# Patient Record
Sex: MALE | Race: WHITE | Employment: UNEMPLOYED | URBAN - METROPOLITAN AREA
[De-identification: names, ages, dates, MRNs, and addresses within clinical notes are randomized per-mention and may not be internally consistent; named-entity substitution may affect disease eponyms.]

---

## 2017-01-02 DIAGNOSIS — Z48.298 AFTERCARE FOLLOWING ORGAN TRANSPLANT: ICD-10-CM

## 2017-01-02 DIAGNOSIS — Z94.0 KIDNEY REPLACED BY TRANSPLANT: ICD-10-CM

## 2017-01-02 LAB
ANION GAP SERPL CALCULATED.3IONS-SCNC: 8 MMOL/L (ref 3–14)
BUN SERPL-MCNC: 23 MG/DL (ref 7–30)
CALCIUM SERPL-MCNC: 10.8 MG/DL (ref 8.5–10.1)
CHLORIDE SERPL-SCNC: 104 MMOL/L (ref 94–109)
CO2 SERPL-SCNC: 24 MMOL/L (ref 20–32)
CREAT SERPL-MCNC: 2.12 MG/DL (ref 0.66–1.25)
DIFFERENTIAL METHOD BLD: NORMAL
EOSINOPHIL NFR BLD AUTO: 4 %
ERYTHROCYTE [DISTWIDTH] IN BLOOD BY AUTOMATED COUNT: 14.6 % (ref 10–15)
GFR SERPL CREATININE-BSD FRML MDRD: 32 ML/MIN/1.7M2
GLUCOSE SERPL-MCNC: 156 MG/DL (ref 70–99)
HCT VFR BLD AUTO: 32 % (ref 40–53)
HGB BLD-MCNC: 10.6 G/DL (ref 13.3–17.7)
LYMPHOCYTES NFR BLD AUTO: 4 %
MAGNESIUM SERPL-MCNC: 1.3 MG/DL (ref 1.6–2.3)
MCH RBC QN AUTO: 32.9 PG (ref 26.5–33)
MCHC RBC AUTO-ENTMCNC: 33.1 G/DL (ref 31.5–36.5)
MCV RBC AUTO: 99 FL (ref 78–100)
MONOCYTES NFR BLD AUTO: 19 %
NEUTROPHILS NFR BLD AUTO: 73 %
PHOSPHATE SERPL-MCNC: 2.1 MG/DL (ref 2.5–4.5)
PLATELET # BLD AUTO: 255 10E9/L (ref 150–450)
PLATELET # BLD EST: NORMAL 10*3/UL
POTASSIUM SERPL-SCNC: 4.8 MMOL/L (ref 3.4–5.3)
RBC # BLD AUTO: 3.22 10E12/L (ref 4.4–5.9)
SODIUM SERPL-SCNC: 136 MMOL/L (ref 133–144)
TACROLIMUS BLD-MCNC: 9.2 UG/L (ref 5–15)
TME LAST DOSE: NORMAL H
WBC # BLD AUTO: 5.2 10E9/L (ref 4–11)

## 2017-01-02 PROCEDURE — 80048 BASIC METABOLIC PNL TOTAL CA: CPT | Performed by: TRANSPLANT SURGERY

## 2017-01-02 PROCEDURE — 83735 ASSAY OF MAGNESIUM: CPT | Performed by: FAMILY MEDICINE

## 2017-01-02 PROCEDURE — 99000 SPECIMEN HANDLING OFFICE-LAB: CPT | Performed by: INTERNAL MEDICINE

## 2017-01-02 PROCEDURE — 85027 COMPLETE CBC AUTOMATED: CPT | Performed by: FAMILY MEDICINE

## 2017-01-02 PROCEDURE — 84100 ASSAY OF PHOSPHORUS: CPT | Performed by: FAMILY MEDICINE

## 2017-01-02 PROCEDURE — 36415 COLL VENOUS BLD VENIPUNCTURE: CPT | Performed by: INTERNAL MEDICINE

## 2017-01-02 PROCEDURE — 80197 ASSAY OF TACROLIMUS: CPT | Performed by: TRANSPLANT SURGERY

## 2017-01-02 PROCEDURE — 80180 DRUG SCRN QUAN MYCOPHENOLATE: CPT | Performed by: TRANSPLANT SURGERY

## 2017-01-03 LAB
MYCOPHENOLATE SERPL LC/MS/MS-MCNC: 3.96 MG/L (ref 1–3.5)
MYCOPHENOLATE-G SERPL LC/MS/MS-MCNC: 144.5 MG/L (ref 30–95)
TME LAST DOSE: ABNORMAL H

## 2017-01-05 ENCOUNTER — OFFICE VISIT (OUTPATIENT)
Dept: NEPHROLOGY | Facility: CLINIC | Age: 62
End: 2017-01-05
Attending: INTERNAL MEDICINE
Payer: MEDICARE

## 2017-01-05 VITALS
HEART RATE: 106 BPM | WEIGHT: 240.6 LBS | OXYGEN SATURATION: 98 % | SYSTOLIC BLOOD PRESSURE: 166 MMHG | DIASTOLIC BLOOD PRESSURE: 82 MMHG | HEIGHT: 72 IN | BODY MASS INDEX: 32.59 KG/M2 | TEMPERATURE: 98.1 F

## 2017-01-05 DIAGNOSIS — D63.1 ANEMIA IN CHRONIC RENAL DISEASE: ICD-10-CM

## 2017-01-05 DIAGNOSIS — K59.09 OTHER CONSTIPATION: Primary | ICD-10-CM

## 2017-01-05 DIAGNOSIS — Z48.298 AFTERCARE FOLLOWING ORGAN TRANSPLANT: ICD-10-CM

## 2017-01-05 DIAGNOSIS — E55.9 VITAMIN D DEFICIENCY: ICD-10-CM

## 2017-01-05 DIAGNOSIS — I15.1 HYPERTENSION SECONDARY TO OTHER RENAL DISORDERS: ICD-10-CM

## 2017-01-05 DIAGNOSIS — Z94.0 KIDNEY REPLACED BY TRANSPLANT: ICD-10-CM

## 2017-01-05 DIAGNOSIS — D84.9 IMMUNOSUPPRESSED STATUS (H): Chronic | ICD-10-CM

## 2017-01-05 DIAGNOSIS — N18.9 ANEMIA IN CHRONIC RENAL DISEASE: ICD-10-CM

## 2017-01-05 DIAGNOSIS — N25.81 SECONDARY RENAL HYPERPARATHYROIDISM (H): ICD-10-CM

## 2017-01-05 DIAGNOSIS — E83.42 HYPOMAGNESEMIA: ICD-10-CM

## 2017-01-05 PROCEDURE — 99212 OFFICE O/P EST SF 10 MIN: CPT | Mod: ZF

## 2017-01-05 RX ORDER — POLYETHYLENE GLYCOL 3350 17 G/17G
17 POWDER, FOR SOLUTION ORAL 2 TIMES DAILY
Qty: 30 PACKET | Refills: 3 | Status: SHIPPED | OUTPATIENT
Start: 2017-01-05 | End: 2017-03-02

## 2017-01-05 ASSESSMENT — ENCOUNTER SYMPTOMS
RECTAL PAIN: 0
EXERCISE INTOLERANCE: 1
PARALYSIS: 0
SPEECH CHANGE: 0
TREMORS: 0
WEIGHT LOSS: 1
ORTHOPNEA: 0
DISTURBANCES IN COORDINATION: 0
BLOATING: 0
LEG SWELLING: 0
NUMBNESS: 1
CLAUDICATION: 0
JAUNDICE: 0
POLYPHAGIA: 0
CONSTIPATION: 1
DIZZINESS: 0
SYNCOPE: 0
BOWEL INCONTINENCE: 0
FEVER: 0
PALPITATIONS: 0
BLOOD IN STOOL: 0
ABDOMINAL PAIN: 0
LEG PAIN: 0
ALTERED TEMPERATURE REGULATION: 0
DIARRHEA: 0
HALLUCINATIONS: 0
WEIGHT GAIN: 0
MEMORY LOSS: 0
LIGHT-HEADEDNESS: 0
HEARTBURN: 1
HEADACHES: 0
LOSS OF CONSCIOUSNESS: 0
SEIZURES: 0
CHILLS: 0
HYPOTENSION: 0
POLYDIPSIA: 0
WEAKNESS: 1
TACHYCARDIA: 1
INCREASED ENERGY: 1
HYPERTENSION: 1
FATIGUE: 1
NIGHT SWEATS: 0
RECTAL BLEEDING: 0
NAUSEA: 1
DECREASED APPETITE: 1
VOMITING: 1
SLEEP DISTURBANCES DUE TO BREATHING: 0
TINGLING: 1

## 2017-01-05 ASSESSMENT — PAIN SCALES - GENERAL: PAINLEVEL: MILD PAIN (2)

## 2017-01-05 NOTE — Clinical Note
1/5/2017       RE: Corey Roland  [address]  [facility] [id number]     Dear Colleague,    Thank you for referring your patient, Corey Roland, to the [facility] at [facility]. Please see a copy of my visit note below.    Assessment and Plan:  1. DDKT - baseline Cr ~ 2.0-2.2, which has been fairly stable after early slow graft function.  He is a little below his dry weight.  Mild proteinuria.  No DSA.  Creatinine is a bit higher than expected and would recommend a kidney transplant biopsy to rule out a simmering rejection.  Will make no changes in immunosuppression.  2. HTN - well controlled at target of less than 140/90.  Eventually, with patient's DM, would consider addition of ARB, but with higher serum creatinine, would hold off on changes at this time.  3. DM - good control.  4. Anemia in chronic renal disease - stable Hgb.  Iron replete.  Will follow.  5. Secondary renal hyperparathyroidism - moderately elevated PTH, which should improve post transplant.  Will continue on calcitriol and treat vitamin D deficiency.  However, if serum calcium remains elevated, would consider changing calcitriol to cinacalcet.  Will recheck PTH at 3 months post transplant.  6. Vitamin D deficiency - low vitamin D level and will continue on cholecalciferol.  7. Hyperphosphatemia - slightly low serum phosphorus and recommend increased dietary phosphorus.  Will continue on calcitriol and cholecalciferol.  8. Hypercalcemia - high serum calcium level and will stop oral calcium supplement.  If high serum calcium persists, would change calcitriol to cinacalcet.  9. Hypomagnesemia - low serum magnesium level and will start oral magnesium oxide 400 mg daily.  10. Metabolic acidosis - normal serum bicarbonate and will continue on oral sodium bicarbonate supplement.  11. Constipation - continue on senna-docusate and will start Miralax daily.  Okay to do enema at home if  "no results with bowel regimen.  12. Recommend return visit at 3 months post transplant.    Assessment and plan was discussed with patient and he voiced his understanding and agreement.    Reason for Visit:  Mr. Roland is here for routine follow up.    HPI:   Corey Roland is a 61 year old male with ESKD from DM and is status post DDKT on 11/24/16.         Transplant Hx:       Tx: DDKT  Date: 11/24/16       Present Maintenance IS: Tacrolimus and Mycophenolate mofetil       Baseline Creatinine: 2.0-2.2       Recent DSA: No  Date last checked: 12/2016       Biopsy: No    Mr. Roland reports feeling okay overall with some medical problems.  His energy level has been a bit better, but not quite normal.  He is active, although gets minimal exercise.  Denies any chest pain, but some shortness of breath with exertion.  Appetite is still \"lousy,\" as he just gets full easy  He has lost about 15 lbs, mostly water weight, since transplant.  Patient has been off furosemide now for about 2 weeks.  Occasional nausea and vomiting.  No diarrhea, but some constipation with no bowel movement for the last 4 days.  No fever, sweats or chills.  Leg swelling is much improved.    Home BP: 120-130/70s, although says it has been running higher the last few days.      ROS:   A comprehensive review of systems was obtained and negative, except as noted in the HPI or PMH.    Active Medical Problems:  Patient Active Problem List   Diagnosis     Diabetes mellitus, type 2 (H)     Hypertension secondary to other renal disorders     Anemia in chronic renal disease     Secondary renal hyperparathyroidism (H)     Obesity     Diabetic peripheral neuropathy (H)     Kidney replaced by transplant     Immunosuppressed status (H)     Type 1 diabetes mellitus with nephropathy (H)     Aftercare following organ transplant     Vitamin D deficiency     Postoperative infection     Complications, kidney transplant       Personal Hx:  Social History     Social " History     Marital Status: Single     Spouse Name: N/A     Number of Children: 0     Years of Education: N/A     Occupational History     Not on file.     Social History Main Topics     Smoking status: Former Smoker -- 2.00 packs/day for 38 years     Types: Cigarettes     Quit date: 04/01/2005     Smokeless tobacco: Not on file     Alcohol Use: 0.0 - 0.5 oz/week     0-1 Standard drinks or equivalent per week      Comment: Not since transplant     Drug Use: No      Comment: past (1980's, marijuana, heroin IV, cocaine, hallucinogens, methamphetamine)     Sexual Activity: No     Other Topics Concern     Not on file     Social History Narrative       Allergies:  No Known Allergies    Medications:  Prior to Admission medications    Medication Sig Start Date End Date Taking? Authorizing Provider   tacrolimus (PROGRAF - GENERIC EQUIVALENT) 1 MG capsule Take 5 capsules (5 mg) by mouth 2 times daily 12/4/16   Erasmo Wilkins MD   tacrolimus (PROGRAF - GENERIC EQUIVALENT) 0.5 MG capsule Take 1 capsule (0.5 mg) by mouth 2 times daily 12/3/16   Abdiaziz Qureshi MD   oxyCODONE (ROXICODONE) 5 MG IR tablet Take 1-2 tablets (5-10 mg) by mouth every 6 hours as needed for moderate to severe pain 12/2/16   Talisha Dunham PA-C   aspirin EC 81 MG EC tablet Take 1 tablet (81 mg) by mouth daily 12/2/16   Talisha Dunham PA-C   calcium carbonate (TUMS) 500 MG chewable tablet Take 1 tablet (500 mg) by mouth 2 times daily as needed for heartburn 12/2/16   Talisha Dunham PA-C   insulin aspart (NOVOLOG PEN) 100 UNIT/ML injection Inject 1 Units Subcutaneous Take with snacks or supplements for high blood sugar 1 unit per 4 grams carbohydrate 12/2/16   Talisha Dunham PA-C   insulin aspart (NOVOLOG PEN) 100 UNIT/ML injection Inject 1 Units Subcutaneous 3 times daily (with meals) DOSE:  1 units per 4 grams of carbohydrate.  Only chart total amount of units given.  Do not give if pre-prandial glucose is less than 60  mg/dL. 12/2/16   Talisha Dunham PA-C   insulin aspart (NOVOLOG PEN) 100 UNIT/ML injection Inject 1-22 Units Subcutaneous 3 times daily (before meals) Correction Scale - VERY HIGH INSULIN RESISTANCE DOSING     Do Not give Correction Insulin if Pre-Meal BG less than 250.   For Pre-Meal  - 260 give 1 unit.   For Pre-Meal -269  give 2 units.   For Pre-Meal  - 279 give 3 units.   For Pre-Meal  - 289 give 4 units.   For Pre-Meal  - 299 give 5 units.   For Pre-Meal  - 309 give 6 units.   ... 12/2/16   Talisha Dunham PA-C   insulin aspart (NOVOLOG PEN) 100 UNIT/ML injection Inject 1-16 Units Subcutaneous At Bedtime Correction Scale - VERY HIGH INSULIN RESISTANCE DOSING     Do Not give Bedtime Correction Insulin if BG less than 200.   For  - 209 give 1 units.   For  - 219 give 2 units.   For  - 229 give 3 units.   For  - 239 give 4 units.   For  - 249 give 5 units.   For  - 259 give 6 units.   For  - 269 give 7 units.   For  - 279 give 8 units  ... 12/2/16   Talisha Dunham PA-C   insulin glargine (LANTUS) 100 UNIT/ML injection Inject 16 Units Subcutaneous At Bedtime 12/2/16   Talisha Dunham PA-C   insulin glargine (LANTUS) 100 UNIT/ML injection Inject 34 Units Subcutaneous every morning 12/3/16   Talisha Dunham PA-C   atorvastatin (LIPITOR) 20 MG tablet Take 0.5 tablets (10 mg) by mouth daily 12/2/16   Talisha Dunham PA-C   cloNIDine (CATAPRES) 0.1 MG tablet Take 1 tablet (0.1 mg) by mouth every 8 hours as needed (Please take for SBP >180.)  Patient taking differently: Take 0.2 mg by mouth every 8 hours as needed (Please take for SBP >180.)  12/2/16   Talisha Dunham PA-C   valACYclovir (VALTREX) 500 MG tablet Take 1 tablet (500 mg) by mouth daily 12/2/16   Talisha Dunham PA-C   carvedilol (COREG) 25 MG tablet Take 1 tablet (25 mg) by mouth 2 times daily (with meals) 12/2/16   Talisha Dunham  GLADIS Jessica   furosemide (LASIX) 80 MG tablet Take 1 tablet (80 mg) by mouth 2 times daily 12/2/16   Talisha Dunham PA-C   senna-docusate (SENOKOT-S;PERICOLACE) 8.6-50 MG per tablet Take 2 tablets by mouth 2 times daily 12/2/16   Talisha Dunham PA-C   sulfamethoxazole-trimethoprim (BACTRIM/SEPTRA) 400-80 MG per tablet Take 1 tablet by mouth three times a week 12/2/16   Talisha Dunham PA-C   mycophenolate (CELLCEPT - GENERIC EQUIVALENT) 250 MG capsule Take 4 capsules (1,000 mg) by mouth 2 times daily 12/2/16   Talisha Dunham PA-C   calcitRIOL (ROCALTROL) 0.25 MCG capsule Take 1 capsule (0.25 mcg) by mouth daily 12/2/16   Talisha Dunham PA-C   amoxicillin (AMOXIL) 500 MG capsule Take 1 capsule (500 mg) by mouth daily for 10 days 12/2/16 12/12/16  Talisha Dunham PA-C   pantoprazole (PROTONIX) 40 MG EC tablet Take 1 tablet (40 mg) by mouth daily 12/2/16   Talisha Dunham PA-C   NIFEdipine ER (ADALAT CC) 30 MG TB24 Take 2 tablets (60 mg) by mouth 2 times daily 12/2/16   Talisha Dunham PA-C   betamethasone dipropionate (DIPROSONE) 0.05 % ointment Apply topically 2 times daily    Reported, Patient       Vitals:  /82 mmHg  Pulse 106  Temp(Src) 98.1  F (36.7  C) (Oral)  Ht 1.829 m (6')  Wt 109.135 kg (240 lb 9.6 oz)  BMI 32.62 kg/m2  SpO2 98%    Exam:   GENERAL APPEARANCE: alert and no distress  HENT: mouth without ulcers or lesions  LYMPHATICS: no cervical or supraclavicular nodes  RESP: lungs clear to auscultation - no rales, rhonchi or wheezes  CV: regular rhythm, normal rate, no rub, no murmur  EDEMA: trace LE edema bilaterally  ABDOMEN: soft, nondistended, nontender, bowel sounds normal, overweight  MS: extremities normal - no gross deformities noted, no evidence of inflammation in joints, no muscle tenderness  SKIN: no rash  TX KIDNEY: minimal TTP, decreased erythema, no discharge    Results:   Reviewed.    Sincerely,    Abdiaziz Qureshi MD

## 2017-01-05 NOTE — MR AVS SNAPSHOT
After Visit Summary   1/5/2017    Corey Roland    MRN: [id number]           Patient Information     Date Of Birth          1955        Visit Information        Provider Department      1/5/2017 1:25 PM Abdiaziz Qureshi MD [facility] Nephrology        Today's Diagnoses     Other constipation    -  1     Hypomagnesemia            Follow-ups after your visit        Your next 10 appointments already scheduled     Jan 06, 2017  7:30 AM   LAB with CP LAB   [facility] ([facility])    [address] [address]   [phone]           Patient must bring picture ID.  Patient should be prepared to give a urine specimen  Please do not eat 10-12 hours before your appointment if you are coming in fasting for labs on lipids, cholesterol, or glucose (sugar).  Pregnant women should follow their Care Team instructions. Water with medications is okay. Do not drink coffee or other fluids.   If you have concerns about taking  your medications, please ask at office or if scheduling via [employer], send a message by clicking on Secure Messaging, Message Your Care Team.            Jan 09, 2017  7:30 AM   LAB with CP LAB   [facility] ([facility])    [address] [address]   [phone]           Patient must bring picture ID.  Patient should be prepared to give a urine specimen  Please do not eat 10-12 hours before your appointment if you are coming in fasting for labs on lipids, cholesterol, or glucose (sugar).  Pregnant women should follow their Care Team instructions. Water with medications is okay. Do not drink coffee or other fluids.   If you have concerns about taking  your medications, please ask at office or if scheduling via [employer], send a message by clicking on Secure Messaging, Message Your Care Team.            Jan 13, 2017  7:45 AM    LAB with CP LAB   [facility]    [address] [address]   [phone]           Patient must bring picture ID.  Patient should be prepared to give a urine specimen  Please do not eat 10-12 hours before your appointment if you are coming in fasting for labs on lipids, cholesterol, or glucose (sugar).  Pregnant women should follow their Care Team instructions. Water with medications is okay. Do not drink coffee or other fluids.   If you have concerns about taking  your medications, please ask at office or if scheduling via [employer], send a message by clicking on Secure Messaging, Message Your Care Team.            Jan 16, 2017  7:30 AM   LAB with CP LAB   [facility]    [address] [address]   [phone]           Patient must bring picture ID.  Patient should be prepared to give a urine specimen  Please do not eat 10-12 hours before your appointment if you are coming in fasting for labs on lipids, cholesterol, or glucose (sugar).  Pregnant women should follow their Care Team instructions. Water with medications is okay. Do not drink coffee or other fluids.   If you have concerns about taking  your medications, please ask at office or if scheduling via [employer], send a message by clicking on Secure Messaging, Message Your Care Team.            Jan 20, 2017  7:30 AM   LAB with CP LAB   [facility]    [address] [address]   [phone]           Patient must bring picture ID.  Patient should be prepared to give a urine specimen  Please do not eat 10-12 hours before your appointment if you are coming in fasting for labs on lipids, cholesterol, or glucose (sugar).  Pregnant women should follow their Care Team instructions. Water  with medications is okay. Do not drink coffee or other fluids.   If you have concerns about taking  your medications, please ask at office or if scheduling via [employer], send a message by clicking on Secure Messaging, Message Your Care Team.            Jan 23, 2017  7:30 AM   LAB with CP LAB   [facility] ([facility])    [address] [address]   [phone]           Patient must bring picture ID.  Patient should be prepared to give a urine specimen  Please do not eat 10-12 hours before your appointment if you are coming in fasting for labs on lipids, cholesterol, or glucose (sugar).  Pregnant women should follow their Care Team instructions. Water with medications is okay. Do not drink coffee or other fluids.   If you have concerns about taking  your medications, please ask at office or if scheduling via [employer], send a message by clicking on Secure Messaging, Message Your Care Team.            Jan 27, 2017  7:30 AM   LAB with CP LAB   [facility] ([facility])    [address] [address]   [phone]           Patient must bring picture ID.  Patient should be prepared to give a urine specimen  Please do not eat 10-12 hours before your appointment if you are coming in fasting for labs on lipids, cholesterol, or glucose (sugar).  Pregnant women should follow their Care Team instructions. Water with medications is okay. Do not drink coffee or other fluids.   If you have concerns about taking  your medications, please ask at office or if scheduling via [employer], send a message by clicking on Secure Messaging, Message Your Care Team.            Jan 30, 2017  7:30 AM   LAB with CP LAB   [facility] ([facility])    [address] [address]   [phone]           Patient must  bring picture ID.  Patient should be prepared to give a urine specimen  Please do not eat 10-12 hours before your appointment if you are coming in fasting for labs on lipids, cholesterol, or glucose (sugar).  Pregnant women should follow their Care Team instructions. Water with medications is okay. Do not drink coffee or other fluids.   If you have concerns about taking  your medications, please ask at office or if scheduling via [employer], send a message by clicking on Secure Messaging, Message Your Care Team.            Feb 03, 2017  7:30 AM   LAB with CP LAB   [facility]    [address] [id number] [phone]           Patient must bring picture ID.  Patient should be prepared to give a urine specimen  Please do not eat 10-12 hours before your appointment if you are coming in fasting for labs on lipids, cholesterol, or glucose (sugar).  Pregnant women should follow their Care Team instructions. Water with medications is okay. Do not drink coffee or other fluids.   If you have concerns about taking  your medications, please ask at office or if scheduling via [employer], send a message by clicking on Secure Messaging, Message Your Care Team.            Mar 02, 2017  1:25 PM   (Arrive by 12:55 PM)   Return Kidney Transplant with Abdiaziz Qureshi MD   [facility]ph[facility] ([facility])    [address] [place] [address] [phone]              Who to contact     If you have questions or need follow up information about today's clinic visit or your schedule please contact [facility] NEPHROLOGY directly at [phone].  Normal or non-critical lab and imaging results will be communicated to you by MyChart, letter or phone within 4 business days after the clinic has received the results. If you do not hear from us within 7 days, please contact the clinic through [employer]  or phone. If you have a critical or abnormal lab result, we will notify you by phone as soon as possible.  Submit refill requests through [employer] or call your pharmacy and they will forward the refill request to us. Please allow 3 business days for your refill to be completed.          Additional Information About Your Visit        [employer] Information     [employer] gives you secure access to your electronic health record. If you see a primary care provider, you can also send messages to your care team and make appointments. If you have questions, please call your primary care clinic.  If you do not have a primary care provider, please call [phone] and they will assist you.        Care EveryWhere ID     This is your Care EveryWhere ID. This could be used by other organizations to access your [facility] medical records  [id number]        Your Vitals Were     Pulse Temperature Height BMI (Body Mass Index) Pulse Oximetry       106 98.1  F (36.7  C) (Oral) 1.829 m (6') 32.62 kg/m2 98%        Blood Pressure from Last 3 Encounters:   01/05/17 166/82   12/23/16 166/68   12/22/16 117/73    Weight from Last 3 Encounters:   01/05/17 109.135 kg (240 lb 9.6 oz)   12/16/16 115.5 kg (254 lb 10.1 oz)   12/12/16 116.212 kg (256 lb 3.2 oz)              Today, you had the following     No orders found for display         Today's Medication Changes          These changes are accurate as of: 1/5/17  2:09 PM.  If you have any questions, ask your nurse or doctor.               Start taking these medicines.        Dose/Directions    magnesium oxide 400 (241.3 MG) MG tablet   Commonly known as:  MAG-OX   Used for:  Hypomagnesemia   Started by:  Abdiaziz Qureshi MD        Dose:  400 mg   Take 1 tablet (400 mg) by mouth daily   Quantity:  90 tablet   Refills:  3       polyethylene glycol Packet   Commonly known as:  MIRALAX/GLYCOLAX   Used for:  Other constipation   Started by:  Abdiaziz Qureshi MD        Dose:  17 g   Take 17  g by mouth 2 times daily   Quantity:  30 packet   Refills:  3         These medicines have changed or have updated prescriptions.        Dose/Directions    * insulin glargine 100 UNIT/ML injection   Commonly known as:  LANTUS   This may have changed:  how much to take   Used for:  Kidney transplant recipient        Dose:  16 Units   Inject 16 Units Subcutaneous At Bedtime   Refills:  0       * insulin glargine 100 UNIT/ML injection   Commonly known as:  LANTUS   This may have changed:  how much to take   Used for:  Kidney transplant recipient        Dose:  34 Units   Inject 34 Units Subcutaneous every morning   Refills:  0       * Notice:  This list has 2 medication(s) that are the same as other medications prescribed for you. Read the directions carefully, and ask your doctor or other care provider to review them with you.      Stop taking these medicines if you haven't already. Please contact your care team if you have questions.     calcium carbonate 500 MG chewable tablet   Commonly known as:  TUMS   Stopped by:  Abdiaziz Qureshi MD           furosemide 20 MG tablet   Commonly known as:  LASIX   Stopped by:  Abdiaziz Qureshi MD           oxyCODONE 5 MG IR tablet   Commonly known as:  ROXICODONE   Stopped by:  Abdiaziz Qureshi MD                Where to get your medicines      These medications were sent to [id number] - [place], [place] - [address]  [address] [address]     Phone:  [phone]    - magnesium oxide 400 (241.3 MG) MG tablet  - polyethylene glycol Packet             Primary Care Provider Office Phone # Fax #    Dustin Tadeo [phone]       [facility] [address] [place] [id number]        Thank you!     Thank you for choosing [facility] NEPHROLOGY  for your care. Our goal is always to provide you with excellent care. Hearing back from our patients is one way we can continue to improve our  services. Please take a few minutes to complete the written survey that you may receive in the mail after your visit with us. Thank you!             Your Updated Medication List - Protect others around you: Learn how to safely use, store and throw away your medicines at www.disposemymeds.org.          This list is accurate as of: 1/5/17  2:09 PM.  Always use your most recent med list.                   Brand Name Dispense Instructions for use    aspirin 81 MG EC tablet     30 tablet    Take 1 tablet (81 mg) by mouth daily       atorvastatin 20 MG tablet    LIPITOR    30 tablet    Take 0.5 tablets (10 mg) by mouth daily       betamethasone dipropionate 0.05 % ointment    DIPROSONE     Apply topically 2 times daily       calcitRIOL 0.25 MCG capsule    ROCALTROL    60 capsule    Take 1 capsule (0.25 mcg) by mouth daily       carvedilol 25 MG tablet    COREG    60 tablet    Take 1 tablet (25 mg) by mouth 2 times daily (with meals)       cloNIDine 0.1 MG tablet    CATAPRES    90 tablet    Take 3 tablets (0.3 mg) by mouth every 8 hours as needed (Please take for SBP >180.)       * insulin aspart 100 UNIT/ML injection    NovoLOG PEN    1 mL    Inject 1 Units Subcutaneous Take with snacks or supplements for high blood sugar 1 unit per 4 grams carbohydrate       * insulin aspart 100 UNIT/ML injection    NovoLOG PEN     Inject 1 Units Subcutaneous 3 times daily (with meals) DOSE:  1 units per 4 grams of carbohydrate.  Only chart total amount of units given.  Do not give if pre-prandial glucose is less than 60 mg/dL.       * insulin aspart 100 UNIT/ML injection    NovoLOG PEN     Inject 1-22 Units Subcutaneous 3 times daily (before meals) Correction Scale - VERY HIGH INSULIN RESISTANCE DOSING    Do Not give Correction Insulin if Pre-Meal BG less than 250.  For Pre-Meal  - 260 give 1 unit.  For Pre-Meal -269  give 2 units.  For Pre-Meal  - 279 give 3 units.  For Pre-Meal  - 289 give 4 units.  For  Pre-Meal  - 299 give 5 units.  For Pre-Meal  - 309 give 6 units.  ...       * insulin aspart 100 UNIT/ML injection    NovoLOG PEN     Inject 1-16 Units Subcutaneous At Bedtime Correction Scale - VERY HIGH INSULIN RESISTANCE DOSING    Do Not give Bedtime Correction Insulin if BG less than 200.  For  - 209 give 1 units.  For  - 219 give 2 units.  For  - 229 give 3 units.  For  - 239 give 4 units.  For  - 249 give 5 units.  For  - 259 give 6 units.  For  - 269 give 7 units.  For  - 279 give 8 units ...       * insulin glargine 100 UNIT/ML injection    LANTUS     Inject 16 Units Subcutaneous At Bedtime       * insulin glargine 100 UNIT/ML injection    LANTUS     Inject 34 Units Subcutaneous every morning       magnesium oxide 400 (241.3 MG) MG tablet    MAG-OX    90 tablet    Take 1 tablet (400 mg) by mouth daily       mycophenolate 250 MG capsule    CELLCEPT - GENERIC EQUIVALENT    180 capsule    Take 3 capsules (750 mg) by mouth 2 times daily       NIFEdipine ER 30 MG Tb24    ADALAT CC    30 tablet    Take 2 tablets (60 mg) by mouth 2 times daily       omeprazole 20 MG tablet     30 tablet    Take 1 tablet (20 mg) by mouth every morning       polyethylene glycol Packet    MIRALAX/GLYCOLAX    30 packet    Take 17 g by mouth 2 times daily       senna-docusate 8.6-50 MG per tablet    SENOKOT-S;PERICOLACE    60 tablet    Take 2 tablets by mouth 2 times daily       sodium bicarbonate 650 MG tablet     60 tablet    Take 1 tablet (650 mg) by mouth 2 times daily       sulfamethoxazole-trimethoprim 400-80 MG per tablet    BACTRIM/SEPTRA    30 tablet    Take 1 tablet by mouth three times a week       tacrolimus 1 MG capsule    PROGRAF - GENERIC EQUIVALENT    480 capsule    Take 8 capsules (8 mg) by mouth every 12 hours       valACYclovir 500 MG tablet    VALTREX    30 tablet    Take 1 tablet (500 mg) by mouth daily       * Notice:  This list has 6 medication(s) that  are the same as other medications prescribed for you. Read the directions carefully, and ask your doctor or other care provider to review them with you.

## 2017-01-05 NOTE — Clinical Note
1/5/2017      RE: Corey Roland  [address]  [facility] [id number]       Assessment and Plan:  1. DDKT - baseline Cr ~ 2.0-2.2, which has been fairly stable after early slow graft function.  He is a little below his dry weight.  Mild proteinuria.  No DSA.  Creatinine is a bit higher than expected and would recommend a kidney transplant biopsy to rule out a simmering rejection.  Will make no changes in immunosuppression.  2. HTN - well controlled at target of less than 140/90.  Eventually, with patient's DM, would consider addition of ARB, but with higher serum creatinine, would hold off on changes at this time.  3. DM - good control.  4. Anemia in chronic renal disease - stable Hgb.  Iron replete.  Will follow.  5. Secondary renal hyperparathyroidism - moderately elevated PTH, which should improve post transplant.  Will continue on calcitriol and treat vitamin D deficiency.  However, if serum calcium remains elevated, would consider changing calcitriol to cinacalcet.  Will recheck PTH at 3 months post transplant.  6. Vitamin D deficiency - low vitamin D level and will continue on cholecalciferol.  7. Hyperphosphatemia - slightly low serum phosphorus and recommend increased dietary phosphorus.  Will continue on calcitriol and cholecalciferol.  8. Hypercalcemia - high serum calcium level and will stop oral calcium supplement.  If high serum calcium persists, would change calcitriol to cinacalcet.  9. Hypomagnesemia - low serum magnesium level and will start oral magnesium oxide 400 mg daily.  10. Metabolic acidosis - normal serum bicarbonate and will continue on oral sodium bicarbonate supplement.  11. Constipation - continue on senna-docusate and will start Miralax daily.  Okay to do enema at home if no results with bowel regimen.  12. Recommend return visit at 3 months post transplant.    Assessment and plan was discussed with patient and he voiced his understanding and agreement.    Reason for  "Visit:  Mr. Roland is here for routine follow up.    HPI:   Corey Magdaleno Roland is a 61 year old male with ESKD from DM and is status post DDKT on 11/24/16.         Transplant Hx:       Tx: DDKT  Date: 11/24/16       Present Maintenance IS: Tacrolimus and Mycophenolate mofetil       Baseline Creatinine: 2.0-2.2       Recent DSA: No  Date last checked: 12/2016       Biopsy: No    Mr. Roland reports feeling okay overall with some medical problems.  His energy level has been a bit better, but not quite normal.  He is active, although gets minimal exercise.  Denies any chest pain, but some shortness of breath with exertion.  Appetite is still \"lousy,\" as he just gets full easy  He has lost about 15 lbs, mostly water weight, since transplant.  Patient has been off furosemide now for about 2 weeks.  Occasional nausea and vomiting.  No diarrhea, but some constipation with no bowel movement for the last 4 days.  No fever, sweats or chills.  Leg swelling is much improved.    Home BP: 120-130/70s, although says it has been running higher the last few days.      ROS:   A comprehensive review of systems was obtained and negative, except as noted in the HPI or PMH.    Active Medical Problems:  Patient Active Problem List   Diagnosis     Diabetes mellitus, type 2 (H)     Hypertension secondary to other renal disorders     Anemia in chronic renal disease     Secondary renal hyperparathyroidism (H)     Obesity     Diabetic peripheral neuropathy (H)     Kidney replaced by transplant     Immunosuppressed status (H)     Type 1 diabetes mellitus with nephropathy (H)     Aftercare following organ transplant     Vitamin D deficiency     Postoperative infection     Complications, kidney transplant       Personal Hx:  Social History     Social History     Marital Status: Single     Spouse Name: N/A     Number of Children: 0     Years of Education: N/A     Occupational History     Not on file.     Social History Main Topics     Smoking " status: Former Smoker -- 2.00 packs/day for 38 years     Types: Cigarettes     Quit date: 04/01/2005     Smokeless tobacco: Not on file     Alcohol Use: 0.0 - 0.5 oz/week     0-1 Standard drinks or equivalent per week      Comment: Not since transplant     Drug Use: No      Comment: past (1980's, marijuana, heroin IV, cocaine, hallucinogens, methamphetamine)     Sexual Activity: No     Other Topics Concern     Not on file     Social History Narrative       Allergies:  No Known Allergies    Medications:  Prior to Admission medications    Medication Sig Start Date End Date Taking? Authorizing Provider   tacrolimus (PROGRAF - GENERIC EQUIVALENT) 1 MG capsule Take 5 capsules (5 mg) by mouth 2 times daily 12/4/16   Erasmo Wilkins MD   tacrolimus (PROGRAF - GENERIC EQUIVALENT) 0.5 MG capsule Take 1 capsule (0.5 mg) by mouth 2 times daily 12/3/16   Abdiaziz Qureshi MD   oxyCODONE (ROXICODONE) 5 MG IR tablet Take 1-2 tablets (5-10 mg) by mouth every 6 hours as needed for moderate to severe pain 12/2/16   Talisha Dunham PA-C   aspirin EC 81 MG EC tablet Take 1 tablet (81 mg) by mouth daily 12/2/16   Talisha Dunham PA-C   calcium carbonate (TUMS) 500 MG chewable tablet Take 1 tablet (500 mg) by mouth 2 times daily as needed for heartburn 12/2/16   Talisha Dunham PA-C   insulin aspart (NOVOLOG PEN) 100 UNIT/ML injection Inject 1 Units Subcutaneous Take with snacks or supplements for high blood sugar 1 unit per 4 grams carbohydrate 12/2/16   Talisha Dunham PA-C   insulin aspart (NOVOLOG PEN) 100 UNIT/ML injection Inject 1 Units Subcutaneous 3 times daily (with meals) DOSE:  1 units per 4 grams of carbohydrate.  Only chart total amount of units given.  Do not give if pre-prandial glucose is less than 60 mg/dL. 12/2/16   Talisha Dunham PA-C   insulin aspart (NOVOLOG PEN) 100 UNIT/ML injection Inject 1-22 Units Subcutaneous 3 times daily (before meals) Correction Scale - VERY HIGH INSULIN  RESISTANCE DOSING     Do Not give Correction Insulin if Pre-Meal BG less than 250.   For Pre-Meal  - 260 give 1 unit.   For Pre-Meal -269  give 2 units.   For Pre-Meal  - 279 give 3 units.   For Pre-Meal  - 289 give 4 units.   For Pre-Meal  - 299 give 5 units.   For Pre-Meal  - 309 give 6 units.   ... 12/2/16   Talisha Dunham PA-C   insulin aspart (NOVOLOG PEN) 100 UNIT/ML injection Inject 1-16 Units Subcutaneous At Bedtime Correction Scale - VERY HIGH INSULIN RESISTANCE DOSING     Do Not give Bedtime Correction Insulin if BG less than 200.   For  - 209 give 1 units.   For  - 219 give 2 units.   For  - 229 give 3 units.   For  - 239 give 4 units.   For  - 249 give 5 units.   For  - 259 give 6 units.   For  - 269 give 7 units.   For  - 279 give 8 units  ... 12/2/16   Talisha Dunham PA-C   insulin glargine (LANTUS) 100 UNIT/ML injection Inject 16 Units Subcutaneous At Bedtime 12/2/16   Talisha Dunham PA-C   insulin glargine (LANTUS) 100 UNIT/ML injection Inject 34 Units Subcutaneous every morning 12/3/16   Talisha Dunham PA-C   atorvastatin (LIPITOR) 20 MG tablet Take 0.5 tablets (10 mg) by mouth daily 12/2/16   Talisha Dunham PA-C   cloNIDine (CATAPRES) 0.1 MG tablet Take 1 tablet (0.1 mg) by mouth every 8 hours as needed (Please take for SBP >180.)  Patient taking differently: Take 0.2 mg by mouth every 8 hours as needed (Please take for SBP >180.)  12/2/16   Talisha Dunham PA-C   valACYclovir (VALTREX) 500 MG tablet Take 1 tablet (500 mg) by mouth daily 12/2/16   Talisha Dunham PA-C   carvedilol (COREG) 25 MG tablet Take 1 tablet (25 mg) by mouth 2 times daily (with meals) 12/2/16   Talisha Dunham PA-C   furosemide (LASIX) 80 MG tablet Take 1 tablet (80 mg) by mouth 2 times daily 12/2/16   Talisha Dunham PA-C   senna-docusate (SENOKOT-S;PERICOLACE) 8.6-50 MG per tablet Take 2  tablets by mouth 2 times daily 12/2/16   Talisha Dunham PA-C   sulfamethoxazole-trimethoprim (BACTRIM/SEPTRA) 400-80 MG per tablet Take 1 tablet by mouth three times a week 12/2/16   Talisha Dunham PA-C   mycophenolate (CELLCEPT - GENERIC EQUIVALENT) 250 MG capsule Take 4 capsules (1,000 mg) by mouth 2 times daily 12/2/16   Talisha Dunham PA-C   calcitRIOL (ROCALTROL) 0.25 MCG capsule Take 1 capsule (0.25 mcg) by mouth daily 12/2/16   Talisha Dunham PA-C   amoxicillin (AMOXIL) 500 MG capsule Take 1 capsule (500 mg) by mouth daily for 10 days 12/2/16 12/12/16  Talisha Dunham PA-C   pantoprazole (PROTONIX) 40 MG EC tablet Take 1 tablet (40 mg) by mouth daily 12/2/16   Talisha Dunham PA-C   NIFEdipine ER (ADALAT CC) 30 MG TB24 Take 2 tablets (60 mg) by mouth 2 times daily 12/2/16   Talisha Dunham PA-C   betamethasone dipropionate (DIPROSONE) 0.05 % ointment Apply topically 2 times daily    Reported, Patient       Vitals:  /82 mmHg  Pulse 106  Temp(Src) 98.1  F (36.7  C) (Oral)  Ht 1.829 m (6')  Wt 109.135 kg (240 lb 9.6 oz)  BMI 32.62 kg/m2  SpO2 98%    Exam:   GENERAL APPEARANCE: alert and no distress  HENT: mouth without ulcers or lesions  LYMPHATICS: no cervical or supraclavicular nodes  RESP: lungs clear to auscultation - no rales, rhonchi or wheezes  CV: regular rhythm, normal rate, no rub, no murmur  EDEMA: trace LE edema bilaterally  ABDOMEN: soft, nondistended, nontender, bowel sounds normal, overweight  MS: extremities normal - no gross deformities noted, no evidence of inflammation in joints, no muscle tenderness  SKIN: no rash  TX KIDNEY: minimal TTP, decreased erythema, no discharge    Results:   Reviewed.    Abdiaziz Qureshi MD

## 2017-01-05 NOTE — NURSING NOTE
Chief Complaint   Patient presents with     RECHECK     1 mo post op       Initial /82 mmHg  Pulse 106  Temp(Src) 98.1  F (36.7  C) (Oral)  Ht 1.829 m (6')  Wt 109.135 kg (240 lb 9.6 oz)  BMI 32.62 kg/m2  SpO2 98% Estimated body mass index is 32.62 kg/(m^2) as calculated from the following:    Height as of this encounter: 1.829 m (6').    Weight as of this encounter: 109.135 kg (240 lb 9.6 oz).  BP completed using cuff size: regular

## 2017-01-06 ENCOUNTER — TELEPHONE (OUTPATIENT)
Dept: TRANSPLANT | Facility: CLINIC | Age: 62
End: 2017-01-06

## 2017-01-06 DIAGNOSIS — Z94.0 KIDNEY REPLACED BY TRANSPLANT: ICD-10-CM

## 2017-01-06 DIAGNOSIS — Z48.298 AFTERCARE FOLLOWING ORGAN TRANSPLANT: ICD-10-CM

## 2017-01-06 LAB
ANION GAP SERPL CALCULATED.3IONS-SCNC: 10 MMOL/L (ref 3–14)
BUN SERPL-MCNC: 19 MG/DL (ref 7–30)
CALCIUM SERPL-MCNC: 10.6 MG/DL (ref 8.5–10.1)
CHLORIDE SERPL-SCNC: 106 MMOL/L (ref 94–109)
CO2 SERPL-SCNC: 20 MMOL/L (ref 20–32)
CREAT SERPL-MCNC: 2.04 MG/DL (ref 0.66–1.25)
ERYTHROCYTE [DISTWIDTH] IN BLOOD BY AUTOMATED COUNT: 14.6 % (ref 10–15)
GFR SERPL CREATININE-BSD FRML MDRD: 33 ML/MIN/1.7M2
GLUCOSE SERPL-MCNC: 209 MG/DL (ref 70–99)
HCT VFR BLD AUTO: 33.9 % (ref 40–53)
HGB BLD-MCNC: 11.2 G/DL (ref 13.3–17.7)
MCH RBC QN AUTO: 32.7 PG (ref 26.5–33)
MCHC RBC AUTO-ENTMCNC: 33 G/DL (ref 31.5–36.5)
MCV RBC AUTO: 99 FL (ref 78–100)
PLATELET # BLD AUTO: 220 10E9/L (ref 150–450)
POTASSIUM SERPL-SCNC: 5 MMOL/L (ref 3.4–5.3)
RBC # BLD AUTO: 3.42 10E12/L (ref 4.4–5.9)
SODIUM SERPL-SCNC: 136 MMOL/L (ref 133–144)
TACROLIMUS BLD-MCNC: 13.6 UG/L (ref 5–15)
TME LAST DOSE: NORMAL H
WBC # BLD AUTO: 9.2 10E9/L (ref 4–11)

## 2017-01-06 PROCEDURE — 84100 ASSAY OF PHOSPHORUS: CPT | Performed by: FAMILY MEDICINE

## 2017-01-06 PROCEDURE — 85027 COMPLETE CBC AUTOMATED: CPT | Performed by: INTERNAL MEDICINE

## 2017-01-06 PROCEDURE — 83735 ASSAY OF MAGNESIUM: CPT | Performed by: FAMILY MEDICINE

## 2017-01-06 PROCEDURE — 36415 COLL VENOUS BLD VENIPUNCTURE: CPT | Performed by: TRANSPLANT SURGERY

## 2017-01-06 PROCEDURE — 80048 BASIC METABOLIC PNL TOTAL CA: CPT | Performed by: TRANSPLANT SURGERY

## 2017-01-06 PROCEDURE — 80197 ASSAY OF TACROLIMUS: CPT | Performed by: TRANSPLANT SURGERY

## 2017-01-06 NOTE — TELEPHONE ENCOUNTER
----- Message from Abdiaziz Qureshi MD sent at 1/5/2017  2:55 PM CST -----  Regarding: RE: I thought he might need a bx - Cr never below 2 -- Issue with hydration and bg   Sorry, already saw him.  He is very constipated with no bowel movement since Sunday.  I added Miralax to his senna/docusate.  He is also supposed to use an enema tonight.  Please check in with him tomorrow about whether things are moving.    You can tell him I said it was okay to shower.  His incision looks fine.    He also needs to stop his aspirin prior to biopsy.  I talked to him about the biopsy too.    Thanks.  ----- Message -----     From: Tayla Collado, RN     Sent: 1/5/2017   1:17 PM       To: Abdiaziz Qureshi MD  Subject: I thought he might need a bx - Cr never belo#    ----- Message -----     From: Abdiaziz Qureshi MD     Sent: 1/5/2017  11:28 AM       To: Fernie Chan MD, ELVIA Arriaza,    I am seeing Mr. Roland in clinic today.  His creatinine has come down to 2.0-2.2 range.  No DSA and negative XM.    He is a big gentleman, but I think his creatinine should be better.    Any reason you think we don't need to biopsy him?    Frank

## 2017-01-09 DIAGNOSIS — Z94.0 KIDNEY REPLACED BY TRANSPLANT: ICD-10-CM

## 2017-01-09 DIAGNOSIS — Z94.0 KIDNEY TRANSPLANT RECIPIENT: Primary | ICD-10-CM

## 2017-01-09 DIAGNOSIS — Z48.298 AFTERCARE FOLLOWING ORGAN TRANSPLANT: ICD-10-CM

## 2017-01-09 LAB
ANION GAP SERPL CALCULATED.3IONS-SCNC: 8 MMOL/L (ref 3–14)
ANISOCYTOSIS BLD QL SMEAR: SLIGHT
BASOPHILS NFR BLD AUTO: 1 %
BUN SERPL-MCNC: 18 MG/DL (ref 7–30)
CALCIUM SERPL-MCNC: 9.9 MG/DL (ref 8.5–10.1)
CHLORIDE SERPL-SCNC: 109 MMOL/L (ref 94–109)
CO2 SERPL-SCNC: 22 MMOL/L (ref 20–32)
CREAT SERPL-MCNC: 1.84 MG/DL (ref 0.66–1.25)
DIFFERENTIAL METHOD BLD: NORMAL
EOSINOPHIL NFR BLD AUTO: 4 %
ERYTHROCYTE [DISTWIDTH] IN BLOOD BY AUTOMATED COUNT: 14.3 % (ref 10–15)
GFR SERPL CREATININE-BSD FRML MDRD: 38 ML/MIN/1.7M2
GLUCOSE SERPL-MCNC: 138 MG/DL (ref 70–99)
HCT VFR BLD AUTO: 33.9 % (ref 40–53)
HGB BLD-MCNC: 11.1 G/DL (ref 13.3–17.7)
LYMPHOCYTES NFR BLD AUTO: 4 %
MAGNESIUM SERPL-MCNC: 1.8 MG/DL (ref 1.6–2.3)
MCH RBC QN AUTO: 32.1 PG (ref 26.5–33)
MCHC RBC AUTO-ENTMCNC: 32.7 G/DL (ref 31.5–36.5)
MCV RBC AUTO: 98 FL (ref 78–100)
MONOCYTES NFR BLD AUTO: 12 %
NEUTROPHILS NFR BLD AUTO: 79 %
PHOSPHATE SERPL-MCNC: 2 MG/DL (ref 2.5–4.5)
PLATELET # BLD AUTO: 229 10E9/L (ref 150–450)
PLATELET # BLD EST: NORMAL 10*3/UL
POLYCHROMASIA BLD QL SMEAR: NORMAL
POTASSIUM SERPL-SCNC: 4.6 MMOL/L (ref 3.4–5.3)
RBC # BLD AUTO: 3.46 10E12/L (ref 4.4–5.9)
SODIUM SERPL-SCNC: 139 MMOL/L (ref 133–144)
STOMATOCYTES BLD QL SMEAR: SLIGHT
TACROLIMUS BLD-MCNC: 10.4 UG/L (ref 5–15)
TME LAST DOSE: NORMAL H
WBC # BLD AUTO: 4.8 10E9/L (ref 4–11)

## 2017-01-09 PROCEDURE — 85027 COMPLETE CBC AUTOMATED: CPT | Performed by: INTERNAL MEDICINE

## 2017-01-09 PROCEDURE — 80048 BASIC METABOLIC PNL TOTAL CA: CPT | Performed by: TRANSPLANT SURGERY

## 2017-01-09 PROCEDURE — 99000 SPECIMEN HANDLING OFFICE-LAB: CPT | Performed by: INTERNAL MEDICINE

## 2017-01-09 PROCEDURE — 80180 DRUG SCRN QUAN MYCOPHENOLATE: CPT | Performed by: TRANSPLANT SURGERY

## 2017-01-09 PROCEDURE — 80197 ASSAY OF TACROLIMUS: CPT | Performed by: TRANSPLANT SURGERY

## 2017-01-09 PROCEDURE — 36415 COLL VENOUS BLD VENIPUNCTURE: CPT | Performed by: TRANSPLANT SURGERY

## 2017-01-11 DIAGNOSIS — Z94.0 KIDNEY TRANSPLANTED: Primary | ICD-10-CM

## 2017-01-11 DIAGNOSIS — T86.10 COMPLICATIONS, KIDNEY TRANSPLANT: Primary | ICD-10-CM

## 2017-01-11 LAB
MYCOPHENOLATE SERPL LC/MS/MS-MCNC: 3.59 MG/L (ref 1–3.5)
MYCOPHENOLATE-G SERPL LC/MS/MS-MCNC: 126.2 MG/L (ref 30–95)
TME LAST DOSE: ABNORMAL H

## 2017-01-11 NOTE — PROGRESS NOTES
Orders entered and patient aware of date/time of renal biopsy.  Discussed blood thinners, hold txp meds until after lab draw.  Report to 1st floor lab, then 5th floor for biopsy.  Use Prep[employer] services and bring form of entertainment.

## 2017-01-13 DIAGNOSIS — Z48.298 AFTERCARE FOLLOWING ORGAN TRANSPLANT: ICD-10-CM

## 2017-01-13 DIAGNOSIS — Z94.0 KIDNEY REPLACED BY TRANSPLANT: ICD-10-CM

## 2017-01-13 LAB
ANION GAP SERPL CALCULATED.3IONS-SCNC: 9 MMOL/L (ref 3–14)
BUN SERPL-MCNC: 17 MG/DL (ref 7–30)
CALCIUM SERPL-MCNC: 9.6 MG/DL (ref 8.5–10.1)
CHLORIDE SERPL-SCNC: 105 MMOL/L (ref 94–109)
CO2 SERPL-SCNC: 20 MMOL/L (ref 20–32)
CREAT SERPL-MCNC: 1.8 MG/DL (ref 0.66–1.25)
ERYTHROCYTE [DISTWIDTH] IN BLOOD BY AUTOMATED COUNT: 14.3 % (ref 10–15)
GFR SERPL CREATININE-BSD FRML MDRD: 38 ML/MIN/1.7M2
GLUCOSE SERPL-MCNC: 209 MG/DL (ref 70–99)
HCT VFR BLD AUTO: 34 % (ref 40–53)
HGB BLD-MCNC: 11.3 G/DL (ref 13.3–17.7)
MCH RBC QN AUTO: 32.3 PG (ref 26.5–33)
MCHC RBC AUTO-ENTMCNC: 33.2 G/DL (ref 31.5–36.5)
MCV RBC AUTO: 97 FL (ref 78–100)
PLATELET # BLD AUTO: 226 10E9/L (ref 150–450)
POTASSIUM SERPL-SCNC: 4.9 MMOL/L (ref 3.4–5.3)
RBC # BLD AUTO: 3.5 10E12/L (ref 4.4–5.9)
SODIUM SERPL-SCNC: 134 MMOL/L (ref 133–144)
TACROLIMUS BLD-MCNC: 11.2 UG/L (ref 5–15)
TME LAST DOSE: NORMAL H
WBC # BLD AUTO: 4.6 10E9/L (ref 4–11)

## 2017-01-13 PROCEDURE — 99000 SPECIMEN HANDLING OFFICE-LAB: CPT | Performed by: FAMILY MEDICINE

## 2017-01-13 PROCEDURE — 36415 COLL VENOUS BLD VENIPUNCTURE: CPT | Performed by: TRANSPLANT SURGERY

## 2017-01-13 PROCEDURE — 85027 COMPLETE CBC AUTOMATED: CPT | Performed by: FAMILY MEDICINE

## 2017-01-13 PROCEDURE — 80048 BASIC METABOLIC PNL TOTAL CA: CPT | Performed by: TRANSPLANT SURGERY

## 2017-01-13 PROCEDURE — 80197 ASSAY OF TACROLIMUS: CPT | Performed by: TRANSPLANT SURGERY

## 2017-01-15 DIAGNOSIS — Z94.0 KIDNEY TRANSPLANT RECIPIENT: Primary | ICD-10-CM

## 2017-01-15 NOTE — TELEPHONE ENCOUNTER
Abdiaziz Qureshi MD Huepfel, Tayla ABRAMS RN                     We can probably hold off on biopsy with improved serum creatinine down to 1.8 and still with high tacrolimus level.     Let's see what happens once his tacrolimus level is 9.     Thanks.         Prograf tacrolimus 11.2  Above goal level   Confirm 12 hour trough level   Confirm current dose of 8 mg twice per of Prograf tacrolimus   If the above accurate lower Prograf tacrolimus 7 mg twice per day     Cancel kidney biopsy

## 2017-01-15 NOTE — PROGRESS NOTES
Assessment and Plan:  1. DDKT - baseline Cr ~ 2.0-2.2, which has been fairly stable after early slow graft function.  He is a little below his dry weight.  Mild proteinuria.  No DSA.  Creatinine is a bit higher than expected and would recommend a kidney transplant biopsy to rule out a simmering rejection.  Will make no changes in immunosuppression.  2. HTN - well controlled at target of less than 140/90.  Eventually, with patient's DM, would consider addition of ARB, but with higher serum creatinine, would hold off on changes at this time.  3. DM - good control.  4. Anemia in chronic renal disease - stable Hgb.  Iron replete.  Will follow.  5. Secondary renal hyperparathyroidism - moderately elevated PTH, which should improve post transplant.  Will continue on calcitriol and treat vitamin D deficiency.  However, if serum calcium remains elevated, would consider changing calcitriol to cinacalcet.  Will recheck PTH at 3 months post transplant.  6. Vitamin D deficiency - low vitamin D level and will continue on cholecalciferol.  7. Hyperphosphatemia - slightly low serum phosphorus and recommend increased dietary phosphorus.  Will continue on calcitriol and cholecalciferol.  8. Hypercalcemia - high serum calcium level and will stop oral calcium supplement.  If high serum calcium persists, would change calcitriol to cinacalcet.  9. Hypomagnesemia - low serum magnesium level and will start oral magnesium oxide 400 mg daily.  10. Metabolic acidosis - normal serum bicarbonate and will continue on oral sodium bicarbonate supplement.  11. Constipation - continue on senna-docusate and will start Miralax daily.  Okay to do enema at home if no results with bowel regimen.  12. Recommend return visit at 3 months post transplant.    Assessment and plan was discussed with patient and he voiced his understanding and agreement.    Reason for Visit:  Mr. Roland is here for routine follow up.    HPI:   Corey Magdaleno Roland is a 61 year  "old male with ESKD from DM and is status post DDKT on 11/24/16.         Transplant Hx:       Tx: DDKT  Date: 11/24/16       Present Maintenance IS: Tacrolimus and Mycophenolate mofetil       Baseline Creatinine: 2.0-2.2       Recent DSA: No  Date last checked: 12/2016       Biopsy: No    Mr. Roland reports feeling okay overall with some medical problems.  His energy level has been a bit better, but not quite normal.  He is active, although gets minimal exercise.  Denies any chest pain, but some shortness of breath with exertion.  Appetite is still \"lousy,\" as he just gets full easy  He has lost about 15 lbs, mostly water weight, since transplant.  Patient has been off furosemide now for about 2 weeks.  Occasional nausea and vomiting.  No diarrhea, but some constipation with no bowel movement for the last 4 days.  No fever, sweats or chills.  Leg swelling is much improved.    Home BP: 120-130/70s, although says it has been running higher the last few days.      ROS:   A comprehensive review of systems was obtained and negative, except as noted in the HPI or PMH.    Active Medical Problems:  Patient Active Problem List   Diagnosis     Diabetes mellitus, type 2 (H)     Hypertension secondary to other renal disorders     Anemia in chronic renal disease     Secondary renal hyperparathyroidism (H)     Obesity     Diabetic peripheral neuropathy (H)     Kidney replaced by transplant     Immunosuppressed status (H)     Type 1 diabetes mellitus with nephropathy (H)     Aftercare following organ transplant     Vitamin D deficiency     Postoperative infection     Complications, kidney transplant       Personal Hx:  Social History     Social History     Marital Status: Single     Spouse Name: N/A     Number of Children: 0     Years of Education: N/A     Occupational History     Not on file.     Social History Main Topics     Smoking status: Former Smoker -- 2.00 packs/day for 38 years     Types: Cigarettes     Quit date: " 04/01/2005     Smokeless tobacco: Not on file     Alcohol Use: 0.0 - 0.5 oz/week     0-1 Standard drinks or equivalent per week      Comment: Not since transplant     Drug Use: No      Comment: past (1980's, marijuana, heroin IV, cocaine, hallucinogens, methamphetamine)     Sexual Activity: No     Other Topics Concern     Not on file     Social History Narrative       Allergies:  No Known Allergies    Medications:  Prior to Admission medications    Medication Sig Start Date End Date Taking? Authorizing Provider   tacrolimus (PROGRAF - GENERIC EQUIVALENT) 1 MG capsule Take 5 capsules (5 mg) by mouth 2 times daily 12/4/16   Erasmo Wilkins MD   tacrolimus (PROGRAF - GENERIC EQUIVALENT) 0.5 MG capsule Take 1 capsule (0.5 mg) by mouth 2 times daily 12/3/16   Abdiaziz Qureshi MD   oxyCODONE (ROXICODONE) 5 MG IR tablet Take 1-2 tablets (5-10 mg) by mouth every 6 hours as needed for moderate to severe pain 12/2/16   Talisha Dunham PA-C   aspirin EC 81 MG EC tablet Take 1 tablet (81 mg) by mouth daily 12/2/16   Talisha Dunham PA-C   calcium carbonate (TUMS) 500 MG chewable tablet Take 1 tablet (500 mg) by mouth 2 times daily as needed for heartburn 12/2/16   Talisha Dunham PA-C   insulin aspart (NOVOLOG PEN) 100 UNIT/ML injection Inject 1 Units Subcutaneous Take with snacks or supplements for high blood sugar 1 unit per 4 grams carbohydrate 12/2/16   Talisha Dunham PA-C   insulin aspart (NOVOLOG PEN) 100 UNIT/ML injection Inject 1 Units Subcutaneous 3 times daily (with meals) DOSE:  1 units per 4 grams of carbohydrate.  Only chart total amount of units given.  Do not give if pre-prandial glucose is less than 60 mg/dL. 12/2/16   Talisha Dunham PA-C   insulin aspart (NOVOLOG PEN) 100 UNIT/ML injection Inject 1-22 Units Subcutaneous 3 times daily (before meals) Correction Scale - VERY HIGH INSULIN RESISTANCE DOSING     Do Not give Correction Insulin if Pre-Meal BG less than 250.   For  Pre-Meal  - 260 give 1 unit.   For Pre-Meal -269  give 2 units.   For Pre-Meal  - 279 give 3 units.   For Pre-Meal  - 289 give 4 units.   For Pre-Meal  - 299 give 5 units.   For Pre-Meal  - 309 give 6 units.   ... 12/2/16   Talisha Dunham PA-C   insulin aspart (NOVOLOG PEN) 100 UNIT/ML injection Inject 1-16 Units Subcutaneous At Bedtime Correction Scale - VERY HIGH INSULIN RESISTANCE DOSING     Do Not give Bedtime Correction Insulin if BG less than 200.   For  - 209 give 1 units.   For  - 219 give 2 units.   For  - 229 give 3 units.   For  - 239 give 4 units.   For  - 249 give 5 units.   For  - 259 give 6 units.   For  - 269 give 7 units.   For  - 279 give 8 units  ... 12/2/16   Talisha Dunham PA-C   insulin glargine (LANTUS) 100 UNIT/ML injection Inject 16 Units Subcutaneous At Bedtime 12/2/16   Talisha Dunham PA-C   insulin glargine (LANTUS) 100 UNIT/ML injection Inject 34 Units Subcutaneous every morning 12/3/16   Talisha Dunham PA-C   atorvastatin (LIPITOR) 20 MG tablet Take 0.5 tablets (10 mg) by mouth daily 12/2/16   Talisha Dunham PA-C   cloNIDine (CATAPRES) 0.1 MG tablet Take 1 tablet (0.1 mg) by mouth every 8 hours as needed (Please take for SBP >180.)  Patient taking differently: Take 0.2 mg by mouth every 8 hours as needed (Please take for SBP >180.)  12/2/16   Talisha Dunham PA-C   valACYclovir (VALTREX) 500 MG tablet Take 1 tablet (500 mg) by mouth daily 12/2/16   Talisha Dunham PA-C   carvedilol (COREG) 25 MG tablet Take 1 tablet (25 mg) by mouth 2 times daily (with meals) 12/2/16   Talisha Dunham PA-C   furosemide (LASIX) 80 MG tablet Take 1 tablet (80 mg) by mouth 2 times daily 12/2/16   Talisha Dunham PA-C   senna-docusate (SENOKOT-S;PERICOLACE) 8.6-50 MG per tablet Take 2 tablets by mouth 2 times daily 12/2/16   Talisha Dunham PA-C    sulfamethoxazole-trimethoprim (BACTRIM/SEPTRA) 400-80 MG per tablet Take 1 tablet by mouth three times a week 12/2/16   Talisha Dunham PA-C   mycophenolate (CELLCEPT - GENERIC EQUIVALENT) 250 MG capsule Take 4 capsules (1,000 mg) by mouth 2 times daily 12/2/16   Talisha Dunham PA-C   calcitRIOL (ROCALTROL) 0.25 MCG capsule Take 1 capsule (0.25 mcg) by mouth daily 12/2/16   Talisha Dunham PA-C   amoxicillin (AMOXIL) 500 MG capsule Take 1 capsule (500 mg) by mouth daily for 10 days 12/2/16 12/12/16  Talisha Dunham PA-C   pantoprazole (PROTONIX) 40 MG EC tablet Take 1 tablet (40 mg) by mouth daily 12/2/16   Talisha Dunham PA-C   NIFEdipine ER (ADALAT CC) 30 MG TB24 Take 2 tablets (60 mg) by mouth 2 times daily 12/2/16   Talisha Dunham PA-C   betamethasone dipropionate (DIPROSONE) 0.05 % ointment Apply topically 2 times daily    Reported, Patient       Vitals:  /82 mmHg  Pulse 106  Temp(Src) 98.1  F (36.7  C) (Oral)  Ht 1.829 m (6')  Wt 109.135 kg (240 lb 9.6 oz)  BMI 32.62 kg/m2  SpO2 98%    Exam:   GENERAL APPEARANCE: alert and no distress  HENT: mouth without ulcers or lesions  LYMPHATICS: no cervical or supraclavicular nodes  RESP: lungs clear to auscultation - no rales, rhonchi or wheezes  CV: regular rhythm, normal rate, no rub, no murmur  EDEMA: trace LE edema bilaterally  ABDOMEN: soft, nondistended, nontender, bowel sounds normal, overweight  MS: extremities normal - no gross deformities noted, no evidence of inflammation in joints, no muscle tenderness  SKIN: no rash  TX KIDNEY: minimal TTP, decreased erythema, no discharge    Results:   Reviewed.

## 2017-01-16 DIAGNOSIS — Z48.298 AFTERCARE FOLLOWING ORGAN TRANSPLANT: ICD-10-CM

## 2017-01-16 DIAGNOSIS — Z94.0 KIDNEY REPLACED BY TRANSPLANT: ICD-10-CM

## 2017-01-16 LAB
ANION GAP SERPL CALCULATED.3IONS-SCNC: 8 MMOL/L (ref 3–14)
BUN SERPL-MCNC: 22 MG/DL (ref 7–30)
CALCIUM SERPL-MCNC: 9.7 MG/DL (ref 8.5–10.1)
CHLORIDE SERPL-SCNC: 104 MMOL/L (ref 94–109)
CO2 SERPL-SCNC: 21 MMOL/L (ref 20–32)
CREAT SERPL-MCNC: 1.73 MG/DL (ref 0.66–1.25)
DIFFERENTIAL METHOD BLD: ABNORMAL
EOSINOPHIL # BLD AUTO: 0.2 10E9/L (ref 0–0.7)
EOSINOPHIL NFR BLD AUTO: 4 %
ERYTHROCYTE [DISTWIDTH] IN BLOOD BY AUTOMATED COUNT: 14.3 % (ref 10–15)
GFR SERPL CREATININE-BSD FRML MDRD: 40 ML/MIN/1.7M2
GLUCOSE SERPL-MCNC: 282 MG/DL (ref 70–99)
HCT VFR BLD AUTO: 33.3 % (ref 40–53)
HGB BLD-MCNC: 11 G/DL (ref 13.3–17.7)
LYMPHOCYTES # BLD AUTO: 0.1 10E9/L (ref 0.8–5.3)
LYMPHOCYTES NFR BLD AUTO: 3 %
MAGNESIUM SERPL-MCNC: 1.7 MG/DL (ref 1.6–2.3)
MCH RBC QN AUTO: 32.3 PG (ref 26.5–33)
MCHC RBC AUTO-ENTMCNC: 33 G/DL (ref 31.5–36.5)
MCV RBC AUTO: 98 FL (ref 78–100)
MONOCYTES # BLD AUTO: 0.4 10E9/L (ref 0–1.3)
MONOCYTES NFR BLD AUTO: 8 %
NEUTROPHILS # BLD AUTO: 4.1 10E9/L (ref 1.6–8.3)
NEUTROPHILS NFR BLD AUTO: 85 %
PHOSPHATE SERPL-MCNC: 1.7 MG/DL (ref 2.5–4.5)
PLATELET # BLD AUTO: 229 10E9/L (ref 150–450)
PLATELET # BLD EST: NORMAL 10*3/UL
POTASSIUM SERPL-SCNC: 4.9 MMOL/L (ref 3.4–5.3)
RBC # BLD AUTO: 3.41 10E12/L (ref 4.4–5.9)
RBC MORPH BLD: NORMAL
SODIUM SERPL-SCNC: 133 MMOL/L (ref 133–144)
TACROLIMUS BLD-MCNC: 11.8 UG/L (ref 5–15)
TME LAST DOSE: NORMAL H
WBC # BLD AUTO: 4.8 10E9/L (ref 4–11)

## 2017-01-16 PROCEDURE — 85027 COMPLETE CBC AUTOMATED: CPT | Performed by: FAMILY MEDICINE

## 2017-01-16 PROCEDURE — 80180 DRUG SCRN QUAN MYCOPHENOLATE: CPT | Performed by: TRANSPLANT SURGERY

## 2017-01-16 PROCEDURE — 80197 ASSAY OF TACROLIMUS: CPT | Performed by: TRANSPLANT SURGERY

## 2017-01-16 PROCEDURE — 83735 ASSAY OF MAGNESIUM: CPT | Performed by: FAMILY MEDICINE

## 2017-01-16 PROCEDURE — 80048 BASIC METABOLIC PNL TOTAL CA: CPT | Performed by: TRANSPLANT SURGERY

## 2017-01-16 PROCEDURE — 36415 COLL VENOUS BLD VENIPUNCTURE: CPT | Performed by: TRANSPLANT SURGERY

## 2017-01-16 PROCEDURE — 84100 ASSAY OF PHOSPHORUS: CPT | Performed by: FAMILY MEDICINE

## 2017-01-16 RX ORDER — TACROLIMUS 1 MG/1
7 CAPSULE ORAL EVERY 12 HOURS
Qty: 420 CAPSULE | Refills: 11 | Status: SHIPPED | OUTPATIENT
Start: 2017-01-16 | End: 2017-01-18

## 2017-01-16 RX ORDER — NICOTINE POLACRILEX 4 MG/1
20 GUM, CHEWING ORAL EVERY MORNING
Qty: 30 TABLET | Refills: 0 | OUTPATIENT
Start: 2017-01-16

## 2017-01-16 NOTE — TELEPHONE ENCOUNTER
Spoke to patient regarding cancelled kidney biopsy. Patient verbalizes understanding. Also, confirmed 12 hour trough level and current Tacrolimus dose = 8 mg BID. Patient verbalizes understanding of medication dose decrease to 7 mg BID.   Kidney biopsy  Cancelled  --   Creatinine 1.84  Below 2.0  Per Dr Qureshi

## 2017-01-16 NOTE — TELEPHONE ENCOUNTER
Spoke to patient regarding cancelled kidney biopsy.  Patient verbalizes understanding.  Also, confirmed 12 hour trough level and current Tacrolimus dose = 8 mg BID.  Patient verbalizes understanding of medication dose decrease to 7 mg BID.

## 2017-01-17 LAB
MYCOPHENOLATE SERPL LC/MS/MS-MCNC: 3.06 MG/L (ref 1–3.5)
MYCOPHENOLATE-G SERPL LC/MS/MS-MCNC: 116.1 MG/L (ref 30–95)
TME LAST DOSE: ABNORMAL H

## 2017-01-18 ENCOUNTER — TELEPHONE (OUTPATIENT)
Dept: TRANSPLANT | Facility: CLINIC | Age: 62
End: 2017-01-18

## 2017-01-18 DIAGNOSIS — Z94.0 KIDNEY TRANSPLANT RECIPIENT: Primary | ICD-10-CM

## 2017-01-18 RX ORDER — TACROLIMUS 1 MG/1
6 CAPSULE ORAL EVERY 12 HOURS
Qty: 360 CAPSULE | Refills: 1 | Status: SHIPPED | OUTPATIENT
Start: 2017-01-18 | End: 2017-02-06

## 2017-01-18 NOTE — TELEPHONE ENCOUNTER
Call from Dr Shaffer -   Requesting to lower Prograf tacrolimus 6.0 mg twice per day  --Prograf tacrolimus levels high   Corey Roland verbalized good understanding

## 2017-01-20 DIAGNOSIS — Z94.0 KIDNEY REPLACED BY TRANSPLANT: ICD-10-CM

## 2017-01-20 DIAGNOSIS — Z48.298 AFTERCARE FOLLOWING ORGAN TRANSPLANT: ICD-10-CM

## 2017-01-20 LAB
ANION GAP SERPL CALCULATED.3IONS-SCNC: 10 MMOL/L (ref 3–14)
BUN SERPL-MCNC: 21 MG/DL (ref 7–30)
CALCIUM SERPL-MCNC: 9.4 MG/DL (ref 8.5–10.1)
CHLORIDE SERPL-SCNC: 105 MMOL/L (ref 94–109)
CO2 SERPL-SCNC: 21 MMOL/L (ref 20–32)
CREAT SERPL-MCNC: 1.67 MG/DL (ref 0.66–1.25)
ERYTHROCYTE [DISTWIDTH] IN BLOOD BY AUTOMATED COUNT: 13.8 % (ref 10–15)
GFR SERPL CREATININE-BSD FRML MDRD: 42 ML/MIN/1.7M2
GLUCOSE SERPL-MCNC: 156 MG/DL (ref 70–99)
HCT VFR BLD AUTO: 33.8 % (ref 40–53)
HGB BLD-MCNC: 11.2 G/DL (ref 13.3–17.7)
MCH RBC QN AUTO: 32.2 PG (ref 26.5–33)
MCHC RBC AUTO-ENTMCNC: 33.1 G/DL (ref 31.5–36.5)
MCV RBC AUTO: 97 FL (ref 78–100)
PLATELET # BLD AUTO: 198 10E9/L (ref 150–450)
POTASSIUM SERPL-SCNC: 4.5 MMOL/L (ref 3.4–5.3)
RBC # BLD AUTO: 3.48 10E12/L (ref 4.4–5.9)
SODIUM SERPL-SCNC: 136 MMOL/L (ref 133–144)
TACROLIMUS BLD-MCNC: 5.9 UG/L (ref 5–15)
TME LAST DOSE: NORMAL H
WBC # BLD AUTO: 4.1 10E9/L (ref 4–11)

## 2017-01-20 PROCEDURE — 80048 BASIC METABOLIC PNL TOTAL CA: CPT | Performed by: TRANSPLANT SURGERY

## 2017-01-20 PROCEDURE — 36415 COLL VENOUS BLD VENIPUNCTURE: CPT | Performed by: TRANSPLANT SURGERY

## 2017-01-20 PROCEDURE — 85027 COMPLETE CBC AUTOMATED: CPT | Performed by: FAMILY MEDICINE

## 2017-01-20 PROCEDURE — 80197 ASSAY OF TACROLIMUS: CPT | Performed by: TRANSPLANT SURGERY

## 2017-01-23 ENCOUNTER — RESULTS ONLY (OUTPATIENT)
Dept: OTHER | Facility: CLINIC | Age: 62
End: 2017-01-23

## 2017-01-23 DIAGNOSIS — Z76.82 ORGAN TRANSPLANT CANDIDATE: ICD-10-CM

## 2017-01-23 DIAGNOSIS — N18.6 END STAGE RENAL DISEASE (H): ICD-10-CM

## 2017-01-23 DIAGNOSIS — Z48.298 AFTERCARE FOLLOWING ORGAN TRANSPLANT: ICD-10-CM

## 2017-01-23 DIAGNOSIS — Z94.0 KIDNEY REPLACED BY TRANSPLANT: ICD-10-CM

## 2017-01-23 LAB
ANION GAP SERPL CALCULATED.3IONS-SCNC: 10 MMOL/L (ref 3–14)
BASOPHILS # BLD AUTO: 0 10E9/L (ref 0–0.2)
BASOPHILS NFR BLD AUTO: 0.8 %
BUN SERPL-MCNC: 18 MG/DL (ref 7–30)
CALCIUM SERPL-MCNC: 9.6 MG/DL (ref 8.5–10.1)
CHLORIDE SERPL-SCNC: 105 MMOL/L (ref 94–109)
CO2 SERPL-SCNC: 22 MMOL/L (ref 20–32)
CREAT SERPL-MCNC: 1.67 MG/DL (ref 0.66–1.25)
DIFFERENTIAL METHOD BLD: ABNORMAL
EOSINOPHIL # BLD AUTO: 0.1 10E9/L (ref 0–0.7)
EOSINOPHIL NFR BLD AUTO: 2.3 %
ERYTHROCYTE [DISTWIDTH] IN BLOOD BY AUTOMATED COUNT: 13.6 % (ref 10–15)
GFR SERPL CREATININE-BSD FRML MDRD: 42 ML/MIN/1.7M2
GLUCOSE SERPL-MCNC: 189 MG/DL (ref 70–99)
HCT VFR BLD AUTO: 34.6 % (ref 40–53)
HGB BLD-MCNC: 11.2 G/DL (ref 13.3–17.7)
LYMPHOCYTES # BLD AUTO: 0.1 10E9/L (ref 0.8–5.3)
LYMPHOCYTES NFR BLD AUTO: 5.3 %
MAGNESIUM SERPL-MCNC: 1.9 MG/DL (ref 1.6–2.3)
MCH RBC QN AUTO: 31.5 PG (ref 26.5–33)
MCHC RBC AUTO-ENTMCNC: 32.4 G/DL (ref 31.5–36.5)
MCV RBC AUTO: 97 FL (ref 78–100)
MONOCYTES # BLD AUTO: 0.2 10E9/L (ref 0–1.3)
MONOCYTES NFR BLD AUTO: 7.6 %
NEUTROPHILS # BLD AUTO: 2.2 10E9/L (ref 1.6–8.3)
NEUTROPHILS NFR BLD AUTO: 84 %
PHOSPHATE SERPL-MCNC: 1.5 MG/DL (ref 2.5–4.5)
PLATELET # BLD AUTO: 215 10E9/L (ref 150–450)
POTASSIUM SERPL-SCNC: 4.6 MMOL/L (ref 3.4–5.3)
RBC # BLD AUTO: 3.56 10E12/L (ref 4.4–5.9)
SODIUM SERPL-SCNC: 137 MMOL/L (ref 133–144)
WBC # BLD AUTO: 2.6 10E9/L (ref 4–11)

## 2017-01-23 PROCEDURE — 80197 ASSAY OF TACROLIMUS: CPT | Performed by: TRANSPLANT SURGERY

## 2017-01-23 PROCEDURE — 86832 HLA CLASS I HIGH DEFIN QUAL: CPT | Performed by: TRANSPLANT SURGERY

## 2017-01-23 PROCEDURE — 85027 COMPLETE CBC AUTOMATED: CPT | Performed by: FAMILY MEDICINE

## 2017-01-23 PROCEDURE — 80048 BASIC METABOLIC PNL TOTAL CA: CPT | Performed by: TRANSPLANT SURGERY

## 2017-01-23 PROCEDURE — 86828 HLA CLASS I&II ANTIBODY QUAL: CPT | Performed by: TRANSPLANT SURGERY

## 2017-01-23 PROCEDURE — 87799 DETECT AGENT NOS DNA QUANT: CPT | Performed by: TRANSPLANT SURGERY

## 2017-01-23 PROCEDURE — 84100 ASSAY OF PHOSPHORUS: CPT | Performed by: FAMILY MEDICINE

## 2017-01-23 PROCEDURE — 36415 COLL VENOUS BLD VENIPUNCTURE: CPT | Performed by: TRANSPLANT SURGERY

## 2017-01-23 PROCEDURE — 86833 HLA CLASS II HIGH DEFIN QUAL: CPT | Performed by: TRANSPLANT SURGERY

## 2017-01-23 PROCEDURE — 80180 DRUG SCRN QUAN MYCOPHENOLATE: CPT | Performed by: TRANSPLANT SURGERY

## 2017-01-23 PROCEDURE — 83735 ASSAY OF MAGNESIUM: CPT | Performed by: FAMILY MEDICINE

## 2017-01-24 LAB
PRA DONOR SPECIFIC ABY: NORMAL
PRA SINGLE ANTIGEN IGG ANTIBODY: NORMAL
TACROLIMUS BLD-MCNC: 8.1 UG/L (ref 5–15)
TME LAST DOSE: NORMAL H

## 2017-01-25 LAB
BKV DNA # SPEC NAA+PROBE: NORMAL COPIES/ML
BKV DNA SPEC NAA+PROBE-LOG#: NORMAL LOG COPIES/ML
MYCOPHENOLATE SERPL LC/MS/MS-MCNC: 1.61 MG/L (ref 1–3.5)
MYCOPHENOLATE-G SERPL LC/MS/MS-MCNC: 99.3 MG/L (ref 30–95)
SPECIMEN SOURCE: NORMAL
TME LAST DOSE: ABNORMAL H

## 2017-01-27 DIAGNOSIS — Z94.0 KIDNEY REPLACED BY TRANSPLANT: ICD-10-CM

## 2017-01-27 DIAGNOSIS — Z48.298 AFTERCARE FOLLOWING ORGAN TRANSPLANT: ICD-10-CM

## 2017-01-27 LAB
ANION GAP SERPL CALCULATED.3IONS-SCNC: 11 MMOL/L (ref 3–14)
BUN SERPL-MCNC: 20 MG/DL (ref 7–30)
CALCIUM SERPL-MCNC: 9.7 MG/DL (ref 8.5–10.1)
CHLORIDE SERPL-SCNC: 105 MMOL/L (ref 94–109)
CO2 SERPL-SCNC: 20 MMOL/L (ref 20–32)
CREAT SERPL-MCNC: 1.81 MG/DL (ref 0.66–1.25)
DONOR IDENTIFICATION: NORMAL
DSA COMMENTS: NORMAL
DSA PRESENT: NO
DSA TEST METHOD: NORMAL
ERYTHROCYTE [DISTWIDTH] IN BLOOD BY AUTOMATED COUNT: 13.6 % (ref 10–15)
GFR SERPL CREATININE-BSD FRML MDRD: 38 ML/MIN/1.7M2
GLUCOSE SERPL-MCNC: 274 MG/DL (ref 70–99)
HCT VFR BLD AUTO: 37.4 % (ref 40–53)
HGB BLD-MCNC: 12.1 G/DL (ref 13.3–17.7)
INTERFERING SUBST TEST METHOD: NORMAL
INTERFERING SUBST TEST METHOD: NORMAL
INTERFERING SUBSTANCE COMMENT: NORMAL
INTERFERING SUBSTANCE COMMENT: NORMAL
INTERFERING SUBSTANCE RESULT: NORMAL
INTERFERING SUBSTANCE RESULT: NORMAL
INTERFERING SUBSTANCE: NORMAL
INTERFERING SUBSTANCE: NORMAL
MCH RBC QN AUTO: 31 PG (ref 26.5–33)
MCHC RBC AUTO-ENTMCNC: 32.4 G/DL (ref 31.5–36.5)
MCV RBC AUTO: 96 FL (ref 78–100)
ORGAN: NORMAL
PLATELET # BLD AUTO: 243 10E9/L (ref 150–450)
POTASSIUM SERPL-SCNC: 4.5 MMOL/L (ref 3.4–5.3)
RBC # BLD AUTO: 3.9 10E12/L (ref 4.4–5.9)
SA1 CELL: NORMAL
SA1 COMMENTS: NORMAL
SA1 HI RISK ABY: NORMAL
SA1 MOD RISK ABY: NORMAL
SA1 TEST METHOD: NORMAL
SA2 CELL: NORMAL
SA2 COMMENTS: NORMAL
SA2 HI RISK ABY UA: NORMAL
SA2 MOD RISK ABY: NORMAL
SA2 TEST METHOD: NORMAL
SODIUM SERPL-SCNC: 136 MMOL/L (ref 133–144)
WBC # BLD AUTO: 2.6 10E9/L (ref 4–11)

## 2017-01-27 PROCEDURE — 36415 COLL VENOUS BLD VENIPUNCTURE: CPT | Performed by: TRANSPLANT SURGERY

## 2017-01-27 PROCEDURE — 85027 COMPLETE CBC AUTOMATED: CPT | Performed by: FAMILY MEDICINE

## 2017-01-27 PROCEDURE — 80197 ASSAY OF TACROLIMUS: CPT | Performed by: TRANSPLANT SURGERY

## 2017-01-27 PROCEDURE — 80048 BASIC METABOLIC PNL TOTAL CA: CPT | Performed by: TRANSPLANT SURGERY

## 2017-01-28 LAB
TACROLIMUS BLD-MCNC: 8.3 UG/L (ref 5–15)
TME LAST DOSE: NORMAL H

## 2017-01-30 DIAGNOSIS — Z94.0 KIDNEY REPLACED BY TRANSPLANT: ICD-10-CM

## 2017-01-30 DIAGNOSIS — Z48.298 AFTERCARE FOLLOWING ORGAN TRANSPLANT: ICD-10-CM

## 2017-01-30 LAB
ANION GAP SERPL CALCULATED.3IONS-SCNC: 8 MMOL/L (ref 3–14)
BUN SERPL-MCNC: 14 MG/DL (ref 7–30)
CALCIUM SERPL-MCNC: 9.7 MG/DL (ref 8.5–10.1)
CHLORIDE SERPL-SCNC: 106 MMOL/L (ref 94–109)
CO2 SERPL-SCNC: 23 MMOL/L (ref 20–32)
CREAT SERPL-MCNC: 1.6 MG/DL (ref 0.66–1.25)
DIFFERENTIAL METHOD BLD: ABNORMAL
EOSINOPHIL # BLD AUTO: 0 10E9/L (ref 0–0.7)
EOSINOPHIL NFR BLD AUTO: 1 %
ERYTHROCYTE [DISTWIDTH] IN BLOOD BY AUTOMATED COUNT: 13.5 % (ref 10–15)
GFR SERPL CREATININE-BSD FRML MDRD: 44 ML/MIN/1.7M2
GLUCOSE SERPL-MCNC: 196 MG/DL (ref 70–99)
HCT VFR BLD AUTO: 37.2 % (ref 40–53)
HGB BLD-MCNC: 12.1 G/DL (ref 13.3–17.7)
LYMPHOCYTES # BLD AUTO: 0.7 10E9/L (ref 0.8–5.3)
LYMPHOCYTES NFR BLD AUTO: 23 %
MAGNESIUM SERPL-MCNC: 1.8 MG/DL (ref 1.6–2.3)
MCH RBC QN AUTO: 31.3 PG (ref 26.5–33)
MCHC RBC AUTO-ENTMCNC: 32.5 G/DL (ref 31.5–36.5)
MCV RBC AUTO: 96 FL (ref 78–100)
MONOCYTES # BLD AUTO: 0.7 10E9/L (ref 0–1.3)
MONOCYTES NFR BLD AUTO: 22 %
NEUTROPHILS # BLD AUTO: 1.6 10E9/L (ref 1.6–8.3)
NEUTROPHILS NFR BLD AUTO: 54 %
PHOSPHATE SERPL-MCNC: 1.7 MG/DL (ref 2.5–4.5)
PLATELET # BLD AUTO: 262 10E9/L (ref 150–450)
PLATELET # BLD EST: NORMAL 10*3/UL
POTASSIUM SERPL-SCNC: 4.7 MMOL/L (ref 3.4–5.3)
RBC # BLD AUTO: 3.87 10E12/L (ref 4.4–5.9)
SODIUM SERPL-SCNC: 137 MMOL/L (ref 133–144)
WBC # BLD AUTO: 3 10E9/L (ref 4–11)

## 2017-01-30 PROCEDURE — 83735 ASSAY OF MAGNESIUM: CPT | Performed by: TRANSPLANT SURGERY

## 2017-01-30 PROCEDURE — 36415 COLL VENOUS BLD VENIPUNCTURE: CPT | Performed by: TRANSPLANT SURGERY

## 2017-01-30 PROCEDURE — 80180 DRUG SCRN QUAN MYCOPHENOLATE: CPT | Performed by: TRANSPLANT SURGERY

## 2017-01-30 PROCEDURE — 80197 ASSAY OF TACROLIMUS: CPT | Performed by: TRANSPLANT SURGERY

## 2017-01-30 PROCEDURE — 80048 BASIC METABOLIC PNL TOTAL CA: CPT | Performed by: TRANSPLANT SURGERY

## 2017-01-30 PROCEDURE — 85027 COMPLETE CBC AUTOMATED: CPT | Performed by: TRANSPLANT SURGERY

## 2017-01-30 PROCEDURE — 84100 ASSAY OF PHOSPHORUS: CPT | Performed by: TRANSPLANT SURGERY

## 2017-01-30 PROCEDURE — 85004 AUTOMATED DIFF WBC COUNT: CPT | Performed by: TRANSPLANT SURGERY

## 2017-01-31 LAB
TACROLIMUS BLD-MCNC: 10.7 UG/L (ref 5–15)
TME LAST DOSE: NORMAL H

## 2017-02-01 LAB
MYCOPHENOLATE SERPL LC/MS/MS-MCNC: 3.02 MG/L (ref 1–3.5)
MYCOPHENOLATE-G SERPL LC/MS/MS-MCNC: 106.6 MG/L (ref 30–95)
TME LAST DOSE: ABNORMAL H

## 2017-02-03 DIAGNOSIS — Z48.298 AFTERCARE FOLLOWING ORGAN TRANSPLANT: ICD-10-CM

## 2017-02-03 DIAGNOSIS — Z94.0 KIDNEY REPLACED BY TRANSPLANT: ICD-10-CM

## 2017-02-03 LAB
ANION GAP SERPL CALCULATED.3IONS-SCNC: 10 MMOL/L (ref 3–14)
BUN SERPL-MCNC: 16 MG/DL (ref 7–30)
CALCIUM SERPL-MCNC: 9.9 MG/DL (ref 8.5–10.1)
CHLORIDE SERPL-SCNC: 107 MMOL/L (ref 94–109)
CO2 SERPL-SCNC: 23 MMOL/L (ref 20–32)
CREAT SERPL-MCNC: 1.51 MG/DL (ref 0.66–1.25)
ERYTHROCYTE [DISTWIDTH] IN BLOOD BY AUTOMATED COUNT: 13.4 % (ref 10–15)
GFR SERPL CREATININE-BSD FRML MDRD: 47 ML/MIN/1.7M2
GLUCOSE SERPL-MCNC: 163 MG/DL (ref 70–99)
HCT VFR BLD AUTO: 40.2 % (ref 40–53)
HGB BLD-MCNC: 13.2 G/DL (ref 13.3–17.7)
MCH RBC QN AUTO: 31.4 PG (ref 26.5–33)
MCHC RBC AUTO-ENTMCNC: 32.8 G/DL (ref 31.5–36.5)
MCV RBC AUTO: 96 FL (ref 78–100)
PLATELET # BLD AUTO: 235 10E9/L (ref 150–450)
POTASSIUM SERPL-SCNC: 4.8 MMOL/L (ref 3.4–5.3)
RBC # BLD AUTO: 4.2 10E12/L (ref 4.4–5.9)
SODIUM SERPL-SCNC: 140 MMOL/L (ref 133–144)
TACROLIMUS BLD-MCNC: 17.6 UG/L (ref 5–15)
TME LAST DOSE: ABNORMAL H
WBC # BLD AUTO: 3.1 10E9/L (ref 4–11)

## 2017-02-03 PROCEDURE — 80048 BASIC METABOLIC PNL TOTAL CA: CPT | Performed by: TRANSPLANT SURGERY

## 2017-02-03 PROCEDURE — 80197 ASSAY OF TACROLIMUS: CPT | Performed by: TRANSPLANT SURGERY

## 2017-02-03 PROCEDURE — 85027 COMPLETE CBC AUTOMATED: CPT | Performed by: TRANSPLANT SURGERY

## 2017-02-03 PROCEDURE — 36415 COLL VENOUS BLD VENIPUNCTURE: CPT | Performed by: TRANSPLANT SURGERY

## 2017-02-06 DIAGNOSIS — Z48.298 AFTERCARE FOLLOWING ORGAN TRANSPLANT: ICD-10-CM

## 2017-02-06 DIAGNOSIS — Z94.0 KIDNEY REPLACED BY TRANSPLANT: ICD-10-CM

## 2017-02-06 DIAGNOSIS — Z94.0 KIDNEY TRANSPLANT RECIPIENT: Primary | ICD-10-CM

## 2017-02-06 LAB
ANION GAP SERPL CALCULATED.3IONS-SCNC: 9 MMOL/L (ref 3–14)
BASOPHILS # BLD AUTO: 0 10E9/L (ref 0–0.2)
BASOPHILS NFR BLD AUTO: 0.4 %
BUN SERPL-MCNC: 18 MG/DL (ref 7–30)
CALCIUM SERPL-MCNC: 10 MG/DL (ref 8.5–10.1)
CHLORIDE SERPL-SCNC: 106 MMOL/L (ref 94–109)
CO2 SERPL-SCNC: 21 MMOL/L (ref 20–32)
CREAT SERPL-MCNC: 1.71 MG/DL (ref 0.66–1.25)
DIFFERENTIAL METHOD BLD: NORMAL
EOSINOPHIL # BLD AUTO: 0 10E9/L (ref 0–0.7)
EOSINOPHIL NFR BLD AUTO: 0.6 %
ERYTHROCYTE [DISTWIDTH] IN BLOOD BY AUTOMATED COUNT: 13.3 % (ref 10–15)
GFR SERPL CREATININE-BSD FRML MDRD: 41 ML/MIN/1.7M2
GLUCOSE SERPL-MCNC: 193 MG/DL (ref 70–99)
HCT VFR BLD AUTO: 43 % (ref 40–53)
HGB BLD-MCNC: 14.1 G/DL (ref 13.3–17.7)
LYMPHOCYTES # BLD AUTO: 1.3 10E9/L (ref 0.8–5.3)
LYMPHOCYTES NFR BLD AUTO: 24.7 %
MAGNESIUM SERPL-MCNC: 1.8 MG/DL (ref 1.6–2.3)
MCH RBC QN AUTO: 31.1 PG (ref 26.5–33)
MCHC RBC AUTO-ENTMCNC: 32.8 G/DL (ref 31.5–36.5)
MCV RBC AUTO: 95 FL (ref 78–100)
MONOCYTES # BLD AUTO: 0.8 10E9/L (ref 0–1.3)
MONOCYTES NFR BLD AUTO: 16.1 %
NEUTROPHILS # BLD AUTO: 3 10E9/L (ref 1.6–8.3)
NEUTROPHILS NFR BLD AUTO: 58.2 %
PHOSPHATE SERPL-MCNC: 2.4 MG/DL (ref 2.5–4.5)
PLATELET # BLD AUTO: 215 10E9/L (ref 150–450)
PLATELET # BLD EST: NORMAL 10*3/UL
POTASSIUM SERPL-SCNC: 4.9 MMOL/L (ref 3.4–5.3)
RBC # BLD AUTO: 4.53 10E12/L (ref 4.4–5.9)
RBC MORPH BLD: NORMAL
SODIUM SERPL-SCNC: 136 MMOL/L (ref 133–144)
TACROLIMUS BLD-MCNC: 13.8 UG/L (ref 5–15)
TME LAST DOSE: NORMAL H
WBC # BLD AUTO: 5.1 10E9/L (ref 4–11)

## 2017-02-06 PROCEDURE — 80180 DRUG SCRN QUAN MYCOPHENOLATE: CPT | Performed by: TRANSPLANT SURGERY

## 2017-02-06 PROCEDURE — 80197 ASSAY OF TACROLIMUS: CPT | Performed by: TRANSPLANT SURGERY

## 2017-02-06 PROCEDURE — 85027 COMPLETE CBC AUTOMATED: CPT | Performed by: INTERNAL MEDICINE

## 2017-02-06 PROCEDURE — 84100 ASSAY OF PHOSPHORUS: CPT | Performed by: INTERNAL MEDICINE

## 2017-02-06 PROCEDURE — 85004 AUTOMATED DIFF WBC COUNT: CPT | Performed by: INTERNAL MEDICINE

## 2017-02-06 PROCEDURE — 83735 ASSAY OF MAGNESIUM: CPT | Performed by: INTERNAL MEDICINE

## 2017-02-06 PROCEDURE — 36415 COLL VENOUS BLD VENIPUNCTURE: CPT | Performed by: TRANSPLANT SURGERY

## 2017-02-06 PROCEDURE — 80048 BASIC METABOLIC PNL TOTAL CA: CPT | Performed by: TRANSPLANT SURGERY

## 2017-02-06 RX ORDER — TACROLIMUS 1 MG/1
5 CAPSULE ORAL EVERY 12 HOURS
Qty: 300 CAPSULE | Refills: 11 | Status: SHIPPED | OUTPATIENT
Start: 2017-02-06 | End: 2017-02-21

## 2017-02-06 NOTE — TELEPHONE ENCOUNTER
Issue Prograf tacrolimus level 17.6 slightly above goal level   Plan   Call confirm 12 hour trough Prograf tacrolimus level   Confirm any medications  - update MAR   Confirm  Any issues  diarrhea   If the above is accurate lower Prograf tacrolimus 5 mg twice per day

## 2017-02-06 NOTE — TELEPHONE ENCOUNTER
Spoke to patient regarding tac level = 17.6, above goal.  Patient confirms 12 hour trough level and current dose of 6 mg BID.  Patient denies any new medications or recent illness.  Patient verbalizes understanding of medication dose decrease to 5 mg BID.  Medication review completed.

## 2017-02-08 LAB
MYCOPHENOLATE SERPL LC/MS/MS-MCNC: 2.38 MG/L (ref 1–3.5)
MYCOPHENOLATE-G SERPL LC/MS/MS-MCNC: 104.7 MG/L (ref 30–95)
TME LAST DOSE: ABNORMAL H

## 2017-02-10 DIAGNOSIS — Z94.0 KIDNEY REPLACED BY TRANSPLANT: ICD-10-CM

## 2017-02-10 DIAGNOSIS — Z48.298 AFTERCARE FOLLOWING ORGAN TRANSPLANT: ICD-10-CM

## 2017-02-10 LAB
ANION GAP SERPL CALCULATED.3IONS-SCNC: 8 MMOL/L (ref 3–14)
BUN SERPL-MCNC: 20 MG/DL (ref 7–30)
CALCIUM SERPL-MCNC: 9.8 MG/DL (ref 8.5–10.1)
CHLORIDE SERPL-SCNC: 110 MMOL/L (ref 94–109)
CO2 SERPL-SCNC: 24 MMOL/L (ref 20–32)
CREAT SERPL-MCNC: 1.53 MG/DL (ref 0.66–1.25)
ERYTHROCYTE [DISTWIDTH] IN BLOOD BY AUTOMATED COUNT: 13.4 % (ref 10–15)
GFR SERPL CREATININE-BSD FRML MDRD: 46 ML/MIN/1.7M2
GLUCOSE SERPL-MCNC: 159 MG/DL (ref 70–99)
HCT VFR BLD AUTO: 41.8 % (ref 40–53)
HGB BLD-MCNC: 13.8 G/DL (ref 13.3–17.7)
MCH RBC QN AUTO: 31.4 PG (ref 26.5–33)
MCHC RBC AUTO-ENTMCNC: 33 G/DL (ref 31.5–36.5)
MCV RBC AUTO: 95 FL (ref 78–100)
PLATELET # BLD AUTO: 216 10E9/L (ref 150–450)
POTASSIUM SERPL-SCNC: 4.7 MMOL/L (ref 3.4–5.3)
RBC # BLD AUTO: 4.4 10E12/L (ref 4.4–5.9)
SODIUM SERPL-SCNC: 142 MMOL/L (ref 133–144)
WBC # BLD AUTO: 5.5 10E9/L (ref 4–11)

## 2017-02-10 PROCEDURE — 80197 ASSAY OF TACROLIMUS: CPT | Performed by: TRANSPLANT SURGERY

## 2017-02-10 PROCEDURE — 36415 COLL VENOUS BLD VENIPUNCTURE: CPT | Performed by: INTERNAL MEDICINE

## 2017-02-10 PROCEDURE — 80048 BASIC METABOLIC PNL TOTAL CA: CPT | Performed by: TRANSPLANT SURGERY

## 2017-02-10 PROCEDURE — 85027 COMPLETE CBC AUTOMATED: CPT | Performed by: INTERNAL MEDICINE

## 2017-02-11 LAB
TACROLIMUS BLD-MCNC: 10.8 UG/L (ref 5–15)
TME LAST DOSE: NORMAL H

## 2017-02-13 ENCOUNTER — TELEPHONE (OUTPATIENT)
Dept: TRANSPLANT | Facility: CLINIC | Age: 62
End: 2017-02-13

## 2017-02-13 DIAGNOSIS — Z48.298 AFTERCARE FOLLOWING ORGAN TRANSPLANT: ICD-10-CM

## 2017-02-13 DIAGNOSIS — Z94.0 KIDNEY REPLACED BY TRANSPLANT: ICD-10-CM

## 2017-02-13 DIAGNOSIS — I15.1 HYPERTENSION SECONDARY TO OTHER RENAL DISORDERS (CODE): ICD-10-CM

## 2017-02-13 LAB
ANION GAP SERPL CALCULATED.3IONS-SCNC: 9 MMOL/L (ref 3–14)
BUN SERPL-MCNC: 23 MG/DL (ref 7–30)
CALCIUM SERPL-MCNC: 10.1 MG/DL (ref 8.5–10.1)
CHLORIDE SERPL-SCNC: 107 MMOL/L (ref 94–109)
CO2 SERPL-SCNC: 21 MMOL/L (ref 20–32)
CREAT SERPL-MCNC: 1.5 MG/DL (ref 0.66–1.25)
DIFFERENTIAL METHOD BLD: NORMAL
ERYTHROCYTE [DISTWIDTH] IN BLOOD BY AUTOMATED COUNT: 13.7 % (ref 10–15)
GFR SERPL CREATININE-BSD FRML MDRD: 47 ML/MIN/1.7M2
GLUCOSE SERPL-MCNC: 314 MG/DL (ref 70–99)
HCT VFR BLD AUTO: 43.6 % (ref 40–53)
HGB BLD-MCNC: 14.4 G/DL (ref 13.3–17.7)
LYMPHOCYTES NFR BLD AUTO: 16 %
MAGNESIUM SERPL-MCNC: 1.6 MG/DL (ref 1.6–2.3)
MCH RBC QN AUTO: 31 PG (ref 26.5–33)
MCHC RBC AUTO-ENTMCNC: 33 G/DL (ref 31.5–36.5)
MCV RBC AUTO: 94 FL (ref 78–100)
MONOCYTES NFR BLD AUTO: 10 %
NEUTROPHILS NFR BLD AUTO: 74 %
PHOSPHATE SERPL-MCNC: 2 MG/DL (ref 2.5–4.5)
PLATELET # BLD AUTO: 213 10E9/L (ref 150–450)
PLATELET # BLD EST: NORMAL 10*3/UL
POTASSIUM SERPL-SCNC: 5.2 MMOL/L (ref 3.4–5.3)
RBC # BLD AUTO: 4.64 10E12/L (ref 4.4–5.9)
SODIUM SERPL-SCNC: 137 MMOL/L (ref 133–144)
WBC # BLD AUTO: 8.1 10E9/L (ref 4–11)

## 2017-02-13 PROCEDURE — 85027 COMPLETE CBC AUTOMATED: CPT | Performed by: INTERNAL MEDICINE

## 2017-02-13 PROCEDURE — 84100 ASSAY OF PHOSPHORUS: CPT | Performed by: INTERNAL MEDICINE

## 2017-02-13 PROCEDURE — 80048 BASIC METABOLIC PNL TOTAL CA: CPT | Performed by: TRANSPLANT SURGERY

## 2017-02-13 PROCEDURE — 80180 DRUG SCRN QUAN MYCOPHENOLATE: CPT | Performed by: TRANSPLANT SURGERY

## 2017-02-13 PROCEDURE — 36415 COLL VENOUS BLD VENIPUNCTURE: CPT | Performed by: TRANSPLANT SURGERY

## 2017-02-13 PROCEDURE — 83735 ASSAY OF MAGNESIUM: CPT | Performed by: INTERNAL MEDICINE

## 2017-02-13 RX ORDER — CLONIDINE HYDROCHLORIDE 0.3 MG/1
0.3 TABLET ORAL EVERY 8 HOURS PRN
Qty: 90 TABLET | Refills: 3 | Status: SHIPPED | OUTPATIENT
Start: 2017-02-13 | End: 2017-05-31

## 2017-02-14 LAB
MYCOPHENOLATE SERPL LC/MS/MS-MCNC: 2.03 MG/L (ref 1–3.5)
MYCOPHENOLATE-G SERPL LC/MS/MS-MCNC: 102.9 MG/L (ref 30–95)
TME LAST DOSE: 2000 H

## 2017-02-17 DIAGNOSIS — Z94.0 KIDNEY REPLACED BY TRANSPLANT: ICD-10-CM

## 2017-02-17 DIAGNOSIS — Z48.298 AFTERCARE FOLLOWING ORGAN TRANSPLANT: ICD-10-CM

## 2017-02-17 LAB
ANION GAP SERPL CALCULATED.3IONS-SCNC: 9 MMOL/L (ref 3–14)
BUN SERPL-MCNC: 23 MG/DL (ref 7–30)
CALCIUM SERPL-MCNC: 10 MG/DL (ref 8.5–10.1)
CHLORIDE SERPL-SCNC: 106 MMOL/L (ref 94–109)
CO2 SERPL-SCNC: 22 MMOL/L (ref 20–32)
CREAT SERPL-MCNC: 1.49 MG/DL (ref 0.66–1.25)
ERYTHROCYTE [DISTWIDTH] IN BLOOD BY AUTOMATED COUNT: 13.6 % (ref 10–15)
GFR SERPL CREATININE-BSD FRML MDRD: 48 ML/MIN/1.7M2
GLUCOSE SERPL-MCNC: 173 MG/DL (ref 70–99)
HCT VFR BLD AUTO: 42.7 % (ref 40–53)
HGB BLD-MCNC: 13.9 G/DL (ref 13.3–17.7)
MCH RBC QN AUTO: 31 PG (ref 26.5–33)
MCHC RBC AUTO-ENTMCNC: 32.6 G/DL (ref 31.5–36.5)
MCV RBC AUTO: 95 FL (ref 78–100)
PLATELET # BLD AUTO: 192 10E9/L (ref 150–450)
POTASSIUM SERPL-SCNC: 4.8 MMOL/L (ref 3.4–5.3)
RBC # BLD AUTO: 4.48 10E12/L (ref 4.4–5.9)
SODIUM SERPL-SCNC: 137 MMOL/L (ref 133–144)
TACROLIMUS BLD-MCNC: 9.8 UG/L (ref 5–15)
TME LAST DOSE: NORMAL H
WBC # BLD AUTO: 4.2 10E9/L (ref 4–11)

## 2017-02-17 PROCEDURE — 85027 COMPLETE CBC AUTOMATED: CPT | Performed by: INTERNAL MEDICINE

## 2017-02-17 PROCEDURE — 80048 BASIC METABOLIC PNL TOTAL CA: CPT | Performed by: TRANSPLANT SURGERY

## 2017-02-17 PROCEDURE — 36415 COLL VENOUS BLD VENIPUNCTURE: CPT | Performed by: TRANSPLANT SURGERY

## 2017-02-17 PROCEDURE — 80197 ASSAY OF TACROLIMUS: CPT | Performed by: TRANSPLANT SURGERY

## 2017-02-20 DIAGNOSIS — Z48.298 AFTERCARE FOLLOWING ORGAN TRANSPLANT: ICD-10-CM

## 2017-02-20 DIAGNOSIS — Z94.0 KIDNEY REPLACED BY TRANSPLANT: ICD-10-CM

## 2017-02-20 LAB
ANION GAP SERPL CALCULATED.3IONS-SCNC: 6 MMOL/L (ref 3–14)
BUN SERPL-MCNC: 19 MG/DL (ref 7–30)
CALCIUM SERPL-MCNC: 9.8 MG/DL (ref 8.5–10.1)
CHLORIDE SERPL-SCNC: 111 MMOL/L (ref 94–109)
CO2 SERPL-SCNC: 22 MMOL/L (ref 20–32)
CREAT SERPL-MCNC: 1.44 MG/DL (ref 0.66–1.25)
ERYTHROCYTE [DISTWIDTH] IN BLOOD BY AUTOMATED COUNT: 13.5 % (ref 10–15)
GFR SERPL CREATININE-BSD FRML MDRD: 50 ML/MIN/1.7M2
GLUCOSE SERPL-MCNC: 196 MG/DL (ref 70–99)
HCT VFR BLD AUTO: 40.9 % (ref 40–53)
HGB BLD-MCNC: 13.3 G/DL (ref 13.3–17.7)
MCH RBC QN AUTO: 30.9 PG (ref 26.5–33)
MCHC RBC AUTO-ENTMCNC: 32.5 G/DL (ref 31.5–36.5)
MCV RBC AUTO: 95 FL (ref 78–100)
PLATELET # BLD AUTO: 207 10E9/L (ref 150–450)
POTASSIUM SERPL-SCNC: 4.8 MMOL/L (ref 3.4–5.3)
RBC # BLD AUTO: 4.3 10E12/L (ref 4.4–5.9)
SODIUM SERPL-SCNC: 139 MMOL/L (ref 133–144)
TACROLIMUS BLD-MCNC: 5.8 UG/L (ref 5–15)
TME LAST DOSE: NORMAL H
WBC # BLD AUTO: 3.5 10E9/L (ref 4–11)

## 2017-02-20 PROCEDURE — 80180 DRUG SCRN QUAN MYCOPHENOLATE: CPT | Performed by: TRANSPLANT SURGERY

## 2017-02-20 PROCEDURE — 85027 COMPLETE CBC AUTOMATED: CPT | Performed by: INTERNAL MEDICINE

## 2017-02-20 PROCEDURE — 80048 BASIC METABOLIC PNL TOTAL CA: CPT | Performed by: TRANSPLANT SURGERY

## 2017-02-20 PROCEDURE — 80197 ASSAY OF TACROLIMUS: CPT | Performed by: TRANSPLANT SURGERY

## 2017-02-20 PROCEDURE — 36415 COLL VENOUS BLD VENIPUNCTURE: CPT | Performed by: TRANSPLANT SURGERY

## 2017-02-21 DIAGNOSIS — Z94.0 KIDNEY TRANSPLANT RECIPIENT: Primary | ICD-10-CM

## 2017-02-21 LAB
MYCOPHENOLATE SERPL LC/MS/MS-MCNC: 4.29 MG/L (ref 1–3.5)
MYCOPHENOLATE-G SERPL LC/MS/MS-MCNC: 85.4 MG/L (ref 30–95)
TME LAST DOSE: ABNORMAL H

## 2017-02-21 NOTE — TELEPHONE ENCOUNTER
Spoke to patient regarding tac level = 5.8, below goal.  Patient confirmed current dose and 12 hour trough level.  Patient verbalizes understanding of medication dose increase to 6 mg BID.  Patient will repeat labs on Friday.

## 2017-02-21 NOTE — TELEPHONE ENCOUNTER
Issue Prograf tacrolimus level 5.8 below goal level   Confirm taking Prograf tacrolimus  5 mg twice per day   Confirm 12 hour trough level Prograf tacrolimus   If the above is correct increase Prograf tacrolimus 6 mg twice per day   If not accurate repeat transplant  Labs this week

## 2017-02-22 RX ORDER — TACROLIMUS 1 MG/1
6 CAPSULE ORAL EVERY 12 HOURS
Qty: 360 CAPSULE | Refills: 11 | Status: SHIPPED | OUTPATIENT
Start: 2017-02-22 | End: 2017-03-27

## 2017-02-24 DIAGNOSIS — Z48.298 AFTERCARE FOLLOWING ORGAN TRANSPLANT: ICD-10-CM

## 2017-02-24 DIAGNOSIS — Z94.0 KIDNEY REPLACED BY TRANSPLANT: ICD-10-CM

## 2017-02-24 LAB
ANION GAP SERPL CALCULATED.3IONS-SCNC: 7 MMOL/L (ref 3–14)
BUN SERPL-MCNC: 19 MG/DL (ref 7–30)
CALCIUM SERPL-MCNC: 10 MG/DL (ref 8.5–10.1)
CHLORIDE SERPL-SCNC: 107 MMOL/L (ref 94–109)
CO2 SERPL-SCNC: 23 MMOL/L (ref 20–32)
CREAT SERPL-MCNC: 1.49 MG/DL (ref 0.66–1.25)
ERYTHROCYTE [DISTWIDTH] IN BLOOD BY AUTOMATED COUNT: 13.4 % (ref 10–15)
GFR SERPL CREATININE-BSD FRML MDRD: 48 ML/MIN/1.7M2
GLUCOSE SERPL-MCNC: 99 MG/DL (ref 70–99)
HCT VFR BLD AUTO: 44.5 % (ref 40–53)
HGB BLD-MCNC: 14.8 G/DL (ref 13.3–17.7)
MCH RBC QN AUTO: 31 PG (ref 26.5–33)
MCHC RBC AUTO-ENTMCNC: 33.3 G/DL (ref 31.5–36.5)
MCV RBC AUTO: 93 FL (ref 78–100)
PLATELET # BLD AUTO: 238 10E9/L (ref 150–450)
POTASSIUM SERPL-SCNC: 4.7 MMOL/L (ref 3.4–5.3)
RBC # BLD AUTO: 4.77 10E12/L (ref 4.4–5.9)
SODIUM SERPL-SCNC: 137 MMOL/L (ref 133–144)
TACROLIMUS BLD-MCNC: 7.4 UG/L (ref 5–15)
TME LAST DOSE: NORMAL H
WBC # BLD AUTO: 3.6 10E9/L (ref 4–11)

## 2017-02-24 PROCEDURE — 36415 COLL VENOUS BLD VENIPUNCTURE: CPT | Performed by: TRANSPLANT SURGERY

## 2017-02-24 PROCEDURE — 85027 COMPLETE CBC AUTOMATED: CPT | Performed by: FAMILY MEDICINE

## 2017-02-24 PROCEDURE — 80197 ASSAY OF TACROLIMUS: CPT | Performed by: TRANSPLANT SURGERY

## 2017-02-24 PROCEDURE — 80048 BASIC METABOLIC PNL TOTAL CA: CPT | Performed by: TRANSPLANT SURGERY

## 2017-03-02 ENCOUNTER — OFFICE VISIT (OUTPATIENT)
Dept: NEPHROLOGY | Facility: CLINIC | Age: 62
End: 2017-03-02
Attending: INTERNAL MEDICINE
Payer: MEDICARE

## 2017-03-02 VITALS
SYSTOLIC BLOOD PRESSURE: 122 MMHG | TEMPERATURE: 97.8 F | HEART RATE: 73 BPM | HEIGHT: 72 IN | DIASTOLIC BLOOD PRESSURE: 71 MMHG | WEIGHT: 251.4 LBS | BODY MASS INDEX: 34.05 KG/M2 | RESPIRATION RATE: 18 BRPM

## 2017-03-02 DIAGNOSIS — N18.9 ANEMIA IN CHRONIC RENAL DISEASE: ICD-10-CM

## 2017-03-02 DIAGNOSIS — Z48.298 AFTERCARE FOLLOWING ORGAN TRANSPLANT: ICD-10-CM

## 2017-03-02 DIAGNOSIS — E83.42 HYPOMAGNESEMIA: ICD-10-CM

## 2017-03-02 DIAGNOSIS — Z94.0 KIDNEY REPLACED BY TRANSPLANT: ICD-10-CM

## 2017-03-02 DIAGNOSIS — D63.1 ANEMIA IN CHRONIC RENAL DISEASE: ICD-10-CM

## 2017-03-02 DIAGNOSIS — N25.81 SECONDARY RENAL HYPERPARATHYROIDISM (H): Primary | ICD-10-CM

## 2017-03-02 DIAGNOSIS — E55.9 VITAMIN D DEFICIENCY: ICD-10-CM

## 2017-03-02 DIAGNOSIS — I15.1 HYPERTENSION SECONDARY TO OTHER RENAL DISORDERS: ICD-10-CM

## 2017-03-02 DIAGNOSIS — D84.9 IMMUNOSUPPRESSED STATUS (H): Chronic | ICD-10-CM

## 2017-03-02 PROCEDURE — 99213 OFFICE O/P EST LOW 20 MIN: CPT | Mod: ZF

## 2017-03-02 ASSESSMENT — PAIN SCALES - GENERAL: PAINLEVEL: NO PAIN (0)

## 2017-03-02 NOTE — LETTER
3/2/2017       RE: Corey Roland  [address]  [facility] [id number]     Dear Colleague,    Thank you for referring your patient, Corey Roland, to the [facility] NEPHROLOG[facility] at [facility]. Please see a copy of my visit note below.    Assessment and Plan:  1. DDKT - baseline Cr ~ 1.5-1.8, which has been stable.  Mild proteinuria.  No DSA.  Will make no changes in immunosuppression.  2. HTN - well controlled at target of less than 140/90.  Eventually, with patient's DM, would consider addition of ARB.  3. DM - good control.  4. Anemia in chronic renal disease - improved, now normal Hgb.  Iron replete.  Will follow.  5. Secondary renal hyperparathyroidism - decreased PTH, now mildly elevated, which should continue to improve post transplant.  Will continue on calcitriol and treat vitamin D deficiency.  Will recheck PTH at next clinic visit.  6. Vitamin D deficiency - low vitamin D level and will continue on cholecalciferol.  Will recheck vitamin D level at next clinic visit.  7. Hypophosphatemia - slightly lower serum phosphorus and will continue with increased dietary phosphorus.  8. Hypercalcemia - high normal serum calcium level.  Will continue on calcitriol for now and recheck PTH and vitamin D levels.  9. Hypomagnesemia - low normal serum magnesium level and will continue oral magnesium supplement.  10. Metabolic acidosis - normal serum bicarbonate and will continue on oral sodium bicarbonate supplement.  11. Overweight - recommend increased exercise and watch caloric intake.  12. Prophylactic medications - patient can stop Valcyte.  13. Recommend return visit in 3 months.    Assessment and plan was discussed with patient and he voiced his understanding and agreement.    Reason for Visit:  Mr. Roland is here for routine follow up.    HPI:   Corey Roland is a 61 year old male with ESKD from DM and is status post DDKT on 11/24/16.         Transplant  "Hx:       Tx: DDKT  Date: 11/24/16       Present Maintenance IS: Tacrolimus and Mycophenolate mofetil       Baseline Creatinine: 1.5-1.8       Recent DSA: No  Date last checked: 12/2016       Biopsy: No    Mr. Roland reports feeling okay overall with some medical problems.  His energy level is \"much, much better,\" although he still will get tired quickly and not normal yet.  He is active and does get some exercise, mostly with walking.  Denies any chest pain or shortness of breath with exertion, but just tires easy.  Appetite is \"too good\" and his weight is up about 10 lbs.  No nausea, vomiting or diarrhea.  No further constipation.  No fever, sweats or chills.  Stable, but a little leg swelling, left greater than right, which is always that way since he hurt his ankle years ago.    Home BP: 130/70s.      ROS:   A comprehensive review of systems was obtained and negative, except as noted in the HPI or PMH.    Active Medical Problems:  Patient Active Problem List   Diagnosis     Hypertension secondary to other renal disorders     Anemia in chronic renal disease     Secondary renal hyperparathyroidism (H)     Obesity     Diabetic peripheral neuropathy (H)     Kidney replaced by transplant     Immunosuppressed status (H)     Type 1 diabetes mellitus with nephropathy (H)     Aftercare following organ transplant     Vitamin D deficiency     Hypomagnesemia       Personal Hx:  Social History     Social History     Marital status: Single     Spouse name: N/A     Number of children: 0     Years of education: N/A     Occupational History     Not on file.     Social History Main Topics     Smoking status: Former Smoker     Packs/day: 2.00     Years: 38.00     Types: Cigarettes     Quit date: 4/1/2005     Smokeless tobacco: Not on file     Alcohol use 0.0 - 0.5 oz/week     0 - 1 Standard drinks or equivalent per week      Comment: Not since transplant     Drug use: No      Comment: past (1980's, marijuana, heroin IV, cocaine, " hallucinogens, methamphetamine)     Sexual activity: No     Other Topics Concern     Not on file     Social History Narrative       Allergies:  No Known Allergies    Medications:  Prior to Admission medications    Medication Sig Start Date End Date Taking? Authorizing Provider   tacrolimus (PROGRAF - GENERIC EQUIVALENT) 1 MG capsule Take 5 capsules (5 mg) by mouth 2 times daily 12/4/16   Erasmo Wilkins MD   tacrolimus (PROGRAF - GENERIC EQUIVALENT) 0.5 MG capsule Take 1 capsule (0.5 mg) by mouth 2 times daily 12/3/16   Abdiaziz Qureshi MD   oxyCODONE (ROXICODONE) 5 MG IR tablet Take 1-2 tablets (5-10 mg) by mouth every 6 hours as needed for moderate to severe pain 12/2/16   Talisha Dunham PA-C   aspirin EC 81 MG EC tablet Take 1 tablet (81 mg) by mouth daily 12/2/16   Talisha Dunham PA-C   calcium carbonate (TUMS) 500 MG chewable tablet Take 1 tablet (500 mg) by mouth 2 times daily as needed for heartburn 12/2/16   Talisha Dunham PA-C   insulin aspart (NOVOLOG PEN) 100 UNIT/ML injection Inject 1 Units Subcutaneous Take with snacks or supplements for high blood sugar 1 unit per 4 grams carbohydrate 12/2/16   Talisha Dunham PA-C   insulin aspart (NOVOLOG PEN) 100 UNIT/ML injection Inject 1 Units Subcutaneous 3 times daily (with meals) DOSE:  1 units per 4 grams of carbohydrate.  Only chart total amount of units given.  Do not give if pre-prandial glucose is less than 60 mg/dL. 12/2/16   Talisha Dunham PA-C   insulin aspart (NOVOLOG PEN) 100 UNIT/ML injection Inject 1-22 Units Subcutaneous 3 times daily (before meals) Correction Scale - VERY HIGH INSULIN RESISTANCE DOSING     Do Not give Correction Insulin if Pre-Meal BG less than 250.   For Pre-Meal  - 260 give 1 unit.   For Pre-Meal -269  give 2 units.   For Pre-Meal  - 279 give 3 units.   For Pre-Meal  - 289 give 4 units.   For Pre-Meal  - 299 give 5 units.   For Pre-Meal  - 309 give 6 units.    ... 12/2/16   Talisha Dunham PA-C   insulin aspart (NOVOLOG PEN) 100 UNIT/ML injection Inject 1-16 Units Subcutaneous At Bedtime Correction Scale - VERY HIGH INSULIN RESISTANCE DOSING     Do Not give Bedtime Correction Insulin if BG less than 200.   For  - 209 give 1 units.   For  - 219 give 2 units.   For  - 229 give 3 units.   For  - 239 give 4 units.   For  - 249 give 5 units.   For  - 259 give 6 units.   For  - 269 give 7 units.   For  - 279 give 8 units  ... 12/2/16   Talisha Dunham PA-C   insulin glargine (LANTUS) 100 UNIT/ML injection Inject 16 Units Subcutaneous At Bedtime 12/2/16   Talisha Dunham PA-C   insulin glargine (LANTUS) 100 UNIT/ML injection Inject 34 Units Subcutaneous every morning 12/3/16   Talisha Dunham PA-C   atorvastatin (LIPITOR) 20 MG tablet Take 0.5 tablets (10 mg) by mouth daily 12/2/16   Talisha Dunham PA-C   cloNIDine (CATAPRES) 0.1 MG tablet Take 1 tablet (0.1 mg) by mouth every 8 hours as needed (Please take for SBP >180.)  Patient taking differently: Take 0.2 mg by mouth every 8 hours as needed (Please take for SBP >180.)  12/2/16   Talisha Dunham PA-C   valACYclovir (VALTREX) 500 MG tablet Take 1 tablet (500 mg) by mouth daily 12/2/16   Talisha Dunham PA-C   carvedilol (COREG) 25 MG tablet Take 1 tablet (25 mg) by mouth 2 times daily (with meals) 12/2/16   Talisha Dunham PA-C   furosemide (LASIX) 80 MG tablet Take 1 tablet (80 mg) by mouth 2 times daily 12/2/16   Talisha Dunham PA-C   senna-docusate (SENOKOT-S;PERICOLACE) 8.6-50 MG per tablet Take 2 tablets by mouth 2 times daily 12/2/16   Sarumi, Talisha Aracely, PA-C   sulfamethoxazole-trimethoprim (BACTRIM/SEPTRA) 400-80 MG per tablet Take 1 tablet by mouth three times a week 12/2/16   Talisha Dunham PA-C   mycophenolate (CELLCEPT - GENERIC EQUIVALENT) 250 MG capsule Take 4 capsules (1,000 mg) by mouth 2 times daily 12/2/16    Talisha Dunham PA-C   calcitRIOL (ROCALTROL) 0.25 MCG capsule Take 1 capsule (0.25 mcg) by mouth daily 12/2/16   Talisha Dunham PA-C   amoxicillin (AMOXIL) 500 MG capsule Take 1 capsule (500 mg) by mouth daily for 10 days 12/2/16 12/12/16  Talisha Dunham PA-C   pantoprazole (PROTONIX) 40 MG EC tablet Take 1 tablet (40 mg) by mouth daily 12/2/16   Talisha Dunham PA-C   NIFEdipine ER (ADALAT CC) 30 MG TB24 Take 2 tablets (60 mg) by mouth 2 times daily 12/2/16   Talisha Dunham PA-C   betamethasone dipropionate (DIPROSONE) 0.05 % ointment Apply topically 2 times daily    Reported, Patient       Vitals:  /71  Pulse 73  Temp 97.8  F (36.6  C) (Oral)  Resp 18  Ht 1.829 m (6')  Wt 114 kg (251 lb 6.4 oz)  BMI 34.1 kg/m2    Exam:   GENERAL APPEARANCE: alert and no distress  HENT: mouth without ulcers or lesions  LYMPHATICS: no cervical or supraclavicular nodes  RESP: lungs clear to auscultation - no rales, rhonchi or wheezes  CV: regular rhythm, normal rate, no rub, no murmur  EDEMA: trace LE edema bilaterally  ABDOMEN: soft, nondistended, nontender, bowel sounds normal, overweight  MS: extremities normal - no gross deformities noted, no evidence of inflammation in joints, no muscle tenderness  SKIN: no rash  TX KIDNEY: minimal TTP, decreased erythema, no discharge    Results:   Recent Results (from the past 200 hour(s))   Parathyroid Hormone Intact    Collection Time: 03/03/17  7:42 AM   Result Value Ref Range    Parathyroid Hormone Intact 277 (H) 12 - 72 pg/mL   Vitamin D Deficiency    Collection Time: 03/03/17  7:42 AM   Result Value Ref Range    Vitamin D Deficiency screening 8 (L) 20 - 75 ug/L   CBC with platelets    Collection Time: 03/03/17  7:42 AM   Result Value Ref Range    WBC 3.8 (L) 4.0 - 11.0 10e9/L    RBC Count 4.93 4.4 - 5.9 10e12/L    Hemoglobin 15.1 13.3 - 17.7 g/dL    Hematocrit 46.1 40.0 - 53.0 %    MCV 94 78 - 100 fl    MCH 30.6 26.5 - 33.0 pg    MCHC 32.8 31.5 - 36.5  g/dL    RDW 13.4 10.0 - 15.0 %    Platelet Count 211 150 - 450 10e9/L   Basic metabolic panel    Collection Time: 03/03/17  7:42 AM   Result Value Ref Range    Sodium 141 133 - 144 mmol/L    Potassium 5.1 3.4 - 5.3 mmol/L    Chloride 110 (H) 94 - 109 mmol/L    Carbon Dioxide 20 20 - 32 mmol/L    Anion Gap 11 3 - 14 mmol/L    Glucose 144 (H) 70 - 99 mg/dL    Urea Nitrogen 23 7 - 30 mg/dL    Creatinine 1.40 (H) 0.66 - 1.25 mg/dL    GFR Estimate 51 (L) >60 mL/min/1.7m2    GFR Estimate If Black 62 >60 mL/min/1.7m2    Calcium 10.0 8.5 - 10.1 mg/dL   Tacrolimus level    Collection Time: 03/03/17  7:43 AM   Result Value Ref Range    Tacrolimus Last Dose [id number] @ 2000     Tacrolimus Level 7.8 5.0 - 15.0 ug/L       Again, thank you for allowing me to participate in the care of your patient.      Sincerely,    Abdiaziz Qureshi MD

## 2017-03-02 NOTE — NURSING NOTE
Chief Complaint   Patient presents with     RECHECK     Kidney follow up       Initial /71  Pulse 73  Temp 97.8  F (36.6  C) (Oral)  Resp 18  Ht 1.829 m (6')  Wt 114 kg (251 lb 6.4 oz)  BMI 34.1 kg/m2 Estimated body mass index is 34.1 kg/(m^2) as calculated from the following:    Height as of this encounter: 1.829 m (6').    Weight as of this encounter: 114 kg (251 lb 6.4 oz).  Medication Reconciliation: complete

## 2017-03-02 NOTE — LETTER
3/2/2017      RE: Corey Roland  [address]  [facility] [id number]       Assessment and Plan:  1. DDKT - baseline Cr ~ 1.5-1.8, which has been stable.  Mild proteinuria.  No DSA.  Will make no changes in immunosuppression.  2. HTN - well controlled at target of less than 140/90.  Eventually, with patient's DM, would consider addition of ARB.  3. DM - good control.  4. Anemia in chronic renal disease - improved, now normal Hgb.  Iron replete.  Will follow.  5. Secondary renal hyperparathyroidism - decreased PTH, now mildly elevated, which should continue to improve post transplant.  Will continue on calcitriol and treat vitamin D deficiency.  Will recheck PTH at next clinic visit.  6. Vitamin D deficiency - low vitamin D level and will continue on cholecalciferol.  Will recheck vitamin D level at next clinic visit.  7. Hypophosphatemia - slightly lower serum phosphorus and will continue with increased dietary phosphorus.  8. Hypercalcemia - high normal serum calcium level.  Will continue on calcitriol for now and recheck PTH and vitamin D levels.  9. Hypomagnesemia - low normal serum magnesium level and will continue oral magnesium supplement.  10. Metabolic acidosis - normal serum bicarbonate and will continue on oral sodium bicarbonate supplement.  11. Overweight - recommend increased exercise and watch caloric intake.  12. Prophylactic medications - patient can stop Valcyte.  13. Recommend return visit in 3 months.    Assessment and plan was discussed with patient and he voiced his understanding and agreement.    Reason for Visit:  Mr. Roland is here for routine follow up.    HPI:   Corey Roland is a 61 year old male with ESKD from DM and is status post DDKT on 11/24/16.         Transplant Hx:       Tx: DDKT  Date: 11/24/16       Present Maintenance IS: Tacrolimus and Mycophenolate mofetil       Baseline Creatinine: 1.5-1.8       Recent DSA: No  Date last checked: 12/2016       Biopsy:  "No    Mr. Roland reports feeling okay overall with some medical problems.  His energy level is \"much, much better,\" although he still will get tired quickly and not normal yet.  He is active and does get some exercise, mostly with walking.  Denies any chest pain or shortness of breath with exertion, but just tires easy.  Appetite is \"too good\" and his weight is up about 10 lbs.  No nausea, vomiting or diarrhea.  No further constipation.  No fever, sweats or chills.  Stable, but a little leg swelling, left greater than right, which is always that way since he hurt his ankle years ago.    Home BP: 130/70s.      ROS:   A comprehensive review of systems was obtained and negative, except as noted in the HPI or PMH.    Active Medical Problems:  Patient Active Problem List   Diagnosis     Hypertension secondary to other renal disorders     Anemia in chronic renal disease     Secondary renal hyperparathyroidism (H)     Obesity     Diabetic peripheral neuropathy (H)     Kidney replaced by transplant     Immunosuppressed status (H)     Type 1 diabetes mellitus with nephropathy (H)     Aftercare following organ transplant     Vitamin D deficiency     Hypomagnesemia       Personal Hx:  Social History     Social History     Marital status: Single     Spouse name: N/A     Number of children: 0     Years of education: N/A     Occupational History     Not on file.     Social History Main Topics     Smoking status: Former Smoker     Packs/day: 2.00     Years: 38.00     Types: Cigarettes     Quit date: 4/1/2005     Smokeless tobacco: Not on file     Alcohol use 0.0 - 0.5 oz/week     0 - 1 Standard drinks or equivalent per week      Comment: Not since transplant     Drug use: No      Comment: past (1980's, marijuana, heroin IV, cocaine, hallucinogens, methamphetamine)     Sexual activity: No     Other Topics Concern     Not on file     Social History Narrative       Allergies:  No Known Allergies    Medications:  Prior to Admission " medications    Medication Sig Start Date End Date Taking? Authorizing Provider   tacrolimus (PROGRAF - GENERIC EQUIVALENT) 1 MG capsule Take 5 capsules (5 mg) by mouth 2 times daily 12/4/16   Erasmo Wilkins MD   tacrolimus (PROGRAF - GENERIC EQUIVALENT) 0.5 MG capsule Take 1 capsule (0.5 mg) by mouth 2 times daily 12/3/16   Abdiaziz Qureshi MD   oxyCODONE (ROXICODONE) 5 MG IR tablet Take 1-2 tablets (5-10 mg) by mouth every 6 hours as needed for moderate to severe pain 12/2/16   Talisha Dunham PA-C   aspirin EC 81 MG EC tablet Take 1 tablet (81 mg) by mouth daily 12/2/16   Talisha Dunham PA-C   calcium carbonate (TUMS) 500 MG chewable tablet Take 1 tablet (500 mg) by mouth 2 times daily as needed for heartburn 12/2/16   Talisha Dunham PA-C   insulin aspart (NOVOLOG PEN) 100 UNIT/ML injection Inject 1 Units Subcutaneous Take with snacks or supplements for high blood sugar 1 unit per 4 grams carbohydrate 12/2/16   Talisha Dunham PA-C   insulin aspart (NOVOLOG PEN) 100 UNIT/ML injection Inject 1 Units Subcutaneous 3 times daily (with meals) DOSE:  1 units per 4 grams of carbohydrate.  Only chart total amount of units given.  Do not give if pre-prandial glucose is less than 60 mg/dL. 12/2/16   Talisha Dunham PA-C   insulin aspart (NOVOLOG PEN) 100 UNIT/ML injection Inject 1-22 Units Subcutaneous 3 times daily (before meals) Correction Scale - VERY HIGH INSULIN RESISTANCE DOSING     Do Not give Correction Insulin if Pre-Meal BG less than 250.   For Pre-Meal  - 260 give 1 unit.   For Pre-Meal -269  give 2 units.   For Pre-Meal  - 279 give 3 units.   For Pre-Meal  - 289 give 4 units.   For Pre-Meal  - 299 give 5 units.   For Pre-Meal  - 309 give 6 units.   ... 12/2/16   Talisha Dunham PA-C   insulin aspart (NOVOLOG PEN) 100 UNIT/ML injection Inject 1-16 Units Subcutaneous At Bedtime Correction Scale - VERY HIGH INSULIN RESISTANCE DOSING     Do  Not give Bedtime Correction Insulin if BG less than 200.   For  - 209 give 1 units.   For  - 219 give 2 units.   For  - 229 give 3 units.   For  - 239 give 4 units.   For  - 249 give 5 units.   For  - 259 give 6 units.   For  - 269 give 7 units.   For  - 279 give 8 units  ... 12/2/16   Talisha Dunham PA-C   insulin glargine (LANTUS) 100 UNIT/ML injection Inject 16 Units Subcutaneous At Bedtime 12/2/16   Talisha Dunham PA-C   insulin glargine (LANTUS) 100 UNIT/ML injection Inject 34 Units Subcutaneous every morning 12/3/16   Talisha Dunham PA-C   atorvastatin (LIPITOR) 20 MG tablet Take 0.5 tablets (10 mg) by mouth daily 12/2/16   Talisha Dunham PA-C   cloNIDine (CATAPRES) 0.1 MG tablet Take 1 tablet (0.1 mg) by mouth every 8 hours as needed (Please take for SBP >180.)  Patient taking differently: Take 0.2 mg by mouth every 8 hours as needed (Please take for SBP >180.)  12/2/16   Talisha Dunham PA-C   valACYclovir (VALTREX) 500 MG tablet Take 1 tablet (500 mg) by mouth daily 12/2/16   Tlaisha Dunham PA-C   carvedilol (COREG) 25 MG tablet Take 1 tablet (25 mg) by mouth 2 times daily (with meals) 12/2/16   Talisha Dunham PA-C   furosemide (LASIX) 80 MG tablet Take 1 tablet (80 mg) by mouth 2 times daily 12/2/16   Talisha Dunham PA-C   senna-docusate (SENOKOT-S;PERICOLACE) 8.6-50 MG per tablet Take 2 tablets by mouth 2 times daily 12/2/16   Talisha Dunham PA-C   sulfamethoxazole-trimethoprim (BACTRIM/SEPTRA) 400-80 MG per tablet Take 1 tablet by mouth three times a week 12/2/16   Talisha Dunham PA-C   mycophenolate (CELLCEPT - GENERIC EQUIVALENT) 250 MG capsule Take 4 capsules (1,000 mg) by mouth 2 times daily 12/2/16   Talisha Dunham PA-C   calcitRIOL (ROCALTROL) 0.25 MCG capsule Take 1 capsule (0.25 mcg) by mouth daily 12/2/16   Talisha Dunham PA-C   amoxicillin (AMOXIL) 500 MG capsule Take 1 capsule  (500 mg) by mouth daily for 10 days 12/2/16 12/12/16  Talisha Dunham PA-C   pantoprazole (PROTONIX) 40 MG EC tablet Take 1 tablet (40 mg) by mouth daily 12/2/16   Talisha Dunham PA-C   NIFEdipine ER (ADALAT CC) 30 MG TB24 Take 2 tablets (60 mg) by mouth 2 times daily 12/2/16   Talisha Dunham PA-C   betamethasone dipropionate (DIPROSONE) 0.05 % ointment Apply topically 2 times daily    Reported, Patient       Vitals:  /71  Pulse 73  Temp 97.8  F (36.6  C) (Oral)  Resp 18  Ht 1.829 m (6')  Wt 114 kg (251 lb 6.4 oz)  BMI 34.1 kg/m2    Exam:   GENERAL APPEARANCE: alert and no distress  HENT: mouth without ulcers or lesions  LYMPHATICS: no cervical or supraclavicular nodes  RESP: lungs clear to auscultation - no rales, rhonchi or wheezes  CV: regular rhythm, normal rate, no rub, no murmur  EDEMA: trace LE edema bilaterally  ABDOMEN: soft, nondistended, nontender, bowel sounds normal, overweight  MS: extremities normal - no gross deformities noted, no evidence of inflammation in joints, no muscle tenderness  SKIN: no rash  TX KIDNEY: minimal TTP, decreased erythema, no discharge    Results:   Recent Results (from the past 200 hour(s))   Parathyroid Hormone Intact    Collection Time: 03/03/17  7:42 AM   Result Value Ref Range    Parathyroid Hormone Intact 277 (H) 12 - 72 pg/mL   Vitamin D Deficiency    Collection Time: 03/03/17  7:42 AM   Result Value Ref Range    Vitamin D Deficiency screening 8 (L) 20 - 75 ug/L   CBC with platelets    Collection Time: 03/03/17  7:42 AM   Result Value Ref Range    WBC 3.8 (L) 4.0 - 11.0 10e9/L    RBC Count 4.93 4.4 - 5.9 10e12/L    Hemoglobin 15.1 13.3 - 17.7 g/dL    Hematocrit 46.1 40.0 - 53.0 %    MCV 94 78 - 100 fl    MCH 30.6 26.5 - 33.0 pg    MCHC 32.8 31.5 - 36.5 g/dL    RDW 13.4 10.0 - 15.0 %    Platelet Count 211 150 - 450 10e9/L   Basic metabolic panel    Collection Time: 03/03/17  7:42 AM   Result Value Ref Range    Sodium 141 133 - 144 mmol/L     Potassium 5.1 3.4 - 5.3 mmol/L    Chloride 110 (H) 94 - 109 mmol/L    Carbon Dioxide 20 20 - 32 mmol/L    Anion Gap 11 3 - 14 mmol/L    Glucose 144 (H) 70 - 99 mg/dL    Urea Nitrogen 23 7 - 30 mg/dL    Creatinine 1.40 (H) 0.66 - 1.25 mg/dL    GFR Estimate 51 (L) >60 mL/min/1.7m2    GFR Estimate If Black 62 >60 mL/min/1.7m2    Calcium 10.0 8.5 - 10.1 mg/dL   Tacrolimus level    Collection Time: 03/03/17  7:43 AM   Result Value Ref Range    Tacrolimus Last Dose [id number] @ 2000     Tacrolimus Level 7.8 5.0 - 15.0 ug/L       Abdiaziz Qureshi MD

## 2017-03-02 NOTE — MR AVS SNAPSHOT
After Visit Summary   3/2/2017    Corey Roland    MRN: [id number]           Patient Information     Date Of Birth          1955        Visit Information        Provider Department      3/2/2017 1:25 PM Abdiaziz Qureshi MD [facility]        Today's Diagnoses     Secondary renal hyperparathyroidism (H)    -  1    Vitamin D deficiency        Kidney replaced by transplant        Aftercare following organ transplant        Immunosuppressed status (H)        Anemia in chronic renal disease        Hypertension secondary to other renal disorders        Hypomagnesemia          Care Instructions    Stop Valtrex.  Increased exercise and watch caloric intake.        Follow-ups after your visit        Follow-up notes from your care team     Return in about 3 months (around 6/2/2017).      Your next 10 appointments already scheduled     Mar 10, 2017  7:30 AM CST   LAB with CP LAB   [facility] ([facility])    [address] [address]   [phone]           Patient must bring picture ID.  Patient should be prepared to give a urine specimen  Please do not eat 10-12 hours before your appointment if you are coming in fasting for labs on lipids, cholesterol, or glucose (sugar).  Pregnant women should follow their Care Team instructions. Water with medications is okay. Do not drink coffee or other fluids.   If you have concerns about taking  your medications, please ask at office or if scheduling via Jellynote, send a message by clicking on Secure Messaging, Message Your Care Team.            Mar 17, 2017  7:30 AM CDT   LAB with CP LAB   [facility] ([facility])    [address] [address]   [phone]           Patient must bring picture ID.  Patient should be prepared to give a urine specimen  Please do not eat 10-12 hours before  your appointment if you are coming in fasting for labs on lipids, cholesterol, or glucose (sugar).  Pregnant women should follow their Care Team instructions. Water with medications is okay. Do not drink coffee or other fluids.   If you have concerns about taking  your medications, please ask at office or if scheduling via [employer], send a message by clicking on Secure Messaging, Message Your Care Team.            Mar 24, 2017  7:45 AM CDT   LAB with CP LAB   [facility]    [address] [address]   [phone]           Patient must bring picture ID.  Patient should be prepared to give a urine specimen  Please do not eat 10-12 hours before your appointment if you are coming in fasting for labs on lipids, cholesterol, or glucose (sugar).  Pregnant women should follow their Care Team instructions. Water with medications is okay. Do not drink coffee or other fluids.   If you have concerns about taking  your medications, please ask at office or if scheduling via [employer], send a message by clicking on Secure Messaging, Message Your Care Team.            Mar 31, 2017  7:30 AM CDT   LAB with CP LAB   [facility]    [address] [address]   [phone]           Patient must bring picture ID.  Patient should be prepared to give a urine specimen  Please do not eat 10-12 hours before your appointment if you are coming in fasting for labs on lipids, cholesterol, or glucose (sugar).  Pregnant women should follow their Care Team instructions. Water with medications is okay. Do not drink coffee or other fluids.   If you have concerns about taking  your medications, please ask at office or if scheduling via [employer], send a message by clicking on Secure Messaging, Message Your Care Team.            Apr 07, 2017  7:45 AM CDT   LAB with CP  LAB   [facility] ([facility])    [address] [address]   [phone]           Patient must bring picture ID.  Patient should be prepared to give a urine specimen  Please do not eat 10-12 hours before your appointment if you are coming in fasting for labs on lipids, cholesterol, or glucose (sugar).  Pregnant women should follow their Care Team instructions. Water with medications is okay. Do not drink coffee or other fluids.   If you have concerns about taking  your medications, please ask at office or if scheduling via ERPLY, send a message by clicking on Secure Messaging, Message Your Care Team.            Apr 14, 2017  7:30 AM CDT   LAB with CP LAB   [facility] ([facility])    [address] [address]   [phone]           Patient must bring picture ID.  Patient should be prepared to give a urine specimen  Please do not eat 10-12 hours before your appointment if you are coming in fasting for labs on lipids, cholesterol, or glucose (sugar).  Pregnant women should follow their Care Team instructions. Water with medications is okay. Do not drink coffee or other fluids.   If you have concerns about taking  your medications, please ask at office or if scheduling via ERPLY, send a message by clicking on Secure Messaging, Message Your Care Team.            Apr 21, 2017  7:30 AM CDT   LAB with CP LAB   [facility] ([facility])    [address] [address]   [phone]           Patient must bring picture ID.  Patient should be prepared to give a urine specimen  Please do not eat 10-12 hours before your appointment if you are coming in fasting for labs on lipids, cholesterol, or glucose (sugar).  Pregnant women should follow their Care Team instructions. Water with  medications is okay. Do not drink coffee or other fluids.   If you have concerns about taking  your medications, please ask at office or if scheduling via [employer], send a message by clicking on Secure Messaging, Message Your Care Team.            Apr 28, 2017  7:30 AM CDT   LAB with CP LAB   [facility] ([facility])    [address] [address]   [phone]           Patient must bring picture ID.  Patient should be prepared to give a urine specimen  Please do not eat 10-12 hours before your appointment if you are coming in fasting for labs on lipids, cholesterol, or glucose (sugar).  Pregnant women should follow their Care Team instructions. Water with medications is okay. Do not drink coffee or other fluids.   If you have concerns about taking  your medications, please ask at office or if scheduling via [employer], send a message by clicking on Secure Messaging, Message Your Care Team.            May 05, 2017  7:30 AM CDT   LAB with CP LAB   [facility] ([facility])    [address] [address]   [phone]           Patient must bring picture ID.  Patient should be prepared to give a urine specimen  Please do not eat 10-12 hours before your appointment if you are coming in fasting for labs on lipids, cholesterol, or glucose (sugar).  Pregnant women should follow their Care Team instructions. Water with medications is okay. Do not drink coffee or other fluids.   If you have concerns about taking  your medications, please ask at office or if scheduling via [employer], send a message by clicking on Secure Messaging, Message Your Care Team.            May 12, 2017  7:30 AM CDT   LAB with CP LAB   [facility] ([facility])    [address] [address]   [phone]            Patient must bring picture ID.  Patient should be prepared to give a urine specimen  Please do not eat 10-12 hours before your appointment if you are coming in fasting for labs on lipids, cholesterol, or glucose (sugar).  Pregnant women should follow their Care Team instructions. Water with medications is okay. Do not drink coffee or other fluids.   If you have concerns about taking  your medications, please ask at office or if scheduling via [email or web address], send a message by clicking on Secure Messaging, Message Your Care Team.              Who to contact     If you have questions or need follow up information about today's clinic visit or your schedule please contact [facility] directly at [phone].  Normal or non-critical lab and imaging results will be communicated to you by UBIKODhart, letter or phone within 4 business days after the clinic has received the results. If you do not hear from us within 7 days, please contact the clinic through [employer] or phone. If you have a critical or abnormal lab result, we will notify you by phone as soon as possible.  Submit refill requests through [email or web address] or call your pharmacy and they will forward the refill request to us. Please allow 3 business days for your refill to be completed.          Additional Information About Your Visit        UBIKODharSlideMail Information     [email or web address] gives you secure access to your electronic health record. If you see a primary care provider, you can also send messages to your care team and make appointments. If you have questions, please call your primary care clinic.  If you do not have a primary care provider, please call [phone] and they will assist you.        Care EveryWhere ID     This is your Care EveryWhere ID. This could be used by other organizations to access your [facility] medical records  [id number]        Your Vitals Were     Pulse Temperature Respirations Height BMI (Body Mass Index)       73 97.8  F (36.6  C) (Oral) 18 1.829 m  (6') 34.1 kg/m2        Blood Pressure from Last 3 Encounters:   03/02/17 122/71   01/05/17 166/82   12/23/16 166/68    Weight from Last 3 Encounters:   03/02/17 114 kg (251 lb 6.4 oz)   01/05/17 109.1 kg (240 lb 9.6 oz)   12/16/16 115.5 kg (254 lb 10.1 oz)                 Today's Medication Changes          These changes are accurate as of: 3/2/17 11:59 PM.  If you have any questions, ask your nurse or doctor.               These medicines have changed or have updated prescriptions.        Dose/Directions    * insulin glargine 100 UNIT/ML injection   Commonly known as:  LANTUS   This may have changed:  how much to take   Used for:  Kidney transplant recipient        Dose:  16 Units   Inject 16 Units Subcutaneous At Bedtime   Refills:  0       * insulin glargine 100 UNIT/ML injection   Commonly known as:  LANTUS   This may have changed:  how much to take   Used for:  Kidney transplant recipient        Dose:  34 Units   Inject 34 Units Subcutaneous every morning   Refills:  0       * Notice:  This list has 2 medication(s) that are the same as other medications prescribed for you. Read the directions carefully, and ask your doctor or other care provider to review them with you.      Stop taking these medicines if you haven't already. Please contact your care team if you have questions.     valACYclovir 500 MG tablet   Commonly known as:  VALTREX   Stopped by:  Abdiaziz Qureshi MD                    Primary Care Provider Office Phone # Fax #    Dustin Tadeo [phone]       [address] [address]        Thank you!     Thank you for choosing [facility]  for your care. Our goal is always to provide you with excellent care. Hearing back from our patients is one way we can continue to improve our services. Please take a few minutes to complete the written survey that you may receive in the mail after your visit with us. Thank you!              Your Updated Medication List - Protect others around you: Learn how to safely use, store and throw away your medicines at www.disposemymeds.org.          This list is accurate as of: 3/2/17 11:59 PM.  Always use your most recent med list.                   Brand Name Dispense Instructions for use    aspirin 81 MG EC tablet     30 tablet    Take 1 tablet (81 mg) by mouth daily       atorvastatin 20 MG tablet    LIPITOR    30 tablet    Take 0.5 tablets (10 mg) by mouth daily       calcitRIOL 0.25 MCG capsule    ROCALTROL    60 capsule    Take 1 capsule (0.25 mcg) by mouth daily       carvedilol 25 MG tablet    COREG    60 tablet    Take 1 tablet (25 mg) by mouth 2 times daily (with meals)       cloNIDine 0.3 MG tablet    CATAPRES    90 tablet    Take 1 tablet (0.3 mg) by mouth every 8 hours as needed (Please take for SBP >180.)       * insulin aspart 100 UNIT/ML injection    NovoLOG PEN    1 mL    Inject 1 Units Subcutaneous Take with snacks or supplements for high blood sugar 1 unit per 4 grams carbohydrate       * insulin aspart 100 UNIT/ML injection    NovoLOG PEN     Inject 1 Units Subcutaneous 3 times daily (with meals) DOSE:  1 units per 4 grams of carbohydrate.  Only chart total amount of units given.  Do not give if pre-prandial glucose is less than 60 mg/dL.       * insulin aspart 100 UNIT/ML injection    NovoLOG PEN     Inject 1-22 Units Subcutaneous 3 times daily (before meals) Correction Scale - VERY HIGH INSULIN RESISTANCE DOSING    Do Not give Correction Insulin if Pre-Meal BG less than 250.  For Pre-Meal  - 260 give 1 unit.  For Pre-Meal -269  give 2 units.  For Pre-Meal  - 279 give 3 units.  For Pre-Meal  - 289 give 4 units.  For Pre-Meal  - 299 give 5 units.  For Pre-Meal  - 309 give 6 units.  ...       * insulin aspart 100 UNIT/ML injection    NovoLOG PEN     Inject 1-16 Units Subcutaneous At Bedtime Correction Scale - VERY HIGH INSULIN RESISTANCE DOSING    Do  Not give Bedtime Correction Insulin if BG less than 200.  For  - 209 give 1 units.  For  - 219 give 2 units.  For  - 229 give 3 units.  For  - 239 give 4 units.  For  - 249 give 5 units.  For  - 259 give 6 units.  For  - 269 give 7 units.  For  - 279 give 8 units ...       * insulin glargine 100 UNIT/ML injection    LANTUS     Inject 16 Units Subcutaneous At Bedtime       * insulin glargine 100 UNIT/ML injection    LANTUS     Inject 34 Units Subcutaneous every morning       magnesium oxide 400 (241.3 MG) MG tablet    MAG-OX    90 tablet    Take 1 tablet (400 mg) by mouth daily       mycophenolate 250 MG capsule    CELLCEPT - GENERIC EQUIVALENT    180 capsule    Take 3 capsules (750 mg) by mouth 2 times daily       NIFEdipine ER 30 MG Tb24    ADALAT CC    30 tablet    Take 2 tablets (60 mg) by mouth 2 times daily       sodium bicarbonate 650 MG tablet     60 tablet    Take 1 tablet (650 mg) by mouth 2 times daily       sulfamethoxazole-trimethoprim 400-80 MG per tablet    BACTRIM/SEPTRA    30 tablet    Take 1 tablet by mouth three times a week       tacrolimus 1 MG capsule    PROGRAF - GENERIC EQUIVALENT    360 capsule    Take 6 capsules (6 mg) by mouth every 12 hours       * Notice:  This list has 6 medication(s) that are the same as other medications prescribed for you. Read the directions carefully, and ask your doctor or other care provider to review them with you.

## 2017-03-03 ENCOUNTER — TELEPHONE (OUTPATIENT)
Dept: TRANSPLANT | Facility: CLINIC | Age: 62
End: 2017-03-03

## 2017-03-03 DIAGNOSIS — Z48.298 AFTERCARE FOLLOWING ORGAN TRANSPLANT: ICD-10-CM

## 2017-03-03 DIAGNOSIS — N25.81 SECONDARY RENAL HYPERPARATHYROIDISM (H): ICD-10-CM

## 2017-03-03 DIAGNOSIS — Z94.0 KIDNEY REPLACED BY TRANSPLANT: ICD-10-CM

## 2017-03-03 DIAGNOSIS — E55.9 VITAMIN D DEFICIENCY: ICD-10-CM

## 2017-03-03 DIAGNOSIS — Z94.0 KIDNEY TRANSPLANT RECIPIENT: ICD-10-CM

## 2017-03-03 LAB
ANION GAP SERPL CALCULATED.3IONS-SCNC: 11 MMOL/L (ref 3–14)
BUN SERPL-MCNC: 23 MG/DL (ref 7–30)
CALCIUM SERPL-MCNC: 10 MG/DL (ref 8.5–10.1)
CHLORIDE SERPL-SCNC: 110 MMOL/L (ref 94–109)
CO2 SERPL-SCNC: 20 MMOL/L (ref 20–32)
CREAT SERPL-MCNC: 1.4 MG/DL (ref 0.66–1.25)
DEPRECATED CALCIDIOL+CALCIFEROL SERPL-MC: 8 UG/L (ref 20–75)
ERYTHROCYTE [DISTWIDTH] IN BLOOD BY AUTOMATED COUNT: 13.4 % (ref 10–15)
GFR SERPL CREATININE-BSD FRML MDRD: 51 ML/MIN/1.7M2
GLUCOSE SERPL-MCNC: 144 MG/DL (ref 70–99)
HCT VFR BLD AUTO: 46.1 % (ref 40–53)
HGB BLD-MCNC: 15.1 G/DL (ref 13.3–17.7)
MCH RBC QN AUTO: 30.6 PG (ref 26.5–33)
MCHC RBC AUTO-ENTMCNC: 32.8 G/DL (ref 31.5–36.5)
MCV RBC AUTO: 94 FL (ref 78–100)
PLATELET # BLD AUTO: 211 10E9/L (ref 150–450)
POTASSIUM SERPL-SCNC: 5.1 MMOL/L (ref 3.4–5.3)
PTH-INTACT SERPL-MCNC: 277 PG/ML (ref 12–72)
RBC # BLD AUTO: 4.93 10E12/L (ref 4.4–5.9)
SODIUM SERPL-SCNC: 141 MMOL/L (ref 133–144)
TACROLIMUS BLD-MCNC: 7.8 UG/L (ref 5–15)
TME LAST DOSE: NORMAL H
WBC # BLD AUTO: 3.8 10E9/L (ref 4–11)

## 2017-03-03 PROCEDURE — 36415 COLL VENOUS BLD VENIPUNCTURE: CPT | Performed by: INTERNAL MEDICINE

## 2017-03-03 PROCEDURE — 80197 ASSAY OF TACROLIMUS: CPT | Performed by: TRANSPLANT SURGERY

## 2017-03-03 PROCEDURE — 80048 BASIC METABOLIC PNL TOTAL CA: CPT | Performed by: INTERNAL MEDICINE

## 2017-03-03 PROCEDURE — 83970 ASSAY OF PARATHORMONE: CPT | Performed by: INTERNAL MEDICINE

## 2017-03-03 PROCEDURE — 82306 VITAMIN D 25 HYDROXY: CPT | Performed by: INTERNAL MEDICINE

## 2017-03-03 PROCEDURE — 85027 COMPLETE CBC AUTOMATED: CPT | Performed by: INTERNAL MEDICINE

## 2017-03-03 RX ORDER — NICOTINE POLACRILEX 4 MG/1
20 GUM, CHEWING ORAL EVERY MORNING
Qty: 30 TABLET | Refills: 0 | Status: SHIPPED | OUTPATIENT
Start: 2017-03-03 | End: 2017-03-06

## 2017-03-03 NOTE — TELEPHONE ENCOUNTER
Please fax [facility] pharmacy on [address] in [place], [place]. Needs new order for Oneprazole 20 mg. Thanks

## 2017-03-04 PROBLEM — E83.42 HYPOMAGNESEMIA: Status: ACTIVE | Noted: 2017-03-04

## 2017-03-04 NOTE — PROGRESS NOTES
Assessment and Plan:  1. DDKT - baseline Cr ~ 1.5-1.8, which has been stable.  Mild proteinuria.  No DSA.  Will make no changes in immunosuppression.  2. HTN - well controlled at target of less than 140/90.  Eventually, with patient's DM, would consider addition of ARB.  3. DM - good control.  4. Anemia in chronic renal disease - improved, now normal Hgb.  Iron replete.  Will follow.  5. Secondary renal hyperparathyroidism - decreased PTH, now mildly elevated, which should continue to improve post transplant.  Will continue on calcitriol and treat vitamin D deficiency.  Will recheck PTH at next clinic visit.  6. Vitamin D deficiency - low vitamin D level and will continue on cholecalciferol.  Will recheck vitamin D level at next clinic visit.  7. Hypophosphatemia - slightly lower serum phosphorus and will continue with increased dietary phosphorus.  8. Hypercalcemia - high normal serum calcium level.  Will continue on calcitriol for now and recheck PTH and vitamin D levels.  9. Hypomagnesemia - low normal serum magnesium level and will continue oral magnesium supplement.  10. Metabolic acidosis - normal serum bicarbonate and will continue on oral sodium bicarbonate supplement.  11. Overweight - recommend increased exercise and watch caloric intake.  12. Prophylactic medications - patient can stop Valcyte.  13. Recommend return visit in 3 months.    Assessment and plan was discussed with patient and he voiced his understanding and agreement.    Reason for Visit:  Mr. Roland is here for routine follow up.    HPI:   Corey Roland is a 61 year old male with ESKD from DM and is status post DDKT on 11/24/16.         Transplant Hx:       Tx: DDKT  Date: 11/24/16       Present Maintenance IS: Tacrolimus and Mycophenolate mofetil       Baseline Creatinine: 1.5-1.8       Recent DSA: No  Date last checked: 12/2016       Biopsy: No    Mr. Roland reports feeling okay overall with some medical problems.  His energy level  "is \"much, much better,\" although he still will get tired quickly and not normal yet.  He is active and does get some exercise, mostly with walking.  Denies any chest pain or shortness of breath with exertion, but just tires easy.  Appetite is \"too good\" and his weight is up about 10 lbs.  No nausea, vomiting or diarrhea.  No further constipation.  No fever, sweats or chills.  Stable, but a little leg swelling, left greater than right, which is always that way since he hurt his ankle years ago.    Home BP: 130/70s.      ROS:   A comprehensive review of systems was obtained and negative, except as noted in the HPI or PMH.    Active Medical Problems:  Patient Active Problem List   Diagnosis     Hypertension secondary to other renal disorders     Anemia in chronic renal disease     Secondary renal hyperparathyroidism (H)     Obesity     Diabetic peripheral neuropathy (H)     Kidney replaced by transplant     Immunosuppressed status (H)     Type 1 diabetes mellitus with nephropathy (H)     Aftercare following organ transplant     Vitamin D deficiency     Hypomagnesemia       Personal Hx:  Social History     Social History     Marital status: Single     Spouse name: N/A     Number of children: 0     Years of education: N/A     Occupational History     Not on file.     Social History Main Topics     Smoking status: Former Smoker     Packs/day: 2.00     Years: 38.00     Types: Cigarettes     Quit date: 4/1/2005     Smokeless tobacco: Not on file     Alcohol use 0.0 - 0.5 oz/week     0 - 1 Standard drinks or equivalent per week      Comment: Not since transplant     Drug use: No      Comment: past (1980's, marijuana, heroin IV, cocaine, hallucinogens, methamphetamine)     Sexual activity: No     Other Topics Concern     Not on file     Social History Narrative       Allergies:  No Known Allergies    Medications:  Prior to Admission medications    Medication Sig Start Date End Date Taking? Authorizing Provider   tacrolimus " (PROGRAF - GENERIC EQUIVALENT) 1 MG capsule Take 5 capsules (5 mg) by mouth 2 times daily 12/4/16   Erasmo Wilkins MD   tacrolimus (PROGRAF - GENERIC EQUIVALENT) 0.5 MG capsule Take 1 capsule (0.5 mg) by mouth 2 times daily 12/3/16   Abdiaziz Qureshi MD   oxyCODONE (ROXICODONE) 5 MG IR tablet Take 1-2 tablets (5-10 mg) by mouth every 6 hours as needed for moderate to severe pain 12/2/16   Talisha Dunham PA-C   aspirin EC 81 MG EC tablet Take 1 tablet (81 mg) by mouth daily 12/2/16   Talisha Dunham PA-C   calcium carbonate (TUMS) 500 MG chewable tablet Take 1 tablet (500 mg) by mouth 2 times daily as needed for heartburn 12/2/16   Talisha Dunham PA-C   insulin aspart (NOVOLOG PEN) 100 UNIT/ML injection Inject 1 Units Subcutaneous Take with snacks or supplements for high blood sugar 1 unit per 4 grams carbohydrate 12/2/16   Talisha Dunham PA-C   insulin aspart (NOVOLOG PEN) 100 UNIT/ML injection Inject 1 Units Subcutaneous 3 times daily (with meals) DOSE:  1 units per 4 grams of carbohydrate.  Only chart total amount of units given.  Do not give if pre-prandial glucose is less than 60 mg/dL. 12/2/16   Talisha Dunham PA-C   insulin aspart (NOVOLOG PEN) 100 UNIT/ML injection Inject 1-22 Units Subcutaneous 3 times daily (before meals) Correction Scale - VERY HIGH INSULIN RESISTANCE DOSING     Do Not give Correction Insulin if Pre-Meal BG less than 250.   For Pre-Meal  - 260 give 1 unit.   For Pre-Meal -269  give 2 units.   For Pre-Meal  - 279 give 3 units.   For Pre-Meal  - 289 give 4 units.   For Pre-Meal  - 299 give 5 units.   For Pre-Meal  - 309 give 6 units.   ... 12/2/16   Talisha Dunham PA-C   insulin aspart (NOVOLOG PEN) 100 UNIT/ML injection Inject 1-16 Units Subcutaneous At Bedtime Correction Scale - VERY HIGH INSULIN RESISTANCE DOSING     Do Not give Bedtime Correction Insulin if BG less than 200.   For  - 209 give 1 units.    For  - 219 give 2 units.   For  - 229 give 3 units.   For  - 239 give 4 units.   For  - 249 give 5 units.   For  - 259 give 6 units.   For  - 269 give 7 units.   For  - 279 give 8 units  ... 12/2/16   Talisha Dunham PA-C   insulin glargine (LANTUS) 100 UNIT/ML injection Inject 16 Units Subcutaneous At Bedtime 12/2/16   Talisha Dunham PA-C   insulin glargine (LANTUS) 100 UNIT/ML injection Inject 34 Units Subcutaneous every morning 12/3/16   Talisha Dunham PA-C   atorvastatin (LIPITOR) 20 MG tablet Take 0.5 tablets (10 mg) by mouth daily 12/2/16   Talisha Dunham PA-C   cloNIDine (CATAPRES) 0.1 MG tablet Take 1 tablet (0.1 mg) by mouth every 8 hours as needed (Please take for SBP >180.)  Patient taking differently: Take 0.2 mg by mouth every 8 hours as needed (Please take for SBP >180.)  12/2/16   Talisha Dunham PA-C   valACYclovir (VALTREX) 500 MG tablet Take 1 tablet (500 mg) by mouth daily 12/2/16   Talisha Dunham PA-C   carvedilol (COREG) 25 MG tablet Take 1 tablet (25 mg) by mouth 2 times daily (with meals) 12/2/16   Talisha Dunham PA-C   furosemide (LASIX) 80 MG tablet Take 1 tablet (80 mg) by mouth 2 times daily 12/2/16   Talisha Dunham PA-C   senna-docusate (SENOKOT-S;PERICOLACE) 8.6-50 MG per tablet Take 2 tablets by mouth 2 times daily 12/2/16   Talisha Dunham PA-C   sulfamethoxazole-trimethoprim (BACTRIM/SEPTRA) 400-80 MG per tablet Take 1 tablet by mouth three times a week 12/2/16   Talisha Dunham PA-C   mycophenolate (CELLCEPT - GENERIC EQUIVALENT) 250 MG capsule Take 4 capsules (1,000 mg) by mouth 2 times daily 12/2/16   Talisha Dunham PA-C   calcitRIOL (ROCALTROL) 0.25 MCG capsule Take 1 capsule (0.25 mcg) by mouth daily 12/2/16   Talisha Dunham PA-C   amoxicillin (AMOXIL) 500 MG capsule Take 1 capsule (500 mg) by mouth daily for 10 days 12/2/16 12/12/16  Talisha Dunham PA-C    pantoprazole (PROTONIX) 40 MG EC tablet Take 1 tablet (40 mg) by mouth daily 12/2/16   Talisha Dunham PA-C   NIFEdipine ER (ADALAT CC) 30 MG TB24 Take 2 tablets (60 mg) by mouth 2 times daily 12/2/16   Talisha Dunham PA-C   betamethasone dipropionate (DIPROSONE) 0.05 % ointment Apply topically 2 times daily    Reported, Patient       Vitals:  /71  Pulse 73  Temp 97.8  F (36.6  C) (Oral)  Resp 18  Ht 1.829 m (6')  Wt 114 kg (251 lb 6.4 oz)  BMI 34.1 kg/m2    Exam:   GENERAL APPEARANCE: alert and no distress  HENT: mouth without ulcers or lesions  LYMPHATICS: no cervical or supraclavicular nodes  RESP: lungs clear to auscultation - no rales, rhonchi or wheezes  CV: regular rhythm, normal rate, no rub, no murmur  EDEMA: trace LE edema bilaterally  ABDOMEN: soft, nondistended, nontender, bowel sounds normal, overweight  MS: extremities normal - no gross deformities noted, no evidence of inflammation in joints, no muscle tenderness  SKIN: no rash  TX KIDNEY: minimal TTP, decreased erythema, no discharge    Results:   Recent Results (from the past 200 hour(s))   Parathyroid Hormone Intact    Collection Time: 03/03/17  7:42 AM   Result Value Ref Range    Parathyroid Hormone Intact 277 (H) 12 - 72 pg/mL   Vitamin D Deficiency    Collection Time: 03/03/17  7:42 AM   Result Value Ref Range    Vitamin D Deficiency screening 8 (L) 20 - 75 ug/L   CBC with platelets    Collection Time: 03/03/17  7:42 AM   Result Value Ref Range    WBC 3.8 (L) 4.0 - 11.0 10e9/L    RBC Count 4.93 4.4 - 5.9 10e12/L    Hemoglobin 15.1 13.3 - 17.7 g/dL    Hematocrit 46.1 40.0 - 53.0 %    MCV 94 78 - 100 fl    MCH 30.6 26.5 - 33.0 pg    MCHC 32.8 31.5 - 36.5 g/dL    RDW 13.4 10.0 - 15.0 %    Platelet Count 211 150 - 450 10e9/L   Basic metabolic panel    Collection Time: 03/03/17  7:42 AM   Result Value Ref Range    Sodium 141 133 - 144 mmol/L    Potassium 5.1 3.4 - 5.3 mmol/L    Chloride 110 (H) 94 - 109 mmol/L    Carbon Dioxide 20  20 - 32 mmol/L    Anion Gap 11 3 - 14 mmol/L    Glucose 144 (H) 70 - 99 mg/dL    Urea Nitrogen 23 7 - 30 mg/dL    Creatinine 1.40 (H) 0.66 - 1.25 mg/dL    GFR Estimate 51 (L) >60 mL/min/1.7m2    GFR Estimate If Black 62 >60 mL/min/1.7m2    Calcium 10.0 8.5 - 10.1 mg/dL   Tacrolimus level    Collection Time: 03/03/17  7:43 AM   Result Value Ref Range    Tacrolimus Last Dose [id number] @ 2000     Tacrolimus Level 7.8 5.0 - 15.0 ug/L

## 2017-03-06 DIAGNOSIS — Z94.0 KIDNEY TRANSPLANT RECIPIENT: ICD-10-CM

## 2017-03-06 RX ORDER — NICOTINE POLACRILEX 4 MG/1
20 GUM, CHEWING ORAL EVERY MORNING
Qty: 90 TABLET | Refills: 3 | Status: SHIPPED | OUTPATIENT
Start: 2017-03-06 | End: 2018-05-08

## 2017-03-06 NOTE — TELEPHONE ENCOUNTER
Last Office Visit with Nephrologist:  3/2/17.  Medication refilled per [facility] protocol.    Keli Gibson RN

## 2017-03-10 ENCOUNTER — TELEPHONE (OUTPATIENT)
Dept: TRANSPLANT | Facility: CLINIC | Age: 62
End: 2017-03-10

## 2017-03-10 DIAGNOSIS — Z48.298 AFTERCARE FOLLOWING ORGAN TRANSPLANT: ICD-10-CM

## 2017-03-10 DIAGNOSIS — Z94.0 KIDNEY REPLACED BY TRANSPLANT: ICD-10-CM

## 2017-03-10 LAB
ANION GAP SERPL CALCULATED.3IONS-SCNC: 9 MMOL/L (ref 3–14)
BUN SERPL-MCNC: 23 MG/DL (ref 7–30)
CALCIUM SERPL-MCNC: 10.2 MG/DL (ref 8.5–10.1)
CHLORIDE SERPL-SCNC: 111 MMOL/L (ref 94–109)
CO2 SERPL-SCNC: 21 MMOL/L (ref 20–32)
CREAT SERPL-MCNC: 1.52 MG/DL (ref 0.66–1.25)
ERYTHROCYTE [DISTWIDTH] IN BLOOD BY AUTOMATED COUNT: 13.3 % (ref 10–15)
GFR SERPL CREATININE-BSD FRML MDRD: 47 ML/MIN/1.7M2
GLUCOSE SERPL-MCNC: 101 MG/DL (ref 70–99)
HCT VFR BLD AUTO: 47.2 % (ref 40–53)
HGB BLD-MCNC: 15.6 G/DL (ref 13.3–17.7)
MCH RBC QN AUTO: 30.6 PG (ref 26.5–33)
MCHC RBC AUTO-ENTMCNC: 33.1 G/DL (ref 31.5–36.5)
MCV RBC AUTO: 93 FL (ref 78–100)
PLATELET # BLD AUTO: 187 10E9/L (ref 150–450)
POTASSIUM SERPL-SCNC: 5 MMOL/L (ref 3.4–5.3)
RBC # BLD AUTO: 5.1 10E12/L (ref 4.4–5.9)
SODIUM SERPL-SCNC: 141 MMOL/L (ref 133–144)
TACROLIMUS BLD-MCNC: 8.6 UG/L (ref 5–15)
TME LAST DOSE: NORMAL H
WBC # BLD AUTO: 3.9 10E9/L (ref 4–11)

## 2017-03-10 PROCEDURE — 80197 ASSAY OF TACROLIMUS: CPT | Performed by: TRANSPLANT SURGERY

## 2017-03-10 PROCEDURE — 85027 COMPLETE CBC AUTOMATED: CPT | Performed by: FAMILY MEDICINE

## 2017-03-10 PROCEDURE — 80048 BASIC METABOLIC PNL TOTAL CA: CPT | Performed by: TRANSPLANT SURGERY

## 2017-03-10 PROCEDURE — 36415 COLL VENOUS BLD VENIPUNCTURE: CPT | Performed by: TRANSPLANT SURGERY

## 2017-03-13 DIAGNOSIS — E55.9 VITAMIN D DEFICIENCY: Primary | ICD-10-CM

## 2017-03-13 RX ORDER — ERGOCALCIFEROL 1.25 MG/1
50000 CAPSULE, LIQUID FILLED ORAL WEEKLY
Qty: 26 CAPSULE | Refills: 0 | Status: SHIPPED | OUTPATIENT
Start: 2017-03-13 | End: 2017-12-12

## 2017-03-13 NOTE — TELEPHONE ENCOUNTER
RE: vit D = 8  Received: Today       Abdiaziz Qureshi MD [place], Teresa, RN                   Would start him on ergocalciferol 25471 units once weekly x 26 weeks.            Previous Messages       ----- Message -----      From: Neena De Guzman RN      Sent: 3/10/2017   4:26 PM        To: Abdiaziz Qureshi MD   Subject: vit D = 8                                         Dr. Qureshi,     Not on vitamin D.            Call and start Ergocalciferol 50,000 units once weekly x 26 weeks.  --- LPN tasked.

## 2017-03-13 NOTE — TELEPHONE ENCOUNTER
Spoke to patient regarding low Vitamin D level.  Per Dr. Qureshi patient recommended starting vitamin D supplement.  Patient verbalizes understanding. New Rx sent to pharm.

## 2017-03-17 DIAGNOSIS — Z94.0 KIDNEY REPLACED BY TRANSPLANT: ICD-10-CM

## 2017-03-17 DIAGNOSIS — Z48.298 AFTERCARE FOLLOWING ORGAN TRANSPLANT: ICD-10-CM

## 2017-03-17 LAB
ANION GAP SERPL CALCULATED.3IONS-SCNC: 4 MMOL/L (ref 3–14)
BUN SERPL-MCNC: 18 MG/DL (ref 7–30)
CALCIUM SERPL-MCNC: 10.3 MG/DL (ref 8.5–10.1)
CHLORIDE SERPL-SCNC: 108 MMOL/L (ref 94–109)
CO2 SERPL-SCNC: 25 MMOL/L (ref 20–32)
CREAT SERPL-MCNC: 1.8 MG/DL (ref 0.66–1.25)
ERYTHROCYTE [DISTWIDTH] IN BLOOD BY AUTOMATED COUNT: 12.9 % (ref 10–15)
GFR SERPL CREATININE-BSD FRML MDRD: 38 ML/MIN/1.7M2
GLUCOSE SERPL-MCNC: 272 MG/DL (ref 70–99)
HCT VFR BLD AUTO: 46.9 % (ref 40–53)
HGB BLD-MCNC: 15.2 G/DL (ref 13.3–17.7)
MCH RBC QN AUTO: 30 PG (ref 26.5–33)
MCHC RBC AUTO-ENTMCNC: 32.4 G/DL (ref 31.5–36.5)
MCV RBC AUTO: 93 FL (ref 78–100)
PLATELET # BLD AUTO: 163 10E9/L (ref 150–450)
POTASSIUM SERPL-SCNC: 5.1 MMOL/L (ref 3.4–5.3)
RBC # BLD AUTO: 5.06 10E12/L (ref 4.4–5.9)
SODIUM SERPL-SCNC: 137 MMOL/L (ref 133–144)
TACROLIMUS BLD-MCNC: 10.1 UG/L (ref 5–15)
TME LAST DOSE: NORMAL H
WBC # BLD AUTO: 4.9 10E9/L (ref 4–11)

## 2017-03-17 PROCEDURE — 80197 ASSAY OF TACROLIMUS: CPT | Performed by: TRANSPLANT SURGERY

## 2017-03-17 PROCEDURE — 85027 COMPLETE CBC AUTOMATED: CPT | Performed by: FAMILY MEDICINE

## 2017-03-17 PROCEDURE — 36415 COLL VENOUS BLD VENIPUNCTURE: CPT | Performed by: TRANSPLANT SURGERY

## 2017-03-17 PROCEDURE — 80048 BASIC METABOLIC PNL TOTAL CA: CPT | Performed by: TRANSPLANT SURGERY

## 2017-03-23 DIAGNOSIS — D84.9 IMMUNOSUPPRESSED STATUS (H): ICD-10-CM

## 2017-03-23 RX ORDER — CARVEDILOL 25 MG/1
25 TABLET ORAL 2 TIMES DAILY WITH MEALS
Qty: 60 TABLET | Refills: 3 | Status: SHIPPED | OUTPATIENT
Start: 2017-03-23 | End: 2017-07-17

## 2017-03-23 NOTE — TELEPHONE ENCOUNTER
Last Office Visit with Nephrologist:  3/2/2017.  Medication refilled per [facility] protocol.    Brenden Bernardo RN

## 2017-03-24 ENCOUNTER — RESULTS ONLY (OUTPATIENT)
Dept: OTHER | Facility: CLINIC | Age: 62
End: 2017-03-24

## 2017-03-24 DIAGNOSIS — Z94.0 KIDNEY TRANSPLANT RECIPIENT: ICD-10-CM

## 2017-03-24 DIAGNOSIS — Z48.298 AFTERCARE FOLLOWING ORGAN TRANSPLANT: ICD-10-CM

## 2017-03-24 DIAGNOSIS — Z94.0 KIDNEY REPLACED BY TRANSPLANT: ICD-10-CM

## 2017-03-24 LAB
ANION GAP SERPL CALCULATED.3IONS-SCNC: 11 MMOL/L (ref 3–14)
BUN SERPL-MCNC: 24 MG/DL (ref 7–30)
CALCIUM SERPL-MCNC: 10.4 MG/DL (ref 8.5–10.1)
CHLORIDE SERPL-SCNC: 108 MMOL/L (ref 94–109)
CO2 SERPL-SCNC: 21 MMOL/L (ref 20–32)
CREAT SERPL-MCNC: 1.45 MG/DL (ref 0.66–1.25)
ERYTHROCYTE [DISTWIDTH] IN BLOOD BY AUTOMATED COUNT: 12.8 % (ref 10–15)
GFR SERPL CREATININE-BSD FRML MDRD: 49 ML/MIN/1.7M2
GLUCOSE SERPL-MCNC: 164 MG/DL (ref 70–99)
HCT VFR BLD AUTO: 48.6 % (ref 40–53)
HGB BLD-MCNC: 16 G/DL (ref 13.3–17.7)
MCH RBC QN AUTO: 30.2 PG (ref 26.5–33)
MCHC RBC AUTO-ENTMCNC: 32.9 G/DL (ref 31.5–36.5)
MCV RBC AUTO: 92 FL (ref 78–100)
PLATELET # BLD AUTO: 174 10E9/L (ref 150–450)
POTASSIUM SERPL-SCNC: 4.8 MMOL/L (ref 3.4–5.3)
RBC # BLD AUTO: 5.29 10E12/L (ref 4.4–5.9)
SODIUM SERPL-SCNC: 140 MMOL/L (ref 133–144)
TACROLIMUS BLD-MCNC: 11.2 UG/L (ref 5–15)
TME LAST DOSE: NORMAL H
WBC # BLD AUTO: 5.7 10E9/L (ref 4–11)

## 2017-03-24 PROCEDURE — 36415 COLL VENOUS BLD VENIPUNCTURE: CPT | Performed by: TRANSPLANT SURGERY

## 2017-03-24 PROCEDURE — 86833 HLA CLASS II HIGH DEFIN QUAL: CPT | Performed by: TRANSPLANT SURGERY

## 2017-03-24 PROCEDURE — 86832 HLA CLASS I HIGH DEFIN QUAL: CPT | Performed by: TRANSPLANT SURGERY

## 2017-03-24 PROCEDURE — 80197 ASSAY OF TACROLIMUS: CPT | Performed by: TRANSPLANT SURGERY

## 2017-03-24 PROCEDURE — 80048 BASIC METABOLIC PNL TOTAL CA: CPT | Performed by: TRANSPLANT SURGERY

## 2017-03-24 PROCEDURE — 87799 DETECT AGENT NOS DNA QUANT: CPT | Performed by: TRANSPLANT SURGERY

## 2017-03-24 PROCEDURE — 85027 COMPLETE CBC AUTOMATED: CPT | Performed by: FAMILY MEDICINE

## 2017-03-24 RX ORDER — CALCITRIOL 0.25 UG/1
0.25 CAPSULE, LIQUID FILLED ORAL DAILY
Qty: 90 CAPSULE | Refills: 1 | Status: SHIPPED | OUTPATIENT
Start: 2017-03-24 | End: 2017-06-03

## 2017-03-26 LAB — PRA DONOR SPECIFIC ABY: NORMAL

## 2017-03-27 DIAGNOSIS — Z94.0 KIDNEY TRANSPLANT RECIPIENT: Primary | ICD-10-CM

## 2017-03-27 LAB
BKV DNA # SPEC NAA+PROBE: NORMAL COPIES/ML
BKV DNA SPEC NAA+PROBE-LOG#: NORMAL LOG COPIES/ML
SPECIMEN SOURCE: NORMAL

## 2017-03-27 NOTE — TELEPHONE ENCOUNTER
Spoke to patient regarding tac level = 11.2, above goal.  Patient confirms current dose and verbalizes understanding of medication dose decrease to 5 mg BID.

## 2017-03-27 NOTE — TELEPHONE ENCOUNTER
Issue Prograf tacrolimus 11.2 above goal level   Plan   Confirm taking 6 mg of Prograf tacrolimus twice per day   Lower to Prograf tacrolimus 5 mg twice per day     2nd Issue overdue for Donor Specific Antibodies  And BK PCR QT  Need to have drawn with next set of labs

## 2017-03-28 RX ORDER — TACROLIMUS 1 MG/1
5 CAPSULE ORAL EVERY 12 HOURS
Qty: 300 CAPSULE | Refills: 11 | Status: SHIPPED | OUTPATIENT
Start: 2017-03-28 | End: 2017-05-01

## 2017-03-29 ENCOUNTER — TELEPHONE (OUTPATIENT)
Dept: TRANSPLANT | Facility: CLINIC | Age: 62
End: 2017-03-29

## 2017-03-29 DIAGNOSIS — Z94.0 KIDNEY TRANSPLANT RECIPIENT: ICD-10-CM

## 2017-03-29 RX ORDER — NIFEDIPINE 30 MG
60 TABLET, EXTENDED RELEASE ORAL 2 TIMES DAILY
Qty: 360 TABLET | Refills: 1 | Status: SHIPPED | OUTPATIENT
Start: 2017-03-29 | End: 2017-06-03

## 2017-03-31 DIAGNOSIS — Z94.0 KIDNEY REPLACED BY TRANSPLANT: Primary | ICD-10-CM

## 2017-03-31 DIAGNOSIS — Z48.298 AFTERCARE FOLLOWING ORGAN TRANSPLANT: ICD-10-CM

## 2017-03-31 DIAGNOSIS — Z94.0 KIDNEY REPLACED BY TRANSPLANT: ICD-10-CM

## 2017-03-31 LAB
ANION GAP SERPL CALCULATED.3IONS-SCNC: 10 MMOL/L (ref 3–14)
BASOPHILS # BLD AUTO: 0.1 10E9/L (ref 0–0.2)
BASOPHILS NFR BLD AUTO: 1.3 %
BUN SERPL-MCNC: 23 MG/DL (ref 7–30)
CALCIUM SERPL-MCNC: 10 MG/DL (ref 8.5–10.1)
CHLORIDE SERPL-SCNC: 109 MMOL/L (ref 94–109)
CO2 SERPL-SCNC: 18 MMOL/L (ref 20–32)
CREAT SERPL-MCNC: 1.58 MG/DL (ref 0.66–1.25)
DIFFERENTIAL METHOD BLD: NORMAL
EOSINOPHIL # BLD AUTO: 0.3 10E9/L (ref 0–0.7)
EOSINOPHIL NFR BLD AUTO: 4.9 %
ERYTHROCYTE [DISTWIDTH] IN BLOOD BY AUTOMATED COUNT: 12.9 % (ref 10–15)
GFR SERPL CREATININE-BSD FRML MDRD: 45 ML/MIN/1.7M2
GLUCOSE SERPL-MCNC: 279 MG/DL (ref 70–99)
HCT VFR BLD AUTO: 47.7 % (ref 40–53)
HGB BLD-MCNC: 16 G/DL (ref 13.3–17.7)
LYMPHOCYTES # BLD AUTO: 0.8 10E9/L (ref 0.8–5.3)
LYMPHOCYTES NFR BLD AUTO: 15 %
MCH RBC QN AUTO: 30 PG (ref 26.5–33)
MCHC RBC AUTO-ENTMCNC: 33.5 G/DL (ref 31.5–36.5)
MCV RBC AUTO: 90 FL (ref 78–100)
MONOCYTES # BLD AUTO: 0.6 10E9/L (ref 0–1.3)
MONOCYTES NFR BLD AUTO: 11.8 %
NEUTROPHILS # BLD AUTO: 3.5 10E9/L (ref 1.6–8.3)
NEUTROPHILS NFR BLD AUTO: 67 %
PLATELET # BLD AUTO: 177 10E9/L (ref 150–450)
POTASSIUM SERPL-SCNC: 4.9 MMOL/L (ref 3.4–5.3)
RBC # BLD AUTO: 5.33 10E12/L (ref 4.4–5.9)
SODIUM SERPL-SCNC: 137 MMOL/L (ref 133–144)
TACROLIMUS BLD-MCNC: 8.3 UG/L (ref 5–15)
TME LAST DOSE: NORMAL H
WBC # BLD AUTO: 5.3 10E9/L (ref 4–11)

## 2017-03-31 PROCEDURE — 80048 BASIC METABOLIC PNL TOTAL CA: CPT | Performed by: TRANSPLANT SURGERY

## 2017-03-31 PROCEDURE — 80197 ASSAY OF TACROLIMUS: CPT | Performed by: TRANSPLANT SURGERY

## 2017-03-31 PROCEDURE — 36415 COLL VENOUS BLD VENIPUNCTURE: CPT | Performed by: TRANSPLANT SURGERY

## 2017-03-31 PROCEDURE — 85025 COMPLETE CBC W/AUTO DIFF WBC: CPT | Performed by: INTERNAL MEDICINE

## 2017-04-03 LAB
DONOR IDENTIFICATION: NORMAL
DSA COMMENTS: NORMAL
DSA PRESENT: NO
DSA TEST METHOD: NORMAL
ORGAN: NORMAL
SA1 CELL: NORMAL
SA1 COMMENTS: NORMAL
SA1 HI RISK ABY: NORMAL
SA1 MOD RISK ABY: NORMAL
SA1 TEST METHOD: NORMAL
SA2 CELL: NORMAL
SA2 COMMENTS: NORMAL
SA2 HI RISK ABY UA: NORMAL
SA2 MOD RISK ABY: NORMAL
SA2 TEST METHOD: NORMAL

## 2017-04-07 DIAGNOSIS — Z48.298 AFTERCARE FOLLOWING ORGAN TRANSPLANT: ICD-10-CM

## 2017-04-07 DIAGNOSIS — Z94.0 KIDNEY REPLACED BY TRANSPLANT: ICD-10-CM

## 2017-04-07 LAB
ANION GAP SERPL CALCULATED.3IONS-SCNC: 8 MMOL/L (ref 3–14)
BUN SERPL-MCNC: 20 MG/DL (ref 7–30)
CALCIUM SERPL-MCNC: 10 MG/DL (ref 8.5–10.1)
CHLORIDE SERPL-SCNC: 108 MMOL/L (ref 94–109)
CO2 SERPL-SCNC: 24 MMOL/L (ref 20–32)
CREAT SERPL-MCNC: 1.38 MG/DL (ref 0.66–1.25)
ERYTHROCYTE [DISTWIDTH] IN BLOOD BY AUTOMATED COUNT: 12.8 % (ref 10–15)
GFR SERPL CREATININE-BSD FRML MDRD: 52 ML/MIN/1.7M2
GLUCOSE SERPL-MCNC: 182 MG/DL (ref 70–99)
HCT VFR BLD AUTO: 47.9 % (ref 40–53)
HGB BLD-MCNC: 15.7 G/DL (ref 13.3–17.7)
MCH RBC QN AUTO: 29.2 PG (ref 26.5–33)
MCHC RBC AUTO-ENTMCNC: 32.8 G/DL (ref 31.5–36.5)
MCV RBC AUTO: 89 FL (ref 78–100)
PLATELET # BLD AUTO: 165 10E9/L (ref 150–450)
POTASSIUM SERPL-SCNC: 4.6 MMOL/L (ref 3.4–5.3)
RBC # BLD AUTO: 5.38 10E12/L (ref 4.4–5.9)
SODIUM SERPL-SCNC: 140 MMOL/L (ref 133–144)
TACROLIMUS BLD-MCNC: 7.7 UG/L (ref 5–15)
TME LAST DOSE: NORMAL H
WBC # BLD AUTO: 4.6 10E9/L (ref 4–11)

## 2017-04-07 PROCEDURE — 85027 COMPLETE CBC AUTOMATED: CPT | Performed by: FAMILY MEDICINE

## 2017-04-07 PROCEDURE — 36415 COLL VENOUS BLD VENIPUNCTURE: CPT | Performed by: TRANSPLANT SURGERY

## 2017-04-07 PROCEDURE — 80197 ASSAY OF TACROLIMUS: CPT | Performed by: TRANSPLANT SURGERY

## 2017-04-07 PROCEDURE — 80048 BASIC METABOLIC PNL TOTAL CA: CPT | Performed by: TRANSPLANT SURGERY

## 2017-04-14 DIAGNOSIS — Z48.298 AFTERCARE FOLLOWING ORGAN TRANSPLANT: ICD-10-CM

## 2017-04-14 DIAGNOSIS — Z94.0 KIDNEY REPLACED BY TRANSPLANT: ICD-10-CM

## 2017-04-14 LAB
ANION GAP SERPL CALCULATED.3IONS-SCNC: 10 MMOL/L (ref 3–14)
BUN SERPL-MCNC: 24 MG/DL (ref 7–30)
CALCIUM SERPL-MCNC: 10.5 MG/DL (ref 8.5–10.1)
CHLORIDE SERPL-SCNC: 107 MMOL/L (ref 94–109)
CO2 SERPL-SCNC: 24 MMOL/L (ref 20–32)
CREAT SERPL-MCNC: 1.51 MG/DL (ref 0.66–1.25)
ERYTHROCYTE [DISTWIDTH] IN BLOOD BY AUTOMATED COUNT: 13 % (ref 10–15)
GFR SERPL CREATININE-BSD FRML MDRD: 47 ML/MIN/1.7M2
GLUCOSE SERPL-MCNC: 165 MG/DL (ref 70–99)
HCT VFR BLD AUTO: 49.2 % (ref 40–53)
HGB BLD-MCNC: 16.6 G/DL (ref 13.3–17.7)
MCH RBC QN AUTO: 29.6 PG (ref 26.5–33)
MCHC RBC AUTO-ENTMCNC: 33.7 G/DL (ref 31.5–36.5)
MCV RBC AUTO: 88 FL (ref 78–100)
PLATELET # BLD AUTO: 157 10E9/L (ref 150–450)
POTASSIUM SERPL-SCNC: 5.1 MMOL/L (ref 3.4–5.3)
RBC # BLD AUTO: 5.61 10E12/L (ref 4.4–5.9)
SODIUM SERPL-SCNC: 141 MMOL/L (ref 133–144)
WBC # BLD AUTO: 4.9 10E9/L (ref 4–11)

## 2017-04-14 PROCEDURE — 36415 COLL VENOUS BLD VENIPUNCTURE: CPT | Performed by: TRANSPLANT SURGERY

## 2017-04-14 PROCEDURE — 80048 BASIC METABOLIC PNL TOTAL CA: CPT | Performed by: TRANSPLANT SURGERY

## 2017-04-14 PROCEDURE — 85027 COMPLETE CBC AUTOMATED: CPT | Performed by: FAMILY MEDICINE

## 2017-04-21 ENCOUNTER — TELEPHONE (OUTPATIENT)
Dept: NEPHROLOGY | Facility: CLINIC | Age: 62
End: 2017-04-21

## 2017-04-21 DIAGNOSIS — Z48.298 AFTERCARE FOLLOWING ORGAN TRANSPLANT: ICD-10-CM

## 2017-04-21 DIAGNOSIS — Z94.0 KIDNEY REPLACED BY TRANSPLANT: ICD-10-CM

## 2017-04-21 DIAGNOSIS — I10 HTN (HYPERTENSION): Primary | ICD-10-CM

## 2017-04-21 LAB
ANION GAP SERPL CALCULATED.3IONS-SCNC: 8 MMOL/L (ref 3–14)
BUN SERPL-MCNC: 27 MG/DL (ref 7–30)
CALCIUM SERPL-MCNC: 10.5 MG/DL (ref 8.5–10.1)
CHLORIDE SERPL-SCNC: 109 MMOL/L (ref 94–109)
CO2 SERPL-SCNC: 24 MMOL/L (ref 20–32)
CREAT SERPL-MCNC: 1.35 MG/DL (ref 0.66–1.25)
ERYTHROCYTE [DISTWIDTH] IN BLOOD BY AUTOMATED COUNT: 12.9 % (ref 10–15)
GFR SERPL CREATININE-BSD FRML MDRD: 54 ML/MIN/1.7M2
GLUCOSE SERPL-MCNC: 103 MG/DL (ref 70–99)
HCT VFR BLD AUTO: 51.9 % (ref 40–53)
HGB BLD-MCNC: 17.5 G/DL (ref 13.3–17.7)
MCH RBC QN AUTO: 29.3 PG (ref 26.5–33)
MCHC RBC AUTO-ENTMCNC: 33.7 G/DL (ref 31.5–36.5)
MCV RBC AUTO: 87 FL (ref 78–100)
PLATELET # BLD AUTO: 181 10E9/L (ref 150–450)
POTASSIUM SERPL-SCNC: 4.5 MMOL/L (ref 3.4–5.3)
RBC # BLD AUTO: 5.97 10E12/L (ref 4.4–5.9)
SODIUM SERPL-SCNC: 141 MMOL/L (ref 133–144)
WBC # BLD AUTO: 5.3 10E9/L (ref 4–11)

## 2017-04-21 PROCEDURE — 80048 BASIC METABOLIC PNL TOTAL CA: CPT | Performed by: TRANSPLANT SURGERY

## 2017-04-21 PROCEDURE — 80197 ASSAY OF TACROLIMUS: CPT | Performed by: TRANSPLANT SURGERY

## 2017-04-21 PROCEDURE — 85027 COMPLETE CBC AUTOMATED: CPT | Performed by: INTERNAL MEDICINE

## 2017-04-21 PROCEDURE — 36415 COLL VENOUS BLD VENIPUNCTURE: CPT | Performed by: TRANSPLANT SURGERY

## 2017-04-21 RX ORDER — LOSARTAN POTASSIUM 25 MG/1
25 TABLET ORAL DAILY
Qty: 90 TABLET | Refills: 3 | Status: SHIPPED | OUTPATIENT
Start: 2017-04-21 | End: 2017-06-03

## 2017-04-21 NOTE — TELEPHONE ENCOUNTER
Patient reports resting BPs on Wed of 160s-180s/80s-90s. Pt self-ordered Clonidine increased from 0.3 to 0.5mg TID, Thurs 120s/70s. Decreased Clonidine back to 0.3mg, this AM resting blood pressure of 182/86. HR high 80s-90s. Patient states he would prefer adding a beta blocker to his medication regimen.   Will wait for recommendation from Dr. Qureshi and advise patient.    Patient returns verbal understanding and will call with further questions/concerns.

## 2017-04-21 NOTE — TELEPHONE ENCOUNTER
Discussed with Dr. Qureshi. Patient is already on a beta blocker (carvedilol). Advised patient to start on 25mg of losartan daily. [facility]    Keli Gibson RN

## 2017-04-22 LAB
TACROLIMUS BLD-MCNC: 8 UG/L (ref 5–15)
TME LAST DOSE: NORMAL H

## 2017-04-27 ENCOUNTER — TELEPHONE (OUTPATIENT)
Dept: NEPHROLOGY | Facility: CLINIC | Age: 62
End: 2017-04-27

## 2017-04-27 NOTE — TELEPHONE ENCOUNTER
"Patient states that he believes the reason his blood pressure has been running so high (consistently 170s-180s/80s, HR 80s) is because on March 29th his Nifedipine dose was reduced to half, from 60mg twice per day to 30mg twice per day. Writer educated patient that he was dispensed 30mg, but that he should be taking 2 twice per day to equal his 60mg dose. Patient confirmed the directions on the bottle and states that he \"never reads the bottle.\" Writer encouraged patient to verify the instructions of any new prescription with his pharmacist, and to call Dr. Qureshi's office if he needs clarification. Encouraged patient to take active role in understanding his medication names/doses/schedules.    Patient returned verbal understanding of all teaching. Writer will call him early next week to gauge trends in his blood pressure once he has returned to his normal Nifedipine dose, patient will call sooner with questions/concerns.  "

## 2017-04-28 ENCOUNTER — TELEPHONE (OUTPATIENT)
Dept: TRANSPLANT | Facility: CLINIC | Age: 62
End: 2017-04-28

## 2017-04-28 DIAGNOSIS — Z94.0 KIDNEY REPLACED BY TRANSPLANT: ICD-10-CM

## 2017-04-28 DIAGNOSIS — Z48.298 AFTERCARE FOLLOWING ORGAN TRANSPLANT: ICD-10-CM

## 2017-04-28 LAB
ANION GAP SERPL CALCULATED.3IONS-SCNC: 9 MMOL/L (ref 3–14)
BUN SERPL-MCNC: 24 MG/DL (ref 7–30)
CALCIUM SERPL-MCNC: 9.8 MG/DL (ref 8.5–10.1)
CHLORIDE SERPL-SCNC: 103 MMOL/L (ref 94–109)
CO2 SERPL-SCNC: 23 MMOL/L (ref 20–32)
CREAT SERPL-MCNC: 1.51 MG/DL (ref 0.66–1.25)
ERYTHROCYTE [DISTWIDTH] IN BLOOD BY AUTOMATED COUNT: 12.8 % (ref 10–15)
GFR SERPL CREATININE-BSD FRML MDRD: 47 ML/MIN/1.7M2
GLUCOSE SERPL-MCNC: 383 MG/DL (ref 70–99)
HCT VFR BLD AUTO: 48.2 % (ref 40–53)
HGB BLD-MCNC: 15.8 G/DL (ref 13.3–17.7)
MCH RBC QN AUTO: 28.6 PG (ref 26.5–33)
MCHC RBC AUTO-ENTMCNC: 32.8 G/DL (ref 31.5–36.5)
MCV RBC AUTO: 87 FL (ref 78–100)
PLATELET # BLD AUTO: 143 10E9/L (ref 150–450)
POTASSIUM SERPL-SCNC: 5 MMOL/L (ref 3.4–5.3)
RBC # BLD AUTO: 5.52 10E12/L (ref 4.4–5.9)
SODIUM SERPL-SCNC: 135 MMOL/L (ref 133–144)
TACROLIMUS BLD-MCNC: 10 UG/L (ref 5–15)
TME LAST DOSE: NORMAL H
WBC # BLD AUTO: 5.2 10E9/L (ref 4–11)

## 2017-04-28 PROCEDURE — 36415 COLL VENOUS BLD VENIPUNCTURE: CPT | Performed by: FAMILY MEDICINE

## 2017-04-28 PROCEDURE — 80048 BASIC METABOLIC PNL TOTAL CA: CPT | Performed by: TRANSPLANT SURGERY

## 2017-04-28 PROCEDURE — 85027 COMPLETE CBC AUTOMATED: CPT | Performed by: TRANSPLANT SURGERY

## 2017-04-28 PROCEDURE — 99001 SPECIMEN HANDLING PT-LAB: CPT | Performed by: FAMILY MEDICINE

## 2017-04-28 PROCEDURE — 36415 COLL VENOUS BLD VENIPUNCTURE: CPT | Performed by: TRANSPLANT SURGERY

## 2017-04-28 PROCEDURE — 80197 ASSAY OF TACROLIMUS: CPT | Performed by: TRANSPLANT SURGERY

## 2017-04-28 NOTE — TELEPHONE ENCOUNTER
Glucose 383.   Please call pt to find out if this was fasting. Encourage him to follow up with the provider that is managing his DM. Insulin may need adjusted.

## 2017-04-28 NOTE — TELEPHONE ENCOUNTER
"Call to patient: Patient states that he had \"crashed his sugar the day before\" and that it takes him 36-48 hours to get back on track and that his provider is aware.   "

## 2017-05-01 DIAGNOSIS — Z94.0 KIDNEY TRANSPLANT RECIPIENT: Primary | ICD-10-CM

## 2017-05-01 RX ORDER — TACROLIMUS 1 MG/1
4 CAPSULE ORAL EVERY 12 HOURS
Qty: 240 CAPSULE | Refills: 11 | Status: SHIPPED | OUTPATIENT
Start: 2017-05-01 | End: 2017-12-29

## 2017-05-01 NOTE — TELEPHONE ENCOUNTER
Prograf tacrolimus level 10.0 slightly above goal level previous levels have remained stable   Plan  Call confirm current dose of Prograf tacrolimus 5.0  Mg twice per day   Call confirm accurate 12 hour trough  Prograf tacrolimus level   IF the above accurate   Lower Prograf tacrolimus 4 mg twice per day

## 2017-05-01 NOTE — TELEPHONE ENCOUNTER
Spoke to patient regarding tac level = 10, above goal.  Patient confirms current dose and good 12 hour trough level.  Patient verbalizes understanding of medication dose to 4 mg BID.

## 2017-05-02 ENCOUNTER — TELEPHONE (OUTPATIENT)
Dept: NEPHROLOGY | Facility: CLINIC | Age: 62
End: 2017-05-02

## 2017-05-02 NOTE — TELEPHONE ENCOUNTER
Patient is very unhappy with blood pressure control, reports pressures ranging from 140s-180s/60s-80s, most consistently in the 180s/80s, HR 80s, weight stable, denies new onset swelling. Taking medications as ordered with the exception of ordered PRN Clonidine which he is occasionally increasing from 0.3mg to 0.6mg. Writer contacted Dr. Qureshi, will wait for his recommendation and notifiy patient.  Patient returned verbal understanding and will call with further questions/concerns.

## 2017-05-04 DIAGNOSIS — I10 HTN (HYPERTENSION): Primary | ICD-10-CM

## 2017-05-04 RX ORDER — MINOXIDIL 2.5 MG/1
2.5 TABLET ORAL 2 TIMES DAILY
Qty: 60 TABLET | Refills: 3 | Status: SHIPPED | OUTPATIENT
Start: 2017-05-04 | End: 2017-06-03

## 2017-05-05 ENCOUNTER — RESULTS ONLY (OUTPATIENT)
Dept: OTHER | Facility: CLINIC | Age: 62
End: 2017-05-05

## 2017-05-05 DIAGNOSIS — N18.6 END STAGE RENAL DISEASE (H): ICD-10-CM

## 2017-05-05 DIAGNOSIS — Z94.0 KIDNEY REPLACED BY TRANSPLANT: ICD-10-CM

## 2017-05-05 DIAGNOSIS — Z76.82 ORGAN TRANSPLANT CANDIDATE: ICD-10-CM

## 2017-05-05 DIAGNOSIS — Z48.298 AFTERCARE FOLLOWING ORGAN TRANSPLANT: ICD-10-CM

## 2017-05-05 LAB
ANION GAP SERPL CALCULATED.3IONS-SCNC: 7 MMOL/L (ref 3–14)
BUN SERPL-MCNC: 24 MG/DL (ref 7–30)
CALCIUM SERPL-MCNC: 10.6 MG/DL (ref 8.5–10.1)
CHLORIDE SERPL-SCNC: 108 MMOL/L (ref 94–109)
CO2 SERPL-SCNC: 25 MMOL/L (ref 20–32)
CREAT SERPL-MCNC: 1.47 MG/DL (ref 0.66–1.25)
ERYTHROCYTE [DISTWIDTH] IN BLOOD BY AUTOMATED COUNT: 12.9 % (ref 10–15)
GFR SERPL CREATININE-BSD FRML MDRD: 49 ML/MIN/1.7M2
GLUCOSE SERPL-MCNC: 123 MG/DL (ref 70–99)
HCT VFR BLD AUTO: 50.4 % (ref 40–53)
HGB BLD-MCNC: 17 G/DL (ref 13.3–17.7)
MCH RBC QN AUTO: 29 PG (ref 26.5–33)
MCHC RBC AUTO-ENTMCNC: 33.7 G/DL (ref 31.5–36.5)
MCV RBC AUTO: 86 FL (ref 78–100)
PLATELET # BLD AUTO: 163 10E9/L (ref 150–450)
POTASSIUM SERPL-SCNC: 4.4 MMOL/L (ref 3.4–5.3)
RBC # BLD AUTO: 5.87 10E12/L (ref 4.4–5.9)
SODIUM SERPL-SCNC: 140 MMOL/L (ref 133–144)
TACROLIMUS BLD-MCNC: 8.5 UG/L (ref 5–15)
TME LAST DOSE: NORMAL H
WBC # BLD AUTO: 3.8 10E9/L (ref 4–11)

## 2017-05-05 PROCEDURE — 85027 COMPLETE CBC AUTOMATED: CPT | Performed by: FAMILY MEDICINE

## 2017-05-05 PROCEDURE — 86833 HLA CLASS II HIGH DEFIN QUAL: CPT | Performed by: TRANSPLANT SURGERY

## 2017-05-05 PROCEDURE — 36415 COLL VENOUS BLD VENIPUNCTURE: CPT | Performed by: TRANSPLANT SURGERY

## 2017-05-05 PROCEDURE — 80197 ASSAY OF TACROLIMUS: CPT | Performed by: TRANSPLANT SURGERY

## 2017-05-05 PROCEDURE — 80048 BASIC METABOLIC PNL TOTAL CA: CPT | Performed by: TRANSPLANT SURGERY

## 2017-05-05 PROCEDURE — 86832 HLA CLASS I HIGH DEFIN QUAL: CPT | Performed by: TRANSPLANT SURGERY

## 2017-05-08 LAB — PRA SINGLE ANTIGEN IGG ANTIBODY: NORMAL

## 2017-05-09 ENCOUNTER — TELEPHONE (OUTPATIENT)
Dept: TRANSPLANT | Facility: CLINIC | Age: 62
End: 2017-05-09

## 2017-05-09 DIAGNOSIS — Z94.0 KIDNEY REPLACED BY TRANSPLANT: Primary | ICD-10-CM

## 2017-05-09 DIAGNOSIS — R71.8 ELEVATED RED BLOOD CELL COUNT: ICD-10-CM

## 2017-05-09 NOTE — TELEPHONE ENCOUNTER
Patient discussed at the acute kidney meeting today.  Updated current standing lab orders to include MPA and DSA levels needed currently.  Team discussed possible lower Prograf dose.  Patient needs an appointment with pharmacy for medication review.  Patient also needs f/u appt. With Endo. For diabetes.  Patient also needs native kidney ultrasound with doppler.

## 2017-05-12 ENCOUNTER — TELEPHONE (OUTPATIENT)
Dept: TRANSPLANT | Facility: CLINIC | Age: 62
End: 2017-05-12

## 2017-05-12 DIAGNOSIS — Z94.0 KIDNEY REPLACED BY TRANSPLANT: Primary | ICD-10-CM

## 2017-05-12 DIAGNOSIS — Z48.298 AFTERCARE FOLLOWING ORGAN TRANSPLANT: ICD-10-CM

## 2017-05-12 DIAGNOSIS — Z94.0 KIDNEY REPLACED BY TRANSPLANT: ICD-10-CM

## 2017-05-12 LAB
TACROLIMUS BLD-MCNC: 5.7 UG/L (ref 5–15)
TME LAST DOSE: NORMAL H

## 2017-05-12 PROCEDURE — 80197 ASSAY OF TACROLIMUS: CPT | Performed by: TRANSPLANT SURGERY

## 2017-05-12 PROCEDURE — 36415 COLL VENOUS BLD VENIPUNCTURE: CPT | Performed by: FAMILY MEDICINE

## 2017-05-12 NOTE — TELEPHONE ENCOUNTER
Please call [facility] #[phone]  They are holding Akanksha's blood.    stated she did run a CBC with diff because the CBC flagged.   They are not sure what labs to draw no new order in EPIC so they patrice basic transplant labs,  CBC added the diff, BMP and Prograf.  If Corey needs more he will have to come back.  Lab # [phone]    Stated they are not sure whom the orders should be under.  Showing both Dr. Chan and Dr. Qureshi.

## 2017-05-12 NOTE — TELEPHONE ENCOUNTER
Spoke to Corey Roland   Issue 1 -HGB erthrocytosis  Denies smoking   Reviewed the need for a kidney ultrasound and native kidney ultrasound     He verbalized understanding orders placed - Plan to obtain with follow up appointment  With Dr Qureshi     2nd Issue   DM control - Corey Roland reports followed up with his PCP for better control of his blood glucoses     3rd Issue   Corey reports still having issues with controlling BP's still having some high BP  But overall  Improved    Working with [facility] Nurse Titus   Sent message to call

## 2017-05-17 ENCOUNTER — TELEPHONE (OUTPATIENT)
Dept: TRANSPLANT | Facility: CLINIC | Age: 62
End: 2017-05-17

## 2017-05-17 NOTE — TELEPHONE ENCOUNTER
----- Message from Neena Recinos sent at 5/16/2017  8:44 AM CDT -----  Regarding: RE: Needs an ultrasound of native kidney and kidney tx June Same date Dr Qureshi  Done.    ----- Message -----     From: Tayla Collado RN     Sent: 5/12/2017  12:57 PM       To: Neena Recinos  Subject: Needs an ultrasound of native kidney and kid#    On 6/2/2017 Dr Qureshi appointment    Needs a abd ultrasound and kidney ultrasound post appointment  Due to increase HGB   Orders are in EPIC

## 2017-05-19 DIAGNOSIS — Z94.0 KIDNEY REPLACED BY TRANSPLANT: ICD-10-CM

## 2017-05-19 DIAGNOSIS — Z48.298 AFTERCARE FOLLOWING ORGAN TRANSPLANT: ICD-10-CM

## 2017-05-19 LAB
ANION GAP SERPL CALCULATED.3IONS-SCNC: 8 MMOL/L (ref 3–14)
BUN SERPL-MCNC: 25 MG/DL (ref 7–30)
CALCIUM SERPL-MCNC: 10.5 MG/DL (ref 8.5–10.1)
CHLORIDE SERPL-SCNC: 109 MMOL/L (ref 94–109)
CO2 SERPL-SCNC: 22 MMOL/L (ref 20–32)
CREAT SERPL-MCNC: 1.6 MG/DL (ref 0.66–1.25)
ERYTHROCYTE [DISTWIDTH] IN BLOOD BY AUTOMATED COUNT: 12.8 % (ref 10–15)
GFR SERPL CREATININE-BSD FRML MDRD: 44 ML/MIN/1.7M2
GLUCOSE SERPL-MCNC: 214 MG/DL (ref 70–99)
HCT VFR BLD AUTO: 50.3 % (ref 40–53)
HGB BLD-MCNC: 16.8 G/DL (ref 13.3–17.7)
MCH RBC QN AUTO: 28.7 PG (ref 26.5–33)
MCHC RBC AUTO-ENTMCNC: 33.4 G/DL (ref 31.5–36.5)
MCV RBC AUTO: 86 FL (ref 78–100)
PLATELET # BLD AUTO: 179 10E9/L (ref 150–450)
POTASSIUM SERPL-SCNC: 4.8 MMOL/L (ref 3.4–5.3)
RBC # BLD AUTO: 5.85 10E12/L (ref 4.4–5.9)
SODIUM SERPL-SCNC: 139 MMOL/L (ref 133–144)
TACROLIMUS BLD-MCNC: 7.1 UG/L (ref 5–15)
TME LAST DOSE: NORMAL H
WBC # BLD AUTO: 3.1 10E9/L (ref 4–11)

## 2017-05-19 PROCEDURE — 80197 ASSAY OF TACROLIMUS: CPT | Performed by: TRANSPLANT SURGERY

## 2017-05-19 PROCEDURE — 85027 COMPLETE CBC AUTOMATED: CPT | Performed by: INTERNAL MEDICINE

## 2017-05-19 PROCEDURE — 36415 COLL VENOUS BLD VENIPUNCTURE: CPT | Performed by: TRANSPLANT SURGERY

## 2017-05-19 PROCEDURE — 80048 BASIC METABOLIC PNL TOTAL CA: CPT | Performed by: TRANSPLANT SURGERY

## 2017-05-26 ENCOUNTER — RESULTS ONLY (OUTPATIENT)
Dept: OTHER | Facility: CLINIC | Age: 62
End: 2017-05-26

## 2017-05-26 DIAGNOSIS — Z48.298 AFTERCARE FOLLOWING ORGAN TRANSPLANT: ICD-10-CM

## 2017-05-26 DIAGNOSIS — Z94.0 KIDNEY REPLACED BY TRANSPLANT: ICD-10-CM

## 2017-05-26 LAB
ALBUMIN SERPL-MCNC: 3.6 G/DL (ref 3.4–5)
ALP SERPL-CCNC: 110 U/L (ref 40–150)
ALT SERPL W P-5'-P-CCNC: 37 U/L (ref 0–70)
ANION GAP SERPL CALCULATED.3IONS-SCNC: 10 MMOL/L (ref 3–14)
AST SERPL W P-5'-P-CCNC: 24 U/L (ref 0–45)
BILIRUB DIRECT SERPL-MCNC: <0.1 MG/DL (ref 0–0.2)
BILIRUB SERPL-MCNC: 0.5 MG/DL (ref 0.2–1.3)
BUN SERPL-MCNC: 23 MG/DL (ref 7–30)
CALCIUM SERPL-MCNC: 10.5 MG/DL (ref 8.5–10.1)
CHLORIDE SERPL-SCNC: 109 MMOL/L (ref 94–109)
CHOLEST SERPL-MCNC: 94 MG/DL
CO2 SERPL-SCNC: 22 MMOL/L (ref 20–32)
CREAT SERPL-MCNC: 1.43 MG/DL (ref 0.66–1.25)
CREAT UR-MCNC: 78 MG/DL
ERYTHROCYTE [DISTWIDTH] IN BLOOD BY AUTOMATED COUNT: 12.9 % (ref 10–15)
GFR SERPL CREATININE-BSD FRML MDRD: 50 ML/MIN/1.7M2
GLUCOSE SERPL-MCNC: 191 MG/DL (ref 70–99)
HBV CORE AB SERPL QL IA: NONREACTIVE
HBV SURFACE AB SERPL IA-ACNC: 0.45 M[IU]/ML
HCT VFR BLD AUTO: 49.1 % (ref 40–53)
HCV AB SERPL QL IA: ABNORMAL
HDLC SERPL-MCNC: 40 MG/DL
HGB BLD-MCNC: 16.4 G/DL (ref 13.3–17.7)
LDLC SERPL CALC-MCNC: 18 MG/DL
MCH RBC QN AUTO: 28.3 PG (ref 26.5–33)
MCHC RBC AUTO-ENTMCNC: 33.4 G/DL (ref 31.5–36.5)
MCV RBC AUTO: 85 FL (ref 78–100)
NONHDLC SERPL-MCNC: 54 MG/DL
PLATELET # BLD AUTO: 178 10E9/L (ref 150–450)
POTASSIUM SERPL-SCNC: 4.5 MMOL/L (ref 3.4–5.3)
PROT SERPL-MCNC: 7.4 G/DL (ref 6.8–8.8)
PROT UR-MCNC: 0.74 G/L
PROT/CREAT 24H UR: 0.94 G/G CR (ref 0–0.2)
RBC # BLD AUTO: 5.8 10E12/L (ref 4.4–5.9)
SODIUM SERPL-SCNC: 141 MMOL/L (ref 133–144)
TACROLIMUS BLD-MCNC: 7.8 UG/L (ref 5–15)
TME LAST DOSE: NORMAL H
TRIGL SERPL-MCNC: 179 MG/DL
WBC # BLD AUTO: 4.5 10E9/L (ref 4–11)

## 2017-05-26 PROCEDURE — 86704 HEP B CORE ANTIBODY TOTAL: CPT | Performed by: TRANSPLANT SURGERY

## 2017-05-26 PROCEDURE — 80197 ASSAY OF TACROLIMUS: CPT | Performed by: TRANSPLANT SURGERY

## 2017-05-26 PROCEDURE — 80076 HEPATIC FUNCTION PANEL: CPT | Performed by: TRANSPLANT SURGERY

## 2017-05-26 PROCEDURE — 86833 HLA CLASS II HIGH DEFIN QUAL: CPT | Performed by: TRANSPLANT SURGERY

## 2017-05-26 PROCEDURE — 85027 COMPLETE CBC AUTOMATED: CPT | Performed by: TRANSPLANT SURGERY

## 2017-05-26 PROCEDURE — 86832 HLA CLASS I HIGH DEFIN QUAL: CPT | Performed by: TRANSPLANT SURGERY

## 2017-05-26 PROCEDURE — 87799 DETECT AGENT NOS DNA QUANT: CPT | Performed by: TRANSPLANT SURGERY

## 2017-05-26 PROCEDURE — 84156 ASSAY OF PROTEIN URINE: CPT | Performed by: TRANSPLANT SURGERY

## 2017-05-26 PROCEDURE — 86706 HEP B SURFACE ANTIBODY: CPT | Performed by: TRANSPLANT SURGERY

## 2017-05-26 PROCEDURE — 86803 HEPATITIS C AB TEST: CPT | Performed by: TRANSPLANT SURGERY

## 2017-05-26 PROCEDURE — 80061 LIPID PANEL: CPT | Performed by: TRANSPLANT SURGERY

## 2017-05-26 PROCEDURE — 80048 BASIC METABOLIC PNL TOTAL CA: CPT | Performed by: TRANSPLANT SURGERY

## 2017-05-26 PROCEDURE — 36415 COLL VENOUS BLD VENIPUNCTURE: CPT | Performed by: INTERNAL MEDICINE

## 2017-05-29 ENCOUNTER — TELEPHONE (OUTPATIENT)
Dept: TRANSPLANT | Facility: CLINIC | Age: 62
End: 2017-05-29

## 2017-05-29 NOTE — TELEPHONE ENCOUNTER
Discussed at the Acute Meeting  With Dr Shaffer, Dr Wilkins , N[place]  Recommend [facility] pharmacy referal

## 2017-05-30 ENCOUNTER — TELEPHONE (OUTPATIENT)
Dept: PHARMACY | Facility: OTHER | Age: 62
End: 2017-05-30

## 2017-05-30 NOTE — TELEPHONE ENCOUNTER
MTM referral from: Transitions of Care (recent hospital discharge or ED visit)    MTM referral outreach attempt #1 on May 30, 2017 at 1:04 PM      Outcome: Left Message    Cecily Calderón MTM Coordinator

## 2017-05-31 ENCOUNTER — ALLIED HEALTH/NURSE VISIT (OUTPATIENT)
Dept: PHARMACY | Facility: CLINIC | Age: 62
End: 2017-05-31
Payer: COMMERCIAL

## 2017-05-31 DIAGNOSIS — Z94.0 KIDNEY TRANSPLANT RECIPIENT: ICD-10-CM

## 2017-05-31 DIAGNOSIS — I15.1 HYPERTENSION SECONDARY TO OTHER RENAL DISORDERS (CODE): ICD-10-CM

## 2017-05-31 PROCEDURE — 99207 ZZC NO CHARGE LOS: CPT | Performed by: PHARMACIST

## 2017-05-31 RX ORDER — CLONIDINE HYDROCHLORIDE 0.3 MG/1
TABLET ORAL
COMMUNITY
Start: 2017-05-31 | End: 2017-06-03

## 2017-05-31 RX ORDER — ATORVASTATIN CALCIUM 20 MG/1
20 TABLET, FILM COATED ORAL DAILY
COMMUNITY
Start: 2017-05-31

## 2017-05-31 NOTE — TELEPHONE ENCOUNTER
MTM referral from: Transitions of Care (recent hospital discharge or ED visit)    MTM referral outreach attempt #2 on May 31, 2017 at 8:20 AM      Outcome: Patient scheduled for MTM appointment on 5/31/2017    Fabiana Farias MTM Coordinator

## 2017-05-31 NOTE — MR AVS SNAPSHOT
"              After Visit Summary   5/31/2017    Corey Roland    MRN: [id number]           Patient Information     Date Of Birth          1955        Visit Information        Provider Department      5/31/2017 9:00 AM [facility]        Today's Diagnoses     Kidney transplant recipient        Hypertension secondary to other renal disorders (CODE)          Care Instructions    Recommendations from today's med rec visit:                                                      1. I updated your med list to reflect what you are actually taking.    2. For further blood pressure control you will likely need a change in clonidine dose or Losartan dose. I will speak to Dr. Qureshi about this.     Next MTM visit: As needed, not covered for full \"MTM\" appt, would have to pay out of pocket.     To schedule another MTM appointment, please call the clinic directly or you may call the MTM scheduling line at [phone] or toll-free at [phone].     My Clinical Pharmacist's contact information:                                                      It was a pleasure seeing you today!  Please feel free to contact me with any questions or concerns you have.      Dre Figueredo, PharmD  MTM Pharmacist    Phone: [phone]     You may receive a survey about the MTM services you received.  I would appreciate your feedback to help me serve you better in the future. Please fill it out and return it when you can. Your comments will be anonymous.            Follow-ups after your visit        Your next 10 appointments already scheduled     Jun 02, 2017  7:30 AM CDT   LAB with CP LAB   [facility] ([facility])    [address] [place] [id number] [phone]           Patient must bring picture ID.  Patient should be prepared to give a urine specimen  Please do not eat 10-12 hours before your " appointment if you are coming in fasting for labs on lipids, cholesterol, or glucose (sugar).  Pregnant women should follow their Care Team instructions. Water with medications is okay. Do not drink coffee or other fluids.   If you have concerns about taking  your medications, please ask at office or if scheduling via [employer], send a message by clicking on Secure Messaging, Message Your Care Team.            Jun 02, 2017  9:15 AM CDT   US ABDOMEN COMPLETE with [address] ([facility])    [address] [place] [id number] [phone]           Please bring a list of your medicines (including vitamins, minerals and over-the-counter drugs). Also, tell your doctor about any allergies you may have. Wear comfortable clothes and leave your valuables at home.  Adults: No eating or drinking for 8 hours before the exam. You may take medicine with a small sip of water.  Children: - Children 6+ years: No food or drink for 6 hours before exam. - Children 1-5 years: No food or drink for 4 hours before exam. - Infants, breast-fed: may have breast milk up to 2 hours before exam. - Infants, formula: may have bottle until 4 hours before exam.  Please call the Imaging Department at your exam site with any questions.            Jun 02, 2017 10:00 AM CDT   US RENAL TRANSPLANT with [address] ([facility])    [address] [place] [id number] [phone]           Please bring a list of your medicines (including vitamins, minerals and over-the-counter drugs). Also, tell your doctor about any allergies you may have. Wear comfortable clothes and leave your valuables at home.  You do not need to do anything special to prepare for your exam.  Please call the Imaging Department at your exam site with any questions.            Jun 03, 2017  8:20 AM CDT   (Arrive by 7:50 AM)   Return Kidney  Transplant with Abdiaziz Qureshi MD   [facility] ([facility]    [address]  [address] [facility] [id number] [phone]            Jun 09, 2017  7:30 AM CDT   LAB with CP LAB   [facility] ([facility])    [address] [address]   [phone]           Patient must bring picture ID.  Patient should be prepared to give a urine specimen  Please do not eat 10-12 hours before your appointment if you are coming in fasting for labs on lipids, cholesterol, or glucose (sugar).  Pregnant women should follow their Care Team instructions. Water with medications is okay. Do not drink coffee or other fluids.   If you have concerns about taking  your medications, please ask at office or if scheduling via B[employer], send a message by clicking on Secure Messaging, Message Your Care Team.            Jun 16, 2017  7:30 AM CDT   LAB with CP LAB   [facility] ([facility])    [address] [address]   [phone]           Patient must bring picture ID.  Patient should be prepared to give a urine specimen  Please do not eat 10-12 hours before your appointment if you are coming in fasting for labs on lipids, cholesterol, or glucose (sugar).  Pregnant women should follow their Care Team instructions. Water with medications is okay. Do not drink coffee or other fluids.   If you have concerns about taking  your medications, please ask at office or if scheduling via Bahu, send a message by clicking on Secure Messaging, Message Your Care Team.            Jun 23, 2017  7:30 AM CDT   LAB with CP LAB   [facility] ([facility])    [address] [address]   [phone]           Patient must bring picture ID.  Patient should be prepared to  give a urine specimen  Please do not eat 10-12 hours before your appointment if you are coming in fasting for labs on lipids, cholesterol, or glucose (sugar).  Pregnant women should follow their Care Team instructions. Water with medications is okay. Do not drink coffee or other fluids.   If you have concerns about taking  your medications, please ask at office or if scheduling via [facility], send a message by clicking on Secure Messaging, Message Your Care Team.            Jun 30, 2017  7:30 AM CDT   LAB with CP LAB   [facility]    [address] [address]   [phone]           Patient must bring picture ID.  Patient should be prepared to give a urine specimen  Please do not eat 10-12 hours before your appointment if you are coming in fasting for labs on lipids, cholesterol, or glucose (sugar).  Pregnant women should follow their Care Team instructions. Water with medications is okay. Do not drink coffee or other fluids.   If you have concerns about taking  your medications, please ask at office or if scheduling via [facility], send a message by clicking on Secure Messaging, Message Your Care Team.            Jul 07, 2017  7:30 AM CDT   LAB with CP LAB   [facility]    [address] [address]   [phone]           Patient must bring picture ID.  Patient should be prepared to give a urine specimen  Please do not eat 10-12 hours before your appointment if you are coming in fasting for labs on lipids, cholesterol, or glucose (sugar).  Pregnant women should follow their Care Team instructions. Water with medications is okay. Do not drink coffee or other fluids.   If you have concerns about taking  your medications, please ask at office or if scheduling via [facility], send a message by clicking on Secure Messaging, Message Your Care  Team.            Jul 14, 2017  7:30 AM CDT   LAB with CP LAB   [facility] ([facility])    [address] [facility] [address] [phone]           Patient must bring picture ID.  Patient should be prepared to give a urine specimen  Please do not eat 10-12 hours before your appointment if you are coming in fasting for labs on lipids, cholesterol, or glucose (sugar).  Pregnant women should follow their Care Team instructions. Water with medications is okay. Do not drink coffee or other fluids.   If you have concerns about taking  your medications, please ask at office or if scheduling via [employer], send a message by clicking on Secure Messaging, Message Your Care Team.              Who to contact     If you have questions or need follow up information about today's clinic visit or your schedule please contact  [facility] directly at [phone].  Normal or non-critical lab and imaging results will be communicated to you by [employer]le[employer], letter or phone within 4 business days after the clinic has received the results. If you do not hear from us within 7 days, please contact the clinic through [employer]ber[employer] or phone. If you have a critical or abnormal lab result, we will notify you by phone as soon as possible.  Submit refill requests through [employer]bercheck or call your pharmacy and they will forward the refill request to us. Please allow 3 business days for your refill to be completed.          Additional Information About Your Visit        Ibercheck Information     [employer]check gives you secure access to your electronic health record. If you see a primary care provider, you can also send messages to your care team and make appointments. If you have questions, please call your primary care clinic.  If you do not have a primary care provider, please call [phone] and they will assist you.        Care EveryWhere ID     This is your Care  EveryWhere ID. This could be used by other organizations to access your [facility] medical records  [id number]         Blood Pressure from Last 3 Encounters:   03/02/17 122/71   01/05/17 166/82   12/23/16 166/68    Weight from Last 3 Encounters:   03/02/17 251 lb 6.4 oz (114 kg)   01/05/17 240 lb 9.6 oz (109.1 kg)   12/16/16 254 lb 10.1 oz (115.5 kg)              Today, you had the following     No orders found for display         Today's Medication Changes          These changes are accurate as of: 5/31/17  9:39 AM.  If you have any questions, ask your nurse or doctor.               These medicines have changed or have updated prescriptions.        Dose/Directions    cloNIDine 0.3 MG tablet   Commonly known as:  CATAPRES   This may have changed:    - how much to take  - how to take this  - when to take this  - reasons to take this  - additional instructions   Used for:  Hypertension secondary to other renal disorders (CODE)        Take 0.3mg in the morning, and 0.6mg in the evening.   Refills:  0       * insulin glargine 100 UNIT/ML injection   Commonly known as:  LANTUS   This may have changed:    - how much to take  - when to take this   Used for:  Kidney transplant recipient        Dose:  30 Units   Inject 30 Units Subcutaneous   Refills:  0       * insulin glargine 100 UNIT/ML injection   Commonly known as:  LANTUS   This may have changed:  how much to take   Used for:  Kidney transplant recipient        Dose:  40 Units   Inject 40 Units Subcutaneous At Bedtime   Refills:  0       * Notice:  This list has 2 medication(s) that are the same as other medications prescribed for you. Read the directions carefully, and ask your doctor or other care provider to review them with you.             Primary Care Provider Office Phone # Fax #    Dustin Tadeo [phone]       [facility] [address] [address]        Thank you!     Thank you for choosing [facility]  for your care. Our goal is always to provide you with excellent care. Hearing back from our patients is one way we can continue to improve our services. Please take a few minutes to complete the written survey that you may receive in the mail after your visit with us. Thank you!             Your Updated Medication List - Protect others around you: Learn how to safely use, store and throw away your medicines at www.disposemymeds.org.          This list is accurate as of: 5/31/17  9:39 AM.  Always use your most recent med list.                   Brand Name Dispense Instructions for use    aspirin 81 MG EC tablet     30 tablet    Take 1 tablet (81 mg) by mouth daily       atorvastatin 20 MG tablet    LIPITOR     Take 1 tablet (20 mg) by mouth daily       calcitRIOL 0.25 MCG capsule    ROCALTROL    90 capsule    Take 1 capsule (0.25 mcg) by mouth daily       carvedilol 25 MG tablet    COREG    60 tablet    Take 1 tablet (25 mg) by mouth 2 times daily (with meals)       cloNIDine 0.3 MG tablet    CATAPRES     Take 0.3mg in the morning, and 0.6mg in the evening.       DIPHENHYDRAMINE HCL PO      Take 50 mg by mouth At Bedtime       * insulin aspart 100 UNIT/ML injection    NovoLOG PEN    1 mL    Inject 1 Units Subcutaneous Take with snacks or supplements for high blood sugar 1 unit per 4 grams carbohydrate       * insulin aspart 100 UNIT/ML injection    NovoLOG PEN     Inject 1 Units Subcutaneous 3 times daily (with meals) DOSE:  1 units per 4 grams of carbohydrate.  Only chart total amount of units given.  Do not give if pre-prandial glucose is less than 60 mg/dL.       * insulin aspart 100 UNIT/ML injection    NovoLOG PEN     Inject 1-22 Units Subcutaneous 3 times daily (before meals) Correction Scale - VERY HIGH INSULIN RESISTANCE DOSING    Do Not give Correction Insulin if Pre-Meal BG less than 250.  For Pre-Meal  - 260 give 1 unit.  For Pre-Meal -269  give 2 units.  For  Pre-Meal  - 279 give 3 units.  For Pre-Meal  - 289 give 4 units.  For Pre-Meal  - 299 give 5 units.  For Pre-Meal  - 309 give 6 units.  ...       * insulin aspart 100 UNIT/ML injection    NovoLOG PEN     Inject 1-16 Units Subcutaneous At Bedtime Correction Scale - VERY HIGH INSULIN RESISTANCE DOSING    Do Not give Bedtime Correction Insulin if BG less than 200.  For  - 209 give 1 units.  For  - 219 give 2 units.  For  - 229 give 3 units.  For  - 239 give 4 units.  For  - 249 give 5 units.  For  - 259 give 6 units.  For  - 269 give 7 units.  For  - 279 give 8 units ...       * insulin glargine 100 UNIT/ML injection    LANTUS     Inject 30 Units Subcutaneous       * insulin glargine 100 UNIT/ML injection    LANTUS     Inject 40 Units Subcutaneous At Bedtime       losartan 25 MG tablet    COZAAR    90 tablet    Take 1 tablet (25 mg) by mouth daily       magnesium oxide 400 (241.3 MG) MG tablet    MAG-OX    90 tablet    Take 1 tablet (400 mg) by mouth daily       minoxidil 2.5 MG tablet    LONITEN    60 tablet    Take 1 tablet (2.5 mg) by mouth 2 times daily       mycophenolate 250 MG capsule    CELLCEPT - GENERIC EQUIVALENT    180 capsule    Take 3 capsules (750 mg) by mouth 2 times daily       NIFEdipine ER 30 MG Tb24    ADALAT CC    360 tablet    Take 2 tablets (60 mg) by mouth 2 times daily       omeprazole 20 MG tablet     90 tablet    Take 1 tablet (20 mg) by mouth every morning       sodium bicarbonate 650 MG tablet     60 tablet    Take 1 tablet (650 mg) by mouth 2 times daily       sulfamethoxazole-trimethoprim 400-80 MG per tablet    BACTRIM/SEPTRA    30 tablet    Take 1 tablet by mouth three times a week       tacrolimus 1 MG capsule    PROGRAF - GENERIC EQUIVALENT    240 capsule    Take 4 capsules (4 mg) by mouth every 12 hours       vitamin D 89297 UNIT capsule    ERGOCALCIFEROL    26 capsule    Take 1 capsule (50,000 Units) by mouth  once a week       * Notice:  This list has 6 medication(s) that are the same as other medications prescribed for you. Read the directions carefully, and ask your doctor or other care provider to review them with you.

## 2017-05-31 NOTE — Clinical Note
Dr. Qureshi,  Unfortunately the first patient you sent to me does not have coverage for MTM! I still completed a med rec and chart review. You may see the note concerning these.  Thank you for the referral none-the-less!   Dre Figueredo, PharmD MTM Pharmacist  Phone: [phone]

## 2017-05-31 NOTE — PATIENT INSTRUCTIONS
"Recommendations from today's med rec visit:                                                      1. I updated your med list to reflect what you are actually taking.    2. For further blood pressure control you will likely need a change in clonidine dose or Losartan dose. I will speak to Dr. Qureshi about this.     Next MTM visit: As needed, not covered for full \"MTM\" appt, would have to pay out of pocket.     To schedule another MTM appointment, please call the clinic directly or you may call the MTM scheduling line at [phone] or toll-free at [phone].     My Clinical Pharmacist's contact information:                                                      It was a pleasure seeing you today!  Please feel free to contact me with any questions or concerns you have.      Dre Figueredo, PharmD  MTM Pharmacist    Phone: [phone]     You may receive a survey about the MTM services you received.  I would appreciate your feedback to help me serve you better in the future. Please fill it out and return it when you can. Your comments will be anonymous.    "

## 2017-05-31 NOTE — PROGRESS NOTES
Pt is not covered for [facility]. We completed a med rec, and I completed a chart review    BP has gone up about 3 weeks ago. Dr. Qureshi and pt have been working on getting it down.     Med list updates/ changes:  Clonidine 0.3mg qAM, and 0.6mg qPM (not TID). He has been on 0.6mg BID in the past.   Lantus 30 U qAM and 40 qPM  Atorvastatin 20mg daily (not 10mg)    HTN recs: We could increase Clonidine back up to 0.6mg BID, a dose which he tolerated in the past, maximize Losartan up to 100mg if BP continues to be out of control. Pt states his most recent BP is 160s/70s.     CC'd Dr. Qureshi.    Dre Figueredo, PharmD  [facility] Pharmacist    Phone: [phone]

## 2017-06-02 DIAGNOSIS — Z94.0 KIDNEY REPLACED BY TRANSPLANT: ICD-10-CM

## 2017-06-02 DIAGNOSIS — Z48.298 AFTERCARE FOLLOWING ORGAN TRANSPLANT: ICD-10-CM

## 2017-06-02 LAB
ANION GAP SERPL CALCULATED.3IONS-SCNC: 6 MMOL/L (ref 3–14)
BUN SERPL-MCNC: 21 MG/DL (ref 7–30)
CALCIUM SERPL-MCNC: 10.3 MG/DL (ref 8.5–10.1)
CHLORIDE SERPL-SCNC: 105 MMOL/L (ref 94–109)
CO2 SERPL-SCNC: 27 MMOL/L (ref 20–32)
CREAT SERPL-MCNC: 1.55 MG/DL (ref 0.66–1.25)
ERYTHROCYTE [DISTWIDTH] IN BLOOD BY AUTOMATED COUNT: 13 % (ref 10–15)
GFR SERPL CREATININE-BSD FRML MDRD: 46 ML/MIN/1.7M2
GLUCOSE SERPL-MCNC: 117 MG/DL (ref 70–99)
HCT VFR BLD AUTO: 48 % (ref 40–53)
HGB BLD-MCNC: 15.8 G/DL (ref 13.3–17.7)
MCH RBC QN AUTO: 28.2 PG (ref 26.5–33)
MCHC RBC AUTO-ENTMCNC: 32.9 G/DL (ref 31.5–36.5)
MCV RBC AUTO: 86 FL (ref 78–100)
PLATELET # BLD AUTO: 146 10E9/L (ref 150–450)
POTASSIUM SERPL-SCNC: 4.8 MMOL/L (ref 3.4–5.3)
PRA DONOR SPECIFIC ABY: NORMAL
RBC # BLD AUTO: 5.61 10E12/L (ref 4.4–5.9)
SODIUM SERPL-SCNC: 138 MMOL/L (ref 133–144)
WBC # BLD AUTO: 4.9 10E9/L (ref 4–11)

## 2017-06-02 PROCEDURE — 85027 COMPLETE CBC AUTOMATED: CPT | Performed by: INTERNAL MEDICINE

## 2017-06-02 PROCEDURE — 80048 BASIC METABOLIC PNL TOTAL CA: CPT | Performed by: TRANSPLANT SURGERY

## 2017-06-02 PROCEDURE — 36415 COLL VENOUS BLD VENIPUNCTURE: CPT | Performed by: TRANSPLANT SURGERY

## 2017-06-03 ENCOUNTER — OFFICE VISIT (OUTPATIENT)
Dept: NEPHROLOGY | Facility: CLINIC | Age: 62
End: 2017-06-03
Attending: INTERNAL MEDICINE
Payer: MEDICARE

## 2017-06-03 VITALS
WEIGHT: 263.8 LBS | HEART RATE: 106 BPM | DIASTOLIC BLOOD PRESSURE: 79 MMHG | BODY MASS INDEX: 35.73 KG/M2 | TEMPERATURE: 98.3 F | OXYGEN SATURATION: 98 % | HEIGHT: 72 IN | SYSTOLIC BLOOD PRESSURE: 178 MMHG

## 2017-06-03 DIAGNOSIS — N25.81 SECONDARY RENAL HYPERPARATHYROIDISM (H): ICD-10-CM

## 2017-06-03 DIAGNOSIS — E83.42 HYPOMAGNESEMIA: ICD-10-CM

## 2017-06-03 DIAGNOSIS — E83.39 HYPOPHOSPHATEMIA: ICD-10-CM

## 2017-06-03 DIAGNOSIS — Z94.0 KIDNEY REPLACED BY TRANSPLANT: ICD-10-CM

## 2017-06-03 DIAGNOSIS — N25.81 SECONDARY RENAL HYPERPARATHYROIDISM (H): Primary | ICD-10-CM

## 2017-06-03 DIAGNOSIS — Z48.298 AFTERCARE FOLLOWING ORGAN TRANSPLANT: ICD-10-CM

## 2017-06-03 DIAGNOSIS — I15.1 HYPERTENSION SECONDARY TO OTHER RENAL DISORDERS (CODE): ICD-10-CM

## 2017-06-03 DIAGNOSIS — Z94.0 KIDNEY TRANSPLANT RECIPIENT: ICD-10-CM

## 2017-06-03 DIAGNOSIS — D75.1 POST-TRANSPLANT ERYTHROCYTOSIS: ICD-10-CM

## 2017-06-03 DIAGNOSIS — E55.9 VITAMIN D DEFICIENCY: ICD-10-CM

## 2017-06-03 DIAGNOSIS — I15.1 HYPERTENSION SECONDARY TO OTHER RENAL DISORDERS: ICD-10-CM

## 2017-06-03 DIAGNOSIS — D84.9 IMMUNOSUPPRESSED STATUS (H): Chronic | ICD-10-CM

## 2017-06-03 LAB
MAGNESIUM SERPL-MCNC: 2.2 MG/DL (ref 1.6–2.3)
PHOSPHATE SERPL-MCNC: 1.9 MG/DL (ref 2.5–4.5)
PTH-INTACT SERPL-MCNC: 279 PG/ML (ref 12–72)

## 2017-06-03 PROCEDURE — 36415 COLL VENOUS BLD VENIPUNCTURE: CPT | Performed by: INTERNAL MEDICINE

## 2017-06-03 PROCEDURE — 82306 VITAMIN D 25 HYDROXY: CPT | Performed by: INTERNAL MEDICINE

## 2017-06-03 PROCEDURE — 83735 ASSAY OF MAGNESIUM: CPT | Performed by: INTERNAL MEDICINE

## 2017-06-03 PROCEDURE — 84100 ASSAY OF PHOSPHORUS: CPT | Performed by: INTERNAL MEDICINE

## 2017-06-03 PROCEDURE — 99212 OFFICE O/P EST SF 10 MIN: CPT | Mod: ZF

## 2017-06-03 PROCEDURE — 83970 ASSAY OF PARATHORMONE: CPT | Performed by: INTERNAL MEDICINE

## 2017-06-03 RX ORDER — NIFEDIPINE 30 MG
30 TABLET, EXTENDED RELEASE ORAL 2 TIMES DAILY
Qty: 60 TABLET | Refills: 3 | Status: SHIPPED | OUTPATIENT
Start: 2017-06-03

## 2017-06-03 RX ORDER — MINOXIDIL 2.5 MG/1
5 TABLET ORAL 2 TIMES DAILY
Qty: 120 TABLET | Refills: 11 | Status: SHIPPED | OUTPATIENT
Start: 2017-06-03 | End: 2017-06-07

## 2017-06-03 RX ORDER — CHLORTHALIDONE 25 MG/1
12.5 TABLET ORAL DAILY
Qty: 45 TABLET | Refills: 3 | Status: SHIPPED | OUTPATIENT
Start: 2017-06-03 | End: 2017-12-12

## 2017-06-03 RX ORDER — BLOOD SUGAR DIAGNOSTIC
STRIP MISCELLANEOUS
Refills: 3 | COMMUNITY
Start: 2017-05-29 | End: 2021-10-12

## 2017-06-03 RX ORDER — CLONIDINE HYDROCHLORIDE 0.3 MG/1
0.3 TABLET ORAL 2 TIMES DAILY
Qty: 60 TABLET | Refills: 11 | Status: SHIPPED | OUTPATIENT
Start: 2017-06-03 | End: 2017-06-21

## 2017-06-03 RX ORDER — LOSARTAN POTASSIUM 50 MG/1
50 TABLET ORAL DAILY
Qty: 30 TABLET | Refills: 11 | Status: SHIPPED | OUTPATIENT
Start: 2017-06-03 | End: 2018-06-19

## 2017-06-03 ASSESSMENT — PAIN SCALES - GENERAL: PAINLEVEL: NO PAIN (0)

## 2017-06-03 NOTE — LETTER
6/3/2017      RE: Corey Roland  [address]  [facility] [id number]       Assessment and Plan:  1. DDKT - baseline Cr ~ 1.5-1.8, which has been stable.  Mild proteinuria, but slight trend up near 1 gram.  No DSA.  Will make no changes in immunosuppression.  2. HTN - poor control above target of less than 140/90.  Will make several antihypertensive medication changes.  Will decrease clonidine to 0.3 mg bid, decrease nifedipine CD to 30 mg bid, increase minoxidil to 5 mg bid, increase losartan to 50 mg nightly and continue carvedilol 25 mg bid.  With some edema, will also start chlorthalidone 12.5 mg daily.  Patient seemed to become too dehydrated with previous use of furosemide and thus will try thiazide diuretic, however, will need to follow serum calcium levels.  3. DM - good control.  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  Recent bilateral native kidney and transplant kidney ultrasound showed no evidence of renal mass.  Will follow.  5. Secondary renal hyperparathyroidism - previously decreased PTH and only mildly elevated, which should continue to improve post transplant.  Will stop calcitriol and continue treatment for vitamin D deficiency.  Will recheck PTH.  6. Vitamin D deficiency - previously low vitamin D level and will continue ergocalciferol.  Will recheck vitamin D level.  7. Hypophosphatemia - previously slightly low serum phosphorus and off phosphorus supplement.  Will recheck serum phosphorus level.  8. Hypercalcemia - high normal serum calcium level.  Will stop calcitriol, but with addition of thiazide diuretic, will need to follow serum calcium level.  May need to change thiazide to loop diuretic if still high.  Will recheck PTH and vitamin D levels.  9. Hypomagnesemia - previously normal serum magnesium level and will continue oral magnesium supplement and recheck level.  10. Metabolic acidosis - normal serum bicarbonate and will stop oral sodium bicarbonate  "supplement.  11. Overweight - recommend increased exercise and watch caloric intake.  12. Skin cancer risk - no new skin lesions.  Recommend regular follow up with Dermatology.  13. Recommend return visit in 3 months.    Assessment and plan was discussed with patient and he voiced his understanding and agreement.    Reason for Visit:  Mr. Roland is here for routine follow up.    HPI:   Corey Roland is a 61 year old male with ESKD from DM and is status post DDKT on 11/24/16.         Transplant Hx:       Tx: DDKT  Date: 11/24/16       Present Maintenance IS: Tacrolimus and Mycophenolate mofetil       Baseline Creatinine: 1.5-1.8       Recent DSA: No  Date last checked: 5/2017       Biopsy: No    Mr. Roland reports feeling good overall with some medical problems.  His energy level is improved and pretty good now, mostly back to normal.  He is active, although really gets minimal exercise.  He has gained about 20-25 lbs back following transplant.  Denies any chest pain or shortness of breath with exertion.  Appetite is \"too good.\"  No nausea, vomiting or diarrhea, but a slight constipation at times.  No fever, sweats or chills.  Some increase in leg swelling since starting minoxidil.  No problems emptying his bladder.    Home BP: 150-170/70-80s.  A little better in the afternoon, but still 130-150s systolic.  Presently taking clonidine 0.6 mg in am and 0.3 mg in pm, minoxidil 2.5 mg bid, nifedipine  mg daily, losartan 25 mg daily, and carvedilol 25 mg bid.      ROS:   A comprehensive review of systems was obtained and negative, except as noted in the HPI or PMH.    Active Medical Problems:  Patient Active Problem List   Diagnosis     Hypertension secondary to other renal disorders     Secondary renal hyperparathyroidism (H)     Obesity     Diabetic peripheral neuropathy (H)     Kidney replaced by transplant     Immunosuppressed status (H)     Type 1 diabetes mellitus with nephropathy (H)     Aftercare " following organ transplant     Vitamin D deficiency     Hypomagnesemia     Post-transplant erythrocytosis       Personal Hx:  Social History     Social History     Marital status: Single     Spouse name: N/A     Number of children: 0     Years of education: N/A     Occupational History     Not on file.     Social History Main Topics     Smoking status: Former Smoker     Packs/day: 2.00     Years: 38.00     Types: Cigarettes     Quit date: 4/1/2005     Smokeless tobacco: Not on file     Alcohol use 0.0 - 0.5 oz/week     0 - 1 Standard drinks or equivalent per week      Comment: Not since transplant     Drug use: No      Comment: past (1980's, marijuana, heroin IV, cocaine, hallucinogens, methamphetamine)     Sexual activity: No     Other Topics Concern     Not on file     Social History Narrative       Allergies:  No Known Allergies    Medications:  Prior to Admission medications    Medication Sig Start Date End Date Taking? Authorizing Provider   tacrolimus (PROGRAF - GENERIC EQUIVALENT) 1 MG capsule Take 5 capsules (5 mg) by mouth 2 times daily 12/4/16   Erasmo Wilkins MD   tacrolimus (PROGRAF - GENERIC EQUIVALENT) 0.5 MG capsule Take 1 capsule (0.5 mg) by mouth 2 times daily 12/3/16   Abdiaziz Qureshi MD   oxyCODONE (ROXICODONE) 5 MG IR tablet Take 1-2 tablets (5-10 mg) by mouth every 6 hours as needed for moderate to severe pain 12/2/16   Talisha Dunham PA-C   aspirin EC 81 MG EC tablet Take 1 tablet (81 mg) by mouth daily 12/2/16   Talisha Dunham PA-C   calcium carbonate (TUMS) 500 MG chewable tablet Take 1 tablet (500 mg) by mouth 2 times daily as needed for heartburn 12/2/16   Talisha Dunham PA-C   insulin aspart (NOVOLOG PEN) 100 UNIT/ML injection Inject 1 Units Subcutaneous Take with snacks or supplements for high blood sugar 1 unit per 4 grams carbohydrate 12/2/16   Talisha Dunham PA-C   insulin aspart (NOVOLOG PEN) 100 UNIT/ML injection Inject 1 Units Subcutaneous 3 times  daily (with meals) DOSE:  1 units per 4 grams of carbohydrate.  Only chart total amount of units given.  Do not give if pre-prandial glucose is less than 60 mg/dL. 12/2/16   Talisha Dunham PA-C   insulin aspart (NOVOLOG PEN) 100 UNIT/ML injection Inject 1-22 Units Subcutaneous 3 times daily (before meals) Correction Scale - VERY HIGH INSULIN RESISTANCE DOSING     Do Not give Correction Insulin if Pre-Meal BG less than 250.   For Pre-Meal  - 260 give 1 unit.   For Pre-Meal -269  give 2 units.   For Pre-Meal  - 279 give 3 units.   For Pre-Meal  - 289 give 4 units.   For Pre-Meal  - 299 give 5 units.   For Pre-Meal  - 309 give 6 units.   ... 12/2/16   Talisha Dunham PA-C   insulin aspart (NOVOLOG PEN) 100 UNIT/ML injection Inject 1-16 Units Subcutaneous At Bedtime Correction Scale - VERY HIGH INSULIN RESISTANCE DOSING     Do Not give Bedtime Correction Insulin if BG less than 200.   For  - 209 give 1 units.   For  - 219 give 2 units.   For  - 229 give 3 units.   For  - 239 give 4 units.   For  - 249 give 5 units.   For  - 259 give 6 units.   For  - 269 give 7 units.   For  - 279 give 8 units  ... 12/2/16   Talisha Dunham PA-C   insulin glargine (LANTUS) 100 UNIT/ML injection Inject 16 Units Subcutaneous At Bedtime 12/2/16   Talisha Dunham PA-C   insulin glargine (LANTUS) 100 UNIT/ML injection Inject 34 Units Subcutaneous every morning 12/3/16   Talisha Dunham PA-C   atorvastatin (LIPITOR) 20 MG tablet Take 0.5 tablets (10 mg) by mouth daily 12/2/16   Talisha Dunham PA-C   cloNIDine (CATAPRES) 0.1 MG tablet Take 1 tablet (0.1 mg) by mouth every 8 hours as needed (Please take for SBP >180.)  Patient taking differently: Take 0.2 mg by mouth every 8 hours as needed (Please take for SBP >180.)  12/2/16   Talisha Dunham PA-C   valACYclovir (VALTREX) 500 MG tablet Take 1 tablet (500 mg) by mouth daily  12/2/16   Talisha Dunham PA-C   carvedilol (COREG) 25 MG tablet Take 1 tablet (25 mg) by mouth 2 times daily (with meals) 12/2/16   Talisha Dunham PA-C   furosemide (LASIX) 80 MG tablet Take 1 tablet (80 mg) by mouth 2 times daily 12/2/16   Talisha Dunham PA-C   senna-docusate (SENOKOT-S;PERICOLACE) 8.6-50 MG per tablet Take 2 tablets by mouth 2 times daily 12/2/16   Talisha Dunham PA-C   sulfamethoxazole-trimethoprim (BACTRIM/SEPTRA) 400-80 MG per tablet Take 1 tablet by mouth three times a week 12/2/16   Talisha Dunham PA-C   mycophenolate (CELLCEPT - GENERIC EQUIVALENT) 250 MG capsule Take 4 capsules (1,000 mg) by mouth 2 times daily 12/2/16   Talisha Dunham PA-C   calcitRIOL (ROCALTROL) 0.25 MCG capsule Take 1 capsule (0.25 mcg) by mouth daily 12/2/16   Talisha Dunham PA-C   amoxicillin (AMOXIL) 500 MG capsule Take 1 capsule (500 mg) by mouth daily for 10 days 12/2/16 12/12/16  Talisha Dunham PA-C   pantoprazole (PROTONIX) 40 MG EC tablet Take 1 tablet (40 mg) by mouth daily 12/2/16   Talisha Dunham PA-C   NIFEdipine ER (ADALAT CC) 30 MG TB24 Take 2 tablets (60 mg) by mouth 2 times daily 12/2/16   Talisha Dunham PA-C   betamethasone dipropionate (DIPROSONE) 0.05 % ointment Apply topically 2 times daily    Reported, Patient       Vitals:  /79  Pulse 106  Temp 98.3  F (36.8  C) (Oral)  Ht 1.829 m (6')  Wt 119.7 kg (263 lb 12.8 oz)  SpO2 98%  BMI 35.78 kg/m2    Exam:   GENERAL APPEARANCE: alert and no distress  HENT: mouth without ulcers or lesions  LYMPHATICS: no cervical or supraclavicular nodes  RESP: lungs clear to auscultation - no rales, rhonchi or wheezes  CV: regular rhythm, normal rate, no rub, no murmur  EDEMA: 1+ LE edema bilaterally  ABDOMEN: soft, nondistended, nontender, bowel sounds normal, overweight  MS: extremities normal - no gross deformities noted, no evidence of inflammation in joints, no muscle tenderness  SKIN: no  rash  TX KIDNEY: normal    Results:   Recent Results (from the past 200 hour(s))   Hepatic panel    Collection Time: 05/26/17  7:28 AM   Result Value Ref Range    Bilirubin Direct <0.1 0.0 - 0.2 mg/dL    Bilirubin Total 0.5 0.2 - 1.3 mg/dL    Albumin 3.6 3.4 - 5.0 g/dL    Protein Total 7.4 6.8 - 8.8 g/dL    Alkaline Phosphatase 110 40 - 150 U/L    ALT 37 0 - 70 U/L    AST 24 0 - 45 U/L   Lipid Profile    Collection Time: 05/26/17  7:28 AM   Result Value Ref Range    Cholesterol 94 <200 mg/dL    Triglycerides 179 (H) <150 mg/dL    HDL Cholesterol 40 >39 mg/dL    LDL Cholesterol Calculated 18 <100 mg/dL    Non HDL Cholesterol 54 <130 mg/dL   Hepatitis B Surface Antibody    Collection Time: 05/26/17  7:28 AM   Result Value Ref Range    Hepatitis B Surface Antibody 0.45 <8.00 m[IU]/mL   Hepatitis B core antibody    Collection Time: 05/26/17  7:28 AM   Result Value Ref Range    Hepatitis B Core Evelyn Nonreactive NR   Hepatitis C antibody    Collection Time: 05/26/17  7:28 AM   Result Value Ref Range    Hepatitis C Antibody (A) NR     Reactive   A reactive result indicates one of the following 1) current HCV infection 2)   past HCV infection that has resolved or 3) false positivity. The CDC recommends   that a reactive result should be followed by Nucleic acid testing for HCV RNA.  If HCV RNA is detected, that indicates current HCV infection. If HCV RNA is not   detected, that indicates either past, resolved HCV infection, or false HCV   antibody positivity.   Assay performance characteristics have not been established for newborns,   infants, and children     CBC with platelets    Collection Time: 05/26/17  7:28 AM   Result Value Ref Range    WBC 4.5 4.0 - 11.0 10e9/L    RBC Count 5.80 4.4 - 5.9 10e12/L    Hemoglobin 16.4 13.3 - 17.7 g/dL    Hematocrit 49.1 40.0 - 53.0 %    MCV 85 78 - 100 fl    MCH 28.3 26.5 - 33.0 pg    MCHC 33.4 31.5 - 36.5 g/dL    RDW 12.9 10.0 - 15.0 %    Platelet Count 178 150 - 450 10e9/L   Basic  metabolic panel    Collection Time: 05/26/17  7:28 AM   Result Value Ref Range    Sodium 141 133 - 144 mmol/L    Potassium 4.5 3.4 - 5.3 mmol/L    Chloride 109 94 - 109 mmol/L    Carbon Dioxide 22 20 - 32 mmol/L    Anion Gap 10 3 - 14 mmol/L    Glucose 191 (H) 70 - 99 mg/dL    Urea Nitrogen 23 7 - 30 mg/dL    Creatinine 1.43 (H) 0.66 - 1.25 mg/dL    GFR Estimate 50 (L) >60 mL/min/1.7m2    GFR Estimate If Black 61 >60 mL/min/1.7m2    Calcium 10.5 (H) 8.5 - 10.1 mg/dL   PRA Donor Specific Antibody    Collection Time: 05/26/17  7:28 AM   Result Value Ref Range    PRA Donor Specific Evelyn       Specimen received - Immunology report to follow upon completion.   BK virus PCR quantitative    Collection Time: 05/26/17  7:28 AM   Result Value Ref Range    BK Virus Specimen Plasma     BK Virus Result BK Virus DNA Not Detected BKNEG copies/mL    BK Virus Log  <2.7 Log copies/mL     Not Calculated   The Real-Time quantitative BK Virus assay was developed and its performance   characteristics determined by the [facility] at   the [facility] in [place], [place]. The   primers and probes for each analyte are Analyte Specific Reagents (ASRs)   manufactured by [employer].   ASRs are used in many laboratory tests necessary for standard medical care and   generally do not require U.S. Food and Drug Administration approval. The FDA   has determined that such clearance or approval is not necessary.   This test is used for clinical purposes. It should not be regarded as   investigational or for research. This laboratory is certified under the   Clinical Laboratory Improvement Amendments of 1988 (CLIA-88) as qualified to   perform high complexity clinical laboratory testing.     Tacrolimus level    Collection Time: 05/26/17  7:28 AM   Result Value Ref Range    Tacrolimus Last Dose 20:00 5/25/17     Tacrolimus Level 7.8 5.0 - 15.0 ug/L   Protein  random urine    Collection Time:  05/26/17  7:49 AM   Result Value Ref Range    Protein Random Urine 0.74 g/L    Protein Total Urine g/gr Creatinine 0.94 (H) 0 - 0.2 g/g Cr   Creatinine urine calculation only    Collection Time: 05/26/17  7:49 AM   Result Value Ref Range    Creatinine Urine 78 mg/dL   CBC with platelets    Collection Time: 06/02/17  7:38 AM   Result Value Ref Range    WBC 4.9 4.0 - 11.0 10e9/L    RBC Count 5.61 4.4 - 5.9 10e12/L    Hemoglobin 15.8 13.3 - 17.7 g/dL    Hematocrit 48.0 40.0 - 53.0 %    MCV 86 78 - 100 fl    MCH 28.2 26.5 - 33.0 pg    MCHC 32.9 31.5 - 36.5 g/dL    RDW 13.0 10.0 - 15.0 %    Platelet Count 146 (L) 150 - 450 10e9/L   Basic metabolic panel    Collection Time: 06/02/17  7:38 AM   Result Value Ref Range    Sodium 138 133 - 144 mmol/L    Potassium 4.8 3.4 - 5.3 mmol/L    Chloride 105 94 - 109 mmol/L    Carbon Dioxide 27 20 - 32 mmol/L    Anion Gap 6 3 - 14 mmol/L    Glucose 117 (H) 70 - 99 mg/dL    Urea Nitrogen 21 7 - 30 mg/dL    Creatinine 1.55 (H) 0.66 - 1.25 mg/dL    GFR Estimate 46 (L) >60 mL/min/1.7m2    GFR Estimate If Black 55 (L) >60 mL/min/1.7m2    Calcium 10.3 (H) 8.5 - 10.1 mg/dL       Abdiaziz Qureshi MD

## 2017-06-03 NOTE — PROGRESS NOTES
Assessment and Plan:  1. DDKT - baseline Cr ~ 1.5-1.8, which has been stable.  Mild proteinuria, but slight trend up near 1 gram.  No DSA.  Will make no changes in immunosuppression.  2. HTN - poor control above target of less than 140/90.  Will make several antihypertensive medication changes.  Will decrease clonidine to 0.3 mg bid, decrease nifedipine CD to 30 mg bid, increase minoxidil to 5 mg bid, increase losartan to 50 mg nightly and continue carvedilol 25 mg bid.  With some edema, will also start chlorthalidone 12.5 mg daily.  Patient seemed to become too dehydrated with previous use of furosemide and thus will try thiazide diuretic, however, will need to follow serum calcium levels.  3. DM - good control.  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  Recent bilateral native kidney and transplant kidney ultrasound showed no evidence of renal mass.  Will follow.  5. Secondary renal hyperparathyroidism - previously decreased PTH and only mildly elevated, which should continue to improve post transplant.  Will stop calcitriol and continue treatment for vitamin D deficiency.  Will recheck PTH.  6. Vitamin D deficiency - previously low vitamin D level and will continue ergocalciferol.  Will recheck vitamin D level.  7. Hypophosphatemia - previously slightly low serum phosphorus and off phosphorus supplement.  Will recheck serum phosphorus level.  8. Hypercalcemia - high normal serum calcium level.  Will stop calcitriol, but with addition of thiazide diuretic, will need to follow serum calcium level.  May need to change thiazide to loop diuretic if still high.  Will recheck PTH and vitamin D levels.  9. Hypomagnesemia - previously normal serum magnesium level and will continue oral magnesium supplement and recheck level.  10. Metabolic acidosis - normal serum bicarbonate and will stop oral sodium bicarbonate supplement.  11. Overweight - recommend increased exercise and watch caloric intake.  12. Skin cancer  "risk - no new skin lesions.  Recommend regular follow up with Dermatology.  13. Recommend return visit in 3 months.    Assessment and plan was discussed with patient and he voiced his understanding and agreement.    Reason for Visit:  Mr. Roland is here for routine follow up.    HPI:   Corey Roland is a 61 year old male with ESKD from DM and is status post DDKT on 11/24/16.         Transplant Hx:       Tx: DDKT  Date: 11/24/16       Present Maintenance IS: Tacrolimus and Mycophenolate mofetil       Baseline Creatinine: 1.5-1.8       Recent DSA: No  Date last checked: 5/2017       Biopsy: No    Mr. Roland reports feeling good overall with some medical problems.  His energy level is improved and pretty good now, mostly back to normal.  He is active, although really gets minimal exercise.  He has gained about 20-25 lbs back following transplant.  Denies any chest pain or shortness of breath with exertion.  Appetite is \"too good.\"  No nausea, vomiting or diarrhea, but a slight constipation at times.  No fever, sweats or chills.  Some increase in leg swelling since starting minoxidil.  No problems emptying his bladder.    Home BP: 150-170/70-80s.  A little better in the afternoon, but still 130-150s systolic.  Presently taking clonidine 0.6 mg in am and 0.3 mg in pm, minoxidil 2.5 mg bid, nifedipine  mg daily, losartan 25 mg daily, and carvedilol 25 mg bid.      ROS:   A comprehensive review of systems was obtained and negative, except as noted in the HPI or PMH.    Active Medical Problems:  Patient Active Problem List   Diagnosis     Hypertension secondary to other renal disorders     Secondary renal hyperparathyroidism (H)     Obesity     Diabetic peripheral neuropathy (H)     Kidney replaced by transplant     Immunosuppressed status (H)     Type 1 diabetes mellitus with nephropathy (H)     Aftercare following organ transplant     Vitamin D deficiency     Hypomagnesemia     Post-transplant erythrocytosis "       Personal Hx:  Social History     Social History     Marital status: Single     Spouse name: N/A     Number of children: 0     Years of education: N/A     Occupational History     Not on file.     Social History Main Topics     Smoking status: Former Smoker     Packs/day: 2.00     Years: 38.00     Types: Cigarettes     Quit date: 4/1/2005     Smokeless tobacco: Not on file     Alcohol use 0.0 - 0.5 oz/week     0 - 1 Standard drinks or equivalent per week      Comment: Not since transplant     Drug use: No      Comment: past (1980's, marijuana, heroin IV, cocaine, hallucinogens, methamphetamine)     Sexual activity: No     Other Topics Concern     Not on file     Social History Narrative       Allergies:  No Known Allergies    Medications:  Prior to Admission medications    Medication Sig Start Date End Date Taking? Authorizing Provider   tacrolimus (PROGRAF - GENERIC EQUIVALENT) 1 MG capsule Take 5 capsules (5 mg) by mouth 2 times daily 12/4/16   Erasmo Wilkins MD   tacrolimus (PROGRAF - GENERIC EQUIVALENT) 0.5 MG capsule Take 1 capsule (0.5 mg) by mouth 2 times daily 12/3/16   Abdiaziz Qureshi MD   oxyCODONE (ROXICODONE) 5 MG IR tablet Take 1-2 tablets (5-10 mg) by mouth every 6 hours as needed for moderate to severe pain 12/2/16   Talisha Dunham PA-C   aspirin EC 81 MG EC tablet Take 1 tablet (81 mg) by mouth daily 12/2/16   Talisha Dunham PA-C   calcium carbonate (TUMS) 500 MG chewable tablet Take 1 tablet (500 mg) by mouth 2 times daily as needed for heartburn 12/2/16   Talisha Dunham PA-C   insulin aspart (NOVOLOG PEN) 100 UNIT/ML injection Inject 1 Units Subcutaneous Take with snacks or supplements for high blood sugar 1 unit per 4 grams carbohydrate 12/2/16   Talisha Dunham PA-C   insulin aspart (NOVOLOG PEN) 100 UNIT/ML injection Inject 1 Units Subcutaneous 3 times daily (with meals) DOSE:  1 units per 4 grams of carbohydrate.  Only chart total amount of units given.  Do  not give if pre-prandial glucose is less than 60 mg/dL. 12/2/16   Talisha Dunham PA-C   insulin aspart (NOVOLOG PEN) 100 UNIT/ML injection Inject 1-22 Units Subcutaneous 3 times daily (before meals) Correction Scale - VERY HIGH INSULIN RESISTANCE DOSING     Do Not give Correction Insulin if Pre-Meal BG less than 250.   For Pre-Meal  - 260 give 1 unit.   For Pre-Meal -269  give 2 units.   For Pre-Meal  - 279 give 3 units.   For Pre-Meal  - 289 give 4 units.   For Pre-Meal  - 299 give 5 units.   For Pre-Meal  - 309 give 6 units.   ... 12/2/16   Talisha Dunham PA-C   insulin aspart (NOVOLOG PEN) 100 UNIT/ML injection Inject 1-16 Units Subcutaneous At Bedtime Correction Scale - VERY HIGH INSULIN RESISTANCE DOSING     Do Not give Bedtime Correction Insulin if BG less than 200.   For  - 209 give 1 units.   For  - 219 give 2 units.   For  - 229 give 3 units.   For  - 239 give 4 units.   For  - 249 give 5 units.   For  - 259 give 6 units.   For  - 269 give 7 units.   For  - 279 give 8 units  ... 12/2/16   Talisha Dunham PA-C   insulin glargine (LANTUS) 100 UNIT/ML injection Inject 16 Units Subcutaneous At Bedtime 12/2/16   Talisha Dunham PA-C   insulin glargine (LANTUS) 100 UNIT/ML injection Inject 34 Units Subcutaneous every morning 12/3/16   Talisha Dunham PA-C   atorvastatin (LIPITOR) 20 MG tablet Take 0.5 tablets (10 mg) by mouth daily 12/2/16   Talisha Dunham PA-C   cloNIDine (CATAPRES) 0.1 MG tablet Take 1 tablet (0.1 mg) by mouth every 8 hours as needed (Please take for SBP >180.)  Patient taking differently: Take 0.2 mg by mouth every 8 hours as needed (Please take for SBP >180.)  12/2/16   Talisha Dunham PA-C   valACYclovir (VALTREX) 500 MG tablet Take 1 tablet (500 mg) by mouth daily 12/2/16   Talisha Dunham PA-C   carvedilol (COREG) 25 MG tablet Take 1 tablet (25 mg) by mouth 2 times  daily (with meals) 12/2/16   Talisha Dunham PA-C   furosemide (LASIX) 80 MG tablet Take 1 tablet (80 mg) by mouth 2 times daily 12/2/16   Talisha Dunham PA-C   senna-docusate (SENOKOT-S;PERICOLACE) 8.6-50 MG per tablet Take 2 tablets by mouth 2 times daily 12/2/16   Talisha Dunham PA-C   sulfamethoxazole-trimethoprim (BACTRIM/SEPTRA) 400-80 MG per tablet Take 1 tablet by mouth three times a week 12/2/16   Talisha Dunham PA-C   mycophenolate (CELLCEPT - GENERIC EQUIVALENT) 250 MG capsule Take 4 capsules (1,000 mg) by mouth 2 times daily 12/2/16   Talisha Dunham PA-C   calcitRIOL (ROCALTROL) 0.25 MCG capsule Take 1 capsule (0.25 mcg) by mouth daily 12/2/16   Talisha Dunham PA-C   amoxicillin (AMOXIL) 500 MG capsule Take 1 capsule (500 mg) by mouth daily for 10 days 12/2/16 12/12/16  Talisha Dunham PA-C   pantoprazole (PROTONIX) 40 MG EC tablet Take 1 tablet (40 mg) by mouth daily 12/2/16   Talisha Dunham PA-C   NIFEdipine ER (ADALAT CC) 30 MG TB24 Take 2 tablets (60 mg) by mouth 2 times daily 12/2/16   Talisha Dunham PA-C   betamethasone dipropionate (DIPROSONE) 0.05 % ointment Apply topically 2 times daily    Reported, Patient       Vitals:  /79  Pulse 106  Temp 98.3  F (36.8  C) (Oral)  Ht 1.829 m (6')  Wt 119.7 kg (263 lb 12.8 oz)  SpO2 98%  BMI 35.78 kg/m2    Exam:   GENERAL APPEARANCE: alert and no distress  HENT: mouth without ulcers or lesions  LYMPHATICS: no cervical or supraclavicular nodes  RESP: lungs clear to auscultation - no rales, rhonchi or wheezes  CV: regular rhythm, normal rate, no rub, no murmur  EDEMA: 1+ LE edema bilaterally  ABDOMEN: soft, nondistended, nontender, bowel sounds normal, overweight  MS: extremities normal - no gross deformities noted, no evidence of inflammation in joints, no muscle tenderness  SKIN: no rash  TX KIDNEY: normal    Results:   Recent Results (from the past 200 hour(s))   Hepatic panel    Collection  Time: 05/26/17  7:28 AM   Result Value Ref Range    Bilirubin Direct <0.1 0.0 - 0.2 mg/dL    Bilirubin Total 0.5 0.2 - 1.3 mg/dL    Albumin 3.6 3.4 - 5.0 g/dL    Protein Total 7.4 6.8 - 8.8 g/dL    Alkaline Phosphatase 110 40 - 150 U/L    ALT 37 0 - 70 U/L    AST 24 0 - 45 U/L   Lipid Profile    Collection Time: 05/26/17  7:28 AM   Result Value Ref Range    Cholesterol 94 <200 mg/dL    Triglycerides 179 (H) <150 mg/dL    HDL Cholesterol 40 >39 mg/dL    LDL Cholesterol Calculated 18 <100 mg/dL    Non HDL Cholesterol 54 <130 mg/dL   Hepatitis B Surface Antibody    Collection Time: 05/26/17  7:28 AM   Result Value Ref Range    Hepatitis B Surface Antibody 0.45 <8.00 m[IU]/mL   Hepatitis B core antibody    Collection Time: 05/26/17  7:28 AM   Result Value Ref Range    Hepatitis B Core Evelyn Nonreactive NR   Hepatitis C antibody    Collection Time: 05/26/17  7:28 AM   Result Value Ref Range    Hepatitis C Antibody (A) NR     Reactive   A reactive result indicates one of the following 1) current HCV infection 2)   past HCV infection that has resolved or 3) false positivity. The CDC recommends   that a reactive result should be followed by Nucleic acid testing for HCV RNA.  If HCV RNA is detected, that indicates current HCV infection. If HCV RNA is not   detected, that indicates either past, resolved HCV infection, or false HCV   antibody positivity.   Assay performance characteristics have not been established for newborns,   infants, and children     CBC with platelets    Collection Time: 05/26/17  7:28 AM   Result Value Ref Range    WBC 4.5 4.0 - 11.0 10e9/L    RBC Count 5.80 4.4 - 5.9 10e12/L    Hemoglobin 16.4 13.3 - 17.7 g/dL    Hematocrit 49.1 40.0 - 53.0 %    MCV 85 78 - 100 fl    MCH 28.3 26.5 - 33.0 pg    MCHC 33.4 31.5 - 36.5 g/dL    RDW 12.9 10.0 - 15.0 %    Platelet Count 178 150 - 450 10e9/L   Basic metabolic panel    Collection Time: 05/26/17  7:28 AM   Result Value Ref Range    Sodium 141 133 - 144 mmol/L     Potassium 4.5 3.4 - 5.3 mmol/L    Chloride 109 94 - 109 mmol/L    Carbon Dioxide 22 20 - 32 mmol/L    Anion Gap 10 3 - 14 mmol/L    Glucose 191 (H) 70 - 99 mg/dL    Urea Nitrogen 23 7 - 30 mg/dL    Creatinine 1.43 (H) 0.66 - 1.25 mg/dL    GFR Estimate 50 (L) >60 mL/min/1.7m2    GFR Estimate If Black 61 >60 mL/min/1.7m2    Calcium 10.5 (H) 8.5 - 10.1 mg/dL   PRA Donor Specific Antibody    Collection Time: 05/26/17  7:28 AM   Result Value Ref Range    PRA Donor Specific Evelyn       Specimen received - Immunology report to follow upon completion.   BK virus PCR quantitative    Collection Time: 05/26/17  7:28 AM   Result Value Ref Range    BK Virus Specimen Plasma     BK Virus Result BK Virus DNA Not Detected BKNEG copies/mL    BK Virus Log  <2.7 Log copies/mL     Not Calculated   The Real-Time quantitative BK Virus assay was developed and its performance   characteristics determined by the [facility] at   the [facility] in [place], [place]. The   primers and probes for each analyte are Analyte Specific Reagents (ASRs)   manufactured by [employer].   ASRs are used in many laboratory tests necessary for standard medical care and   generally do not require U.S. Food and Drug Administration approval. The FDA   has determined that such clearance or approval is not necessary.   This test is used for clinical purposes. It should not be regarded as   investigational or for research. This laboratory is certified under the   Clinical Laboratory Improvement Amendments of 1988 (CLIA-88) as qualified to   perform high complexity clinical laboratory testing.     Tacrolimus level    Collection Time: 05/26/17  7:28 AM   Result Value Ref Range    Tacrolimus Last Dose 20:00 5/25/17     Tacrolimus Level 7.8 5.0 - 15.0 ug/L   Protein  random urine    Collection Time: 05/26/17  7:49 AM   Result Value Ref Range    Protein Random Urine 0.74 g/L    Protein Total Urine g/gr Creatinine  0.94 (H) 0 - 0.2 g/g Cr   Creatinine urine calculation only    Collection Time: 05/26/17  7:49 AM   Result Value Ref Range    Creatinine Urine 78 mg/dL   CBC with platelets    Collection Time: 06/02/17  7:38 AM   Result Value Ref Range    WBC 4.9 4.0 - 11.0 10e9/L    RBC Count 5.61 4.4 - 5.9 10e12/L    Hemoglobin 15.8 13.3 - 17.7 g/dL    Hematocrit 48.0 40.0 - 53.0 %    MCV 86 78 - 100 fl    MCH 28.2 26.5 - 33.0 pg    MCHC 32.9 31.5 - 36.5 g/dL    RDW 13.0 10.0 - 15.0 %    Platelet Count 146 (L) 150 - 450 10e9/L   Basic metabolic panel    Collection Time: 06/02/17  7:38 AM   Result Value Ref Range    Sodium 138 133 - 144 mmol/L    Potassium 4.8 3.4 - 5.3 mmol/L    Chloride 105 94 - 109 mmol/L    Carbon Dioxide 27 20 - 32 mmol/L    Anion Gap 6 3 - 14 mmol/L    Glucose 117 (H) 70 - 99 mg/dL    Urea Nitrogen 21 7 - 30 mg/dL    Creatinine 1.55 (H) 0.66 - 1.25 mg/dL    GFR Estimate 46 (L) >60 mL/min/1.7m2    GFR Estimate If Black 55 (L) >60 mL/min/1.7m2    Calcium 10.3 (H) 8.5 - 10.1 mg/dL

## 2017-06-03 NOTE — MR AVS SNAPSHOT
After Visit Summary   6/3/2017    Corey Roland    MRN: [id number]           Patient Information     Date Of Birth          1955        Visit Information        Provider Department      6/3/2017 8:20 AM Abdiaziz Qureshi MD [facility]ph[facility]        Today's Diagnoses     Secondary renal hyperparathyroidism (H)    -  1    Hypertension secondary to other renal disorders (CODE)        Kidney transplant recipient        Vitamin D deficiency        Hypophosphatemia        Hypomagnesemia        Kidney replaced by transplant        Aftercare following organ transplant        Immunosuppressed status (H)        Hypertension secondary to other renal disorders        Post-transplant erythrocytosis          Care Instructions    Decrease clonidine to 0.3 mg twice daily.  Decrease nifedipine CD to 30 mg twice daily.  Increased minoxidil to 5 mg twice daily.  Increase losartan to 50 mg nightly.  Start chlorthalidone 12.5 mg (half tablet) once daily.  Continue carvedilol 25 mg twice daily.  Stop calcitriol.  Stop sodium bicarbonate supplement.            Follow-ups after your visit        Follow-up notes from your care team     Return in about 3 months (around 9/3/2017).      Your next 10 appointments already scheduled     Jun 09, 2017  7:30 AM CDT   LAB with CP LAB   [facility]    [address] [place] [address] [phone]           Patient must bring picture ID.  Patient should be prepared to give a urine specimen  Please do not eat 10-12 hours before your appointment if you are coming in fasting for labs on lipids, cholesterol, or glucose (sugar).  Pregnant women should follow their Care Team instructions. Water with medications is okay. Do not drink coffee or other fluids.   If you have concerns about taking  your medications, please ask at office or if scheduling via [employer], send a message by clicking on  Secure Messaging, Message Your Care Team.            Jun 16, 2017  7:30 AM CDT   LAB with CP LAB   [facility] ([facility])    [address] [address]   [phone]           Patient must bring picture ID.  Patient should be prepared to give a urine specimen  Please do not eat 10-12 hours before your appointment if you are coming in fasting for labs on lipids, cholesterol, or glucose (sugar).  Pregnant women should follow their Care Team instructions. Water with medications is okay. Do not drink coffee or other fluids.   If you have concerns about taking  your medications, please ask at office or if scheduling via [email or web address], send a message by clicking on Secure Messaging, Message Your Care Team.            Jun 23, 2017  7:30 AM CDT   LAB with CP LAB   [facility] ([facility])    [address] [address]   [phone]           Patient must bring picture ID.  Patient should be prepared to give a urine specimen  Please do not eat 10-12 hours before your appointment if you are coming in fasting for labs on lipids, cholesterol, or glucose (sugar).  Pregnant women should follow their Care Team instructions. Water with medications is okay. Do not drink coffee or other fluids.   If you have concerns about taking  your medications, please ask at office or if scheduling via [email or web address], send a message by clicking on Secure Messaging, Message Your Care Team.            Jun 30, 2017  7:30 AM CDT   LAB with CP LAB   [facility] ([facility])    [address] [address]   [phone]           Patient must bring picture ID.  Patient should be prepared to give a urine specimen  Please do not eat 10-12 hours before your appointment if you are coming in fasting for labs on lipids, cholesterol,  or glucose (sugar).  Pregnant women should follow their Care Team instructions. Water with medications is okay. Do not drink coffee or other fluids.   If you have concerns about taking  your medications, please ask at office or if scheduling via [employer], send a message by clicking on Secure Messaging, Message Your Care Team.            Jul 07, 2017  7:30 AM CDT   LAB with CP LAB   [facility]    [address] [address]   [phone]           Patient must bring picture ID.  Patient should be prepared to give a urine specimen  Please do not eat 10-12 hours before your appointment if you are coming in fasting for labs on lipids, cholesterol, or glucose (sugar).  Pregnant women should follow their Care Team instructions. Water with medications is okay. Do not drink coffee or other fluids.   If you have concerns about taking  your medications, please ask at office or if scheduling via [employer], send a message by clicking on Secure Messaging, Message Your Care Team.            Jul 14, 2017  7:30 AM CDT   LAB with CP LAB   [facility] ([facility])    [address] [address]   [phone]           Patient must bring picture ID.  Patient should be prepared to give a urine specimen  Please do not eat 10-12 hours before your appointment if you are coming in fasting for labs on lipids, cholesterol, or glucose (sugar).  Pregnant women should follow their Care Team instructions. Water with medications is okay. Do not drink coffee or other fluids.   If you have concerns about taking  your medications, please ask at office or if scheduling via [employer], send a message by clicking on Secure Messaging, Message Your Care Team.            Jul 21, 2017  7:30 AM CDT   LAB with CP LAB   [facility] ([facility])     [address] [address]   [phone]           Patient must bring picture ID.  Patient should be prepared to give a urine specimen  Please do not eat 10-12 hours before your appointment if you are coming in fasting for labs on lipids, cholesterol, or glucose (sugar).  Pregnant women should follow their Care Team instructions. Water with medications is okay. Do not drink coffee or other fluids.   If you have concerns about taking  your medications, please ask at office or if scheduling via [employer], send a message by clicking on Secure Messaging, Message Your Care Team.            Jul 28, 2017  7:30 AM CDT   LAB with CP LAB   [facility] ([facility])    [address] [address]   [phone]           Patient must bring picture ID.  Patient should be prepared to give a urine specimen  Please do not eat 10-12 hours before your appointment if you are coming in fasting for labs on lipids, cholesterol, or glucose (sugar).  Pregnant women should follow their Care Team instructions. Water with medications is okay. Do not drink coffee or other fluids.   If you have concerns about taking  your medications, please ask at office or if scheduling via [employer], send a message by clicking on Secure Messaging, Message Your Care Team.              Who to contact     If you have questions or need follow up information about today's clinic visit or your schedule please contact [facility] directly at [phone].  Normal or non-critical lab and imaging results will be communicated to you by [employer], letter or phone within 4 business days after the clinic has received the results. If you do not hear from us within 7 days, please contact the clinic through [employer] or phone. If you have a critical or abnormal lab result, we will notify you by phone as soon as possible.  Submit refill requests through [employer]  or call your pharmacy and they will forward the refill request to us. Please allow 3 business days for your refill to be completed.          Additional Information About Your Visit        [employer] Information     [employer] gives you secure access to your electronic health record. If you see a primary care provider, you can also send messages to your care team and make appointments. If you have questions, please call your primary care clinic.  If you do not have a primary care provider, please call [phone] and they will assist you.        Care EveryWhere ID     This is your Care EveryWhere ID. This could be used by other organizations to access your [facility] medical records  [id number]        Your Vitals Were     Pulse Temperature Height Pulse Oximetry BMI (Body Mass Index)       106 98.3  F (36.8  C) (Oral) 1.829 m (6') 98% 35.78 kg/m2        Blood Pressure from Last 3 Encounters:   06/03/17 178/79   03/02/17 122/71   01/05/17 166/82    Weight from Last 3 Encounters:   06/03/17 119.7 kg (263 lb 12.8 oz)   03/02/17 114 kg (251 lb 6.4 oz)   01/05/17 109.1 kg (240 lb 9.6 oz)                 Today's Medication Changes          These changes are accurate as of: 6/3/17  9:07 AM.  If you have any questions, ask your nurse or doctor.               Start taking these medicines.        Dose/Directions    chlorthalidone 25 MG tablet   Commonly known as:  HYGROTON   Used for:  Hypertension secondary to other renal disorders (CODE)   Started by:  Abdiaziz Qureshi MD        Dose:  12.5 mg   Take 0.5 tablets (12.5 mg) by mouth daily   Quantity:  45 tablet   Refills:  3         These medicines have changed or have updated prescriptions.        Dose/Directions    cloNIDine 0.3 MG tablet   Commonly known as:  CATAPRES   This may have changed:    - how much to take  - how to take this  - when to take this   Used for:  Hypertension secondary to other renal disorders (CODE)   Changed by:  Abdiaziz Qureshi MD        Dose:   0.3 mg   Take 1 tablet (0.3 mg) by mouth 2 times daily Take 0.3mg in the morning, and 0.6mg in the evening.   Quantity:  60 tablet   Refills:  11       losartan 50 MG tablet   Commonly known as:  COZAAR   This may have changed:    - medication strength  - how much to take   Used for:  Hypertension secondary to other renal disorders (CODE)   Changed by:  Abdiaziz Qureshi MD        Dose:  50 mg   Take 1 tablet (50 mg) by mouth daily   Quantity:  30 tablet   Refills:  11       minoxidil 2.5 MG tablet   Commonly known as:  LONITEN   This may have changed:  how much to take   Used for:  Hypertension secondary to other renal disorders (CODE)   Changed by:  Abdiaziz Qureshi MD        Dose:  5 mg   Take 2 tablets (5 mg) by mouth 2 times daily   Quantity:  120 tablet   Refills:  11       NIFEdipine ER 30 MG Tb24   Commonly known as:  ADALAT CC   This may have changed:  how much to take   Used for:  Hypertension secondary to other renal disorders (CODE)   Changed by:  Abdiaziz Qureshi MD        Dose:  30 mg   Take 1 tablet (30 mg) by mouth 2 times daily   Quantity:  60 tablet   Refills:  3       tacrolimus 1 MG capsule   Commonly known as:  PROGRAF - GENERIC EQUIVALENT   This may have changed:  when to take this   Used for:  Kidney transplant recipient        Dose:  4 mg   Take 4 capsules (4 mg) by mouth every 12 hours   Quantity:  240 capsule   Refills:  11         Stop taking these medicines if you haven't already. Please contact your care team if you have questions.     calcitRIOL 0.25 MCG capsule   Commonly known as:  ROCALTROL   Stopped by:  Abdiaziz Qureshi MD           sodium bicarbonate 650 MG tablet   Stopped by:  Abdiaziz Qureshi MD                Where to get your medicines      These medications were sent to [id number] - [place], [place] - [address]  [address] [address]     Phone:  [phone]     chlorthalidone 25 MG tablet    cloNIDine  0.3 MG tablet    losartan 50 MG tablet    minoxidil 2.5 MG tablet    NIFEdipine ER 30 MG Tb24                Primary Care Provider Office Phone # Fax #    Dustin Tadeo [phone]       [facility] [address] [address]        Thank you!     Thank you for choosing [facility]  for your care. Our goal is always to provide you with excellent care. Hearing back from our patients is one way we can continue to improve our services. Please take a few minutes to complete the written survey that you may receive in the mail after your visit with us. Thank you!             Your Updated Medication List - Protect others around you: Learn how to safely use, store and throw away your medicines at www.disposemymeds.org.          This list is accurate as of: 6/3/17  9:07 AM.  Always use your most recent med list.                   Brand Name Dispense Instructions for use    ACCU-CHEK EBONIE PLUS test strip   Generic drug:  blood glucose monitoring          aspirin 81 MG EC tablet     30 tablet    Take 1 tablet (81 mg) by mouth daily       atorvastatin 20 MG tablet    LIPITOR     Take 1 tablet (20 mg) by mouth daily       carvedilol 25 MG tablet    COREG    60 tablet    Take 1 tablet (25 mg) by mouth 2 times daily (with meals)       chlorthalidone 25 MG tablet    HYGROTON    45 tablet    Take 0.5 tablets (12.5 mg) by mouth daily       cloNIDine 0.3 MG tablet    CATAPRES    60 tablet    Take 1 tablet (0.3 mg) by mouth 2 times daily Take 0.3mg in the morning, and 0.6mg in the evening.       DIPHENHYDRAMINE HCL PO      Take 50 mg by mouth At Bedtime       * insulin aspart 100 UNIT/ML injection    NovoLOG PEN    1 mL    Inject 1 Units Subcutaneous Take with snacks or supplements for high blood sugar 1 unit per 4 grams carbohydrate       * insulin aspart 100 UNIT/ML injection    NovoLOG PEN     Inject 1 Units Subcutaneous 3 times daily (with meals) DOSE:  1 units per 4 grams of  carbohydrate.  Only chart total amount of units given.  Do not give if pre-prandial glucose is less than 60 mg/dL.       * insulin aspart 100 UNIT/ML injection    NovoLOG PEN     Inject 1-22 Units Subcutaneous 3 times daily (before meals) Correction Scale - VERY HIGH INSULIN RESISTANCE DOSING    Do Not give Correction Insulin if Pre-Meal BG less than 250.  For Pre-Meal  - 260 give 1 unit.  For Pre-Meal -269  give 2 units.  For Pre-Meal  - 279 give 3 units.  For Pre-Meal  - 289 give 4 units.  For Pre-Meal  - 299 give 5 units.  For Pre-Meal  - 309 give 6 units.  ...       * insulin aspart 100 UNIT/ML injection    NovoLOG PEN     Inject 1-16 Units Subcutaneous At Bedtime Correction Scale - VERY HIGH INSULIN RESISTANCE DOSING    Do Not give Bedtime Correction Insulin if BG less than 200.  For  - 209 give 1 units.  For  - 219 give 2 units.  For  - 229 give 3 units.  For  - 239 give 4 units.  For  - 249 give 5 units.  For  - 259 give 6 units.  For  - 269 give 7 units.  For  - 279 give 8 units ...       * insulin glargine 100 UNIT/ML injection    LANTUS     Inject 30 Units Subcutaneous       * insulin glargine 100 UNIT/ML injection    LANTUS     Inject 40 Units Subcutaneous At Bedtime       losartan 50 MG tablet    COZAAR    30 tablet    Take 1 tablet (50 mg) by mouth daily       magnesium oxide 400 (241.3 MG) MG tablet    MAG-OX    90 tablet    Take 1 tablet (400 mg) by mouth daily       minoxidil 2.5 MG tablet    LONITEN    120 tablet    Take 2 tablets (5 mg) by mouth 2 times daily       mycophenolate 250 MG capsule    CELLCEPT - GENERIC EQUIVALENT    180 capsule    Take 3 capsules (750 mg) by mouth 2 times daily       NIFEdipine ER 30 MG Tb24    ADALAT CC    60 tablet    Take 1 tablet (30 mg) by mouth 2 times daily       omeprazole 20 MG tablet     90 tablet    Take 1 tablet (20 mg) by mouth every morning        sulfamethoxazole-trimethoprim 400-80 MG per tablet    BACTRIM/SEPTRA    30 tablet    Take 1 tablet by mouth three times a week       tacrolimus 1 MG capsule    PROGRAF - GENERIC EQUIVALENT    240 capsule    Take 4 capsules (4 mg) by mouth every 12 hours       vitamin D 70945 UNIT capsule    ERGOCALCIFEROL    26 capsule    Take 1 capsule (50,000 Units) by mouth once a week       * Notice:  This list has 6 medication(s) that are the same as other medications prescribed for you. Read the directions carefully, and ask your doctor or other care provider to review them with you.

## 2017-06-03 NOTE — PATIENT INSTRUCTIONS
Decrease clonidine to 0.3 mg twice daily.  Decrease nifedipine CD to 30 mg twice daily.  Increased minoxidil to 5 mg twice daily.  Increase losartan to 50 mg nightly.  Start chlorthalidone 12.5 mg (half tablet) once daily.  Continue carvedilol 25 mg twice daily.  Stop calcitriol.  Stop sodium bicarbonate supplement.

## 2017-06-03 NOTE — NURSING NOTE
Chief Complaint   Patient presents with     RECHECK     Follow up kidney transplant.       Initial /79  Pulse 106  Temp 98.3  F (36.8  C) (Oral)  Ht 1.829 m (6')  Wt 119.7 kg (263 lb 12.8 oz)  SpO2 98%  BMI 35.78 kg/m2 Estimated body mass index is 35.78 kg/(m^2) as calculated from the following:    Height as of this encounter: 1.829 m (6').    Weight as of this encounter: 119.7 kg (263 lb 12.8 oz).  Medication Reconciliation: complete   Dona Hernandez., CMA

## 2017-06-05 LAB — DEPRECATED CALCIDIOL+CALCIFEROL SERPL-MC: 17 UG/L (ref 20–75)

## 2017-06-07 ENCOUNTER — CARE COORDINATION (OUTPATIENT)
Dept: NEPHROLOGY | Facility: CLINIC | Age: 62
End: 2017-06-07

## 2017-06-07 DIAGNOSIS — I15.1 HYPERTENSION SECONDARY TO OTHER RENAL DISORDERS (CODE): ICD-10-CM

## 2017-06-07 RX ORDER — MINOXIDIL 10 MG/1
10 TABLET ORAL 2 TIMES DAILY
Qty: 180 TABLET | Refills: 3 | Status: SHIPPED | OUTPATIENT
Start: 2017-06-07 | End: 2018-05-21

## 2017-06-07 NOTE — PROGRESS NOTES
Reason for Call    Spoke with patient. States BP is out of control following med changes at 6/3 appointment. On 6/5 it was 179/86 and yesterday was 167/82. This morning before meds was 193/90 with a pulse of 99. At 9:30 (an hour and a half after meds) it went up to 202/87 with pulse of 87. Said he felt lousy and went back to bed. Notes dizziness and a headache for a few days. Denied chest pain, shortness of breath, blurred vision, or change in weight. No change in edema.    Currently prescribed clonidine 0.3mg BID, nifedipine 30mg BID, minoxidil 5mg BID, losartan 50mg at night, coreg 25mg BID, and chlorthalidone 12.5mg daily.    Collaboration    Above discussed with Dr. Qureshi.  Orders received.    Let's increase minoxidil to 10 mg bid.       Plan    1. Increase minoxidil to 10mg BID  2. Call this nurse if systolic (top number) blood pressure is consistently <110 or >160 for further instructions.     Patient was given an opportunity to ask questions and have those questions answered to his satisfaction.  Patient verbalized understanding of instructions provided and agreed to plan of care.    Keli Gibson RN

## 2017-06-09 DIAGNOSIS — Z48.298 AFTERCARE FOLLOWING ORGAN TRANSPLANT: ICD-10-CM

## 2017-06-09 DIAGNOSIS — Z94.0 KIDNEY REPLACED BY TRANSPLANT: ICD-10-CM

## 2017-06-09 LAB
ANION GAP SERPL CALCULATED.3IONS-SCNC: 7 MMOL/L (ref 3–14)
BUN SERPL-MCNC: 27 MG/DL (ref 7–30)
CALCIUM SERPL-MCNC: 10 MG/DL (ref 8.5–10.1)
CHLORIDE SERPL-SCNC: 104 MMOL/L (ref 94–109)
CO2 SERPL-SCNC: 25 MMOL/L (ref 20–32)
CREAT SERPL-MCNC: 1.54 MG/DL (ref 0.66–1.25)
ERYTHROCYTE [DISTWIDTH] IN BLOOD BY AUTOMATED COUNT: 12.8 % (ref 10–15)
GFR SERPL CREATININE-BSD FRML MDRD: 46 ML/MIN/1.7M2
GLUCOSE SERPL-MCNC: 269 MG/DL (ref 70–99)
HCT VFR BLD AUTO: 46.8 % (ref 40–53)
HGB BLD-MCNC: 15.8 G/DL (ref 13.3–17.7)
MCH RBC QN AUTO: 28.2 PG (ref 26.5–33)
MCHC RBC AUTO-ENTMCNC: 33.8 G/DL (ref 31.5–36.5)
MCV RBC AUTO: 84 FL (ref 78–100)
PLATELET # BLD AUTO: 173 10E9/L (ref 150–450)
POTASSIUM SERPL-SCNC: 4.5 MMOL/L (ref 3.4–5.3)
RBC # BLD AUTO: 5.6 10E12/L (ref 4.4–5.9)
SODIUM SERPL-SCNC: 136 MMOL/L (ref 133–144)
TACROLIMUS BLD-MCNC: 5.7 UG/L (ref 5–15)
TME LAST DOSE: NORMAL H
WBC # BLD AUTO: 5.8 10E9/L (ref 4–11)

## 2017-06-09 PROCEDURE — 80197 ASSAY OF TACROLIMUS: CPT | Performed by: TRANSPLANT SURGERY

## 2017-06-09 PROCEDURE — 36415 COLL VENOUS BLD VENIPUNCTURE: CPT | Performed by: INTERNAL MEDICINE

## 2017-06-09 PROCEDURE — 80048 BASIC METABOLIC PNL TOTAL CA: CPT | Performed by: TRANSPLANT SURGERY

## 2017-06-09 PROCEDURE — 85027 COMPLETE CBC AUTOMATED: CPT | Performed by: INTERNAL MEDICINE

## 2017-06-16 DIAGNOSIS — Z48.298 AFTERCARE FOLLOWING ORGAN TRANSPLANT: ICD-10-CM

## 2017-06-16 DIAGNOSIS — Z94.0 KIDNEY REPLACED BY TRANSPLANT: ICD-10-CM

## 2017-06-16 LAB
ANION GAP SERPL CALCULATED.3IONS-SCNC: 10 MMOL/L (ref 3–14)
BUN SERPL-MCNC: 29 MG/DL (ref 7–30)
CALCIUM SERPL-MCNC: 10.1 MG/DL (ref 8.5–10.1)
CHLORIDE SERPL-SCNC: 101 MMOL/L (ref 94–109)
CO2 SERPL-SCNC: 27 MMOL/L (ref 20–32)
CREAT SERPL-MCNC: 1.62 MG/DL (ref 0.66–1.25)
ERYTHROCYTE [DISTWIDTH] IN BLOOD BY AUTOMATED COUNT: 13 % (ref 10–15)
GFR SERPL CREATININE-BSD FRML MDRD: 43 ML/MIN/1.7M2
GLUCOSE SERPL-MCNC: 162 MG/DL (ref 70–99)
HCT VFR BLD AUTO: 44.9 % (ref 40–53)
HGB BLD-MCNC: 15.2 G/DL (ref 13.3–17.7)
MCH RBC QN AUTO: 28.2 PG (ref 26.5–33)
MCHC RBC AUTO-ENTMCNC: 33.9 G/DL (ref 31.5–36.5)
MCV RBC AUTO: 83 FL (ref 78–100)
PLATELET # BLD AUTO: 212 10E9/L (ref 150–450)
POTASSIUM SERPL-SCNC: 4.1 MMOL/L (ref 3.4–5.3)
RBC # BLD AUTO: 5.39 10E12/L (ref 4.4–5.9)
SODIUM SERPL-SCNC: 138 MMOL/L (ref 133–144)
TACROLIMUS BLD-MCNC: 6.1 UG/L (ref 5–15)
TME LAST DOSE: NORMAL H
WBC # BLD AUTO: 4.6 10E9/L (ref 4–11)

## 2017-06-16 PROCEDURE — 80048 BASIC METABOLIC PNL TOTAL CA: CPT | Performed by: TRANSPLANT SURGERY

## 2017-06-16 PROCEDURE — 36415 COLL VENOUS BLD VENIPUNCTURE: CPT | Performed by: TRANSPLANT SURGERY

## 2017-06-16 PROCEDURE — 85027 COMPLETE CBC AUTOMATED: CPT | Performed by: INTERNAL MEDICINE

## 2017-06-16 PROCEDURE — 80197 ASSAY OF TACROLIMUS: CPT | Performed by: TRANSPLANT SURGERY

## 2017-06-19 ENCOUNTER — CARE COORDINATION (OUTPATIENT)
Dept: NEPHROLOGY | Facility: CLINIC | Age: 62
End: 2017-06-19

## 2017-06-19 NOTE — PROGRESS NOTES
Reason for Call    [facility] BP meds around. He increased the clonidine to 0.6mg on Friday. Currently taking minoxidil 10mg BID, clonodine 0.6mg BID, coreg 25mg BID, and nifedipine 60mg BID. He stopped the losartan and chlorthalidone. He would like to to stop coreg and nifedipine and replace them with metoprolol 50mg BID. BP has been averaging 140-150's / 60's. Has not written down his heart rate. Denied headaches, dizziness, chest pain, or shortness of breath.    Collaboration    Above discussed with Dr. Qureshi.  Orders received.    Okay to start metoprolol 50mg BID. Could try clonidine 0.3mg TID. Needs to schedule a 24 hour ambulatory BP monitor or follow up with PCP for hypertension management.    Plan    1. Start metoprolol 50mg BID  2. Schedule 24 hour BP monitor or follow up with PCP    Patient was given an opportunity to ask questions and have those questions answered to his satisfaction.  Patient verbalized understanding of instructions provided and agreed to plan of care.    Keli Gibson RN

## 2017-06-21 DIAGNOSIS — I15.1 HYPERTENSION SECONDARY TO OTHER RENAL DISORDERS (CODE): ICD-10-CM

## 2017-06-21 RX ORDER — CLONIDINE HYDROCHLORIDE 0.3 MG/1
0.3 TABLET ORAL EVERY MORNING
Qty: 30 TABLET | Refills: 3 | Status: SHIPPED | OUTPATIENT
Start: 2017-06-21 | End: 2017-12-12

## 2017-06-21 RX ORDER — CLONIDINE HYDROCHLORIDE 0.3 MG/1
0.6 TABLET ORAL EVERY EVENING
Qty: 60 TABLET | Refills: 3 | Status: SHIPPED | OUTPATIENT
Start: 2017-06-21 | End: 2018-06-19 | Stop reason: DRUGHIGH

## 2017-06-30 DIAGNOSIS — Z94.0 KIDNEY REPLACED BY TRANSPLANT: ICD-10-CM

## 2017-06-30 DIAGNOSIS — Z48.298 AFTERCARE FOLLOWING ORGAN TRANSPLANT: ICD-10-CM

## 2017-06-30 LAB
ANION GAP SERPL CALCULATED.3IONS-SCNC: 7 MMOL/L (ref 3–14)
BUN SERPL-MCNC: 25 MG/DL (ref 7–30)
CALCIUM SERPL-MCNC: 10 MG/DL (ref 8.5–10.1)
CHLORIDE SERPL-SCNC: 107 MMOL/L (ref 94–109)
CO2 SERPL-SCNC: 26 MMOL/L (ref 20–32)
CREAT SERPL-MCNC: 1.8 MG/DL (ref 0.66–1.25)
ERYTHROCYTE [DISTWIDTH] IN BLOOD BY AUTOMATED COUNT: 13.4 % (ref 10–15)
GFR SERPL CREATININE-BSD FRML MDRD: 38 ML/MIN/1.7M2
GLUCOSE SERPL-MCNC: 192 MG/DL (ref 70–99)
HCT VFR BLD AUTO: 46 % (ref 40–53)
HGB BLD-MCNC: 15.4 G/DL (ref 13.3–17.7)
MCH RBC QN AUTO: 28.4 PG (ref 26.5–33)
MCHC RBC AUTO-ENTMCNC: 33.5 G/DL (ref 31.5–36.5)
MCV RBC AUTO: 85 FL (ref 78–100)
PLATELET # BLD AUTO: 213 10E9/L (ref 150–450)
POTASSIUM SERPL-SCNC: 4.5 MMOL/L (ref 3.4–5.3)
RBC # BLD AUTO: 5.43 10E12/L (ref 4.4–5.9)
SODIUM SERPL-SCNC: 140 MMOL/L (ref 133–144)
TACROLIMUS BLD-MCNC: 7.8 UG/L (ref 5–15)
TME LAST DOSE: NORMAL H
WBC # BLD AUTO: 5.2 10E9/L (ref 4–11)

## 2017-06-30 PROCEDURE — 85027 COMPLETE CBC AUTOMATED: CPT | Performed by: TRANSPLANT SURGERY

## 2017-06-30 PROCEDURE — 36415 COLL VENOUS BLD VENIPUNCTURE: CPT | Performed by: TRANSPLANT SURGERY

## 2017-06-30 PROCEDURE — 80197 ASSAY OF TACROLIMUS: CPT | Performed by: TRANSPLANT SURGERY

## 2017-06-30 PROCEDURE — 80048 BASIC METABOLIC PNL TOTAL CA: CPT | Performed by: TRANSPLANT SURGERY

## 2017-07-14 DIAGNOSIS — Z94.0 KIDNEY REPLACED BY TRANSPLANT: ICD-10-CM

## 2017-07-14 DIAGNOSIS — Z48.298 AFTERCARE FOLLOWING ORGAN TRANSPLANT: ICD-10-CM

## 2017-07-14 LAB
TACROLIMUS BLD-MCNC: 6.4 UG/L (ref 5–15)
TME LAST DOSE: NORMAL H

## 2017-07-14 PROCEDURE — 80197 ASSAY OF TACROLIMUS: CPT | Performed by: TRANSPLANT SURGERY

## 2017-07-14 PROCEDURE — 36415 COLL VENOUS BLD VENIPUNCTURE: CPT | Performed by: INTERNAL MEDICINE

## 2017-07-17 DIAGNOSIS — D84.9 IMMUNOSUPPRESSED STATUS (H): ICD-10-CM

## 2017-07-17 RX ORDER — CARVEDILOL 25 MG/1
TABLET ORAL
Qty: 60 TABLET | Refills: 11 | Status: SHIPPED | OUTPATIENT
Start: 2017-07-17

## 2017-07-17 NOTE — TELEPHONE ENCOUNTER
Last Office Visit with Nephrologist:  6/3/17.  Medication refilled per Nephrology Clinic protocol.     Keli Gibson RN

## 2017-07-28 DIAGNOSIS — Z48.298 AFTERCARE FOLLOWING ORGAN TRANSPLANT: ICD-10-CM

## 2017-07-28 DIAGNOSIS — Z94.0 KIDNEY REPLACED BY TRANSPLANT: ICD-10-CM

## 2017-07-28 LAB
ANION GAP SERPL CALCULATED.3IONS-SCNC: 6 MMOL/L (ref 3–14)
BUN SERPL-MCNC: 24 MG/DL (ref 7–30)
CALCIUM SERPL-MCNC: 10 MG/DL (ref 8.5–10.1)
CHLORIDE SERPL-SCNC: 107 MMOL/L (ref 94–109)
CO2 SERPL-SCNC: 23 MMOL/L (ref 20–32)
CREAT SERPL-MCNC: 1.6 MG/DL (ref 0.66–1.25)
ERYTHROCYTE [DISTWIDTH] IN BLOOD BY AUTOMATED COUNT: 13.4 % (ref 10–15)
GFR SERPL CREATININE-BSD FRML MDRD: 44 ML/MIN/1.7M2
GLUCOSE SERPL-MCNC: 168 MG/DL (ref 70–99)
HCT VFR BLD AUTO: 45.1 % (ref 40–53)
HGB BLD-MCNC: 15.2 G/DL (ref 13.3–17.7)
MCH RBC QN AUTO: 28.4 PG (ref 26.5–33)
MCHC RBC AUTO-ENTMCNC: 33.7 G/DL (ref 31.5–36.5)
MCV RBC AUTO: 84 FL (ref 78–100)
PLATELET # BLD AUTO: 194 10E9/L (ref 150–450)
POTASSIUM SERPL-SCNC: 4.1 MMOL/L (ref 3.4–5.3)
RBC # BLD AUTO: 5.35 10E12/L (ref 4.4–5.9)
SODIUM SERPL-SCNC: 136 MMOL/L (ref 133–144)
TACROLIMUS BLD-MCNC: 6.9 UG/L (ref 5–15)
TME LAST DOSE: NORMAL H
WBC # BLD AUTO: 9.2 10E9/L (ref 4–11)

## 2017-07-28 PROCEDURE — 80197 ASSAY OF TACROLIMUS: CPT | Performed by: TRANSPLANT SURGERY

## 2017-07-28 PROCEDURE — 80048 BASIC METABOLIC PNL TOTAL CA: CPT | Performed by: TRANSPLANT SURGERY

## 2017-07-28 PROCEDURE — 36415 COLL VENOUS BLD VENIPUNCTURE: CPT | Performed by: INTERNAL MEDICINE

## 2017-07-28 PROCEDURE — 85027 COMPLETE CBC AUTOMATED: CPT | Performed by: INTERNAL MEDICINE

## 2017-08-18 ENCOUNTER — RESULTS ONLY (OUTPATIENT)
Dept: OTHER | Facility: CLINIC | Age: 62
End: 2017-08-18

## 2017-08-18 DIAGNOSIS — Z48.298 AFTERCARE FOLLOWING ORGAN TRANSPLANT: ICD-10-CM

## 2017-08-18 DIAGNOSIS — Z94.0 KIDNEY REPLACED BY TRANSPLANT: ICD-10-CM

## 2017-08-18 LAB
ANION GAP SERPL CALCULATED.3IONS-SCNC: 5 MMOL/L (ref 3–14)
BUN SERPL-MCNC: 21 MG/DL (ref 7–30)
CALCIUM SERPL-MCNC: 10 MG/DL (ref 8.5–10.1)
CHLORIDE SERPL-SCNC: 104 MMOL/L (ref 94–109)
CO2 SERPL-SCNC: 27 MMOL/L (ref 20–32)
CREAT SERPL-MCNC: 1.71 MG/DL (ref 0.66–1.25)
ERYTHROCYTE [DISTWIDTH] IN BLOOD BY AUTOMATED COUNT: 13.3 % (ref 10–15)
GFR SERPL CREATININE-BSD FRML MDRD: 41 ML/MIN/1.7M2
GLUCOSE SERPL-MCNC: 213 MG/DL (ref 70–99)
HCT VFR BLD AUTO: 49.6 % (ref 40–53)
HGB BLD-MCNC: 16.8 G/DL (ref 13.3–17.7)
MCH RBC QN AUTO: 28.1 PG (ref 26.5–33)
MCHC RBC AUTO-ENTMCNC: 33.9 G/DL (ref 31.5–36.5)
MCV RBC AUTO: 83 FL (ref 78–100)
PLATELET # BLD AUTO: 191 10E9/L (ref 150–450)
POTASSIUM SERPL-SCNC: 4.4 MMOL/L (ref 3.4–5.3)
RBC # BLD AUTO: 5.98 10E12/L (ref 4.4–5.9)
SODIUM SERPL-SCNC: 136 MMOL/L (ref 133–144)
TACROLIMUS BLD-MCNC: 8.1 UG/L (ref 5–15)
TME LAST DOSE: NORMAL H
WBC # BLD AUTO: 5.4 10E9/L (ref 4–11)

## 2017-08-18 PROCEDURE — 36415 COLL VENOUS BLD VENIPUNCTURE: CPT | Performed by: TRANSPLANT SURGERY

## 2017-08-18 PROCEDURE — 86833 HLA CLASS II HIGH DEFIN QUAL: CPT | Performed by: TRANSPLANT SURGERY

## 2017-08-18 PROCEDURE — 87799 DETECT AGENT NOS DNA QUANT: CPT | Performed by: TRANSPLANT SURGERY

## 2017-08-18 PROCEDURE — 85027 COMPLETE CBC AUTOMATED: CPT | Performed by: INTERNAL MEDICINE

## 2017-08-18 PROCEDURE — 86832 HLA CLASS I HIGH DEFIN QUAL: CPT | Performed by: TRANSPLANT SURGERY

## 2017-08-18 PROCEDURE — 80197 ASSAY OF TACROLIMUS: CPT | Performed by: TRANSPLANT SURGERY

## 2017-08-18 PROCEDURE — 80048 BASIC METABOLIC PNL TOTAL CA: CPT | Performed by: TRANSPLANT SURGERY

## 2017-08-21 LAB
BKV DNA # SPEC NAA+PROBE: NORMAL COPIES/ML
BKV DNA SPEC NAA+PROBE-LOG#: NORMAL LOG COPIES/ML
PRA DONOR SPECIFIC ABY: NORMAL
SPECIMEN SOURCE: NORMAL

## 2017-08-29 ASSESSMENT — ENCOUNTER SYMPTOMS
BOWEL INCONTINENCE: 0
NAUSEA: 0
BLOATING: 1
CONSTIPATION: 1
BLOOD IN STOOL: 0
RECTAL BLEEDING: 0
RECTAL PAIN: 0
HEARTBURN: 0
ABDOMINAL PAIN: 0
BRUISES/BLEEDS EASILY: 0
DIARRHEA: 0
VOMITING: 0
JAUNDICE: 0
SWOLLEN GLANDS: 0

## 2017-09-07 DIAGNOSIS — E55.9 VITAMIN D DEFICIENCY: ICD-10-CM

## 2017-09-08 RX ORDER — ERGOCALCIFEROL 1.25 MG/1
CAPSULE, LIQUID FILLED ORAL
Qty: 26 CAPSULE | Refills: 0 | OUTPATIENT
Start: 2017-09-08

## 2017-09-08 NOTE — TELEPHONE ENCOUNTER
Per Dr. Qureshi, patient to switch to cholecalciferol 2000 units daily. New rx sent.    Keli Gibson RN

## 2017-09-12 ENCOUNTER — RESULTS ONLY (OUTPATIENT)
Dept: OTHER | Facility: CLINIC | Age: 62
End: 2017-09-12

## 2017-09-12 ENCOUNTER — OFFICE VISIT (OUTPATIENT)
Dept: NEPHROLOGY | Facility: CLINIC | Age: 62
End: 2017-09-12
Attending: INTERNAL MEDICINE
Payer: MEDICARE

## 2017-09-12 VITALS — OXYGEN SATURATION: 94 % | WEIGHT: 271.8 LBS | HEIGHT: 72 IN | BODY MASS INDEX: 36.82 KG/M2

## 2017-09-12 DIAGNOSIS — Z94.0 KIDNEY REPLACED BY TRANSPLANT: ICD-10-CM

## 2017-09-12 DIAGNOSIS — N18.30 CKD (CHRONIC KIDNEY DISEASE) STAGE 3, GFR 30-59 ML/MIN (H): ICD-10-CM

## 2017-09-12 DIAGNOSIS — Z94.0 STATUS POST KIDNEY TRANSPLANT: Primary | ICD-10-CM

## 2017-09-12 DIAGNOSIS — E87.70 HYPERVOLEMIA, UNSPECIFIED HYPERVOLEMIA TYPE: ICD-10-CM

## 2017-09-12 DIAGNOSIS — E55.9 HYPOVITAMINOSIS D: ICD-10-CM

## 2017-09-12 DIAGNOSIS — Z29.89 NEED FOR PNEUMOCYSTIS PROPHYLAXIS: ICD-10-CM

## 2017-09-12 DIAGNOSIS — I10 BENIGN ESSENTIAL HYPERTENSION: ICD-10-CM

## 2017-09-12 DIAGNOSIS — Z48.298 AFTERCARE FOLLOWING ORGAN TRANSPLANT: ICD-10-CM

## 2017-09-12 DIAGNOSIS — D84.9 IMMUNOSUPPRESSION (H): ICD-10-CM

## 2017-09-12 LAB
ALBUMIN SERPL-MCNC: 3.5 G/DL (ref 3.4–5)
ALP SERPL-CCNC: 100 U/L (ref 40–150)
ALT SERPL W P-5'-P-CCNC: 30 U/L (ref 0–70)
ANION GAP SERPL CALCULATED.3IONS-SCNC: 6 MMOL/L (ref 3–14)
AST SERPL W P-5'-P-CCNC: 17 U/L (ref 0–45)
BILIRUB DIRECT SERPL-MCNC: 0.1 MG/DL (ref 0–0.2)
BILIRUB SERPL-MCNC: 0.6 MG/DL (ref 0.2–1.3)
BUN SERPL-MCNC: 21 MG/DL (ref 7–30)
CALCIUM SERPL-MCNC: 9.8 MG/DL (ref 8.5–10.1)
CHLORIDE SERPL-SCNC: 106 MMOL/L (ref 94–109)
CHOLEST SERPL-MCNC: 97 MG/DL
CO2 SERPL-SCNC: 24 MMOL/L (ref 20–32)
CREAT SERPL-MCNC: 1.35 MG/DL (ref 0.66–1.25)
ERYTHROCYTE [DISTWIDTH] IN BLOOD BY AUTOMATED COUNT: 13.2 % (ref 10–15)
GFR SERPL CREATININE-BSD FRML MDRD: 54 ML/MIN/1.7M2
GLUCOSE SERPL-MCNC: 102 MG/DL (ref 70–99)
HCT VFR BLD AUTO: 48.3 % (ref 40–53)
HDLC SERPL-MCNC: 40 MG/DL
HGB BLD-MCNC: 15.7 G/DL (ref 13.3–17.7)
LDLC SERPL CALC-MCNC: 32 MG/DL
MCH RBC QN AUTO: 27.2 PG (ref 26.5–33)
MCHC RBC AUTO-ENTMCNC: 32.5 G/DL (ref 31.5–36.5)
MCV RBC AUTO: 84 FL (ref 78–100)
NONHDLC SERPL-MCNC: 58 MG/DL
PLATELET # BLD AUTO: 210 10E9/L (ref 150–450)
POTASSIUM SERPL-SCNC: 4.2 MMOL/L (ref 3.4–5.3)
PROT SERPL-MCNC: 7.7 G/DL (ref 6.8–8.8)
RBC # BLD AUTO: 5.78 10E12/L (ref 4.4–5.9)
SODIUM SERPL-SCNC: 137 MMOL/L (ref 133–144)
TRIGL SERPL-MCNC: 126 MG/DL
WBC # BLD AUTO: 5.5 10E9/L (ref 4–11)

## 2017-09-12 PROCEDURE — 86832 HLA CLASS I HIGH DEFIN QUAL: CPT | Performed by: TRANSPLANT SURGERY

## 2017-09-12 PROCEDURE — 99212 OFFICE O/P EST SF 10 MIN: CPT | Mod: ZF

## 2017-09-12 PROCEDURE — 80076 HEPATIC FUNCTION PANEL: CPT | Performed by: TRANSPLANT SURGERY

## 2017-09-12 PROCEDURE — 85027 COMPLETE CBC AUTOMATED: CPT | Performed by: TRANSPLANT SURGERY

## 2017-09-12 PROCEDURE — 36415 COLL VENOUS BLD VENIPUNCTURE: CPT | Performed by: TRANSPLANT SURGERY

## 2017-09-12 PROCEDURE — 80061 LIPID PANEL: CPT | Performed by: TRANSPLANT SURGERY

## 2017-09-12 PROCEDURE — 80048 BASIC METABOLIC PNL TOTAL CA: CPT | Performed by: TRANSPLANT SURGERY

## 2017-09-12 PROCEDURE — 86833 HLA CLASS II HIGH DEFIN QUAL: CPT | Performed by: TRANSPLANT SURGERY

## 2017-09-12 RX ORDER — NAPROXEN SODIUM 220 MG
TABLET ORAL
COMMUNITY
Start: 2017-06-13

## 2017-09-12 RX ORDER — INFLUENZA A VIRUS A/GUANGDONG-MAONAN/SWL1536/2019 CNIC-1909 (H1N1) ANTIGEN (FORMALDEHYDE INACTIVATED), INFLUENZA A VIRUS A/HONG KONG/2671/2019 (H3N2) ANTIGEN (FORMALDEHYDE INACTIVATED), INFLUENZA B VIRUS B/PHUKET/3073/2013 ANTIGEN (FORMALDEHYDE INACTIVATED), AND INFLUENZA B VIRUS B/WASHINGTON/02/2019 ANTIGEN (FORMALDEHYDE INACTIVATED) 15; 15; 15; 15 UG/.5ML; UG/.5ML; UG/.5ML; UG/.5ML
INJECTION, SUSPENSION INTRAMUSCULAR
Refills: 0 | COMMUNITY
Start: 2017-08-17 | End: 2019-06-14

## 2017-09-12 ASSESSMENT — PAIN SCALES - GENERAL: PAINLEVEL: NO PAIN (0)

## 2017-09-12 NOTE — MR AVS SNAPSHOT
After Visit Summary   9/12/2017    Corey Roland    MRN: [id number]           Patient Information     Date Of Birth          1955        Visit Information        Provider Department      9/12/2017 1:05 PM Jair Hernández MD [facility] Nephrology        Today's Diagnoses     Status post kidney transplant    -  1    Immunosuppression (H)        CKD (chronic kidney disease) stage 3, GFR 30-59 ml/min        Benign essential hypertension        Hypervolemia, unspecified hypervolemia type        Need for pneumocystis prophylaxis        Hypovitaminosis D          Care Instructions    Continue with your labs as you are doing.    Your bp is at goal with your current regimen. We will continue to follow. If consistently > 140 / 90, contact PCP for adjustment.     Labs as you are doing monthly.    Follow up at 12 months post transplant.     Flu shot in the fall.     Vitamin D supplement should now be 2000 units daily.           Follow-ups after your visit        Follow-up notes from your care team     Return in about 3 months (around 12/12/2017).      Your next 10 appointments already scheduled     Sep 22, 2017  7:30 AM CDT   LAB with  LAB   [facility]    [address] [place] [id number] [phone]           Patient must bring picture ID. Patient should be prepared to give a urine specimen  Please do not eat 10-12 hours before your appointment if you are coming in fasting for labs on lipids, cholesterol, or glucose (sugar). Pregnant women should follow their Care Team instructions. Water with medications is okay. Do not drink coffee or other fluids. If you have concerns about taking  your medications, please ask at office or if scheduling via myQaat, send a message by clicking on Secure Messaging, Message Your Care Team.            Dec 12, 2017 12:00 PM CST   Lab with UC LAB   [facility]  [facility])    [address]  1st Floor  [facility] [id number] [phone]            Dec 12, 2017  1:05 PM CST   (Arrive by 12:35 PM)   Return Kidney Transplant with Jair Hernández MD   [facility] ([facility])    [address]  3rd Floor  [facility] [id number] [phone]              Who to contact     If you have questions or need follow up information about today's clinic visit or your schedule please contact [facility] NE[facility] directly at [phone].  Normal or non-critical lab and imaging results will be communicated to you by [employer], letter or phone within 4 business days after the clinic has received the results. If you do not hear from us within 7 days, please contact the clinic through MakeLeap[employer] or phone. If you have a critical or abnormal lab result, we will notify you by phone as soon as possible.  Submit refill requests through [employer] or call your pharmacy and they will forward the refill request to us. Please allow 3 business days for your refill to be completed.          Additional Information About Your Visit        [employer] Information     [employer] gives you secure access to your electronic health record. If you see a primary care provider, you can also send messages to your care team and make appointments. If you have questions, please call your primary care clinic.  If you do not have a primary care provider, please call [phone] and they will assist you.        Care EveryWhere ID     This is your Care EveryWhere ID. This could be used by other organizations to access your [facility] medical records  [id number]        Your Vitals Were     Height Pulse Oximetry BMI (Body Mass Index)             1.829 m (6') 94% 36.86 kg/m2          Blood Pressure from Last 3 Encounters:   06/03/17 178/79   03/02/17 122/71   01/05/17 166/82    Weight from Last 3 Encounters:   09/12/17 123.3 kg (271 lb 12.8 oz)   06/03/17 119.7 kg (263 lb  12.8 oz)   03/02/17 114 kg (251 lb 6.4 oz)              Today, you had the following     No orders found for display         Today's Medication Changes          These changes are accurate as of: 9/12/17  1:35 PM.  If you have any questions, ask your nurse or doctor.               These medicines have changed or have updated prescriptions.        Dose/Directions    * cloNIDine 0.3 MG tablet   Commonly known as:  CATAPRES   This may have changed:  when to take this   Used for:  Hypertension secondary to other renal disorders (CODE)   Changed by:  Italia Javed RN        Dose:  0.3 mg   Take 1 tablet (0.3 mg) by mouth every morning   Quantity:  30 tablet   Refills:  3       * cloNIDine 0.3 MG tablet   Commonly known as:  CATAPRES   This may have changed:  Another medication with the same name was changed. Make sure you understand how and when to take each.   Used for:  Hypertension secondary to other renal disorders (CODE)   Changed by:  Italia Javed RN        Dose:  0.6 mg   Take 2 tablets (0.6 mg) by mouth every evening   Quantity:  60 tablet   Refills:  3       tacrolimus 1 MG capsule   Commonly known as:  GENERIC EQUIVALENT   This may have changed:  when to take this   Used for:  Kidney transplant recipient        Dose:  4 mg   Take 4 capsules (4 mg) by mouth every 12 hours   Quantity:  240 capsule   Refills:  11       * Notice:  This list has 2 medication(s) that are the same as other medications prescribed for you. Read the directions carefully, and ask your doctor or other care provider to review them with you.             Primary Care Provider Office Phone # Fax #    Dustin Tadeo [phone]       [address] [address]        Equal Access to Services     [facility] : Ferddie Manriquez, rosalinda campos, gonzález hendrix. So [facility] [phone].    ATENCIÓN: Si  gerhard hussein, tiene a sanchez disposición servicios gratuitos de asistencia lingüística. Yakov mistry [phone].    We comply with applicable federal civil rights laws and [place] laws. We do not discriminate on the basis of race, color, national origin, age, disability sex, sexual orientation or gender identity.            Thank you!     Thank you for choosing [facility] NEPHROLOGY  for your care. Our goal is always to provide you with excellent care. Hearing back from our patients is one way we can continue to improve our services. Please take a few minutes to complete the written survey that you may receive in the mail after your visit with us. Thank you!             Your Updated Medication List - Protect others around you: Learn how to safely use, store and throw away your medicines at www.disposemymeds.org.          This list is accurate as of: 9/12/17  1:35 PM.  Always use your most recent med list.                   Brand Name Dispense Instructions for use Diagnosis    ACCU-CHEK EBONIE PLUS test strip   Generic drug:  blood glucose monitoring           aspirin 81 MG EC tablet     30 tablet    Take 1 tablet (81 mg) by mouth daily    Kidney transplant recipient       atorvastatin 20 MG tablet    LIPITOR     Take 1 tablet (20 mg) by mouth daily    Kidney transplant recipient       carvedilol 25 MG tablet    COREG    60 tablet    TAKE ONE TABLET BY MOUTH TWICE DAILY WITH MEALS    Immunosuppressed status (H)       chlorthalidone 25 MG tablet    HYGROTON    45 tablet    Take 0.5 tablets (12.5 mg) by mouth daily    Hypertension secondary to other renal disorders (CODE)       cholecalciferol 1000 UNITS capsule    vitamin  -D    180 capsule    Take 2 capsules (2,000 Units) by mouth daily    Vitamin D deficiency       * cloNIDine 0.3 MG tablet    CATAPRES    30 tablet    Take 1 tablet (0.3 mg) by mouth every morning    Hypertension secondary to other renal disorders (CODE)       * cloNIDine 0.3 MG tablet    CATAPRES    60  tablet    Take 2 tablets (0.6 mg) by mouth every evening    Hypertension secondary to other renal disorders (CODE)       DIPHENHYDRAMINE HCL PO      Take 50 mg by mouth At Bedtime        FLUZONE QUADRIVALENT injection   Generic drug:  influenza quadrivalent (w/PRESERVATIVE) vacc age 3 yrs and older           FUROSEMIDE PO      Take 20 mg by mouth 2 times daily        * insulin aspart 100 UNIT/ML injection    NovoLOG PEN    1 mL    Inject 1 Units Subcutaneous Take with snacks or supplements for high blood sugar 1 unit per 4 grams carbohydrate    Kidney transplant recipient       * insulin aspart 100 UNIT/ML injection    NovoLOG PEN     Inject 1 Units Subcutaneous 3 times daily (with meals) DOSE:  1 units per 4 grams of carbohydrate.  Only chart total amount of units given.  Do not give if pre-prandial glucose is less than 60 mg/dL.    Kidney transplant recipient       * insulin aspart 100 UNIT/ML injection    NovoLOG PEN     Inject 1-22 Units Subcutaneous 3 times daily (before meals) Correction Scale - VERY HIGH INSULIN RESISTANCE DOSING    Do Not give Correction Insulin if Pre-Meal BG less than 250.  For Pre-Meal  - 260 give 1 unit.  For Pre-Meal -269  give 2 units.  For Pre-Meal  - 279 give 3 units.  For Pre-Meal  - 289 give 4 units.  For Pre-Meal  - 299 give 5 units.  For Pre-Meal  - 309 give 6 units.  ...    Kidney transplant recipient       * insulin aspart 100 UNIT/ML injection    NovoLOG PEN     Inject 1-16 Units Subcutaneous At Bedtime Correction Scale - VERY HIGH INSULIN RESISTANCE DOSING    Do Not give Bedtime Correction Insulin if BG less than 200.  For  - 209 give 1 units.  For  - 219 give 2 units.  For  - 229 give 3 units.  For  - 239 give 4 units.  For  - 249 give 5 units.  For  - 259 give 6 units.  For  - 269 give 7 units.  For  - 279 give 8 units ...    Kidney transplant recipient       * insulin glargine 100 UNIT/ML  "injection    LANTUS     Inject 30 Units Subcutaneous    Kidney transplant recipient       * insulin glargine 100 UNIT/ML injection    LANTUS     Inject 40 Units Subcutaneous At Bedtime    Kidney transplant recipient       insulin syringe 31G X 5/16\" 0.5 ML Misc      FOR ADMINISTERING INSULIN AT HOME 5 TIMES PER DAY.        losartan 50 MG tablet    COZAAR    30 tablet    Take 1 tablet (50 mg) by mouth daily    Hypertension secondary to other renal disorders (CODE)       magnesium oxide 400 (241.3 MG) MG tablet    MAG-OX    90 tablet    Take 1 tablet (400 mg) by mouth daily    Hypomagnesemia       minoxidil 10 MG tablet    LONITEN    180 tablet    Take 1 tablet (10 mg) by mouth 2 times daily    Hypertension secondary to other renal disorders (CODE)       mycophenolate 250 MG capsule    GENERIC EQUIVALENT    180 capsule    Take 3 capsules (750 mg) by mouth 2 times daily    Kidney replaced by transplant       NIFEdipine ER 30 MG Tb24    ADALAT CC    60 tablet    Take 1 tablet (30 mg) by mouth 2 times daily    Hypertension secondary to other renal disorders (CODE)       omeprazole 20 MG tablet     90 tablet    Take 1 tablet (20 mg) by mouth every morning    Kidney transplant recipient       SODIUM BICARBONATE PO      Take 650 mg by mouth 2 times daily        sulfamethoxazole-trimethoprim 400-80 MG per tablet    BACTRIM/SEPTRA    30 tablet    Take 1 tablet by mouth three times a week    Kidney transplant recipient       tacrolimus 1 MG capsule    GENERIC EQUIVALENT    240 capsule    Take 4 capsules (4 mg) by mouth every 12 hours    Kidney transplant recipient       vitamin D 13048 UNIT capsule    ERGOCALCIFEROL    26 capsule    Take 1 capsule (50,000 Units) by mouth once a week    Vitamin D deficiency       * Notice:  This list has 8 medication(s) that are the same as other medications prescribed for you. Read the directions carefully, and ask your doctor or other care provider to review them with you.      "

## 2017-09-12 NOTE — PROGRESS NOTES
Assessment and Plan:  1. 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 He is currently on immunosuppression with:    Tac 4 mg po bid  Mmf 750 mg po bid.    DSA - negative 5/26.  BK - negative 8/18    UPC - due.    Creatinine is as low as it has been. Down to 1.35 mg / dl with eGFR of 54 ml / min.     Continue immunosuppression as below.     2. Immunosuppression:     Tac 4 mg po bid - last trough was 8.1 with goal 6-8 due to his kidney vintage. In December we will drop him down to 4-6.    mg po bid.     3. HTN:     Currently on:  Furosemide 20 mg po bid - goal to maintain weight. If INCREASING weight, we will double furosemide. If weight is DECREASING and bp is low, he will hold this and call.   Clonidine 0.3 mg po bid - this is likely contributing to the bp volativity.   Minoxidil 10 mg po bid  Coreg 25 mg po bid    Goal is < 140 / 90.     4. Volume overload: see above. 20 mg po bid furosemide seems appropriate (mild LE edema on exam.)    5. CMV immunotherapy: completed valcyte.     6. PCP prophylaxis: on bactrim ss tab daily.    7. Hypovitaminosis d: cholecalciferol 2000 units daily.     Assessment and plan was discussed with patient and he voiced his understanding and agreement.    Reason for Visit:  Mr. Roland is here for kidney transplant and transplant.    HPI:   Corey Roland is a 62 year old male with ESKD from Type 2 Diabetes and is status post DDKT on 11/24/2016.         Transplant Hx:       Tx: DDKT  Date: 11/24/16       Present Maintenance IS: Tacrolimus and Mycophenolate mofetil       Baseline Creatinine: 1.3 mg / dl       Recent DSA: No        Biopsy: No    Mr Roland is here for f/u of his DD kid transplant from 11/24/16. He has been working with his PCP to adjust anti-hypertensives. With current regimen bp is reasonably controlled with goal < 140 / 90.     He is on immunosuppression with tac, mmf at goal on last checks.     He is due for UPC. His DSA and BK are negative on last checsk.  "    He denies cp, sob, no n/v, + constipation - using fiber supplementation.     No f/s/c.     Edema controlled now with furosemide.    Home BP: 120-140 systolically.      ROS:   A comprehensive review of systems was obtained and negative, except as noted in the HPI or PMH.    Active Medical Problems:  Patient Active Problem List   Diagnosis     Hypertension secondary to other renal disorders     Secondary renal hyperparathyroidism (H)     Obesity     Diabetic peripheral neuropathy (H)     Kidney replaced by transplant     Immunosuppressed status (H)     Type 1 diabetes mellitus with nephropathy (H)     Aftercare following organ transplant     Vitamin D deficiency     Hypomagnesemia     Post-transplant erythrocytosis       Personal Hx:  Social History     Social History     Marital status: Single     Spouse name: N/A     Number of children: 0     Years of education: N/A     Occupational History     Not on file.     Social History Main Topics     Smoking status: Former Smoker     Packs/day: 2.00     Years: 38.00     Types: Cigarettes     Quit date: 4/1/2005     Smokeless tobacco: Not on file     Alcohol use 0.0 - 0.5 oz/week     0 - 1 Standard drinks or equivalent per week      Comment: Not since transplant     Drug use: No      Comment: past (1980's, marijuana, heroin IV, cocaine, hallucinogens, methamphetamine)     Sexual activity: No     Other Topics Concern     Not on file     Social History Narrative       Allergies:  No Known Allergies    Medications:  Prior to Admission medications    Medication Sig Start Date End Date Taking? Authorizing Provider   FLUZONE QUADRIVALENT injection  8/17/17  Yes Reported, Patient   insulin syringe 31G X 5/16\" 0.5 ML MISC FOR ADMINISTERING INSULIN AT HOME 5 TIMES PER DAY. 6/13/17  Yes Reported, Patient   SODIUM BICARBONATE PO Take 650 mg by mouth 2 times daily   Yes Reported, Patient   FUROSEMIDE PO Take 20 mg by mouth 2 times daily   Yes Reported, Patient   carvedilol (COREG) 25 " MG tablet TAKE ONE TABLET BY MOUTH TWICE DAILY WITH MEALS 7/17/17  Yes Abdiaziz Qureshi MD   cloNIDine (CATAPRES) 0.3 MG tablet Take 1 tablet (0.3 mg) by mouth every morning  Patient taking differently: Take 0.3 mg by mouth 2 times daily  6/21/17  Yes Abdiaziz Qureshi MD   cloNIDine (CATAPRES) 0.3 MG tablet Take 2 tablets (0.6 mg) by mouth every evening 6/21/17  Yes Abdiaziz Qureshi MD   minoxidil (LONITEN) 10 MG tablet Take 1 tablet (10 mg) by mouth 2 times daily 6/7/17  Yes Abdiaziz Qureshi MD   ACCU-CHEK EBONIE PLUS test strip  5/29/17  Yes Reported, Patient   NIFEdipine ER (ADALAT CC) 30 MG TB24 Take 1 tablet (30 mg) by mouth 2 times daily 6/3/17  Yes Abdiaziz Qureshi MD   DIPHENHYDRAMINE HCL PO Take 50 mg by mouth At Bedtime   Yes Reported, Patient   atorvastatin (LIPITOR) 20 MG tablet Take 1 tablet (20 mg) by mouth daily 5/31/17  Yes    insulin glargine (LANTUS) 100 UNIT/ML injection Inject 30 Units Subcutaneous 5/31/17  Yes    insulin glargine (LANTUS) 100 UNIT/ML injection Inject 40 Units Subcutaneous At Bedtime 5/31/17  Yes    tacrolimus (PROGRAF - GENERIC EQUIVALENT) 1 MG capsule Take 4 capsules (4 mg) by mouth every 12 hours  Patient taking differently: Take 4 mg by mouth 2 times daily  5/1/17  Yes Abdiaziz Qureshi MD   vitamin D (ERGOCALCIFEROL) 53318 UNIT capsule Take 1 capsule (50,000 Units) by mouth once a week 3/13/17  Yes Abdiaziz Qureshi MD   omeprazole 20 MG tablet Take 1 tablet (20 mg) by mouth every morning 3/6/17  Yes Abdiaziz Qureshi MD   mycophenolate (CELLCEPT - GENERIC EQUIVALENT) 250 MG capsule Take 3 capsules (750 mg) by mouth 2 times daily 12/13/16  Yes Erasmo Wilkins MD   aspirin EC 81 MG EC tablet Take 1 tablet (81 mg) by mouth daily 12/2/16  Yes Talisha Dunham PA-C   insulin aspart (NOVOLOG PEN) 100 UNIT/ML injection Inject 1 Units Subcutaneous Take with snacks or supplements for high blood sugar 1 unit per 4 grams carbohydrate  12/2/16  Yes Talisha Dunham PA-C   insulin aspart (NOVOLOG PEN) 100 UNIT/ML injection Inject 1 Units Subcutaneous 3 times daily (with meals) DOSE:  1 units per 4 grams of carbohydrate.  Only chart total amount of units given.  Do not give if pre-prandial glucose is less than 60 mg/dL. 12/2/16  Yes Talisha Dunham PA-C   insulin aspart (NOVOLOG PEN) 100 UNIT/ML injection Inject 1-22 Units Subcutaneous 3 times daily (before meals) Correction Scale - VERY HIGH INSULIN RESISTANCE DOSING     Do Not give Correction Insulin if Pre-Meal BG less than 250.   For Pre-Meal  - 260 give 1 unit.   For Pre-Meal -269  give 2 units.   For Pre-Meal  - 279 give 3 units.   For Pre-Meal  - 289 give 4 units.   For Pre-Meal  - 299 give 5 units.   For Pre-Meal  - 309 give 6 units.   ... 12/2/16  Yes Talisha Dunham PA-C   insulin aspart (NOVOLOG PEN) 100 UNIT/ML injection Inject 1-16 Units Subcutaneous At Bedtime Correction Scale - VERY HIGH INSULIN RESISTANCE DOSING     Do Not give Bedtime Correction Insulin if BG less than 200.   For  - 209 give 1 units.   For  - 219 give 2 units.   For  - 229 give 3 units.   For  - 239 give 4 units.   For  - 249 give 5 units.   For  - 259 give 6 units.   For  - 269 give 7 units.   For  - 279 give 8 units  ... 12/2/16  Yes Talisha Dunham PA-C   sulfamethoxazole-trimethoprim (BACTRIM/SEPTRA) 400-80 MG per tablet Take 1 tablet by mouth three times a week 12/2/16  Yes Talisha Dunham PA-C   cholecalciferol (VITAMIN  -D) 1000 UNITS capsule Take 2 capsules (2,000 Units) by mouth daily  Patient not taking: Reported on 9/12/2017 9/8/17   Abdiaziz Qureshi MD   losartan (COZAAR) 50 MG tablet Take 1 tablet (50 mg) by mouth daily  Patient not taking: Reported on 9/12/2017 6/3/17   Abdiaziz Qureshi MD   chlorthalidone (HYGROTON) 25 MG tablet Take 0.5 tablets (12.5 mg) by mouth daily  Patient not  taking: Reported on 9/12/2017 6/3/17   Abdiaziz Qureshi MD   magnesium oxide (MAG-OX) 400 (241.3 MG) MG tablet Take 1 tablet (400 mg) by mouth daily  Patient not taking: Reported on 9/12/2017 1/5/17   Abdiaziz Qureshi MD       Vitals:  Ht 1.829 m (6')  Wt 123.3 kg (271 lb 12.8 oz)  SpO2 94%  BMI 36.86 kg/m2    Exam:   GENERAL APPEARANCE: alert and no distress  HENT: mouth without ulcers or lesions  LYMPHATICS: no cervical or supraclavicular nodes  RESP: lungs clear to auscultation - no rales, rhonchi or wheezes  CV: regular rhythm, normal rate, no rub, no murmur  EDEMA: 1+ LE edema bilaterally  ABDOMEN: soft, nondistended, nontender, bowel sounds normal  MS: extremities normal - no gross deformities noted, no evidence of inflammation in joints, no muscle tenderness  SKIN: no rash    Results:   Reviewed with patient.    Answers for HPI/ROS submitted by the patient on 8/29/2017   General Symptoms: No  Skin Symptoms: No  HENT Symptoms: No  EYE SYMPTOMS: No  HEART SYMPTOMS: No  LUNG SYMPTOMS: No  INTESTINAL SYMPTOMS: Yes  URINARY SYMPTOMS: No  REPRODUCTIVE SYMPTOMS: No  SKELETAL SYMPTOMS: No  BLOOD SYMPTOMS: Yes  NERVOUS SYSTEM SYMPTOMS: No  MENTAL HEALTH SYMPTOMS: No  Heart burn or indigestion: No  Nausea: No  Vomiting: No  Abdominal pain: No  Bloating: Yes  Constipation: Yes  Diarrhea: No  Blood in stool: No  Black stools: No  Rectal or Anal pain: No  Fecal incontinence: No  Rectal bleeding: No  Yellowing of skin or eyes: No  Vomit with blood: No  Change in stools: No  Hemorrhoids: No  Anemia: No  Swollen glands: No  Easy bleeding or bruising: No  Edema or swelling: Yes

## 2017-09-12 NOTE — LETTER
9/12/2017      RE: Corey Roland  [address]  [facility] [id number]       Assessment and Plan:  1. 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 He is currently on immunosuppression with:    Tac 4 mg po bid  Mmf 750 mg po bid.    DSA - negative 5/26.  BK - negative 8/18    UPC - due.    Creatinine is as low as it has been. Down to 1.35 mg / dl with eGFR of 54 ml / min.     Continue immunosuppression as below.     2. Immunosuppression:     Tac 4 mg po bid - last trough was 8.1 with goal 6-8 due to his kidney vintage. In December we will drop him down to 4-6.    mg po bid.     3. HTN:     Currently on:  Furosemide 20 mg po bid - goal to maintain weight. If INCREASING weight, we will double furosemide. If weight is DECREASING and bp is low, he will hold this and call.   Clonidine 0.3 mg po bid - this is likely contributing to the bp volativity.   Minoxidil 10 mg po bid  Coreg 25 mg po bid    Goal is < 140 / 90.     4. Volume overload: see above. 20 mg po bid furosemide seems appropriate (mild LE edema on exam.)    5. CMV immunotherapy: completed valcyte.     6. PCP prophylaxis: on bactrim ss tab daily.    7. Hypovitaminosis d: cholecalciferol 2000 units daily.     Assessment and plan was discussed with patient and he voiced his understanding and agreement.    Reason for Visit:  Mr. Roland is here for kidney transplant and transplant.    HPI:   Corey Roland is a 62 year old male with ESKD from Type 2 Diabetes and is status post DDKT on 11/24/2016.         Transplant Hx:       Tx: DDKT  Date: 11/24/16       Present Maintenance IS: Tacrolimus and Mycophenolate mofetil       Baseline Creatinine: 1.3 mg / dl       Recent DSA: No        Biopsy: No    Mr Roland is here for f/u of his DD kid transplant from 11/24/16. He has been working with his PCP to adjust anti-hypertensives. With current regimen bp is reasonably controlled with goal < 140 / 90.     He is on immunosuppression with tac, mmf  "at goal on last checks.     He is due for UPC. His DSA and BK are negative on last checsk.     He denies cp, sob, no n/v, + constipation - using fiber supplementation.     No f/s/c.     Edema controlled now with furosemide.    Home BP: 120-140 systolically.      ROS:   A comprehensive review of systems was obtained and negative, except as noted in the HPI or PMH.    Active Medical Problems:  Patient Active Problem List   Diagnosis     Hypertension secondary to other renal disorders     Secondary renal hyperparathyroidism (H)     Obesity     Diabetic peripheral neuropathy (H)     Kidney replaced by transplant     Immunosuppressed status (H)     Type 1 diabetes mellitus with nephropathy (H)     Aftercare following organ transplant     Vitamin D deficiency     Hypomagnesemia     Post-transplant erythrocytosis       Personal Hx:  Social History     Social History     Marital status: Single     Spouse name: N/A     Number of children: 0     Years of education: N/A     Occupational History     Not on file.     Social History Main Topics     Smoking status: Former Smoker     Packs/day: 2.00     Years: 38.00     Types: Cigarettes     Quit date: 4/1/2005     Smokeless tobacco: Not on file     Alcohol use 0.0 - 0.5 oz/week     0 - 1 Standard drinks or equivalent per week      Comment: Not since transplant     Drug use: No      Comment: past (1980's, marijuana, heroin IV, cocaine, hallucinogens, methamphetamine)     Sexual activity: No     Other Topics Concern     Not on file     Social History Narrative       Allergies:  No Known Allergies    Medications:  Prior to Admission medications    Medication Sig Start Date End Date Taking? Authorizing Provider   FLUZONE QUADRIVALENT injection  8/17/17  Yes Reported, Patient   insulin syringe 31G X 5/16\" 0.5 ML MISC FOR ADMINISTERING INSULIN AT HOME 5 TIMES PER DAY. 6/13/17  Yes Reported, Patient   SODIUM BICARBONATE PO Take 650 mg by mouth 2 times daily   Yes Reported, Patient "   FUROSEMIDE PO Take 20 mg by mouth 2 times daily   Yes Reported, Patient   carvedilol (COREG) 25 MG tablet TAKE ONE TABLET BY MOUTH TWICE DAILY WITH MEALS 7/17/17  Yes Abdiaziz Qureshi MD   cloNIDine (CATAPRES) 0.3 MG tablet Take 1 tablet (0.3 mg) by mouth every morning  Patient taking differently: Take 0.3 mg by mouth 2 times daily  6/21/17  Yes Abdiaziz Qureshi MD   cloNIDine (CATAPRES) 0.3 MG tablet Take 2 tablets (0.6 mg) by mouth every evening 6/21/17  Yes Abdiaziz Qureshi MD   minoxidil (LONITEN) 10 MG tablet Take 1 tablet (10 mg) by mouth 2 times daily 6/7/17  Yes Abdiaziz Qureshi MD   ACCU-CHEK EBONIE PLUS test strip  5/29/17  Yes Reported, Patient   NIFEdipine ER (ADALAT CC) 30 MG TB24 Take 1 tablet (30 mg) by mouth 2 times daily 6/3/17  Yes Abdiaziz Qureshi MD   DIPHENHYDRAMINE HCL PO Take 50 mg by mouth At Bedtime   Yes Reported, Patient   atorvastatin (LIPITOR) 20 MG tablet Take 1 tablet (20 mg) by mouth daily 5/31/17  Yes    insulin glargine (LANTUS) 100 UNIT/ML injection Inject 30 Units Subcutaneous 5/31/17  Yes    insulin glargine (LANTUS) 100 UNIT/ML injection Inject 40 Units Subcutaneous At Bedtime 5/31/17  Yes    tacrolimus (PROGRAF - GENERIC EQUIVALENT) 1 MG capsule Take 4 capsules (4 mg) by mouth every 12 hours  Patient taking differently: Take 4 mg by mouth 2 times daily  5/1/17  Yes Abdiaziz Qureshi MD   vitamin D (ERGOCALCIFEROL) 14038 UNIT capsule Take 1 capsule (50,000 Units) by mouth once a week 3/13/17  Yes Abdiaziz Qureshi MD   omeprazole 20 MG tablet Take 1 tablet (20 mg) by mouth every morning 3/6/17  Yes Abdiaziz Qureshi MD   mycophenolate (CELLCEPT - GENERIC EQUIVALENT) 250 MG capsule Take 3 capsules (750 mg) by mouth 2 times daily 12/13/16  Yes Erasmo Wilkins MD   aspirin EC 81 MG EC tablet Take 1 tablet (81 mg) by mouth daily 12/2/16  Yes Talisha Dunham PA-C   insulin aspart (NOVOLOG PEN) 100 UNIT/ML injection Inject 1 Units  Subcutaneous Take with snacks or supplements for high blood sugar 1 unit per 4 grams carbohydrate 12/2/16  Yes Talisha Dunham PA-C   insulin aspart (NOVOLOG PEN) 100 UNIT/ML injection Inject 1 Units Subcutaneous 3 times daily (with meals) DOSE:  1 units per 4 grams of carbohydrate.  Only chart total amount of units given.  Do not give if pre-prandial glucose is less than 60 mg/dL. 12/2/16  Yes Talisha Dunham PA-C   insulin aspart (NOVOLOG PEN) 100 UNIT/ML injection Inject 1-22 Units Subcutaneous 3 times daily (before meals) Correction Scale - VERY HIGH INSULIN RESISTANCE DOSING     Do Not give Correction Insulin if Pre-Meal BG less than 250.   For Pre-Meal  - 260 give 1 unit.   For Pre-Meal -269  give 2 units.   For Pre-Meal  - 279 give 3 units.   For Pre-Meal  - 289 give 4 units.   For Pre-Meal  - 299 give 5 units.   For Pre-Meal  - 309 give 6 units.   ... 12/2/16  Yes Talisha Dunham PA-C   insulin aspart (NOVOLOG PEN) 100 UNIT/ML injection Inject 1-16 Units Subcutaneous At Bedtime Correction Scale - VERY HIGH INSULIN RESISTANCE DOSING     Do Not give Bedtime Correction Insulin if BG less than 200.   For  - 209 give 1 units.   For  - 219 give 2 units.   For  - 229 give 3 units.   For  - 239 give 4 units.   For  - 249 give 5 units.   For  - 259 give 6 units.   For  - 269 give 7 units.   For  - 279 give 8 units  ... 12/2/16  Yes Talisha Dunham PA-C   sulfamethoxazole-trimethoprim (BACTRIM/SEPTRA) 400-80 MG per tablet Take 1 tablet by mouth three times a week 12/2/16  Yes Talisha Dunham PA-C   cholecalciferol (VITAMIN  -D) 1000 UNITS capsule Take 2 capsules (2,000 Units) by mouth daily  Patient not taking: Reported on 9/12/2017 9/8/17   Abdiaziz Qureshi MD   losartan (COZAAR) 50 MG tablet Take 1 tablet (50 mg) by mouth daily  Patient not taking: Reported on 9/12/2017 6/3/17   Abdiaziz Qrueshi,  MD   chlorthalidone (HYGROTON) 25 MG tablet Take 0.5 tablets (12.5 mg) by mouth daily  Patient not taking: Reported on 9/12/2017 6/3/17   Abdiaziz Qureshi MD   magnesium oxide (MAG-OX) 400 (241.3 MG) MG tablet Take 1 tablet (400 mg) by mouth daily  Patient not taking: Reported on 9/12/2017 1/5/17   Abdiaziz Qureshi MD       Vitals:  Ht 1.829 m (6')  Wt 123.3 kg (271 lb 12.8 oz)  SpO2 94%  BMI 36.86 kg/m2    Exam:   GENERAL APPEARANCE: alert and no distress  HENT: mouth without ulcers or lesions  LYMPHATICS: no cervical or supraclavicular nodes  RESP: lungs clear to auscultation - no rales, rhonchi or wheezes  CV: regular rhythm, normal rate, no rub, no murmur  EDEMA: 1+ LE edema bilaterally  ABDOMEN: soft, nondistended, nontender, bowel sounds normal  MS: extremities normal - no gross deformities noted, no evidence of inflammation in joints, no muscle tenderness  SKIN: no rash    Results:   Reviewed with patient.          Jair Hernández MD

## 2017-09-12 NOTE — PATIENT INSTRUCTIONS
Continue with your labs as you are doing.    Your bp is at goal with your current regimen. We will continue to follow. If consistently > 140 / 90, contact PCP for adjustment.     Labs as you are doing monthly.    Follow up at 12 months post transplant.     Flu shot in the fall.     Vitamin D supplement should now be 2000 units daily.

## 2017-09-12 NOTE — NURSING NOTE
Chief Complaint   Patient presents with     RECHECK     Follow up kidney transplant.       Initial Ht 1.829 m (6')  Wt 123.3 kg (271 lb 12.8 oz)  SpO2 94%  BMI 36.86 kg/m2 Estimated body mass index is 36.86 kg/(m^2) as calculated from the following:    Height as of this encounter: 1.829 m (6').    Weight as of this encounter: 123.3 kg (271 lb 12.8 oz).  Medication Reconciliation: complete   Dona Casas., CMA

## 2017-09-13 LAB — PRA DONOR SPECIFIC ABY: NORMAL

## 2017-10-13 ENCOUNTER — DOCUMENTATION ONLY (OUTPATIENT)
Dept: LAB | Facility: CLINIC | Age: 62
End: 2017-10-13

## 2017-10-13 DIAGNOSIS — Z48.298 AFTERCARE FOLLOWING ORGAN TRANSPLANT: ICD-10-CM

## 2017-10-13 DIAGNOSIS — Z94.0 KIDNEY REPLACED BY TRANSPLANT: ICD-10-CM

## 2017-10-13 LAB
ANION GAP SERPL CALCULATED.3IONS-SCNC: 8 MMOL/L (ref 3–14)
BUN SERPL-MCNC: 21 MG/DL (ref 7–30)
CALCIUM SERPL-MCNC: 9.8 MG/DL (ref 8.5–10.1)
CHLORIDE SERPL-SCNC: 105 MMOL/L (ref 94–109)
CO2 SERPL-SCNC: 23 MMOL/L (ref 20–32)
CREAT SERPL-MCNC: 1.3 MG/DL (ref 0.66–1.25)
ERYTHROCYTE [DISTWIDTH] IN BLOOD BY AUTOMATED COUNT: 13.3 % (ref 10–15)
GFR SERPL CREATININE-BSD FRML MDRD: 56 ML/MIN/1.7M2
GLUCOSE SERPL-MCNC: 242 MG/DL (ref 70–99)
HCT VFR BLD AUTO: 49.5 % (ref 40–53)
HGB BLD-MCNC: 16.8 G/DL (ref 13.3–17.7)
MCH RBC QN AUTO: 27.9 PG (ref 26.5–33)
MCHC RBC AUTO-ENTMCNC: 33.9 G/DL (ref 31.5–36.5)
MCV RBC AUTO: 82 FL (ref 78–100)
PLATELET # BLD AUTO: 191 10E9/L (ref 150–450)
POTASSIUM SERPL-SCNC: 4.7 MMOL/L (ref 3.4–5.3)
RBC # BLD AUTO: 6.03 10E12/L (ref 4.4–5.9)
SODIUM SERPL-SCNC: 136 MMOL/L (ref 133–144)
TACROLIMUS BLD-MCNC: 9 UG/L (ref 5–15)
TME LAST DOSE: NORMAL H
WBC # BLD AUTO: 5.9 10E9/L (ref 4–11)

## 2017-10-13 PROCEDURE — 36415 COLL VENOUS BLD VENIPUNCTURE: CPT | Performed by: TRANSPLANT SURGERY

## 2017-10-13 PROCEDURE — 80197 ASSAY OF TACROLIMUS: CPT | Performed by: TRANSPLANT SURGERY

## 2017-10-13 PROCEDURE — 85027 COMPLETE CBC AUTOMATED: CPT | Performed by: TRANSPLANT SURGERY

## 2017-10-13 PROCEDURE — 80048 BASIC METABOLIC PNL TOTAL CA: CPT | Performed by: TRANSPLANT SURGERY

## 2017-10-13 NOTE — PROGRESS NOTES
Corey needs new standing orders for his every three week blood draws. Please place new standing orders. Thank you, lab.

## 2017-10-17 DIAGNOSIS — Z48.298 AFTERCARE FOLLOWING ORGAN TRANSPLANT: ICD-10-CM

## 2017-10-17 DIAGNOSIS — Z94.0 KIDNEY REPLACED BY TRANSPLANT: Primary | ICD-10-CM

## 2017-11-03 DIAGNOSIS — Z94.0 KIDNEY REPLACED BY TRANSPLANT: ICD-10-CM

## 2017-11-03 DIAGNOSIS — Z48.298 AFTERCARE FOLLOWING ORGAN TRANSPLANT: ICD-10-CM

## 2017-11-03 LAB
ANION GAP SERPL CALCULATED.3IONS-SCNC: 9 MMOL/L (ref 3–14)
BUN SERPL-MCNC: 18 MG/DL (ref 7–30)
CALCIUM SERPL-MCNC: 9.8 MG/DL (ref 8.5–10.1)
CHLORIDE SERPL-SCNC: 106 MMOL/L (ref 94–109)
CO2 SERPL-SCNC: 23 MMOL/L (ref 20–32)
CREAT SERPL-MCNC: 1.3 MG/DL (ref 0.66–1.25)
ERYTHROCYTE [DISTWIDTH] IN BLOOD BY AUTOMATED COUNT: 13.8 % (ref 10–15)
GFR SERPL CREATININE-BSD FRML MDRD: 56 ML/MIN/1.7M2
GLUCOSE SERPL-MCNC: 146 MG/DL (ref 70–99)
HCT VFR BLD AUTO: 49.2 % (ref 40–53)
HGB BLD-MCNC: 16.7 G/DL (ref 13.3–17.7)
MCH RBC QN AUTO: 27.9 PG (ref 26.5–33)
MCHC RBC AUTO-ENTMCNC: 33.9 G/DL (ref 31.5–36.5)
MCV RBC AUTO: 82 FL (ref 78–100)
PLATELET # BLD AUTO: 199 10E9/L (ref 150–450)
POTASSIUM SERPL-SCNC: 4.7 MMOL/L (ref 3.4–5.3)
RBC # BLD AUTO: 5.99 10E12/L (ref 4.4–5.9)
SODIUM SERPL-SCNC: 138 MMOL/L (ref 133–144)
TACROLIMUS BLD-MCNC: 5.3 UG/L (ref 5–15)
TME LAST DOSE: NORMAL H
WBC # BLD AUTO: 5.9 10E9/L (ref 4–11)

## 2017-11-03 PROCEDURE — 80197 ASSAY OF TACROLIMUS: CPT | Performed by: INTERNAL MEDICINE

## 2017-11-03 PROCEDURE — 85027 COMPLETE CBC AUTOMATED: CPT | Performed by: INTERNAL MEDICINE

## 2017-11-03 PROCEDURE — 80048 BASIC METABOLIC PNL TOTAL CA: CPT | Performed by: INTERNAL MEDICINE

## 2017-11-03 PROCEDURE — 36415 COLL VENOUS BLD VENIPUNCTURE: CPT | Performed by: INTERNAL MEDICINE

## 2017-11-05 ENCOUNTER — TELEPHONE (OUTPATIENT)
Dept: TRANSPLANT | Facility: CLINIC | Age: 62
End: 2017-11-05

## 2017-11-05 NOTE — TELEPHONE ENCOUNTER
ISSUE: tacrolimus = 5.3  Goal 6-8 per protocol  Previous levels within and above goal w/ current dose.    Confirm result is a good trough.  Recheck Tac levels.  No dose change at this time.  Moreover, Tac goal next month 4-6.

## 2017-12-01 DIAGNOSIS — Z48.298 AFTERCARE FOLLOWING ORGAN TRANSPLANT: ICD-10-CM

## 2017-12-01 DIAGNOSIS — Z94.0 KIDNEY REPLACED BY TRANSPLANT: ICD-10-CM

## 2017-12-01 LAB
ANION GAP SERPL CALCULATED.3IONS-SCNC: 9 MMOL/L (ref 3–14)
BUN SERPL-MCNC: 25 MG/DL (ref 7–30)
CALCIUM SERPL-MCNC: 9.8 MG/DL (ref 8.5–10.1)
CHLORIDE SERPL-SCNC: 101 MMOL/L (ref 94–109)
CO2 SERPL-SCNC: 25 MMOL/L (ref 20–32)
CREAT SERPL-MCNC: 1.39 MG/DL (ref 0.66–1.25)
ERYTHROCYTE [DISTWIDTH] IN BLOOD BY AUTOMATED COUNT: 13.9 % (ref 10–15)
GFR SERPL CREATININE-BSD FRML MDRD: 52 ML/MIN/1.7M2
GLUCOSE SERPL-MCNC: 175 MG/DL (ref 70–99)
HCT VFR BLD AUTO: 48.3 % (ref 40–53)
HGB BLD-MCNC: 16.5 G/DL (ref 13.3–17.7)
MCH RBC QN AUTO: 27.9 PG (ref 26.5–33)
MCHC RBC AUTO-ENTMCNC: 34.2 G/DL (ref 31.5–36.5)
MCV RBC AUTO: 82 FL (ref 78–100)
PLATELET # BLD AUTO: 203 10E9/L (ref 150–450)
POTASSIUM SERPL-SCNC: 4.3 MMOL/L (ref 3.4–5.3)
RBC # BLD AUTO: 5.92 10E12/L (ref 4.4–5.9)
SODIUM SERPL-SCNC: 135 MMOL/L (ref 133–144)
TACROLIMUS BLD-MCNC: 5.5 UG/L (ref 5–15)
TME LAST DOSE: NORMAL H
WBC # BLD AUTO: 10.2 10E9/L (ref 4–11)

## 2017-12-01 PROCEDURE — 85027 COMPLETE CBC AUTOMATED: CPT | Performed by: INTERNAL MEDICINE

## 2017-12-01 PROCEDURE — 80197 ASSAY OF TACROLIMUS: CPT | Performed by: INTERNAL MEDICINE

## 2017-12-01 PROCEDURE — 80048 BASIC METABOLIC PNL TOTAL CA: CPT | Performed by: INTERNAL MEDICINE

## 2017-12-01 PROCEDURE — 36415 COLL VENOUS BLD VENIPUNCTURE: CPT | Performed by: INTERNAL MEDICINE

## 2017-12-05 ASSESSMENT — ENCOUNTER SYMPTOMS
POLYPHAGIA: 0
WEIGHT GAIN: 1
FATIGUE: 0
POLYDIPSIA: 0
WEIGHT LOSS: 0
NIGHT SWEATS: 0
CHILLS: 0
DECREASED APPETITE: 0
INCREASED ENERGY: 0
FEVER: 0
ALTERED TEMPERATURE REGULATION: 0
HALLUCINATIONS: 0

## 2017-12-12 ENCOUNTER — RESULTS ONLY (OUTPATIENT)
Dept: OTHER | Facility: CLINIC | Age: 62
End: 2017-12-12

## 2017-12-12 ENCOUNTER — OFFICE VISIT (OUTPATIENT)
Dept: NEPHROLOGY | Facility: CLINIC | Age: 62
End: 2017-12-12
Attending: INTERNAL MEDICINE
Payer: MEDICARE

## 2017-12-12 VITALS
DIASTOLIC BLOOD PRESSURE: 77 MMHG | HEART RATE: 96 BPM | TEMPERATURE: 97.4 F | RESPIRATION RATE: 18 BRPM | SYSTOLIC BLOOD PRESSURE: 169 MMHG | OXYGEN SATURATION: 100 %

## 2017-12-12 DIAGNOSIS — Z94.0 STATUS POST KIDNEY TRANSPLANT: Primary | ICD-10-CM

## 2017-12-12 DIAGNOSIS — Z48.298 AFTERCARE FOLLOWING ORGAN TRANSPLANT: ICD-10-CM

## 2017-12-12 DIAGNOSIS — E83.52 HYPERCALCEMIA: ICD-10-CM

## 2017-12-12 DIAGNOSIS — Z94.0 KIDNEY REPLACED BY TRANSPLANT: ICD-10-CM

## 2017-12-12 DIAGNOSIS — E21.2 TERTIARY HYPERPARATHYROIDISM (H): ICD-10-CM

## 2017-12-12 DIAGNOSIS — I15.1 HYPERTENSION SECONDARY TO OTHER RENAL DISORDERS (CODE): ICD-10-CM

## 2017-12-12 DIAGNOSIS — N18.30 CKD (CHRONIC KIDNEY DISEASE) STAGE 3, GFR 30-59 ML/MIN (H): ICD-10-CM

## 2017-12-12 DIAGNOSIS — D84.9 IMMUNOSUPPRESSION (H): ICD-10-CM

## 2017-12-12 LAB
ANION GAP SERPL CALCULATED.3IONS-SCNC: 7 MMOL/L (ref 3–14)
BUN SERPL-MCNC: 21 MG/DL (ref 7–30)
CALCIUM SERPL-MCNC: 10.2 MG/DL (ref 8.5–10.1)
CHLORIDE SERPL-SCNC: 101 MMOL/L (ref 94–109)
CO2 SERPL-SCNC: 26 MMOL/L (ref 20–32)
CREAT SERPL-MCNC: 1.27 MG/DL (ref 0.66–1.25)
ERYTHROCYTE [DISTWIDTH] IN BLOOD BY AUTOMATED COUNT: 13.5 % (ref 10–15)
GFR SERPL CREATININE-BSD FRML MDRD: 57 ML/MIN/1.7M2
GLUCOSE SERPL-MCNC: 231 MG/DL (ref 70–99)
HCT VFR BLD AUTO: 50 % (ref 40–53)
HGB BLD-MCNC: 16.3 G/DL (ref 13.3–17.7)
MCH RBC QN AUTO: 27.1 PG (ref 26.5–33)
MCHC RBC AUTO-ENTMCNC: 32.6 G/DL (ref 31.5–36.5)
MCV RBC AUTO: 83 FL (ref 78–100)
PLATELET # BLD AUTO: 227 10E9/L (ref 150–450)
POTASSIUM SERPL-SCNC: 4.4 MMOL/L (ref 3.4–5.3)
RBC # BLD AUTO: 6.02 10E12/L (ref 4.4–5.9)
SODIUM SERPL-SCNC: 135 MMOL/L (ref 133–144)
WBC # BLD AUTO: 6.2 10E9/L (ref 4–11)

## 2017-12-12 PROCEDURE — 86832 HLA CLASS I HIGH DEFIN QUAL: CPT | Performed by: TRANSPLANT SURGERY

## 2017-12-12 PROCEDURE — 80048 BASIC METABOLIC PNL TOTAL CA: CPT | Performed by: INTERNAL MEDICINE

## 2017-12-12 PROCEDURE — 85027 COMPLETE CBC AUTOMATED: CPT | Performed by: INTERNAL MEDICINE

## 2017-12-12 PROCEDURE — 36415 COLL VENOUS BLD VENIPUNCTURE: CPT | Performed by: INTERNAL MEDICINE

## 2017-12-12 PROCEDURE — 86833 HLA CLASS II HIGH DEFIN QUAL: CPT | Performed by: TRANSPLANT SURGERY

## 2017-12-12 PROCEDURE — 99212 OFFICE O/P EST SF 10 MIN: CPT | Mod: ZF

## 2017-12-12 RX ORDER — CLONIDINE HYDROCHLORIDE 0.3 MG/1
0.6 TABLET ORAL 2 TIMES DAILY
Qty: 180 TABLET | Refills: 3 | COMMUNITY
Start: 2017-12-12 | End: 2021-06-14 | Stop reason: ALTCHOICE

## 2017-12-12 ASSESSMENT — PAIN SCALES - GENERAL: PAINLEVEL: NO PAIN (0)

## 2017-12-12 NOTE — PROGRESS NOTES
Assessment and Plan:  1. 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 Currently on immunosuppression with:    Tac 4 mg po bid - goal trough now 4-6.   mg po bid    UPC: 0.94 g / g  DSA: due for DSA  BK: due for BK    Baseline creatinine around 1.3 mg / dl  2. Immunosuppression: at goal currently. See above.    3. HTN: currently on:    Clonidine 0.3 mg po bid  Nifedipine 30 mg po bid  Minoxidil 10 mg po bid  Coreg 25 mg po bid  Furosemide 40 mg po bid    bp at home running 130-150/60-70.  He is having significant hypotensive symptoms with position changes when bp meds increaseed. As such, we will not increase at this time.     4. Need for pneumocystis: on bactrim ss tab - increase to daily.    5. HYPERcalcemia with vitamin d due and pth / phos due. Hold vitamin d for now.     Assessment and plan was discussed with patient and he voiced his understanding and agreement.    Reason for Visit:  Mr. Roland is here for routine follow up and kidney transplant.    HPI:   Corey Roland is a 62 year old male with ESKD from DMII and is status post DDKT on 11/24/16.         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 He is on immunosuppression with tac, mmf. He currently feels ok. He denies cp, sob, no n/v/d, no f/s/c.     BP is still not perfectly controlled and he is on multiple agents at this time.     Home BP: elevated..      ROS:   A comprehensive review of systems was obtained and negative, except as noted in the HPI or PMH.    Active Medical Problems:  Patient Active Problem List   Diagnosis     Hypertension secondary to other renal disorders     Secondary renal hyperparathyroidism (H)     Obesity     Diabetic peripheral neuropathy (H)     Kidney replaced by transplant     Immunosuppressed  "status (H)     Type 1 diabetes mellitus with nephropathy (H)     Aftercare following organ transplant     Vitamin D deficiency     Hypomagnesemia     Post-transplant erythrocytosis     Personal Hx:  Social History     Social History     Marital status: Single     Spouse name: N/A     Number of children: 0     Years of education: N/A     Occupational History     Not on file.     Social History Main Topics     Smoking status: Former Smoker     Packs/day: 2.00     Years: 38.00     Types: Cigarettes     Quit date: 4/1/2005     Smokeless tobacco: Never Used     Alcohol use 0.0 - 0.5 oz/week     0 - 1 Standard drinks or equivalent per week      Comment: Not since transplant     Drug use: No      Comment: past (1980's, marijuana, heroin IV, cocaine, hallucinogens, methamphetamine)     Sexual activity: No     Other Topics Concern     Not on file     Social History Narrative     Allergies:  No Known Allergies    Medications:  Prior to Admission medications    Medication Sig Start Date End Date Taking? Authorizing Provider   FLUZONE QUADRIVALENT injection  8/17/17  Yes Reported, Patient   insulin syringe 31G X 5/16\" 0.5 ML MISC FOR ADMINISTERING INSULIN AT HOME 5 TIMES PER DAY. 6/13/17  Yes Reported, Patient   SODIUM BICARBONATE PO Take 650 mg by mouth 2 times daily   Yes Reported, Patient   FUROSEMIDE PO Take 20 mg by mouth 2 times daily   Yes Reported, Patient   cholecalciferol (VITAMIN  -D) 1000 UNITS capsule Take 2 capsules (2,000 Units) by mouth daily 9/8/17  Yes Abdiaziz Qureshi MD   carvedilol (COREG) 25 MG tablet TAKE ONE TABLET BY MOUTH TWICE DAILY WITH MEALS 7/17/17  Yes Abdiaziz Qureshi MD   cloNIDine (CATAPRES) 0.3 MG tablet Take 1 tablet (0.3 mg) by mouth every morning  Patient taking differently: Take 0.3 mg by mouth 2 times daily  6/21/17  Yes Abdiaziz Qureshi MD   cloNIDine (CATAPRES) 0.3 MG tablet Take 2 tablets (0.6 mg) by mouth every evening 6/21/17  Yes Abdiaziz Qureshi MD "   minoxidil (LONITEN) 10 MG tablet Take 1 tablet (10 mg) by mouth 2 times daily 6/7/17  Yes Abdiaziz Qureshi MD   ACCU-CHEK EBONIE PLUS test strip  5/29/17  Yes Reported, Patient   NIFEdipine ER (ADALAT CC) 30 MG TB24 Take 1 tablet (30 mg) by mouth 2 times daily 6/3/17  Yes Abdiaziz Qureshi MD   losartan (COZAAR) 50 MG tablet Take 1 tablet (50 mg) by mouth daily 6/3/17  Yes Abdiaziz Qureshi MD   chlorthalidone (HYGROTON) 25 MG tablet Take 0.5 tablets (12.5 mg) by mouth daily 6/3/17  Yes Abdiaziz Qureshi MD   DIPHENHYDRAMINE HCL PO Take 50 mg by mouth At Bedtime   Yes Reported, Patient   atorvastatin (LIPITOR) 20 MG tablet Take 1 tablet (20 mg) by mouth daily 5/31/17  Yes    insulin glargine (LANTUS) 100 UNIT/ML injection Inject 30 Units Subcutaneous 5/31/17  Yes    insulin glargine (LANTUS) 100 UNIT/ML injection Inject 40 Units Subcutaneous At Bedtime 5/31/17  Yes    tacrolimus (PROGRAF - GENERIC EQUIVALENT) 1 MG capsule Take 4 capsules (4 mg) by mouth every 12 hours  Patient taking differently: Take 4 mg by mouth 2 times daily  5/1/17  Yes Abdiaziz Qureshi MD   vitamin D (ERGOCALCIFEROL) 29978 UNIT capsule Take 1 capsule (50,000 Units) by mouth once a week 3/13/17  Yes Abdiaziz Qureshi MD   omeprazole 20 MG tablet Take 1 tablet (20 mg) by mouth every morning 3/6/17  Yes Abdiaziz Qureshi MD   magnesium oxide (MAG-OX) 400 (241.3 MG) MG tablet Take 1 tablet (400 mg) by mouth daily 1/5/17  Yes Abdiaziz Qureshi MD   mycophenolate (CELLCEPT - GENERIC EQUIVALENT) 250 MG capsule Take 3 capsules (750 mg) by mouth 2 times daily 12/13/16  Yes Erasmo Wilkins MD   aspirin EC 81 MG EC tablet Take 1 tablet (81 mg) by mouth daily 12/2/16  Yes Talisha Dunham PA-C   insulin aspart (NOVOLOG PEN) 100 UNIT/ML injection Inject 1 Units Subcutaneous Take with snacks or supplements for high blood sugar 1 unit per 4 grams carbohydrate 12/2/16  Yes Talisha Dunham PA-C   insulin  aspart (NOVOLOG PEN) 100 UNIT/ML injection Inject 1 Units Subcutaneous 3 times daily (with meals) DOSE:  1 units per 4 grams of carbohydrate.  Only chart total amount of units given.  Do not give if pre-prandial glucose is less than 60 mg/dL. 12/2/16  Yes Talisha Dunham PA-C   insulin aspart (NOVOLOG PEN) 100 UNIT/ML injection Inject 1-22 Units Subcutaneous 3 times daily (before meals) Correction Scale - VERY HIGH INSULIN RESISTANCE DOSING     Do Not give Correction Insulin if Pre-Meal BG less than 250.   For Pre-Meal  - 260 give 1 unit.   For Pre-Meal -269  give 2 units.   For Pre-Meal  - 279 give 3 units.   For Pre-Meal  - 289 give 4 units.   For Pre-Meal  - 299 give 5 units.   For Pre-Meal  - 309 give 6 units.   ... 12/2/16  Yes Talisha Dunham PA-C   insulin aspart (NOVOLOG PEN) 100 UNIT/ML injection Inject 1-16 Units Subcutaneous At Bedtime Correction Scale - VERY HIGH INSULIN RESISTANCE DOSING     Do Not give Bedtime Correction Insulin if BG less than 200.   For  - 209 give 1 units.   For  - 219 give 2 units.   For  - 229 give 3 units.   For  - 239 give 4 units.   For  - 249 give 5 units.   For  - 259 give 6 units.   For  - 269 give 7 units.   For  - 279 give 8 units  ... 12/2/16  Yes Talisha Dunham PA-C   sulfamethoxazole-trimethoprim (BACTRIM/SEPTRA) 400-80 MG per tablet Take 1 tablet by mouth three times a week 12/2/16  Yes Talisha Dunham PA-C       Vitals:  /77  Pulse 96  Temp 97.4  F (36.3  C)  Resp 18  SpO2 100%    Exam:   GENERAL APPEARANCE: alert and no distress  HENT: mouth without ulcers or lesions  LYMPHATICS: no cervical or supraclavicular nodes  RESP: lungs clear to auscultation - no rales, rhonchi or wheezes  CV: regular rhythm, normal rate, no rub, no murmur  EDEMA: no LE edema bilaterally  ABDOMEN: soft, nondistended, nontender, bowel sounds normal  MS: extremities normal - no  gross deformities noted, no evidence of inflammation in joints, no muscle tenderness  SKIN: no rash    Results:   Labs reviewed with patient.

## 2017-12-12 NOTE — LETTER
12/12/2017       RE: Corey Roland  [address]  [facility] [id number]     Dear Colleague,    Thank you for referring your patient, Corey Roland, to the [facility] NEPHROLOGY at [facility]. Please see a copy of my visit note below.    Assessment and Plan:  1. 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 Currently on immunosuppression with:    Tac 4 mg po bid - goal trough now 4-6.   mg po bid    UPC: 0.94 g / g  DSA: due for DSA  BK: due for BK    Baseline creatinine around 1.3 mg / dl  2. Immunosuppression: at goal currently. See above.    3. HTN: currently on:    Clonidine 0.3 mg po bid  Nifedipine 30 mg po bid  Minoxidil 10 mg po bid  Coreg 25 mg po bid  Furosemide 40 mg po bid    bp at home running 130-150/60-70.  He is having significant hypotensive symptoms with position changes when bp meds increaseed. As such, we will not increase at this time.     4. Need for pneumocystis: on bactrim ss tab - increase to daily.    5. HYPERcalcemia with vitamin d due and pth / phos due. Hold vitamin d for now.     Assessment and plan was discussed with patient and he voiced his understanding and agreement.    Reason for Visit:  Mr. Roland is here for routine follow up and kidney transplant.    HPI:   Corey Roland is a 62 year old male with ESKD from DMII and is status post DDKT on 11/24/16.         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 He is on immunosuppression with tac, mmf. He currently feels ok. He denies cp, sob, no n/v/d, no f/s/c.     BP is still not perfectly controlled and he is on multiple agents at this time.     Home BP: elevated..      ROS:   A comprehensive review of systems was obtained and negative, except as  "noted in the HPI or PMH.    Active Medical Problems:  Patient Active Problem List   Diagnosis     Hypertension secondary to other renal disorders     Secondary renal hyperparathyroidism (H)     Obesity     Diabetic peripheral neuropathy (H)     Kidney replaced by transplant     Immunosuppressed status (H)     Type 1 diabetes mellitus with nephropathy (H)     Aftercare following organ transplant     Vitamin D deficiency     Hypomagnesemia     Post-transplant erythrocytosis     Personal Hx:  Social History     Social History     Marital status: Single     Spouse name: N/A     Number of children: 0     Years of education: N/A     Occupational History     Not on file.     Social History Main Topics     Smoking status: Former Smoker     Packs/day: 2.00     Years: 38.00     Types: Cigarettes     Quit date: 4/1/2005     Smokeless tobacco: Never Used     Alcohol use 0.0 - 0.5 oz/week     0 - 1 Standard drinks or equivalent per week      Comment: Not since transplant     Drug use: No      Comment: past (1980's, marijuana, heroin IV, cocaine, hallucinogens, methamphetamine)     Sexual activity: No     Other Topics Concern     Not on file     Social History Narrative     Allergies:  No Known Allergies    Medications:  Prior to Admission medications    Medication Sig Start Date End Date Taking? Authorizing Provider   FLUZONE QUADRIVALENT injection  8/17/17  Yes Reported, Patient   insulin syringe 31G X 5/16\" 0.5 ML MISC FOR ADMINISTERING INSULIN AT HOME 5 TIMES PER DAY. 6/13/17  Yes Reported, Patient   SODIUM BICARBONATE PO Take 650 mg by mouth 2 times daily   Yes Reported, Patient   FUROSEMIDE PO Take 20 mg by mouth 2 times daily   Yes Reported, Patient   cholecalciferol (VITAMIN  -D) 1000 UNITS capsule Take 2 capsules (2,000 Units) by mouth daily 9/8/17  Yes Abdiaziz Qureshi MD   carvedilol (COREG) 25 MG tablet TAKE ONE TABLET BY MOUTH TWICE DAILY WITH MEALS 7/17/17  Yes Abdiaziz Qureshi MD   cloNIDine (CATAPRES) " 0.3 MG tablet Take 1 tablet (0.3 mg) by mouth every morning  Patient taking differently: Take 0.3 mg by mouth 2 times daily  6/21/17  Yes Abdiaziz Qureshi MD   cloNIDine (CATAPRES) 0.3 MG tablet Take 2 tablets (0.6 mg) by mouth every evening 6/21/17  Yes Abdiaziz Qureshi MD   minoxidil (LONITEN) 10 MG tablet Take 1 tablet (10 mg) by mouth 2 times daily 6/7/17  Yes Abdiaziz Qureshi MD   ACCU-CHEK EBONIE PLUS test strip  5/29/17  Yes Reported, Patient   NIFEdipine ER (ADALAT CC) 30 MG TB24 Take 1 tablet (30 mg) by mouth 2 times daily 6/3/17  Yes bAdiaziz Qureshi MD   losartan (COZAAR) 50 MG tablet Take 1 tablet (50 mg) by mouth daily 6/3/17  Yes Abdiaziz Qureshi MD   chlorthalidone (HYGROTON) 25 MG tablet Take 0.5 tablets (12.5 mg) by mouth daily 6/3/17  Yes Abdiaziz Qureshi MD   DIPHENHYDRAMINE HCL PO Take 50 mg by mouth At Bedtime   Yes Reported, Patient   atorvastatin (LIPITOR) 20 MG tablet Take 1 tablet (20 mg) by mouth daily 5/31/17  Yes    insulin glargine (LANTUS) 100 UNIT/ML injection Inject 30 Units Subcutaneous 5/31/17  Yes    insulin glargine (LANTUS) 100 UNIT/ML injection Inject 40 Units Subcutaneous At Bedtime 5/31/17  Yes    tacrolimus (PROGRAF - GENERIC EQUIVALENT) 1 MG capsule Take 4 capsules (4 mg) by mouth every 12 hours  Patient taking differently: Take 4 mg by mouth 2 times daily  5/1/17  Yes Abdiaziz Qureshi MD   vitamin D (ERGOCALCIFEROL) 05582 UNIT capsule Take 1 capsule (50,000 Units) by mouth once a week 3/13/17  Yes Abdiaziz Qureshi MD   omeprazole 20 MG tablet Take 1 tablet (20 mg) by mouth every morning 3/6/17  Yes Abdiaziz Qureshi MD   magnesium oxide (MAG-OX) 400 (241.3 MG) MG tablet Take 1 tablet (400 mg) by mouth daily 1/5/17  Yes Abdiaziz Qureshi MD   mycophenolate (CELLCEPT - GENERIC EQUIVALENT) 250 MG capsule Take 3 capsules (750 mg) by mouth 2 times daily 12/13/16  Yes Erasmo Wilkins MD   aspirin EC 81 MG EC tablet Take 1  tablet (81 mg) by mouth daily 12/2/16  Yes Talisha Dunham PA-C   insulin aspart (NOVOLOG PEN) 100 UNIT/ML injection Inject 1 Units Subcutaneous Take with snacks or supplements for high blood sugar 1 unit per 4 grams carbohydrate 12/2/16  Yes Talisha Dunham PA-C   insulin aspart (NOVOLOG PEN) 100 UNIT/ML injection Inject 1 Units Subcutaneous 3 times daily (with meals) DOSE:  1 units per 4 grams of carbohydrate.  Only chart total amount of units given.  Do not give if pre-prandial glucose is less than 60 mg/dL. 12/2/16  Yes Talisha Dunham PA-C   insulin aspart (NOVOLOG PEN) 100 UNIT/ML injection Inject 1-22 Units Subcutaneous 3 times daily (before meals) Correction Scale - VERY HIGH INSULIN RESISTANCE DOSING     Do Not give Correction Insulin if Pre-Meal BG less than 250.   For Pre-Meal  - 260 give 1 unit.   For Pre-Meal -269  give 2 units.   For Pre-Meal  - 279 give 3 units.   For Pre-Meal  - 289 give 4 units.   For Pre-Meal  - 299 give 5 units.   For Pre-Meal  - 309 give 6 units.   ... 12/2/16  Yes Talisha Dunham PA-C   insulin aspart (NOVOLOG PEN) 100 UNIT/ML injection Inject 1-16 Units Subcutaneous At Bedtime Correction Scale - VERY HIGH INSULIN RESISTANCE DOSING     Do Not give Bedtime Correction Insulin if BG less than 200.   For  - 209 give 1 units.   For  - 219 give 2 units.   For  - 229 give 3 units.   For  - 239 give 4 units.   For  - 249 give 5 units.   For  - 259 give 6 units.   For  - 269 give 7 units.   For  - 279 give 8 units  ... 12/2/16  Yes Talisha Dunham PA-C   sulfamethoxazole-trimethoprim (BACTRIM/SEPTRA) 400-80 MG per tablet Take 1 tablet by mouth three times a week 12/2/16  Yes Talisha Dunham PA-C       Vitals:  /77  Pulse 96  Temp 97.4  F (36.3  C)  Resp 18  SpO2 100%    Exam:   GENERAL APPEARANCE: alert and no distress  HENT: mouth without ulcers or  lesions  LYMPHATICS: no cervical or supraclavicular nodes  RESP: lungs clear to auscultation - no rales, rhonchi or wheezes  CV: regular rhythm, normal rate, no rub, no murmur  EDEMA: no LE edema bilaterally  ABDOMEN: soft, nondistended, nontender, bowel sounds normal  MS: extremities normal - no gross deformities noted, no evidence of inflammation in joints, no muscle tenderness  SKIN: no rash    Results:   Labs reviewed with patient.       Again, thank you for allowing me to participate in the care of your patient.      Sincerely,    Jair Hernández MD

## 2017-12-12 NOTE — MR AVS SNAPSHOT
After Visit Summary   12/12/2017    Corey Roland    MRN: [id number]           Patient Information     Date Of Birth          1955        Visit Information        Provider Department      12/12/2017 1:05 PM Jair Hernández MD [facility] Nephrology        Today's Diagnoses     Status post kidney transplant    -  1    Hypertension secondary to other renal disorders (CODE)        Immunosuppression (H)        CKD (chronic kidney disease) stage 3, GFR 30-59 ml/min        Hypercalcemia        Tertiary hyperparathyroidism (H)           Follow-ups after your visit        Follow-up notes from your care team     Return in about 1 year (around 12/12/2018).      Your next 10 appointments already scheduled     Dec 22, 2017  7:30 AM CST   LAB with  LAB   [facility] ([facility])    [address] [id number] [phone]           Please do not eat 10-12 hours before your appointment if you are coming in fasting for labs on lipids, cholesterol, or glucose (sugar). This does not apply to pregnant women. Water, hot tea and black coffee (with nothing added) are okay. Do not drink other fluids, diet soda or chew gum.            Dec 04, 2018 12:30 PM CST   Lab with  LAB   [facility] ([facility])    [address] [place] [id number] [phone]            Dec 04, 2018  1:30 PM CST   (Arrive by 1:00 PM)   Return Kidney Transplant with Jair Hernández MD   [facility] Nephrology ([facility])    [address] [place] [id number] [phone]              Future tests that were ordered for you today     Open Future Orders        Priority Expected Expires Ordered    Tacrolimus level Routine 12/26/2017 12/12/2018 12/12/2017            Who to contact     If you have questions or need follow up information about today's clinic  visit or your schedule please contact [facility] NEPHROLOGY directly at [phone].  Normal or non-critical lab and imaging results will be communicated to you by [employer], letter or phone within 4 business days after the clinic has received the results. If you do not hear from us within 7 days, please contact the clinic through [employer] or phone. If you have a critical or abnormal lab result, we will notify you by phone as soon as possible.  Submit refill requests through [employer] or call your pharmacy and they will forward the refill request to us. Please allow 3 business days for your refill to be completed.          Additional Information About Your Visit        [employer] Information     [employer] gives you secure access to your electronic health record. If you see a primary care provider, you can also send messages to your care team and make appointments. If you have questions, please call your primary care clinic.  If you do not have a primary care provider, please call [phone] and they will assist you.        Care EveryWhere ID     This is your Care EveryWhere ID. This could be used by other organizations to access your [facility] medical records  [id number]        Your Vitals Were     Pulse Temperature Respirations Pulse Oximetry          96 97.4  F (36.3  C) 18 100%         Blood Pressure from Last 3 Encounters:   12/12/17 169/77   06/03/17 178/79   03/02/17 122/71    Weight from Last 3 Encounters:   09/12/17 123.3 kg (271 lb 12.8 oz)   06/03/17 119.7 kg (263 lb 12.8 oz)   03/02/17 114 kg (251 lb 6.4 oz)              Today, you had the following     No orders found for display         Today's Medication Changes          These changes are accurate as of: 12/12/17 11:59 PM.  If you have any questions, ask your nurse or doctor.               These medicines have changed or have updated prescriptions.        Dose/Directions    tacrolimus 1 MG capsule   Commonly known as:  GENERIC EQUIVALENT   This may have  changed:  when to take this   Used for:  Kidney transplant recipient        Dose:  4 mg   Take 4 capsules (4 mg) by mouth every 12 hours   Quantity:  240 capsule   Refills:  11         Stop taking these medicines if you haven't already. Please contact your care team if you have questions.     chlorthalidone 25 MG tablet   Commonly known as:  HYGROTON   Stopped by:  Jair Hernández MD           magnesium oxide 400 (241.3 MG) MG tablet   Commonly known as:  MAG-OX   Stopped by:  Jair Hernández MD           vitamin D 28465 UNIT capsule   Commonly known as:  ERGOCALCIFEROL   Stopped by:  Jair Hernández MD                    Primary Care Provider Office Phone # Fax #    Dustin ARIA Tadeo [phone]       [address] [address]        Equal Access to Services     [facility]: Hadii lino augustine hadasho Soomaali, waaxda luqadaha, qaybta kaalmada adeegyada, gonzález senior . So [facility] [phone].    ATENCIÓN: Si habla español, tiene a sanchez disposición servicios gratuitos de asistencia lingüística. [facility] [phone].    We comply with applicable federal civil rights laws and [place] laws. We do not discriminate on the basis of race, color, national origin, age, disability, sex, sexual orientation, or gender identity.            Thank you!     Thank you for choosing [facility] NEPHROLOGY  for your care. Our goal is always to provide you with excellent care. Hearing back from our patients is one way we can continue to improve our services. Please take a few minutes to complete the written survey that you may receive in the mail after your visit with us. Thank you!             Your Updated Medication List - Protect others around you: Learn how to safely use, store and throw away your medicines at www.disposemymeds.org.          This list is accurate as of: 12/12/17 11:59 PM.  Always use your most recent med list.                    Brand Name Dispense Instructions for use Diagnosis    ACCU-CHEK EBONIE PLUS test strip   Generic drug:  blood glucose monitoring           aspirin 81 MG EC tablet     30 tablet    Take 1 tablet (81 mg) by mouth daily    Kidney transplant recipient       atorvastatin 20 MG tablet    LIPITOR     Take 1 tablet (20 mg) by mouth daily    Kidney transplant recipient       carvedilol 25 MG tablet    COREG    60 tablet    TAKE ONE TABLET BY MOUTH TWICE DAILY WITH MEALS    Immunosuppressed status (H)       cholecalciferol 1000 UNITS capsule    vitamin  -D    180 capsule    Take 2 capsules (2,000 Units) by mouth daily    Vitamin D deficiency       * cloNIDine 0.3 MG tablet    CATAPRES    60 tablet    Take 2 tablets (0.6 mg) by mouth every evening    Hypertension secondary to other renal disorders (CODE)       * cloNIDine 0.3 MG tablet    CATAPRES    180 tablet    Take 1 tablet (0.3 mg) by mouth 2 times daily    Hypertension secondary to other renal disorders (CODE)       DIPHENHYDRAMINE HCL PO      Take 50 mg by mouth At Bedtime        FLUZONE QUADRIVALENT injection   Generic drug:  influenza quadrivalent (w/PRESERVATIVE) vacc age 3 yrs and older           FUROSEMIDE PO      Take 20 mg by mouth 2 times daily        * insulin aspart 100 UNIT/ML injection    NovoLOG PEN    1 mL    Inject 1 Units Subcutaneous Take with snacks or supplements for high blood sugar 1 unit per 4 grams carbohydrate    Kidney transplant recipient       * insulin aspart 100 UNIT/ML injection    NovoLOG PEN     Inject 1 Units Subcutaneous 3 times daily (with meals) DOSE:  1 units per 4 grams of carbohydrate.  Only chart total amount of units given.  Do not give if pre-prandial glucose is less than 60 mg/dL.    Kidney transplant recipient       * insulin aspart 100 UNIT/ML injection    NovoLOG PEN     Inject 1-22 Units Subcutaneous 3 times daily (before meals) Correction Scale - VERY HIGH INSULIN RESISTANCE DOSING    Do Not give Correction Insulin if  "Pre-Meal BG less than 250.  For Pre-Meal  - 260 give 1 unit.  For Pre-Meal -269  give 2 units.  For Pre-Meal  - 279 give 3 units.  For Pre-Meal  - 289 give 4 units.  For Pre-Meal  - 299 give 5 units.  For Pre-Meal  - 309 give 6 units.  ...    Kidney transplant recipient       * insulin aspart 100 UNIT/ML injection    NovoLOG PEN     Inject 1-16 Units Subcutaneous At Bedtime Correction Scale - VERY HIGH INSULIN RESISTANCE DOSING    Do Not give Bedtime Correction Insulin if BG less than 200.  For  - 209 give 1 units.  For  - 219 give 2 units.  For  - 229 give 3 units.  For  - 239 give 4 units.  For  - 249 give 5 units.  For  - 259 give 6 units.  For  - 269 give 7 units.  For  - 279 give 8 units ...    Kidney transplant recipient       * insulin glargine 100 UNIT/ML injection    LANTUS     Inject 30 Units Subcutaneous    Kidney transplant recipient       * insulin glargine 100 UNIT/ML injection    LANTUS     Inject 40 Units Subcutaneous At Bedtime    Kidney transplant recipient       insulin syringe 31G X 5/16\" 0.5 ML Misc      FOR ADMINISTERING INSULIN AT HOME 5 TIMES PER DAY.        losartan 50 MG tablet    COZAAR    30 tablet    Take 1 tablet (50 mg) by mouth daily    Hypertension secondary to other renal disorders (CODE)       minoxidil 10 MG tablet    LONITEN    180 tablet    Take 1 tablet (10 mg) by mouth 2 times daily    Hypertension secondary to other renal disorders (CODE)       mycophenolate 250 MG capsule    GENERIC EQUIVALENT    180 capsule    Take 3 capsules (750 mg) by mouth 2 times daily    Kidney replaced by transplant       NIFEdipine ER 30 MG Tb24    ADALAT CC    60 tablet    Take 1 tablet (30 mg) by mouth 2 times daily    Hypertension secondary to other renal disorders (CODE)       omeprazole 20 MG tablet     90 tablet    Take 1 tablet (20 mg) by mouth every morning    Kidney transplant recipient       SODIUM " BICARBONATE PO      Take 650 mg by mouth 2 times daily        sulfamethoxazole-trimethoprim 400-80 MG per tablet    BACTRIM/SEPTRA    30 tablet    Take 1 tablet by mouth three times a week    Kidney transplant recipient       tacrolimus 1 MG capsule    GENERIC EQUIVALENT    240 capsule    Take 4 capsules (4 mg) by mouth every 12 hours    Kidney transplant recipient       * Notice:  This list has 8 medication(s) that are the same as other medications prescribed for you. Read the directions carefully, and ask your doctor or other care provider to review them with you.

## 2017-12-13 LAB — PRA DONOR SPECIFIC ABY: NORMAL

## 2017-12-22 ENCOUNTER — RESULTS ONLY (OUTPATIENT)
Dept: OTHER | Facility: CLINIC | Age: 62
End: 2017-12-22

## 2017-12-22 DIAGNOSIS — Z48.298 AFTERCARE FOLLOWING ORGAN TRANSPLANT: ICD-10-CM

## 2017-12-22 DIAGNOSIS — Z94.0 KIDNEY REPLACED BY TRANSPLANT: ICD-10-CM

## 2017-12-22 LAB
ALBUMIN SERPL-MCNC: 3.4 G/DL (ref 3.4–5)
ALP SERPL-CCNC: 71 U/L (ref 40–150)
ALT SERPL W P-5'-P-CCNC: 21 U/L (ref 0–70)
ANION GAP SERPL CALCULATED.3IONS-SCNC: 10 MMOL/L (ref 3–14)
AST SERPL W P-5'-P-CCNC: 15 U/L (ref 0–45)
BILIRUB DIRECT SERPL-MCNC: 0.1 MG/DL (ref 0–0.2)
BILIRUB SERPL-MCNC: 0.4 MG/DL (ref 0.2–1.3)
BUN SERPL-MCNC: 19 MG/DL (ref 7–30)
CALCIUM SERPL-MCNC: 10.1 MG/DL (ref 8.5–10.1)
CHLORIDE SERPL-SCNC: 103 MMOL/L (ref 94–109)
CHOLEST SERPL-MCNC: 105 MG/DL
CO2 SERPL-SCNC: 24 MMOL/L (ref 20–32)
CREAT SERPL-MCNC: 1.37 MG/DL (ref 0.66–1.25)
ERYTHROCYTE [DISTWIDTH] IN BLOOD BY AUTOMATED COUNT: 13.7 % (ref 10–15)
GFR SERPL CREATININE-BSD FRML MDRD: 53 ML/MIN/1.7M2
GLUCOSE SERPL-MCNC: 130 MG/DL (ref 70–99)
HCT VFR BLD AUTO: 44.9 % (ref 40–53)
HDLC SERPL-MCNC: 44 MG/DL
HGB BLD-MCNC: 15.1 G/DL (ref 13.3–17.7)
LDLC SERPL CALC-MCNC: 31 MG/DL
MCH RBC QN AUTO: 28 PG (ref 26.5–33)
MCHC RBC AUTO-ENTMCNC: 33.6 G/DL (ref 31.5–36.5)
MCV RBC AUTO: 83 FL (ref 78–100)
NONHDLC SERPL-MCNC: 61 MG/DL
PLATELET # BLD AUTO: 237 10E9/L (ref 150–450)
POTASSIUM SERPL-SCNC: 4.4 MMOL/L (ref 3.4–5.3)
PROT SERPL-MCNC: 7.9 G/DL (ref 6.8–8.8)
PROT UR-MCNC: 0.21 G/L
PROT/CREAT 24H UR: 0.54 G/G CR (ref 0–0.2)
RBC # BLD AUTO: 5.39 10E12/L (ref 4.4–5.9)
SODIUM SERPL-SCNC: 137 MMOL/L (ref 133–144)
TACROLIMUS BLD-MCNC: 4.8 UG/L (ref 5–15)
TME LAST DOSE: ABNORMAL H
TRIGL SERPL-MCNC: 151 MG/DL
WBC # BLD AUTO: 8 10E9/L (ref 4–11)

## 2017-12-22 PROCEDURE — 36415 COLL VENOUS BLD VENIPUNCTURE: CPT | Performed by: INTERNAL MEDICINE

## 2017-12-22 PROCEDURE — 84156 ASSAY OF PROTEIN URINE: CPT | Performed by: INTERNAL MEDICINE

## 2017-12-22 PROCEDURE — 80076 HEPATIC FUNCTION PANEL: CPT | Performed by: INTERNAL MEDICINE

## 2017-12-22 PROCEDURE — 80197 ASSAY OF TACROLIMUS: CPT | Performed by: INTERNAL MEDICINE

## 2017-12-22 PROCEDURE — 86833 HLA CLASS II HIGH DEFIN QUAL: CPT | Performed by: TRANSPLANT SURGERY

## 2017-12-22 PROCEDURE — 85027 COMPLETE CBC AUTOMATED: CPT | Performed by: INTERNAL MEDICINE

## 2017-12-22 PROCEDURE — 80061 LIPID PANEL: CPT | Performed by: INTERNAL MEDICINE

## 2017-12-22 PROCEDURE — 80048 BASIC METABOLIC PNL TOTAL CA: CPT | Performed by: INTERNAL MEDICINE

## 2017-12-22 PROCEDURE — 87799 DETECT AGENT NOS DNA QUANT: CPT | Performed by: INTERNAL MEDICINE

## 2017-12-22 PROCEDURE — 86832 HLA CLASS I HIGH DEFIN QUAL: CPT | Performed by: TRANSPLANT SURGERY

## 2017-12-26 LAB — PRA DONOR SPECIFIC ABY: NORMAL

## 2017-12-29 DIAGNOSIS — Z79.899 ENCOUNTER FOR LONG-TERM CURRENT USE OF MEDICATION: ICD-10-CM

## 2017-12-29 DIAGNOSIS — Z48.298 AFTERCARE FOLLOWING ORGAN TRANSPLANT: ICD-10-CM

## 2017-12-29 DIAGNOSIS — Z94.0 KIDNEY TRANSPLANT RECIPIENT: ICD-10-CM

## 2017-12-29 DIAGNOSIS — Z94.0 KIDNEY REPLACED BY TRANSPLANT: Primary | ICD-10-CM

## 2017-12-29 LAB
DONOR IDENTIFICATION: NORMAL
DONOR IDENTIFICATION: NORMAL
DSA COMMENTS: NORMAL
DSA COMMENTS: NORMAL
DSA PRESENT: NO
DSA PRESENT: NO
DSA TEST METHOD: NORMAL
DSA TEST METHOD: NORMAL
ORGAN: NORMAL
ORGAN: NORMAL
SA1 CELL: NORMAL
SA1 CELL: NORMAL
SA1 COMMENTS: NORMAL
SA1 COMMENTS: NORMAL
SA1 HI RISK ABY: NORMAL
SA1 HI RISK ABY: NORMAL
SA1 MOD RISK ABY: NORMAL
SA1 MOD RISK ABY: NORMAL
SA1 TEST METHOD: NORMAL
SA1 TEST METHOD: NORMAL
SA2 CELL: NORMAL
SA2 CELL: NORMAL
SA2 COMMENTS: NORMAL
SA2 COMMENTS: NORMAL
SA2 HI RISK ABY UA: NORMAL
SA2 HI RISK ABY UA: NORMAL
SA2 MOD RISK ABY: NORMAL
SA2 MOD RISK ABY: NORMAL
SA2 TEST METHOD: NORMAL
SA2 TEST METHOD: NORMAL

## 2017-12-29 RX ORDER — TACROLIMUS 1 MG/1
4 CAPSULE ORAL 2 TIMES DAILY
Qty: 240 CAPSULE | Refills: 11 | Status: SHIPPED | OUTPATIENT
Start: 2017-12-29 | End: 2018-02-26

## 2017-12-29 RX ORDER — MYCOPHENOLATE MOFETIL 250 MG/1
750 CAPSULE ORAL 2 TIMES DAILY
Qty: 180 CAPSULE | Refills: 11 | Status: SHIPPED | OUTPATIENT
Start: 2017-12-29 | End: 2018-12-26

## 2017-12-29 NOTE — TELEPHONE ENCOUNTER
Called this morning needs refills sent to [facility] [place]  Immunosuppression medications   Prograf tacrolimus 4 mg twice per day   Mycophenolate 759 mg twice per day - out of medications within 5 days       1 year post transplant -    Needs new lab order mailed to him   Phone call regarding his new coordinator  Or new transplant  Model

## 2017-12-29 NOTE — LETTER
OUTPATIENT LABORATORY TEST ORDER    Patient Name: Corey Roland             Transplant Date: 11-  YOB: 1955                                     Issue Date & Time: December 29, 20178:49 AM  [facility] MR: [id number]                                 Exp. Date (1 year after date issued)      Diagnoses: Kidney Transplant (ICD-10 Z94.0)   Long term use of medications (ICD-10  Z79.899)     Lab results to be available on the same day drawn.   Patient should release information to the [facility].  Please fax to the Transplant Center at [phone].        Every  Month  ?CBC and Platelet  ?Basic Metabolic Panel (Sodium, Potassium, Chloride, CO2, Creatinine, Urea Nitrogen, Glucose, Calcium)  ?/Tacrolimus/Prograf drug level             Every 6 Months       Due:  and                 ?BK (Polyoma Virus) PCR Quantitative - Plasma                                           ?Urine for protein/creatinine                  DSA (patient will provide )     If you have any questions, please call The [facility] at [phone] or [phone].    Please fax labs to [phone]

## 2018-01-10 DIAGNOSIS — Z94.0 KIDNEY TRANSPLANT RECIPIENT: ICD-10-CM

## 2018-01-10 RX ORDER — SULFAMETHOXAZOLE AND TRIMETHOPRIM 400; 80 MG/1; MG/1
1 TABLET ORAL
Qty: 14 TABLET | Refills: 11 | Status: SHIPPED | OUTPATIENT
Start: 2018-01-10 | End: 2019-01-18

## 2018-01-10 NOTE — TELEPHONE ENCOUNTER
Patient Call: Medication Refill  Instruct the patient to first contact their pharmacy. If they have called their pharmacy and require further assistance, route to LPN.  Pharmacy Name: [facility]   Pharmacy Location: [facility]  Name of Medication: Bactrim  When will the patient be out of this medication? 01/12/2018 This Friday  Needs new script with Dr. Betty choudhury

## 2018-01-12 DIAGNOSIS — Z48.298 AFTERCARE FOLLOWING ORGAN TRANSPLANT: ICD-10-CM

## 2018-01-12 DIAGNOSIS — Z94.0 KIDNEY REPLACED BY TRANSPLANT: ICD-10-CM

## 2018-01-12 DIAGNOSIS — Z79.899 ENCOUNTER FOR LONG-TERM CURRENT USE OF MEDICATION: ICD-10-CM

## 2018-01-12 LAB
ANION GAP SERPL CALCULATED.3IONS-SCNC: 9 MMOL/L (ref 3–14)
BUN SERPL-MCNC: 16 MG/DL (ref 7–30)
CALCIUM SERPL-MCNC: 9.7 MG/DL (ref 8.5–10.1)
CHLORIDE SERPL-SCNC: 103 MMOL/L (ref 94–109)
CO2 SERPL-SCNC: 27 MMOL/L (ref 20–32)
CREAT SERPL-MCNC: 1.21 MG/DL (ref 0.66–1.25)
ERYTHROCYTE [DISTWIDTH] IN BLOOD BY AUTOMATED COUNT: 13.7 % (ref 10–15)
GFR SERPL CREATININE-BSD FRML MDRD: 61 ML/MIN/1.7M2
GLUCOSE SERPL-MCNC: 203 MG/DL (ref 70–99)
HCT VFR BLD AUTO: 49.1 % (ref 40–53)
HGB BLD-MCNC: 16.2 G/DL (ref 13.3–17.7)
MCH RBC QN AUTO: 27.9 PG (ref 26.5–33)
MCHC RBC AUTO-ENTMCNC: 33 G/DL (ref 31.5–36.5)
MCV RBC AUTO: 85 FL (ref 78–100)
PLATELET # BLD AUTO: 193 10E9/L (ref 150–450)
POTASSIUM SERPL-SCNC: 4 MMOL/L (ref 3.4–5.3)
RBC # BLD AUTO: 5.81 10E12/L (ref 4.4–5.9)
SODIUM SERPL-SCNC: 139 MMOL/L (ref 133–144)
TACROLIMUS BLD-MCNC: 6.3 UG/L (ref 5–15)
TME LAST DOSE: NORMAL H
WBC # BLD AUTO: 6.3 10E9/L (ref 4–11)

## 2018-01-12 PROCEDURE — 36415 COLL VENOUS BLD VENIPUNCTURE: CPT | Performed by: INTERNAL MEDICINE

## 2018-01-12 PROCEDURE — 80197 ASSAY OF TACROLIMUS: CPT | Performed by: INTERNAL MEDICINE

## 2018-01-12 PROCEDURE — 80048 BASIC METABOLIC PNL TOTAL CA: CPT | Performed by: INTERNAL MEDICINE

## 2018-01-12 PROCEDURE — 85027 COMPLETE CBC AUTOMATED: CPT | Performed by: INTERNAL MEDICINE

## 2018-02-02 DIAGNOSIS — Z79.899 ENCOUNTER FOR LONG-TERM CURRENT USE OF MEDICATION: ICD-10-CM

## 2018-02-02 DIAGNOSIS — Z94.0 KIDNEY REPLACED BY TRANSPLANT: ICD-10-CM

## 2018-02-02 DIAGNOSIS — Z48.298 AFTERCARE FOLLOWING ORGAN TRANSPLANT: ICD-10-CM

## 2018-02-02 LAB
ANION GAP SERPL CALCULATED.3IONS-SCNC: 5 MMOL/L (ref 3–14)
BUN SERPL-MCNC: 21 MG/DL (ref 7–30)
CALCIUM SERPL-MCNC: 10.2 MG/DL (ref 8.5–10.1)
CHLORIDE SERPL-SCNC: 103 MMOL/L (ref 94–109)
CO2 SERPL-SCNC: 29 MMOL/L (ref 20–32)
CREAT SERPL-MCNC: 1.21 MG/DL (ref 0.66–1.25)
ERYTHROCYTE [DISTWIDTH] IN BLOOD BY AUTOMATED COUNT: 13.6 % (ref 10–15)
GFR SERPL CREATININE-BSD FRML MDRD: 61 ML/MIN/1.7M2
GLUCOSE SERPL-MCNC: 188 MG/DL (ref 70–99)
HCT VFR BLD AUTO: 49.2 % (ref 40–53)
HGB BLD-MCNC: 16.5 G/DL (ref 13.3–17.7)
MCH RBC QN AUTO: 28.3 PG (ref 26.5–33)
MCHC RBC AUTO-ENTMCNC: 33.5 G/DL (ref 31.5–36.5)
MCV RBC AUTO: 84 FL (ref 78–100)
PLATELET # BLD AUTO: 216 10E9/L (ref 150–450)
POTASSIUM SERPL-SCNC: 4.5 MMOL/L (ref 3.4–5.3)
RBC # BLD AUTO: 5.83 10E12/L (ref 4.4–5.9)
SODIUM SERPL-SCNC: 137 MMOL/L (ref 133–144)
TACROLIMUS BLD-MCNC: 6.1 UG/L (ref 5–15)
TME LAST DOSE: NORMAL H
WBC # BLD AUTO: 6.8 10E9/L (ref 4–11)

## 2018-02-02 PROCEDURE — 80197 ASSAY OF TACROLIMUS: CPT | Performed by: INTERNAL MEDICINE

## 2018-02-02 PROCEDURE — 80048 BASIC METABOLIC PNL TOTAL CA: CPT | Performed by: INTERNAL MEDICINE

## 2018-02-02 PROCEDURE — 36415 COLL VENOUS BLD VENIPUNCTURE: CPT | Performed by: INTERNAL MEDICINE

## 2018-02-02 PROCEDURE — 85027 COMPLETE CBC AUTOMATED: CPT | Performed by: INTERNAL MEDICINE

## 2018-02-23 DIAGNOSIS — Z94.0 KIDNEY REPLACED BY TRANSPLANT: ICD-10-CM

## 2018-02-23 DIAGNOSIS — Z79.899 ENCOUNTER FOR LONG-TERM CURRENT USE OF MEDICATION: ICD-10-CM

## 2018-02-23 DIAGNOSIS — Z48.298 AFTERCARE FOLLOWING ORGAN TRANSPLANT: ICD-10-CM

## 2018-02-23 LAB
ANION GAP SERPL CALCULATED.3IONS-SCNC: 7 MMOL/L (ref 3–14)
BUN SERPL-MCNC: 19 MG/DL (ref 7–30)
CALCIUM SERPL-MCNC: 10.1 MG/DL (ref 8.5–10.1)
CHLORIDE SERPL-SCNC: 107 MMOL/L (ref 94–109)
CO2 SERPL-SCNC: 24 MMOL/L (ref 20–32)
CREAT SERPL-MCNC: 1.28 MG/DL (ref 0.66–1.25)
ERYTHROCYTE [DISTWIDTH] IN BLOOD BY AUTOMATED COUNT: 13.8 % (ref 10–15)
GFR SERPL CREATININE-BSD FRML MDRD: 57 ML/MIN/1.7M2
GLUCOSE SERPL-MCNC: 130 MG/DL (ref 70–99)
HCT VFR BLD AUTO: 49 % (ref 40–53)
HGB BLD-MCNC: 16.4 G/DL (ref 13.3–17.7)
MCH RBC QN AUTO: 28.4 PG (ref 26.5–33)
MCHC RBC AUTO-ENTMCNC: 33.5 G/DL (ref 31.5–36.5)
MCV RBC AUTO: 85 FL (ref 78–100)
PLATELET # BLD AUTO: 187 10E9/L (ref 150–450)
POTASSIUM SERPL-SCNC: 4.2 MMOL/L (ref 3.4–5.3)
RBC # BLD AUTO: 5.78 10E12/L (ref 4.4–5.9)
SODIUM SERPL-SCNC: 138 MMOL/L (ref 133–144)
TACROLIMUS BLD-MCNC: 7.9 UG/L (ref 5–15)
TME LAST DOSE: NORMAL H
WBC # BLD AUTO: 6.9 10E9/L (ref 4–11)

## 2018-02-23 PROCEDURE — 36415 COLL VENOUS BLD VENIPUNCTURE: CPT | Performed by: INTERNAL MEDICINE

## 2018-02-23 PROCEDURE — 85027 COMPLETE CBC AUTOMATED: CPT | Performed by: INTERNAL MEDICINE

## 2018-02-23 PROCEDURE — 80197 ASSAY OF TACROLIMUS: CPT | Performed by: INTERNAL MEDICINE

## 2018-02-23 PROCEDURE — 80048 BASIC METABOLIC PNL TOTAL CA: CPT | Performed by: INTERNAL MEDICINE

## 2018-02-25 ENCOUNTER — TELEPHONE (OUTPATIENT)
Dept: TRANSPLANT | Facility: CLINIC | Age: 63
End: 2018-02-25

## 2018-02-26 ENCOUNTER — TELEPHONE (OUTPATIENT)
Dept: TRANSPLANT | Facility: CLINIC | Age: 63
End: 2018-02-26

## 2018-02-26 DIAGNOSIS — Z94.0 KIDNEY TRANSPLANT RECIPIENT: ICD-10-CM

## 2018-02-26 DIAGNOSIS — Z94.0 KIDNEY REPLACED BY TRANSPLANT: Primary | ICD-10-CM

## 2018-02-26 RX ORDER — TACROLIMUS 1 MG/1
3 CAPSULE ORAL 2 TIMES DAILY
Qty: 180 CAPSULE | Refills: 11 | Status: SHIPPED | OUTPATIENT
Start: 2018-02-26 | End: 2018-04-13

## 2018-02-26 RX ORDER — TACROLIMUS 0.5 MG/1
0.5 CAPSULE ORAL 2 TIMES DAILY
Qty: 60 CAPSULE | Refills: 11 | Status: SHIPPED | OUTPATIENT
Start: 2018-02-26 | End: 2018-04-13

## 2018-02-26 NOTE — TELEPHONE ENCOUNTER
Patient Call: Transplant Lab  Reason for call: Discuss lab results Corey called this AM concerned about his Tacro is climbing high   Corey stated he is taking 4mg BID    Corey #[phone]

## 2018-02-26 NOTE — TELEPHONE ENCOUNTER
Spoke to patient and he confirmed accurate 12 hour prograf level, will ensure next labs are 12 hour as well and will remain on same dose until next labs reviewed.

## 2018-02-26 NOTE — TELEPHONE ENCOUNTER
Callback: Pt tac level elevating over 2 months (4.8>6.3>7.9), goal 4-6. Pt denies any changes to med list or health.     Decrease Tacrolimus to 3.5 mg BID, take 4mg q AM and 3 mg q PM until 0.5 mg tablets arrive. Will recheck level next Monday.

## 2018-02-26 NOTE — TELEPHONE ENCOUNTER
Tacrolimus = 7.9  Prev levels within goal.    PLAN:  Confirm result is a good trough.  Recommend rechecking Tacrolimus level and making sure it is drawn 12hrs from last dose.  If remains elevated, will plan to decrease dose.

## 2018-03-30 ENCOUNTER — TELEPHONE (OUTPATIENT)
Dept: TRANSPLANT | Facility: CLINIC | Age: 63
End: 2018-03-30

## 2018-03-30 DIAGNOSIS — Z79.899 ENCOUNTER FOR LONG-TERM CURRENT USE OF MEDICATION: ICD-10-CM

## 2018-03-30 DIAGNOSIS — Z94.0 KIDNEY TRANSPLANT RECIPIENT: ICD-10-CM

## 2018-03-30 DIAGNOSIS — Z94.0 KIDNEY REPLACED BY TRANSPLANT: ICD-10-CM

## 2018-03-30 DIAGNOSIS — Z48.298 AFTERCARE FOLLOWING ORGAN TRANSPLANT: ICD-10-CM

## 2018-03-30 LAB
ANION GAP SERPL CALCULATED.3IONS-SCNC: 10 MMOL/L (ref 3–14)
BUN SERPL-MCNC: 16 MG/DL (ref 7–30)
CALCIUM SERPL-MCNC: 9.7 MG/DL (ref 8.5–10.1)
CHLORIDE SERPL-SCNC: 105 MMOL/L (ref 94–109)
CO2 SERPL-SCNC: 23 MMOL/L (ref 20–32)
CREAT SERPL-MCNC: 1 MG/DL (ref 0.66–1.25)
ERYTHROCYTE [DISTWIDTH] IN BLOOD BY AUTOMATED COUNT: 13.7 % (ref 10–15)
GFR SERPL CREATININE-BSD FRML MDRD: 76 ML/MIN/1.7M2
GLUCOSE SERPL-MCNC: 273 MG/DL (ref 70–99)
HCT VFR BLD AUTO: 48.6 % (ref 40–53)
HGB BLD-MCNC: 16.1 G/DL (ref 13.3–17.7)
MCH RBC QN AUTO: 28 PG (ref 26.5–33)
MCHC RBC AUTO-ENTMCNC: 33.1 G/DL (ref 31.5–36.5)
MCV RBC AUTO: 84 FL (ref 78–100)
PLATELET # BLD AUTO: 201 10E9/L (ref 150–450)
POTASSIUM SERPL-SCNC: 4.1 MMOL/L (ref 3.4–5.3)
RBC # BLD AUTO: 5.76 10E12/L (ref 4.4–5.9)
SODIUM SERPL-SCNC: 138 MMOL/L (ref 133–144)
TACROLIMUS BLD-MCNC: 5.7 UG/L (ref 5–15)
TME LAST DOSE: NORMAL H
WBC # BLD AUTO: 12.9 10E9/L (ref 4–11)

## 2018-03-30 PROCEDURE — 85027 COMPLETE CBC AUTOMATED: CPT | Performed by: INTERNAL MEDICINE

## 2018-03-30 PROCEDURE — 80048 BASIC METABOLIC PNL TOTAL CA: CPT | Performed by: INTERNAL MEDICINE

## 2018-03-30 PROCEDURE — 36415 COLL VENOUS BLD VENIPUNCTURE: CPT | Performed by: INTERNAL MEDICINE

## 2018-03-30 PROCEDURE — 80197 ASSAY OF TACROLIMUS: CPT | Performed by: INTERNAL MEDICINE

## 2018-03-30 NOTE — TELEPHONE ENCOUNTER
WBC = 12.9    PLAN:  Call Corey Roland and check if feeling sick?  If with symptoms, need to go to PCP.

## 2018-04-13 DIAGNOSIS — Z94.0 KIDNEY TRANSPLANT RECIPIENT: ICD-10-CM

## 2018-04-13 RX ORDER — TACROLIMUS 1 MG/1
4 CAPSULE ORAL 2 TIMES DAILY
Qty: 240 CAPSULE | Refills: 11 | Status: SHIPPED | OUTPATIENT
Start: 2018-04-13 | End: 2018-04-18

## 2018-04-13 RX ORDER — TACROLIMUS 0.5 MG/1
CAPSULE ORAL
Qty: 60 CAPSULE | Refills: 11 | Status: SHIPPED | OUTPATIENT
Start: 2018-04-13 | End: 2018-04-18

## 2018-04-18 DIAGNOSIS — Z94.0 KIDNEY TRANSPLANT RECIPIENT: Primary | ICD-10-CM

## 2018-04-18 RX ORDER — TACROLIMUS 0.5 MG/1
0.5 CAPSULE ORAL 2 TIMES DAILY
Qty: 60 CAPSULE | Refills: 11 | Status: SHIPPED | OUTPATIENT
Start: 2018-04-18 | End: 2018-04-19

## 2018-04-18 RX ORDER — TACROLIMUS 1 MG/1
3 CAPSULE ORAL 2 TIMES DAILY
Qty: 180 CAPSULE | Refills: 11 | Status: SHIPPED | OUTPATIENT
Start: 2018-04-18 | End: 2018-04-19

## 2018-04-19 DIAGNOSIS — Z94.0 KIDNEY TRANSPLANT RECIPIENT: ICD-10-CM

## 2018-04-19 RX ORDER — TACROLIMUS 1 MG/1
4 CAPSULE ORAL 2 TIMES DAILY
Qty: 240 CAPSULE | Refills: 11 | Status: SHIPPED | OUTPATIENT
Start: 2018-04-19 | End: 2018-05-21

## 2018-04-19 RX ORDER — TACROLIMUS 0.5 MG/1
0.5 CAPSULE ORAL 2 TIMES DAILY
Qty: 60 CAPSULE | Refills: 11 | Status: CANCELLED | OUTPATIENT
Start: 2018-04-19

## 2018-04-19 NOTE — TELEPHONE ENCOUNTER
Patient Call: Patient Call: Medication Refill  Route to LPN  Instruct the patient to first contact their pharmacy. If they have called their pharmacy and require further assistance, route to LPN.  Pharmacy Name:  Pharmacy  Pharmacy Location: [address] [place] [address]  Name of Medication: Tacrolimus 1 mg   When will the patient be out of this medication?: Less than 3 days (Route high priority)  Patient states he only wants the Tacrolimus 1 mg tab patient states he take 1 mg he does not take the 0.5 mg and he would like a call back from the pharmacy when the order is ready.

## 2018-04-20 DIAGNOSIS — Z48.298 AFTERCARE FOLLOWING ORGAN TRANSPLANT: ICD-10-CM

## 2018-04-20 DIAGNOSIS — Z94.0 KIDNEY REPLACED BY TRANSPLANT: ICD-10-CM

## 2018-04-20 DIAGNOSIS — Z79.899 ENCOUNTER FOR LONG-TERM CURRENT USE OF MEDICATION: ICD-10-CM

## 2018-04-20 LAB
ANION GAP SERPL CALCULATED.3IONS-SCNC: 9 MMOL/L (ref 3–14)
BUN SERPL-MCNC: 14 MG/DL (ref 7–30)
CALCIUM SERPL-MCNC: 10.7 MG/DL (ref 8.5–10.1)
CHLORIDE SERPL-SCNC: 106 MMOL/L (ref 94–109)
CO2 SERPL-SCNC: 25 MMOL/L (ref 20–32)
CREAT SERPL-MCNC: 1.14 MG/DL (ref 0.66–1.25)
ERYTHROCYTE [DISTWIDTH] IN BLOOD BY AUTOMATED COUNT: 14 % (ref 10–15)
GFR SERPL CREATININE-BSD FRML MDRD: 65 ML/MIN/1.7M2
GLUCOSE SERPL-MCNC: 148 MG/DL (ref 70–99)
HCT VFR BLD AUTO: 51 % (ref 40–53)
HGB BLD-MCNC: 17.4 G/DL (ref 13.3–17.7)
MCH RBC QN AUTO: 28.2 PG (ref 26.5–33)
MCHC RBC AUTO-ENTMCNC: 34.1 G/DL (ref 31.5–36.5)
MCV RBC AUTO: 83 FL (ref 78–100)
PLATELET # BLD AUTO: 218 10E9/L (ref 150–450)
POTASSIUM SERPL-SCNC: 4 MMOL/L (ref 3.4–5.3)
RBC # BLD AUTO: 6.17 10E12/L (ref 4.4–5.9)
SODIUM SERPL-SCNC: 140 MMOL/L (ref 133–144)
TACROLIMUS BLD-MCNC: 8.5 UG/L (ref 5–15)
TME LAST DOSE: NORMAL H
WBC # BLD AUTO: 7 10E9/L (ref 4–11)

## 2018-04-20 PROCEDURE — 36415 COLL VENOUS BLD VENIPUNCTURE: CPT | Performed by: INTERNAL MEDICINE

## 2018-04-20 PROCEDURE — 80197 ASSAY OF TACROLIMUS: CPT | Performed by: INTERNAL MEDICINE

## 2018-04-20 PROCEDURE — 80048 BASIC METABOLIC PNL TOTAL CA: CPT | Performed by: INTERNAL MEDICINE

## 2018-04-20 PROCEDURE — 85027 COMPLETE CBC AUTOMATED: CPT | Performed by: INTERNAL MEDICINE

## 2018-05-07 ENCOUNTER — TELEPHONE (OUTPATIENT)
Dept: TRANSPLANT | Facility: CLINIC | Age: 63
End: 2018-05-07

## 2018-05-07 DIAGNOSIS — Z94.0 KIDNEY TRANSPLANT RECIPIENT: ICD-10-CM

## 2018-05-07 NOTE — TELEPHONE ENCOUNTER
Patient Call: Medication Refill  Route to LPN  Instruct the patient to first contact their pharmacy. If they have called their pharmacy and require further assistance, route to LPN.    Pharmacy Name: [facility]  Pharmacy Location: [place]  Name of Medication: Omepersol  Dose: 20mg  When will the patient be out of this medication?: Greater than 3 days (Route standard priority)       Please call the pt regarding refill.

## 2018-05-08 RX ORDER — NICOTINE POLACRILEX 4 MG/1
20 GUM, CHEWING ORAL EVERY MORNING
Qty: 60 TABLET | Refills: 0 | Status: SHIPPED | OUTPATIENT
Start: 2018-05-08 | End: 2020-04-14

## 2018-05-18 DIAGNOSIS — Z79.899 ENCOUNTER FOR LONG-TERM CURRENT USE OF MEDICATION: ICD-10-CM

## 2018-05-18 DIAGNOSIS — Z48.298 AFTERCARE FOLLOWING ORGAN TRANSPLANT: ICD-10-CM

## 2018-05-18 DIAGNOSIS — Z94.0 KIDNEY REPLACED BY TRANSPLANT: ICD-10-CM

## 2018-05-18 LAB
ANION GAP SERPL CALCULATED.3IONS-SCNC: 8 MMOL/L (ref 3–14)
BUN SERPL-MCNC: 21 MG/DL (ref 7–30)
CALCIUM SERPL-MCNC: 9.8 MG/DL (ref 8.5–10.1)
CHLORIDE SERPL-SCNC: 104 MMOL/L (ref 94–109)
CO2 SERPL-SCNC: 25 MMOL/L (ref 20–32)
CREAT SERPL-MCNC: 1.19 MG/DL (ref 0.66–1.25)
ERYTHROCYTE [DISTWIDTH] IN BLOOD BY AUTOMATED COUNT: 13.9 % (ref 10–15)
GFR SERPL CREATININE-BSD FRML MDRD: 62 ML/MIN/1.7M2
GLUCOSE SERPL-MCNC: 167 MG/DL (ref 70–99)
HCT VFR BLD AUTO: 50.9 % (ref 40–53)
HGB BLD-MCNC: 17 G/DL (ref 13.3–17.7)
MCH RBC QN AUTO: 28.2 PG (ref 26.5–33)
MCHC RBC AUTO-ENTMCNC: 33.4 G/DL (ref 31.5–36.5)
MCV RBC AUTO: 85 FL (ref 78–100)
PLATELET # BLD AUTO: 194 10E9/L (ref 150–450)
POTASSIUM SERPL-SCNC: 4.3 MMOL/L (ref 3.4–5.3)
RBC # BLD AUTO: 6.02 10E12/L (ref 4.4–5.9)
SODIUM SERPL-SCNC: 137 MMOL/L (ref 133–144)
TACROLIMUS BLD-MCNC: 8.6 UG/L (ref 5–15)
TME LAST DOSE: NORMAL H
WBC # BLD AUTO: 7.5 10E9/L (ref 4–11)

## 2018-05-18 PROCEDURE — 85027 COMPLETE CBC AUTOMATED: CPT | Performed by: INTERNAL MEDICINE

## 2018-05-18 PROCEDURE — 36415 COLL VENOUS BLD VENIPUNCTURE: CPT | Performed by: INTERNAL MEDICINE

## 2018-05-18 PROCEDURE — 80048 BASIC METABOLIC PNL TOTAL CA: CPT | Performed by: INTERNAL MEDICINE

## 2018-05-18 PROCEDURE — 80197 ASSAY OF TACROLIMUS: CPT | Performed by: INTERNAL MEDICINE

## 2018-05-21 ENCOUNTER — TELEPHONE (OUTPATIENT)
Dept: NEPHROLOGY | Facility: CLINIC | Age: 63
End: 2018-05-21

## 2018-05-21 DIAGNOSIS — I15.1 HYPERTENSION SECONDARY TO OTHER RENAL DISORDERS (CODE): ICD-10-CM

## 2018-05-21 DIAGNOSIS — Z94.0 KIDNEY TRANSPLANT RECIPIENT: ICD-10-CM

## 2018-05-21 DIAGNOSIS — Z94.0 KIDNEY TRANSPLANTED: Primary | ICD-10-CM

## 2018-05-21 RX ORDER — TACROLIMUS 1 MG/1
3 CAPSULE ORAL 2 TIMES DAILY
Qty: 180 CAPSULE | Refills: 11 | Status: SHIPPED | OUTPATIENT
Start: 2018-05-21 | End: 2018-06-04

## 2018-05-21 RX ORDER — MINOXIDIL 10 MG/1
10 TABLET ORAL 2 TIMES DAILY
Qty: 180 TABLET | Refills: 3 | Status: SHIPPED | OUTPATIENT
Start: 2018-05-21 | End: 2018-06-19

## 2018-05-21 NOTE — TELEPHONE ENCOUNTER
Spoke to patient regarding Tac level = 8.6, above goal.  Patient confirmed good 12 hour trough level and current dose of 4 mg BID.  Patient verbalizes understanding of medication dose decrease to 3 mg BID and will repeat labs in 2 weeks.  Updated current lab orders in [facility].

## 2018-05-21 NOTE — TELEPHONE ENCOUNTER
Patient Call: Transplant Lab/Orders  Route to LPN  Post Transplant Days: 543  When patient is less than 60 days post-transplant, route high priority    Reason for Call: patient left a voicemail message on 5/21/18 at 8:18 am stating that his Tacrolimus level of 8.6 has been elevated for about 4 months. The patient would like the doctor to know and to see what should be done.  Callback needed? Yes    Return Call Needed  Same as documented in contacts section  When to return call?: Same day: Route High Priority

## 2018-05-21 NOTE — TELEPHONE ENCOUNTER
Tacrolimus 8.6, goal 4-6 per protocol  Please call pt to verify this was a good trough level. Confirm dose 4 mg BID. Decrease tacrolimus to 3 mg BID. Repeat labs in 1-2 weeks.

## 2018-06-01 DIAGNOSIS — Z79.899 ENCOUNTER FOR LONG-TERM CURRENT USE OF MEDICATION: ICD-10-CM

## 2018-06-01 DIAGNOSIS — Z48.298 AFTERCARE FOLLOWING ORGAN TRANSPLANT: ICD-10-CM

## 2018-06-01 DIAGNOSIS — Z94.0 KIDNEY REPLACED BY TRANSPLANT: ICD-10-CM

## 2018-06-01 PROCEDURE — 36415 COLL VENOUS BLD VENIPUNCTURE: CPT | Performed by: INTERNAL MEDICINE

## 2018-06-01 PROCEDURE — 80197 ASSAY OF TACROLIMUS: CPT | Performed by: INTERNAL MEDICINE

## 2018-06-02 LAB
TACROLIMUS BLD-MCNC: 11.2 UG/L (ref 5–15)
TME LAST DOSE: NORMAL H

## 2018-06-04 DIAGNOSIS — Z94.0 KIDNEY TRANSPLANTED: Primary | ICD-10-CM

## 2018-06-04 DIAGNOSIS — Z94.0 KIDNEY TRANSPLANT RECIPIENT: ICD-10-CM

## 2018-06-04 NOTE — TELEPHONE ENCOUNTER
Patient returned call and confirmed current dose and good 12 hour trough level.  Patient verbalizes understanding of medication dose decrease to 2 mg BID and will repeat labs next week.  Updated lab orders in [facility].

## 2018-06-04 NOTE — TELEPHONE ENCOUNTER
Tacrolimus 11.2, goal 4-6 per protocol  Please call pt to verify this was a good trough level. Confirm dose 3 mg BID. Decrease tacrolimus to 2 mg BID. Repeat labs next week.

## 2018-06-04 NOTE — TELEPHONE ENCOUNTER
Left message for patient regarding Tacrolimus level = 11.2, above goal.  Instructed patient return call and confirm current dose and good 12 hour trough level.  If both are accurate, patient should decrease dose to 2 mg BID and repeat labs next week.

## 2018-06-05 RX ORDER — TACROLIMUS 1 MG/1
2 CAPSULE ORAL 2 TIMES DAILY
Qty: 120 CAPSULE | Refills: 11 | Status: SHIPPED | OUTPATIENT
Start: 2018-06-05 | End: 2018-06-18

## 2018-06-08 DIAGNOSIS — Z94.0 KIDNEY TRANSPLANTED: ICD-10-CM

## 2018-06-08 LAB
ANION GAP SERPL CALCULATED.3IONS-SCNC: 11 MMOL/L (ref 3–14)
BUN SERPL-MCNC: 16 MG/DL (ref 7–30)
CALCIUM SERPL-MCNC: 9.9 MG/DL (ref 8.5–10.1)
CHLORIDE SERPL-SCNC: 103 MMOL/L (ref 94–109)
CO2 SERPL-SCNC: 22 MMOL/L (ref 20–32)
CREAT SERPL-MCNC: 0.94 MG/DL (ref 0.66–1.25)
ERYTHROCYTE [DISTWIDTH] IN BLOOD BY AUTOMATED COUNT: 14.4 % (ref 10–15)
GFR SERPL CREATININE-BSD FRML MDRD: 81 ML/MIN/1.7M2
GLUCOSE SERPL-MCNC: 292 MG/DL (ref 70–99)
HCT VFR BLD AUTO: 52.1 % (ref 40–53)
HGB BLD-MCNC: 18 G/DL (ref 13.3–17.7)
MCH RBC QN AUTO: 28.5 PG (ref 26.5–33)
MCHC RBC AUTO-ENTMCNC: 34.5 G/DL (ref 31.5–36.5)
MCV RBC AUTO: 83 FL (ref 78–100)
PLATELET # BLD AUTO: 211 10E9/L (ref 150–450)
POTASSIUM SERPL-SCNC: 4.3 MMOL/L (ref 3.4–5.3)
RBC # BLD AUTO: 6.31 10E12/L (ref 4.4–5.9)
SODIUM SERPL-SCNC: 136 MMOL/L (ref 133–144)
WBC # BLD AUTO: 8.6 10E9/L (ref 4–11)

## 2018-06-08 PROCEDURE — 80048 BASIC METABOLIC PNL TOTAL CA: CPT | Performed by: INTERNAL MEDICINE

## 2018-06-08 PROCEDURE — 85027 COMPLETE CBC AUTOMATED: CPT | Performed by: INTERNAL MEDICINE

## 2018-06-08 PROCEDURE — 36415 COLL VENOUS BLD VENIPUNCTURE: CPT | Performed by: INTERNAL MEDICINE

## 2018-06-08 PROCEDURE — 80197 ASSAY OF TACROLIMUS: CPT | Performed by: INTERNAL MEDICINE

## 2018-06-09 LAB
TACROLIMUS BLD-MCNC: 5.8 UG/L (ref 5–15)
TME LAST DOSE: NORMAL H

## 2018-06-15 DIAGNOSIS — Z94.0 KIDNEY REPLACED BY TRANSPLANT: ICD-10-CM

## 2018-06-15 DIAGNOSIS — Z48.298 AFTERCARE FOLLOWING ORGAN TRANSPLANT: ICD-10-CM

## 2018-06-15 DIAGNOSIS — Z79.899 ENCOUNTER FOR LONG-TERM CURRENT USE OF MEDICATION: ICD-10-CM

## 2018-06-15 LAB
ANION GAP SERPL CALCULATED.3IONS-SCNC: 10 MMOL/L (ref 3–14)
BUN SERPL-MCNC: 22 MG/DL (ref 7–30)
CALCIUM SERPL-MCNC: 9.7 MG/DL (ref 8.5–10.1)
CHLORIDE SERPL-SCNC: 104 MMOL/L (ref 94–109)
CO2 SERPL-SCNC: 23 MMOL/L (ref 20–32)
CREAT SERPL-MCNC: 0.92 MG/DL (ref 0.66–1.25)
ERYTHROCYTE [DISTWIDTH] IN BLOOD BY AUTOMATED COUNT: 14 % (ref 10–15)
GFR SERPL CREATININE-BSD FRML MDRD: 83 ML/MIN/1.7M2
GLUCOSE SERPL-MCNC: 243 MG/DL (ref 70–99)
HCT VFR BLD AUTO: 51.1 % (ref 40–53)
HGB BLD-MCNC: 17.6 G/DL (ref 13.3–17.7)
MCH RBC QN AUTO: 28.6 PG (ref 26.5–33)
MCHC RBC AUTO-ENTMCNC: 34.4 G/DL (ref 31.5–36.5)
MCV RBC AUTO: 83 FL (ref 78–100)
PLATELET # BLD AUTO: 219 10E9/L (ref 150–450)
POTASSIUM SERPL-SCNC: 4.2 MMOL/L (ref 3.4–5.3)
RBC # BLD AUTO: 6.16 10E12/L (ref 4.4–5.9)
SODIUM SERPL-SCNC: 137 MMOL/L (ref 133–144)
WBC # BLD AUTO: 8.2 10E9/L (ref 4–11)

## 2018-06-15 PROCEDURE — 80197 ASSAY OF TACROLIMUS: CPT | Performed by: INTERNAL MEDICINE

## 2018-06-15 PROCEDURE — 36415 COLL VENOUS BLD VENIPUNCTURE: CPT | Performed by: INTERNAL MEDICINE

## 2018-06-15 PROCEDURE — 85027 COMPLETE CBC AUTOMATED: CPT | Performed by: INTERNAL MEDICINE

## 2018-06-15 PROCEDURE — 80048 BASIC METABOLIC PNL TOTAL CA: CPT | Performed by: INTERNAL MEDICINE

## 2018-06-16 LAB
TACROLIMUS BLD-MCNC: 4.3 UG/L (ref 5–15)
TME LAST DOSE: 2230 H

## 2018-06-18 DIAGNOSIS — Z94.0 KIDNEY TRANSPLANTED: Primary | ICD-10-CM

## 2018-06-18 DIAGNOSIS — Z94.0 KIDNEY TRANSPLANT RECIPIENT: ICD-10-CM

## 2018-06-18 RX ORDER — TACROLIMUS 1 MG/1
2 CAPSULE ORAL 2 TIMES DAILY
Qty: 120 CAPSULE | Refills: 11 | Status: SHIPPED | OUTPATIENT
Start: 2018-06-18 | End: 2018-06-21

## 2018-06-18 RX ORDER — TACROLIMUS 0.5 MG/1
0.5 CAPSULE ORAL 2 TIMES DAILY
Qty: 60 CAPSULE | Refills: 11 | Status: SHIPPED | OUTPATIENT
Start: 2018-06-18 | End: 2018-06-21

## 2018-06-18 NOTE — TELEPHONE ENCOUNTER
Spoke to patient regarding tac level within goal.  Patient verbalizes understanding to increase dose to 2.5 mg BID and repeat labs in 1-2 weeks.

## 2018-06-18 NOTE — TELEPHONE ENCOUNTER
Patient Call: Transplant Lab/Orders  Route to LPN  Post Transplant Days: 571  When patient is less than 60 days post-transplant, route high priority    Reason for Call: Pt will be having a tacro lab draw on July 10th, if there is any dosage or blood changes please call the pt. Otherwise he is checking in w/ his team regarding his Tx.   Callback needed? No, unless needed.

## 2018-06-18 NOTE — TELEPHONE ENCOUNTER
Tacrolimus level within goal, but towards lower end of 4-6  Increase tacrolimus to 2.5 mg BID. Repeat level in 1-2 weeks. If stable, can then return to monthly labs.

## 2018-06-19 ENCOUNTER — TELEPHONE (OUTPATIENT)
Dept: NEPHROLOGY | Facility: CLINIC | Age: 63
End: 2018-06-19

## 2018-06-19 NOTE — TELEPHONE ENCOUNTER
Call to patient regarding result note per Dr. Hernández. Patient reports that he is not taking losartan and has not for some time. Patient reports BP today ( 3 hours before he takes his medications 149/67 P 79) and reports swelling is getting better (wearing compression stockings 1 out of 7 days a week).  Current BP related medications:  Clonidine 2 (0.3 mg ) BID  Carvedilol 25 mg BID  Lasix 20 (60 mg) BID  Nifedipine 30 mg BID    Patient states he is no longer taking minoxidil or losartan. Will send to [facility] [place] for follow up.    Lamar Rose LPN  Nephrology  [phone]

## 2018-06-21 DIAGNOSIS — Z94.0 KIDNEY TRANSPLANT RECIPIENT: ICD-10-CM

## 2018-06-21 DIAGNOSIS — Z94.0 KIDNEY TRANSPLANTED: ICD-10-CM

## 2018-06-21 RX ORDER — TACROLIMUS 0.5 MG/1
0.5 CAPSULE ORAL 2 TIMES DAILY
Qty: 60 CAPSULE | Refills: 11 | Status: SHIPPED | OUTPATIENT
Start: 2018-06-21 | End: 2019-06-13

## 2018-06-21 RX ORDER — TACROLIMUS 1 MG/1
2 CAPSULE ORAL 2 TIMES DAILY
Qty: 120 CAPSULE | Refills: 11 | Status: SHIPPED | OUTPATIENT
Start: 2018-06-21 | End: 2019-06-13

## 2018-06-21 NOTE — TELEPHONE ENCOUNTER
Patient Call: Medication Refill  Route to LPN  Instruct the patient to first contact their pharmacy. If they have called their pharmacy and require further assistance, route to LPN.    [facility]  Pharmacy Location: clinic  Name of Medication: Tacro Dose: 0.5 mg  When will the patient be out of this medication?: Greater than 3 days (Route standard priority)     Also needs orders for repeat Tacro level to be done Fri June 29th at [facility]

## 2018-06-29 ENCOUNTER — RESULTS ONLY (OUTPATIENT)
Dept: OTHER | Facility: CLINIC | Age: 63
End: 2018-06-29

## 2018-06-29 DIAGNOSIS — Z79.899 ENCOUNTER FOR LONG-TERM CURRENT USE OF MEDICATION: ICD-10-CM

## 2018-06-29 DIAGNOSIS — Z48.298 AFTERCARE FOLLOWING ORGAN TRANSPLANT: ICD-10-CM

## 2018-06-29 DIAGNOSIS — Z94.0 KIDNEY TRANSPLANTED: ICD-10-CM

## 2018-06-29 DIAGNOSIS — Z94.0 KIDNEY REPLACED BY TRANSPLANT: ICD-10-CM

## 2018-06-29 LAB
PROT UR-MCNC: 0.27 G/L
PROT/CREAT 24H UR: 1.16 G/G CR (ref 0–0.2)

## 2018-06-29 PROCEDURE — 80197 ASSAY OF TACROLIMUS: CPT | Performed by: INTERNAL MEDICINE

## 2018-06-29 PROCEDURE — 86832 HLA CLASS I HIGH DEFIN QUAL: CPT | Performed by: TRANSPLANT SURGERY

## 2018-06-29 PROCEDURE — 86833 HLA CLASS II HIGH DEFIN QUAL: CPT | Performed by: TRANSPLANT SURGERY

## 2018-06-29 PROCEDURE — 84156 ASSAY OF PROTEIN URINE: CPT | Performed by: INTERNAL MEDICINE

## 2018-06-29 PROCEDURE — 36415 COLL VENOUS BLD VENIPUNCTURE: CPT | Performed by: INTERNAL MEDICINE

## 2018-06-30 LAB
TACROLIMUS BLD-MCNC: 6.2 UG/L (ref 5–15)
TME LAST DOSE: NORMAL H

## 2018-07-02 ENCOUNTER — TELEPHONE (OUTPATIENT)
Dept: NEPHROLOGY | Facility: CLINIC | Age: 63
End: 2018-07-02

## 2018-07-02 LAB — PRA DONOR SPECIFIC ABY: NORMAL

## 2018-07-02 NOTE — TELEPHONE ENCOUNTER
Called patient back. States his blood sugars have been hard to control lately, so he was recently started on Tresiba. He wanted the team to be aware as this could be causing the elevated proteinuria.    Keli Gibson RN

## 2018-07-02 NOTE — TELEPHONE ENCOUNTER
Per Dr. Hernández:    Can you see how bp is doing? Proteinuria worsens with poor bp control    Called patient. Said he doesn't know how accurate his home BP cuff is. Previously was seeing readings around 145-159/64-70. The last few days it's been higher, today was 168/71.    He's had clinic readings around 123/49, so he doesn't know what to think. Did note he stopped minoxidil one month ago. Update sent to Dr. Hernández and Asha.    Keli Gibson RN

## 2018-07-02 NOTE — TELEPHONE ENCOUNTER
[facility] Call Center    Phone Message    May a detailed message be left on voicemail: yes    Reason for Call: Other: The pt has additional questions about the protein in his urine to discuss with Keli. Please call the pt. Thanks!     Action Taken: Message routed to:  [facility]

## 2018-07-03 NOTE — TELEPHONE ENCOUNTER
Per Dr. Hernández:    Poor blood sugar control certainly can worsen protein over time.     Called patient, relayed message. He denied further questions or concerns.    Keli Gibson RN

## 2018-07-27 DIAGNOSIS — Z94.0 KIDNEY REPLACED BY TRANSPLANT: ICD-10-CM

## 2018-07-27 DIAGNOSIS — Z79.899 ENCOUNTER FOR LONG-TERM CURRENT USE OF MEDICATION: ICD-10-CM

## 2018-07-27 DIAGNOSIS — Z48.298 AFTERCARE FOLLOWING ORGAN TRANSPLANT: ICD-10-CM

## 2018-07-27 LAB
ANION GAP SERPL CALCULATED.3IONS-SCNC: 9 MMOL/L (ref 3–14)
BUN SERPL-MCNC: 15 MG/DL (ref 7–30)
CALCIUM SERPL-MCNC: 9.5 MG/DL (ref 8.5–10.1)
CHLORIDE SERPL-SCNC: 105 MMOL/L (ref 94–109)
CO2 SERPL-SCNC: 26 MMOL/L (ref 20–32)
CREAT SERPL-MCNC: 1.03 MG/DL (ref 0.66–1.25)
ERYTHROCYTE [DISTWIDTH] IN BLOOD BY AUTOMATED COUNT: 14.5 % (ref 10–15)
GFR SERPL CREATININE-BSD FRML MDRD: 73 ML/MIN/1.7M2
GLUCOSE SERPL-MCNC: 109 MG/DL (ref 70–99)
HCT VFR BLD AUTO: 52.7 % (ref 40–53)
HGB BLD-MCNC: 18.1 G/DL (ref 13.3–17.7)
MCH RBC QN AUTO: 28.5 PG (ref 26.5–33)
MCHC RBC AUTO-ENTMCNC: 34.3 G/DL (ref 31.5–36.5)
MCV RBC AUTO: 83 FL (ref 78–100)
PLATELET # BLD AUTO: 215 10E9/L (ref 150–450)
POTASSIUM SERPL-SCNC: 4.2 MMOL/L (ref 3.4–5.3)
RBC # BLD AUTO: 6.35 10E12/L (ref 4.4–5.9)
SODIUM SERPL-SCNC: 140 MMOL/L (ref 133–144)
WBC # BLD AUTO: 6.6 10E9/L (ref 4–11)

## 2018-07-27 PROCEDURE — 80048 BASIC METABOLIC PNL TOTAL CA: CPT | Performed by: INTERNAL MEDICINE

## 2018-07-27 PROCEDURE — 85027 COMPLETE CBC AUTOMATED: CPT | Performed by: INTERNAL MEDICINE

## 2018-07-27 PROCEDURE — 80197 ASSAY OF TACROLIMUS: CPT | Performed by: INTERNAL MEDICINE

## 2018-07-27 PROCEDURE — 87799 DETECT AGENT NOS DNA QUANT: CPT | Performed by: INTERNAL MEDICINE

## 2018-07-27 PROCEDURE — 36415 COLL VENOUS BLD VENIPUNCTURE: CPT | Performed by: INTERNAL MEDICINE

## 2018-07-28 LAB
TACROLIMUS BLD-MCNC: 6.8 UG/L (ref 5–15)
TME LAST DOSE: 1000 H

## 2018-08-27 DIAGNOSIS — Z79.899 ENCOUNTER FOR LONG-TERM CURRENT USE OF MEDICATION: ICD-10-CM

## 2018-08-27 DIAGNOSIS — Z48.298 AFTERCARE FOLLOWING ORGAN TRANSPLANT: ICD-10-CM

## 2018-08-27 DIAGNOSIS — Z94.0 KIDNEY REPLACED BY TRANSPLANT: ICD-10-CM

## 2018-08-27 LAB
ANION GAP SERPL CALCULATED.3IONS-SCNC: 9 MMOL/L (ref 3–14)
BUN SERPL-MCNC: 17 MG/DL (ref 7–30)
CALCIUM SERPL-MCNC: 9.8 MG/DL (ref 8.5–10.1)
CHLORIDE SERPL-SCNC: 105 MMOL/L (ref 94–109)
CO2 SERPL-SCNC: 23 MMOL/L (ref 20–32)
CREAT SERPL-MCNC: 0.94 MG/DL (ref 0.66–1.25)
ERYTHROCYTE [DISTWIDTH] IN BLOOD BY AUTOMATED COUNT: 14 % (ref 10–15)
GFR SERPL CREATININE-BSD FRML MDRD: 82 ML/MIN/1.7M2
GLUCOSE SERPL-MCNC: 144 MG/DL (ref 70–99)
HCT VFR BLD AUTO: 49.1 % (ref 40–53)
HGB BLD-MCNC: 16.8 G/DL (ref 13.3–17.7)
MCH RBC QN AUTO: 28.8 PG (ref 26.5–33)
MCHC RBC AUTO-ENTMCNC: 34.2 G/DL (ref 31.5–36.5)
MCV RBC AUTO: 84 FL (ref 78–100)
PLATELET # BLD AUTO: 232 10E9/L (ref 150–450)
POTASSIUM SERPL-SCNC: 4.3 MMOL/L (ref 3.4–5.3)
RBC # BLD AUTO: 5.83 10E12/L (ref 4.4–5.9)
SODIUM SERPL-SCNC: 137 MMOL/L (ref 133–144)
WBC # BLD AUTO: 8.2 10E9/L (ref 4–11)

## 2018-08-27 PROCEDURE — 36415 COLL VENOUS BLD VENIPUNCTURE: CPT | Performed by: INTERNAL MEDICINE

## 2018-08-27 PROCEDURE — 80197 ASSAY OF TACROLIMUS: CPT | Performed by: INTERNAL MEDICINE

## 2018-08-27 PROCEDURE — 80048 BASIC METABOLIC PNL TOTAL CA: CPT | Performed by: INTERNAL MEDICINE

## 2018-08-27 PROCEDURE — 85027 COMPLETE CBC AUTOMATED: CPT | Performed by: INTERNAL MEDICINE

## 2018-08-28 LAB
TACROLIMUS BLD-MCNC: 4.4 UG/L (ref 5–15)
TME LAST DOSE: ABNORMAL H

## 2018-10-01 DIAGNOSIS — Z48.298 AFTERCARE FOLLOWING ORGAN TRANSPLANT: ICD-10-CM

## 2018-10-01 DIAGNOSIS — Z79.899 ENCOUNTER FOR LONG-TERM CURRENT USE OF MEDICATION: ICD-10-CM

## 2018-10-01 DIAGNOSIS — Z94.0 KIDNEY REPLACED BY TRANSPLANT: ICD-10-CM

## 2018-10-01 LAB
ANION GAP SERPL CALCULATED.3IONS-SCNC: 6 MMOL/L (ref 3–14)
BUN SERPL-MCNC: 16 MG/DL (ref 7–30)
CALCIUM SERPL-MCNC: 10.1 MG/DL (ref 8.5–10.1)
CHLORIDE SERPL-SCNC: 104 MMOL/L (ref 94–109)
CO2 SERPL-SCNC: 29 MMOL/L (ref 20–32)
CREAT SERPL-MCNC: 1.06 MG/DL (ref 0.66–1.25)
ERYTHROCYTE [DISTWIDTH] IN BLOOD BY AUTOMATED COUNT: 14.8 % (ref 10–15)
GFR SERPL CREATININE-BSD FRML MDRD: 71 ML/MIN/1.7M2
GLUCOSE SERPL-MCNC: 125 MG/DL (ref 70–99)
HCT VFR BLD AUTO: 53 % (ref 40–53)
HGB BLD-MCNC: 17.6 G/DL
MCH RBC QN AUTO: 28.7 PG (ref 26.5–33)
MCHC RBC AUTO-ENTMCNC: 33.2 G/DL (ref 31.5–36.5)
MCV RBC AUTO: 87 FL (ref 78–100)
PLATELET # BLD AUTO: 201 10E9/L (ref 150–450)
POTASSIUM SERPL-SCNC: 3.9 MMOL/L (ref 3.4–5.3)
RBC # BLD AUTO: 6.13 10E12/L (ref 4.4–5.9)
SODIUM SERPL-SCNC: 139 MMOL/L (ref 133–144)
TACROLIMUS BLD-MCNC: 5 UG/L (ref 5–15)
TME LAST DOSE: NORMAL H
WBC # BLD AUTO: 7.6 10E9/L (ref 4–11)

## 2018-10-01 PROCEDURE — 80197 ASSAY OF TACROLIMUS: CPT | Performed by: INTERNAL MEDICINE

## 2018-10-01 PROCEDURE — 36415 COLL VENOUS BLD VENIPUNCTURE: CPT | Performed by: INTERNAL MEDICINE

## 2018-10-01 PROCEDURE — 80048 BASIC METABOLIC PNL TOTAL CA: CPT | Performed by: INTERNAL MEDICINE

## 2018-10-01 PROCEDURE — 85027 COMPLETE CBC AUTOMATED: CPT | Performed by: INTERNAL MEDICINE

## 2018-10-23 ENCOUNTER — DOCUMENTATION ONLY (OUTPATIENT)
Dept: TRANSPLANT | Facility: CLINIC | Age: 63
End: 2018-10-23

## 2018-10-23 DIAGNOSIS — Z79.899 IMMUNOSUPPRESSIVE MANAGEMENT ENCOUNTER FOLLOWING KIDNEY TRANSPLANT: ICD-10-CM

## 2018-10-23 DIAGNOSIS — Z94.0 IMMUNOSUPPRESSIVE MANAGEMENT ENCOUNTER FOLLOWING KIDNEY TRANSPLANT: ICD-10-CM

## 2018-10-23 DIAGNOSIS — Z48.298 AFTERCARE FOLLOWING ORGAN TRANSPLANT: ICD-10-CM

## 2018-10-23 DIAGNOSIS — Z94.0 KIDNEY TRANSPLANTED: Primary | ICD-10-CM

## 2018-10-23 NOTE — LETTER
PHYSICIAN ORDERS    DATE & TIME ISSUED: 2018 4:06 PM  PATIENT NAME: Corey Roland   : 1955     [facility] MR# [if applicable]: [id number]     DIAGNOSIS / ICD - 10 CODES    Kidney Transplanted (Z94.0)    After Care Following Organ Transplant (Z48.298)    Long Term Use of Medication (Z79.899)    Complications Kidney Transplant (T86.10)      Please complete the following labs:    Monthly    Basic Metabolic panel    Complete Blood Count    Tacrolimus level    Every 6 months    Protein Random Urine with creatinine ratio    BK virus PCR Quantitative    PRA Donor Specific Antibodies      Patient should release information to the [facility].   Please fax results to the Transplant Center at [phone].  Any questions please call [phone].      .

## 2018-10-23 NOTE — PROGRESS NOTES
Chart Prep    Clinic Visit on: 12/4/18    Last lab completed:  10/1/18    Lab letter updated: 10/23/18    Lab orders up to date in Epic.

## 2018-10-23 NOTE — LETTER
PHYSICIAN ORDERS    DATE & TIME ISSUED: 2019 4:00 PM  PATIENT NAME: Corey Roland   : 1955     [facility] MR# [if applicable]: [id number]     DIAGNOSIS / ICD - 10 CODES    Kidney Transplanted (Z94.0)    After Care Following Organ Transplant (Z48.298)    Long Term Use of Medication (Z79.899)    Complications Kidney Transplant (T86.10)      Please complete the following labs:    Every other Month    Basic Metabolic panel    Complete Blood Count    Tacrolimus level    Every 6 months    Protein Random Urine with creatinine ratio    BK virus PCR Quantitative    PRA Donor Specific Antibodies      Patient should release information to the [facility].   Please fax results to the Transplant Center at [phone].  Any questions please call [phone].      .

## 2018-11-02 DIAGNOSIS — Z94.0 KIDNEY TRANSPLANTED: ICD-10-CM

## 2018-11-02 DIAGNOSIS — Z94.0 IMMUNOSUPPRESSIVE MANAGEMENT ENCOUNTER FOLLOWING KIDNEY TRANSPLANT: ICD-10-CM

## 2018-11-02 DIAGNOSIS — Z48.298 AFTERCARE FOLLOWING ORGAN TRANSPLANT: ICD-10-CM

## 2018-11-02 DIAGNOSIS — Z79.899 IMMUNOSUPPRESSIVE MANAGEMENT ENCOUNTER FOLLOWING KIDNEY TRANSPLANT: ICD-10-CM

## 2018-11-02 LAB
ANION GAP SERPL CALCULATED.3IONS-SCNC: 10 MMOL/L (ref 3–14)
BUN SERPL-MCNC: 16 MG/DL (ref 7–30)
CALCIUM SERPL-MCNC: 9.6 MG/DL (ref 8.5–10.1)
CHLORIDE SERPL-SCNC: 104 MMOL/L (ref 94–109)
CO2 SERPL-SCNC: 25 MMOL/L (ref 20–32)
CREAT SERPL-MCNC: 0.94 MG/DL (ref 0.66–1.25)
ERYTHROCYTE [DISTWIDTH] IN BLOOD BY AUTOMATED COUNT: 13.8 % (ref 10–15)
GFR SERPL CREATININE-BSD FRML MDRD: 81 ML/MIN/1.7M2
GLUCOSE SERPL-MCNC: 88 MG/DL (ref 70–99)
HCT VFR BLD AUTO: 51.1 % (ref 40–53)
HGB BLD-MCNC: 17.1 G/DL (ref 13.3–17.7)
MCH RBC QN AUTO: 29 PG (ref 26.5–33)
MCHC RBC AUTO-ENTMCNC: 33.5 G/DL (ref 31.5–36.5)
MCV RBC AUTO: 87 FL (ref 78–100)
PLATELET # BLD AUTO: 227 10E9/L (ref 150–450)
POTASSIUM SERPL-SCNC: 4 MMOL/L (ref 3.4–5.3)
RBC # BLD AUTO: 5.9 10E12/L (ref 4.4–5.9)
SODIUM SERPL-SCNC: 139 MMOL/L (ref 133–144)
WBC # BLD AUTO: 8.1 10E9/L (ref 4–11)

## 2018-11-02 PROCEDURE — 36415 COLL VENOUS BLD VENIPUNCTURE: CPT | Performed by: INTERNAL MEDICINE

## 2018-11-02 PROCEDURE — 85027 COMPLETE CBC AUTOMATED: CPT | Performed by: INTERNAL MEDICINE

## 2018-11-02 PROCEDURE — 80048 BASIC METABOLIC PNL TOTAL CA: CPT | Performed by: INTERNAL MEDICINE

## 2018-11-02 PROCEDURE — 80197 ASSAY OF TACROLIMUS: CPT | Performed by: INTERNAL MEDICINE

## 2018-11-03 LAB
TACROLIMUS BLD-MCNC: 5.8 UG/L (ref 5–15)
TME LAST DOSE: NORMAL H

## 2018-11-19 ENCOUNTER — TELEPHONE (OUTPATIENT)
Dept: NEPHROLOGY | Facility: CLINIC | Age: 63
End: 2018-11-19

## 2018-11-19 NOTE — TELEPHONE ENCOUNTER
Spoke with patient for transplant follow up. States he's doing well, no symptoms or concerns. He would like to discuss his diuretics at the appointment, as they're prescribed by his PCP, who doesn't know much about transplant. Denied further questions.    Keli Gibson RN

## 2018-12-04 ENCOUNTER — OFFICE VISIT (OUTPATIENT)
Dept: NEPHROLOGY | Facility: CLINIC | Age: 63
End: 2018-12-04
Attending: INTERNAL MEDICINE
Payer: MEDICARE

## 2018-12-04 ENCOUNTER — ALLIED HEALTH/NURSE VISIT (OUTPATIENT)
Dept: RESEARCH | Facility: CLINIC | Age: 63
End: 2018-12-04

## 2018-12-04 VITALS
SYSTOLIC BLOOD PRESSURE: 150 MMHG | WEIGHT: 286 LBS | OXYGEN SATURATION: 97 % | HEART RATE: 78 BPM | TEMPERATURE: 98 F | DIASTOLIC BLOOD PRESSURE: 79 MMHG | BODY MASS INDEX: 38.79 KG/M2

## 2018-12-04 DIAGNOSIS — Z94.0 KIDNEY TRANSPLANTED: ICD-10-CM

## 2018-12-04 DIAGNOSIS — Z48.298 AFTERCARE FOLLOWING ORGAN TRANSPLANT: ICD-10-CM

## 2018-12-04 DIAGNOSIS — D75.1 POST-TRANSPLANT ERYTHROCYTOSIS: ICD-10-CM

## 2018-12-04 DIAGNOSIS — Z00.6 EXAMINATION OF PARTICIPANT IN CLINICAL TRIAL: Primary | ICD-10-CM

## 2018-12-04 DIAGNOSIS — R80.9 PROTEINURIA, UNSPECIFIED TYPE: ICD-10-CM

## 2018-12-04 DIAGNOSIS — Z79.899 IMMUNOSUPPRESSIVE MANAGEMENT ENCOUNTER FOLLOWING KIDNEY TRANSPLANT: ICD-10-CM

## 2018-12-04 DIAGNOSIS — Z48.298 AFTERCARE FOLLOWING ORGAN TRANSPLANT: Primary | ICD-10-CM

## 2018-12-04 DIAGNOSIS — I15.9 SECONDARY HYPERTENSION: ICD-10-CM

## 2018-12-04 DIAGNOSIS — Z94.0 IMMUNOSUPPRESSIVE MANAGEMENT ENCOUNTER FOLLOWING KIDNEY TRANSPLANT: ICD-10-CM

## 2018-12-04 LAB
ANION GAP SERPL CALCULATED.3IONS-SCNC: 7 MMOL/L (ref 3–14)
BUN SERPL-MCNC: 21 MG/DL (ref 7–30)
CALCIUM SERPL-MCNC: 9.8 MG/DL (ref 8.5–10.1)
CHLORIDE SERPL-SCNC: 105 MMOL/L (ref 94–109)
CO2 SERPL-SCNC: 26 MMOL/L (ref 20–32)
CREAT SERPL-MCNC: 0.97 MG/DL (ref 0.66–1.25)
CREAT UR-MCNC: 41 MG/DL
ERYTHROCYTE [DISTWIDTH] IN BLOOD BY AUTOMATED COUNT: 13 % (ref 10–15)
GFR SERPL CREATININE-BSD FRML MDRD: 78 ML/MIN/1.7M2
GLUCOSE SERPL-MCNC: 126 MG/DL (ref 70–99)
HCT VFR BLD AUTO: 52.1 % (ref 40–53)
HGB BLD-MCNC: 17.1 G/DL (ref 13.3–17.7)
MCH RBC QN AUTO: 28.1 PG (ref 26.5–33)
MCHC RBC AUTO-ENTMCNC: 32.8 G/DL (ref 31.5–36.5)
MCV RBC AUTO: 86 FL (ref 78–100)
PLATELET # BLD AUTO: 236 10E9/L (ref 150–450)
POTASSIUM SERPL-SCNC: 4 MMOL/L (ref 3.4–5.3)
PROT UR-MCNC: 0.71 G/L
PROT/CREAT 24H UR: 1.75 G/G CR (ref 0–0.2)
RBC # BLD AUTO: 6.08 10E12/L (ref 4.4–5.9)
SODIUM SERPL-SCNC: 138 MMOL/L (ref 133–144)
WBC # BLD AUTO: 8.1 10E9/L (ref 4–11)

## 2018-12-04 PROCEDURE — 86833 HLA CLASS II HIGH DEFIN QUAL: CPT | Performed by: INTERNAL MEDICINE

## 2018-12-04 PROCEDURE — 85027 COMPLETE CBC AUTOMATED: CPT | Performed by: INTERNAL MEDICINE

## 2018-12-04 PROCEDURE — 36415 COLL VENOUS BLD VENIPUNCTURE: CPT | Performed by: INTERNAL MEDICINE

## 2018-12-04 PROCEDURE — 84156 ASSAY OF PROTEIN URINE: CPT | Performed by: INTERNAL MEDICINE

## 2018-12-04 PROCEDURE — 86832 HLA CLASS I HIGH DEFIN QUAL: CPT | Performed by: INTERNAL MEDICINE

## 2018-12-04 PROCEDURE — 87799 DETECT AGENT NOS DNA QUANT: CPT | Performed by: INTERNAL MEDICINE

## 2018-12-04 PROCEDURE — 80048 BASIC METABOLIC PNL TOTAL CA: CPT | Performed by: INTERNAL MEDICINE

## 2018-12-04 PROCEDURE — G0463 HOSPITAL OUTPT CLINIC VISIT: HCPCS | Mod: ZF

## 2018-12-04 RX ORDER — LOSARTAN POTASSIUM 50 MG/1
50 TABLET ORAL DAILY
Qty: 90 TABLET | Refills: 3 | Status: SHIPPED | OUTPATIENT
Start: 2018-12-04 | End: 2019-05-08 | Stop reason: ALTCHOICE

## 2018-12-04 ASSESSMENT — PAIN SCALES - GENERAL: PAINLEVEL: NO PAIN (0)

## 2018-12-04 NOTE — NURSING NOTE
Chief Complaint   Patient presents with     RECHECK     Follwo Up Kidney TX     /79 (BP Location: Right arm)  Pulse 78  Temp 98  F (36.7  C) (Oral)  Wt 129.7 kg (286 lb)  SpO2 97%  BMI 38.79 kg/m2   Justina Castellanos

## 2018-12-04 NOTE — PROGRESS NOTES
[facility]  Nephrology Clinic  Kidney/Pancreas Recipient  2018     Name: Corey Roland  MRN: [id number]   Age: 63 year old  : 1955  Referring provider: Abdiaziz Qureshi     CHRONIC TRANSPLANT NEPHROLOGY VISIT    Assessment and Plan:   # DDKT:    - Baseline Cr ~ 0.9-1.1; Stable   - Proteinuria: Moderate   - Date of DSA last checked: 2018 Latest DSA: No   - BK Viremia: No   - Kidney Tx Biopsy: No   - proteinuria is getting worse now 1.7g/g will start losartan 25mg daily will repeat labs if still with proteinuria will proceed with biopsy. Will update DSA.     # Immunosuppression: Tacrolimus immediate release (goal  4-6) and Mycophenolate mofetil (goal  1-3.5)   - Changes: No    # Prophylaxis:    - PJP on Bactrim.     # Hypertension: Inadequate control; Goal BP: < 140/90   - Changes: No clonidine 0.6 bid, coreg 25mg bid, lasix 60mg bid, nifedipine 30mg bid   -- will add losartan 50mg daily    # Diabetes: Controlled in insulin f/u with endocrinology last A1c 7    #  Post transplant erythrocytosis Hgb: Increased 17-18   - this could be post transplant erythrocytosis but will order US to native kidney to r/o malignancy    - will start losartan     # Mineral Bone Disorder:    - Secondary renal hyperparathyroidism; PTH level is: Mildly elevated   - Vitamin D; level is: Not checked recently   - Calcium; level is: Normal   - Phosphorus; level is: Not checked recently     # Electrolytes:   - Potassium; level: Normal  - Magnesium; level: Normal  - Bicarbonate; level: Normal      # Skin Cancer Risk:    - Discussed sun protection and recommend regular follow up with Dermatology.    # Medical Compliance: Yes    Follow-up: Data Unavailable     # Transplant History:  Etiology of kidney failure: diabetes mellitus type 2  Tx: DDKT  Transplant: 2016 (Kidney)  Donor Type:  - Cardiac Death Donor Class:   Crossmatch at time of Tx: negative  Significant changes in immunosuppression: None  Significant  transplant-related complications: None    Transplant Office Phone Number: [phone]    Assessment and plan was discussed with the patient and they voiced understanding and agreement.  Patient seen and discussed  with Dr. Wilkins.  Jerilyn Crystal  Nephrology Fellow  Pager: [phone]    Chief Complaint   Follow up.    History of Present Illness:  Corey Roland is a 63 year old male with a history of ESKD secondary to diabetes mellitus type 2 and is status post DDKT on 11/24/16. Patient was last seen by Dr. Hernández on 12/12/2017, please refer to that note for further details.   He was seen yesterday in clinic found that his glucose was low and was feeling low then sugar candy was given and increased his glucose, his dm, insulin dose decreased,   Today, patient feels ok no acute complaint, he      Recent Hospitalizations:  [x] No [] Yes    New Medical Issues: [x] No [] Yes    Decreased energy: [x] No [] Yes    Chest pain or SOB with exertion:  [x] No [] Yes    Appetite change or weight change: [x] No [] Yes    Nausea, vomiting or diarrhea:  [x] No [] Yes    Fever, sweats or chills: [x] No [] Yes    Leg swelling: [x] No [] Yes      Other medical issues:  No    Home BP: 130-140/69-70 early morning     Review of Systems:   A comprehensive review of systems was obtained and negative, except as noted in the HPI or past medical history.     Active Medications:     Current Outpatient Prescriptions:      ACCU-CHEK EBONIE PLUS test strip, , Disp: , Rfl: 3     aspirin EC 81 MG EC tablet, Take 1 tablet (81 mg) by mouth daily, Disp: 30 tablet, Rfl: 3     atorvastatin (LIPITOR) 20 MG tablet, Take 1 tablet (20 mg) by mouth daily, Disp: , Rfl:      carvedilol (COREG) 25 MG tablet, TAKE ONE TABLET BY MOUTH TWICE DAILY WITH MEALS, Disp: 60 tablet, Rfl: 11     cholecalciferol (VITAMIN  -D) 1000 UNITS capsule, Take 2 capsules (2,000 Units) by mouth daily, Disp: 180 capsule, Rfl: 3     cloNIDine (CATAPRES) 0.3 MG tablet, Take 0.6 mg  "by mouth 2 times daily Patient reports, Disp: 180 tablet, Rfl: 3     DIPHENHYDRAMINE HCL PO, Take 50 mg by mouth At Bedtime, Disp: , Rfl:      FLUZONE QUADRIVALENT injection, , Disp: , Rfl: 0     FUROSEMIDE PO, Take 60 mg by mouth 2 times daily, Disp: , Rfl:      insulin aspart (NOVOLOG PEN) 100 UNIT/ML injection, Inject 1 Units Subcutaneous Take with snacks or supplements for high blood sugar 1 unit per 4 grams carbohydrate, Disp: 1 mL, Rfl: 0     insulin aspart (NOVOLOG PEN) 100 UNIT/ML injection, Inject 1 Units Subcutaneous 3 times daily (with meals) DOSE:  1 units per 4 grams of carbohydrate.  Only chart total amount of units given.  Do not give if pre-prandial glucose is less than 60 mg/dL., Disp: , Rfl:      insulin aspart (NOVOLOG PEN) 100 UNIT/ML injection, Inject 1-22 Units Subcutaneous 3 times daily (before meals) Correction Scale - VERY HIGH INSULIN RESISTANCE DOSING    Do Not give Correction Insulin if Pre-Meal BG less than 250.  For Pre-Meal  - 260 give 1 unit.  For Pre-Meal -269  give 2 units.  For Pre-Meal  - 279 give 3 units.  For Pre-Meal  - 289 give 4 units.  For Pre-Meal  - 299 give 5 units.  For Pre-Meal  - 309 give 6 units.  ..., Disp: , Rfl:      insulin aspart (NOVOLOG PEN) 100 UNIT/ML injection, Inject 1-16 Units Subcutaneous At Bedtime Correction Scale - VERY HIGH INSULIN RESISTANCE DOSING    Do Not give Bedtime Correction Insulin if BG less than 200.  For  - 209 give 1 units.  For  - 219 give 2 units.  For  - 229 give 3 units.  For  - 239 give 4 units.  For  - 249 give 5 units.  For  - 259 give 6 units.  For  - 269 give 7 units.  For  - 279 give 8 units ..., Disp: , Rfl:      insulin glargine (LANTUS) 100 UNIT/ML injection, Inject 30 Units Subcutaneous, Disp: , Rfl:      insulin glargine (LANTUS) 100 UNIT/ML injection, Inject 40 Units Subcutaneous At Bedtime, Disp: , Rfl:      insulin syringe 31G X 5/16\" 0.5 " ML MISC, FOR ADMINISTERING INSULIN AT HOME 5 TIMES PER DAY., Disp: , Rfl:      mycophenolate (GENERIC EQUIVALENT) 250 MG capsule, Take 3 capsules (750 mg) by mouth 2 times daily, Disp: 180 capsule, Rfl: 11     NIFEdipine ER (ADALAT CC) 30 MG TB24, Take 1 tablet (30 mg) by mouth 2 times daily, Disp: 60 tablet, Rfl: 3     omeprazole 20 MG tablet, Take 1 tablet (20 mg) by mouth every morning, Disp: 60 tablet, Rfl: 0     SODIUM BICARBONATE PO, Take 650 mg by mouth 2 times daily, Disp: , Rfl:      sulfamethoxazole-trimethoprim (BACTRIM/SEPTRA) 400-80 MG per tablet, Take 1 tablet by mouth three times a week, Disp: 14 tablet, Rfl: 11     tacrolimus (GENERIC EQUIVALENT) 0.5 MG capsule, Take 1 capsule (0.5 mg) by mouth 2 times daily Total dose = 2.5 mg BID, Disp: 60 capsule, Rfl: 11     tacrolimus (GENERIC EQUIVALENT) 1 MG capsule, Take 2 capsules (2 mg) by mouth 2 times daily Total dose = 2.5 mg BID, Disp: 120 capsule, Rfl: 11      Allergies:   No known drug allergies     Active Medical Problems:  Hypertension  Secondary renal hyperparathyroidism  Obesity  Diabetic peripheral neuropathy  Immunosuppressed status  Type 1 diabetes mellitus with nephropathy  Vitamin D deficiency   Hypomagnesemia  Post-transplant erythrocytosis     Social History:   Patient formerly smoked 2 packs a day for 38 years until cessation in 2005. The patient denies smokeless tobacco. Patient currently drinks 0-1 standard drinks per week.      Physical Exam:   There were no vitals taken for this visit.   Wt Readings from Last 4 Encounters:   09/12/17 123.3 kg (271 lb 12.8 oz)   06/03/17 119.7 kg (263 lb 12.8 oz)   03/02/17 114 kg (251 lb 6.4 oz)   01/05/17 109.1 kg (240 lb 9.6 oz)     GENERAL APPEARANCE: alert and no distress  HENT: mouth without ulcers or lesions  LYMPHATICS: no cervical or supraclavicular nodes  RESP: lungs clear to auscultation - no rales, rhonchi or wheezes  CV: regular rhythm, normal rate, no rub, no murmur  EDEMA: 1+ LE edema  bilaterally  ABDOMEN: soft, nondistended, nontender, bowel sounds normal  MS: extremities normal - no gross deformities noted, no evidence of inflammation in joints, no muscle tenderness  SKIN: no rash  TX KIDNEY: normal  DIALYSIS ACCESS:  LUE AV Fistula good bruit     Data:     Renal Latest Ref Rng & Units 11/2/2018 10/1/2018 8/27/2018   Na 133 - 144 mmol/L 139 139 137   K 3.4 - 5.3 mmol/L 4.0 3.9 4.3   Cl 94 - 109 mmol/L 104 104 105   CO2 20 - 32 mmol/L 25 29 23   BUN 7 - 30 mg/dL 16 16 17   Cr 0.66 - 1.25 mg/dL 0.94 1.06 0.94   Glucose 70 - 99 mg/dL 88 125(H) 144(H)   Ca  8.5 - 10.1 mg/dL 9.6 10.1 9.8   Mg 1.6 - 2.3 mg/dL - - -     Bone Health Latest Ref Rng & Units 6/3/2017 3/3/2017 2/13/2017   Phos 2.5 - 4.5 mg/dL 1.9(L) - 2.0(L)   PTHi 12 - 72 pg/mL 279(H) 277(H) -   Vit D Def 20 - 75 ug/L 17(L) 8(L) -     Heme Latest Ref Rng & Units 11/2/2018 10/1/2018 8/27/2018   WBC 4.0 - 11.0 10e9/L 8.1 7.6 8.2   Hgb 13.3 - 17.7 g/dL 17.1 17.6 16.8   Plt 150 - 450 10e9/L 227 201 232     Liver Latest Ref Rng & Units 12/22/2017 9/12/2017 5/26/2017   AP 40 - 150 U/L 71 100 110   TBili 0.2 - 1.3 mg/dL 0.4 0.6 0.5   DBili 0.0 - 0.2 mg/dL 0.1 0.1 <0.1   ALT 0 - 70 U/L 21 30 37   AST 0 - 45 U/L 15 17 24   Tot Protein 6.8 - 8.8 g/dL 7.9 7.7 7.4   Albumin 3.4 - 5.0 g/dL 3.4 3.5 3.6     Pancreas Latest Ref Rng & Units 11/25/2016 11/24/2016 4/15/2015   A1C 4.3 - 6.0 % 7.0(H) 7.0(H) 7.2(H)     Iron studies Latest Ref Rng & Units 11/27/2016   Iron 35 - 180 ug/dL 74   Iron sat 15 - 46 % 41   Ferritin 26 - 388 ng/mL 1611(H)     [facility] Txp Virology Latest Ref Rng & Units 7/27/2018 12/22/2017 8/18/2017   BK Spec - Plasma Plasma Plasma   BK Res BKNEG:BK Virus DNA Not Detected copies/mL BK Virus DNA Not Detected BK Virus DNA Not Detected BK Virus DNA Not Detected   BK Log <2.7 Log copies/mL Not Calculated Not Calculated Not Calculated   Hep B Core NR - - -        Recent Labs   Lab Test  08/27/18   1023  10/01/18   0849  11/02/18   0948   DOSTAC   [id number]  [id number]@2200  22:00 11/1/18   TACROL  4.4*  5.0  5.8     Recent Labs   Lab Test  02/06/17   0733  02/13/17   0736  02/20/17 0733   DOSMPA  [id number]   MPACID  2.38  2.03  4.29*   MPAG  104.7*  102.9*  85.4     Patient was seen and evaluated by me, Erasmo Wilkins MD. I have reviewed the note and agree with the the plan of care as documented by the fellow.

## 2018-12-04 NOTE — MR AVS SNAPSHOT
After Visit Summary   12/4/2018    Corey Roland    MRN: [id number]           Patient Information     Date Of Birth          1955        Visit Information        Provider Department      12/4/2018 1:35 PM Anjana Banks Kidney/Pancreas Recipient [facility] Nephrology        Today's Diagnoses     Secondary hypertension    -  1    Post-transplant erythrocytosis        Proteinuria, unspecified type           Follow-ups after your visit        Follow-up notes from your care team     Return in about 6 months (around 6/4/2019).      Your next 10 appointments already scheduled     Dec 18, 2018  1:00 PM CST   US RENAL COMPLETE with UCUS1   [facility]  ([facility])    [address]  1st Floor  [place] [facility] [address] [phone]           How do I prepare for my exam? (Food and drink instructions) No Food and Drink Restrictions.  How do I prepare for my exam? (Other instructions) You do not need to do anything special to prepare for your exam.  What should I wear: Wear comfortable clothes.  How long does the exam take: Most ultrasounds take 30 to 60 minutes.  What should I bring: Bring a list of your medicines, including vitamins, minerals and over-the-counter drugs. It is safest to leave personal items at home.  Do I need a :  No  is needed.  What do I need to tell my doctor: Tell your doctor about any allergies you may have.  What should I do after the exam: No restrictions, You may resume normal activities.  What is this test: An ultrasound uses sound waves to make pictures of the body. Sound waves do not cause pain. The only discomfort may be the pressure of the wand against your skin or full bladder.  Who should I call with questions: If you have any questions, please call the Imaging Department where you will have your exam. Directions, parking instructions, and other information is available on our website, [email or web address].            Dec  28, 2018  9:30 AM CST   LAB with CP LAB   [facility] ([facility])    [address] [id number] [phone]           Please do not eat 10-12 hours before your appointment if you are coming in fasting for labs on lipids, cholesterol, or glucose (sugar). This does not apply to pregnant women. Water, hot tea and black coffee (with nothing added) are okay. Do not drink other fluids, diet soda or chew gum.            Jun 14, 2019  1:05 PM CDT   (Arrive by 12:35 PM)   Return Kidney Transplant with  Kidney/Pancreas Recipient 1   [facility] ([facility])    [address]  [address]  [place] [address] [phone]              Future tests that were ordered for you today     Open Future Orders        Priority Expected Expires Ordered    Renal panel Routine  1/3/2019 12/4/2018    PRA Donor Specific Antibody Routine  12/31/2018 12/4/2018    US Retroperitoneal complete Routine  12/4/2019 12/4/2018            Who to contact     If you have questions or need follow up information about today's clinic visit or your schedule please contact [facility] NEPHROLOGY directly at [phone].  Normal or non-critical lab and imaging results will be communicated to you by [employer]and[employer] bookhart, letter or phone within 4 business days after the clinic has received the results. If you do not hear from us within 7 days, please contact the clinic through [employer]and[employer] bookhart or phone. If you have a critical or abnormal lab result, we will notify you by phone as soon as possible.  Submit refill requests through [employer]ower or call your pharmacy and they will forward the refill request to us. Please allow 3 business days for your refill to be completed.          Additional Information About Your Visit        T[employer]andas bookharAccuDraft Information     [employer] Mic[employer]ower gives you secure access to your electronic health record. If you see a primary care provider, you can also  send messages to your care team and make appointments. If you have questions, please call your primary care clinic.  If you do not have a primary care provider, please call [phone] and they will assist you.        Care EveryWhere ID     This is your Care EveryWhere ID. This could be used by other organizations to access your [facility] medical records  [id number]        Your Vitals Were     Pulse Temperature Pulse Oximetry BMI (Body Mass Index)          78 98  F (36.7  C) (Oral) 97% 38.79 kg/m2         Blood Pressure from Last 3 Encounters:   12/04/18 150/79   12/12/17 169/77   06/03/17 178/79    Weight from Last 3 Encounters:   12/04/18 129.7 kg (286 lb)   09/12/17 123.3 kg (271 lb 12.8 oz)   06/03/17 119.7 kg (263 lb 12.8 oz)                 Today's Medication Changes          These changes are accurate as of 12/4/18  2:15 PM.  If you have any questions, ask your nurse or doctor.               Start taking these medicines.        Dose/Directions    losartan 50 MG tablet   Commonly known as:  COZAAR   Used for:  Secondary hypertension   Started by:  1, [facility]c Kidney/Pancreas Recipient        Dose:  50 mg   Take 1 tablet (50 mg) by mouth daily   Quantity:  90 tablet   Refills:  3            Where to get your medicines      These medications were sent to [id number] - [place], [place] - [address]  [address] [address]     Phone:  [phone]     losartan 50 MG tablet                Primary Care Provider Office Phone # Fax #    Dustin Tadeo [phone]       [address] [address]        Equal Access to Services     VIJAY HAYES : Hadii lino Manriquez, wasantiagoda lubuddy, qaybta kaalmada renato, gonzález herbert. So [facility] [phone].    ATENCIÓN: Si habla español, tiene a sanchez disposición servicios gratuitos de asistencia lingüística. Llame al [phone].    We comply with  applicable federal civil rights laws and [place] laws. We do not discriminate on the basis of race, color, national origin, age, disability, sex, sexual orientation, or gender identity.            Thank you!     Thank you for choosing [facility] NEPHROLOGY  for your care. Our goal is always to provide you with excellent care. Hearing back from our patients is one way we can continue to improve our services. Please take a few minutes to complete the written survey that you may receive in the mail after your visit with us. Thank you!             Your Updated Medication List - Protect others around you: Learn how to safely use, store and throw away your medicines at www.disposemymeds.org.          This list is accurate as of 12/4/18  2:15 PM.  Always use your most recent med list.                   Brand Name Dispense Instructions for use Diagnosis    ACCU-CHEK EBONIE PLUS test strip   Generic drug:  blood glucose monitoring           aspirin 81 MG EC tablet    ASA    30 tablet    Take 1 tablet (81 mg) by mouth daily    Kidney transplant recipient       atorvastatin 20 MG tablet    LIPITOR     Take 1 tablet (20 mg) by mouth daily    Kidney transplant recipient       carvedilol 25 MG tablet    COREG    60 tablet    TAKE ONE TABLET BY MOUTH TWICE DAILY WITH MEALS    Immunosuppressed status (H)       cholecalciferol 1000 units (25 mcg) capsule    VITAMIN D3    180 capsule    Take 2 capsules (2,000 Units) by mouth daily    Vitamin D deficiency       cloNIDine 0.3 MG tablet    CATAPRES    180 tablet    Take 0.6 mg by mouth 2 times daily Patient reports    Hypertension secondary to other renal disorders (CODE)       DIPHENHYDRAMINE HCL PO      Take 50 mg by mouth At Bedtime        FLUZONE QUADRIVALENT injection   Generic drug:  influenza quadrivalent (w/PRESERVATIVE) vacc age 3 yrs and older           FUROSEMIDE PO      Take 60 mg by mouth 2 times daily        * insulin aspart 100 UNIT/ML pen    NovoLOG PEN    1 mL    Inject  "1 Units Subcutaneous Take with snacks or supplements for high blood sugar 1 unit per 4 grams carbohydrate    Kidney transplant recipient       * insulin aspart 100 UNIT/ML pen    NovoLOG PEN     Inject 1 Units Subcutaneous 3 times daily (with meals) DOSE:  1 units per 4 grams of carbohydrate.  Only chart total amount of units given.  Do not give if pre-prandial glucose is less than 60 mg/dL.    Kidney transplant recipient       * insulin aspart 100 UNIT/ML pen    NovoLOG PEN     Inject 1-22 Units Subcutaneous 3 times daily (before meals) Correction Scale - VERY HIGH INSULIN RESISTANCE DOSING    Do Not give Correction Insulin if Pre-Meal BG less than 250.  For Pre-Meal  - 260 give 1 unit.  For Pre-Meal -269  give 2 units.  For Pre-Meal  - 279 give 3 units.  For Pre-Meal  - 289 give 4 units.  For Pre-Meal  - 299 give 5 units.  For Pre-Meal  - 309 give 6 units.  ...    Kidney transplant recipient       * insulin aspart 100 UNIT/ML pen    NovoLOG PEN     Inject 1-16 Units Subcutaneous At Bedtime Correction Scale - VERY HIGH INSULIN RESISTANCE DOSING    Do Not give Bedtime Correction Insulin if BG less than 200.  For  - 209 give 1 units.  For  - 219 give 2 units.  For  - 229 give 3 units.  For  - 239 give 4 units.  For  - 249 give 5 units.  For  - 259 give 6 units.  For  - 269 give 7 units.  For  - 279 give 8 units ...    Kidney transplant recipient       * insulin glargine 100 UNIT/ML pen    LANTUS PEN     Inject 30 Units Subcutaneous    Kidney transplant recipient       * insulin glargine 100 UNIT/ML pen    LANTUS PEN     Inject 40 Units Subcutaneous At Bedtime    Kidney transplant recipient       insulin syringe 31G X 5/16\" 0.5 ML Misc      FOR ADMINISTERING INSULIN AT HOME 5 TIMES PER DAY.        losartan 50 MG tablet    COZAAR    90 tablet    Take 1 tablet (50 mg) by mouth daily    Secondary hypertension       mycophenolate 250 MG " capsule    GENERIC EQUIVALENT    180 capsule    Take 3 capsules (750 mg) by mouth 2 times daily    Kidney replaced by transplant       NIFEdipine ER 30 MG 24 hr tablet    ADALAT CC    60 tablet    Take 1 tablet (30 mg) by mouth 2 times daily    Hypertension secondary to other renal disorders (CODE)       omeprazole 20 MG tablet     60 tablet    Take 1 tablet (20 mg) by mouth every morning    Kidney transplant recipient       SODIUM BICARBONATE PO      Take 650 mg by mouth 2 times daily        sulfamethoxazole-trimethoprim 400-80 MG tablet    BACTRIM/SEPTRA    14 tablet    Take 1 tablet by mouth three times a week    Kidney transplant recipient       * tacrolimus 0.5 MG capsule    GENERIC EQUIVALENT    60 capsule    Take 1 capsule (0.5 mg) by mouth 2 times daily Total dose = 2.5 mg BID    Kidney transplanted, Kidney transplant recipient       * tacrolimus 1 MG capsule    GENERIC EQUIVALENT    120 capsule    Take 2 capsules (2 mg) by mouth 2 times daily Total dose = 2.5 mg BID    Kidney transplant recipient, Kidney transplanted       * Notice:  This list has 8 medication(s) that are the same as other medications prescribed for you. Read the directions carefully, and ask your doctor or other care provider to review them with you.

## 2018-12-04 NOTE — PROGRESS NOTES
Surgical Clinical Trials Office Notification of Study Eligibility  Study Name: Comparison of the cognitive and motor effects of treatment  between an immediate- and extended-release tacrolimus  (Envarsus  XR) based immunosuppression regimen in kidney  transplant recipients  Version dt 10/9/2018, IRB Approved 10/9/2018    Per our IRB approved recruitment process, the patient was approached in clinic on 04-DEC-2018 to discuss the study.   Patient declined study due to not wanting to change any medications at this time.   Please contact the study coordinators, Radha Recinos ([phone]) or Rosa Maldonado ([phone]) with any questions.

## 2018-12-04 NOTE — LETTER
2018      RE: Corey Roland  [address]  [place] [address]       [facility]  Nephrology Clinic  Kidney/Pancreas Recipient  2018     Name: Corey Roland  MRN: [id number]   Age: 63 year old  : 1955  Referring provider: Abdiaziz Qureshi     CHRONIC TRANSPLANT NEPHROLOGY VISIT    Assessment and Plan:   # DDKT:    - Baseline Cr ~ 0.9-1.1; Stable   - Proteinuria: Moderate   - Date of DSA last checked: 2018 Latest DSA: No   - BK Viremia: No   - Kidney Tx Biopsy: No   - proteinuria is getting worse now 1.7g/g will start losartan 25mg daily will repeat labs if still with proteinuria will proceed with biopsy. Will update DSA.     # Immunosuppression: Tacrolimus immediate release (goal  4-6) and Mycophenolate mofetil (goal  1-3.5)   - Changes: No    # Prophylaxis:    - PJP on Bactrim.     # Hypertension: Inadequate control; Goal BP: < 140/90   - Changes: No clonidine 0.6 bid, coreg 25mg bid, lasix 60mg bid, nifedipine 30mg bid   -- will add losartan 50mg daily    # Diabetes: Controlled in insulin f/u with endocrinology last A1c 7    #  Post transplant erythrocytosis Hgb: Increased 17-18   - this could be post transplant erythrocytosis but will order US to native kidney to r/o malignancy    - will start losartan     # Mineral Bone Disorder:    - Secondary renal hyperparathyroidism; PTH level is: Mildly elevated   - Vitamin D; level is: Not checked recently   - Calcium; level is: Normal   - Phosphorus; level is: Not checked recently     # Electrolytes:   - Potassium; level: Normal  - Magnesium; level: Normal  - Bicarbonate; level: Normal      # Skin Cancer Risk:    - Discussed sun protection and recommend regular follow up with Dermatology.    # Medical Compliance: Yes    Follow-up: Data Unavailable     # Transplant History:  Etiology of kidney failure: diabetes mellitus type 2  Tx: DDKT  Transplant: 2016 (Kidney)  Donor Type:  - Cardiac Death Donor Class:    Crossmatch at time of Tx: negative  Significant changes in immunosuppression: None  Significant transplant-related complications: None    Transplant Office Phone Number: [phone]    Assessment and plan was discussed with the patient and they voiced understanding and agreement.  Patient seen and discussed  with Dr. Wilkins.  Jerilyn Crystal  Nephrology Fellow  Pager: [phone]    Chief Complaint   Follow up.    History of Present Illness:  Corey Roland is a 63 year old male with a history of ESKD secondary to diabetes mellitus type 2 and is status post DDKT on 11/24/16. Patient was last seen by Dr. Hernández on 12/12/2017, please refer to that note for further details.   He was seen yesterday in clinic found that his glucose was low and was feeling low then sugar candy was given and increased his glucose, his dm, insulin dose decreased,   Today, patient feels ok no acute complaint, he      Recent Hospitalizations:  [x] No [] Yes    New Medical Issues: [x] No [] Yes    Decreased energy: [x] No [] Yes    Chest pain or SOB with exertion:  [x] No [] Yes    Appetite change or weight change: [x] No [] Yes    Nausea, vomiting or diarrhea:  [x] No [] Yes    Fever, sweats or chills: [x] No [] Yes    Leg swelling: [x] No [] Yes      Other medical issues:  No    Home BP: 130-140/69-70 early morning     Review of Systems:   A comprehensive review of systems was obtained and negative, except as noted in the HPI or past medical history.     Active Medications:     Current Outpatient Prescriptions:      ACCU-CHEK EBONIE PLUS test strip, , Disp: , Rfl: 3     aspirin EC 81 MG EC tablet, Take 1 tablet (81 mg) by mouth daily, Disp: 30 tablet, Rfl: 3     atorvastatin (LIPITOR) 20 MG tablet, Take 1 tablet (20 mg) by mouth daily, Disp: , Rfl:      carvedilol (COREG) 25 MG tablet, TAKE ONE TABLET BY MOUTH TWICE DAILY WITH MEALS, Disp: 60 tablet, Rfl: 11     cholecalciferol (VITAMIN  -D) 1000 UNITS capsule, Take 2 capsules (2,000  Units) by mouth daily, Disp: 180 capsule, Rfl: 3     cloNIDine (CATAPRES) 0.3 MG tablet, Take 0.6 mg by mouth 2 times daily Patient reports, Disp: 180 tablet, Rfl: 3     DIPHENHYDRAMINE HCL PO, Take 50 mg by mouth At Bedtime, Disp: , Rfl:      FLUZONE QUADRIVALENT injection, , Disp: , Rfl: 0     FUROSEMIDE PO, Take 60 mg by mouth 2 times daily, Disp: , Rfl:      insulin aspart (NOVOLOG PEN) 100 UNIT/ML injection, Inject 1 Units Subcutaneous Take with snacks or supplements for high blood sugar 1 unit per 4 grams carbohydrate, Disp: 1 mL, Rfl: 0     insulin aspart (NOVOLOG PEN) 100 UNIT/ML injection, Inject 1 Units Subcutaneous 3 times daily (with meals) DOSE:  1 units per 4 grams of carbohydrate.  Only chart total amount of units given.  Do not give if pre-prandial glucose is less than 60 mg/dL., Disp: , Rfl:      insulin aspart (NOVOLOG PEN) 100 UNIT/ML injection, Inject 1-22 Units Subcutaneous 3 times daily (before meals) Correction Scale - VERY HIGH INSULIN RESISTANCE DOSING    Do Not give Correction Insulin if Pre-Meal BG less than 250.  For Pre-Meal  - 260 give 1 unit.  For Pre-Meal -269  give 2 units.  For Pre-Meal  - 279 give 3 units.  For Pre-Meal  - 289 give 4 units.  For Pre-Meal  - 299 give 5 units.  For Pre-Meal  - 309 give 6 units.  ..., Disp: , Rfl:      insulin aspart (NOVOLOG PEN) 100 UNIT/ML injection, Inject 1-16 Units Subcutaneous At Bedtime Correction Scale - VERY HIGH INSULIN RESISTANCE DOSING    Do Not give Bedtime Correction Insulin if BG less than 200.  For  - 209 give 1 units.  For  - 219 give 2 units.  For  - 229 give 3 units.  For  - 239 give 4 units.  For  - 249 give 5 units.  For  - 259 give 6 units.  For  - 269 give 7 units.  For  - 279 give 8 units ..., Disp: , Rfl:      insulin glargine (LANTUS) 100 UNIT/ML injection, Inject 30 Units Subcutaneous, Disp: , Rfl:      insulin glargine (LANTUS) 100 UNIT/ML  "injection, Inject 40 Units Subcutaneous At Bedtime, Disp: , Rfl:      insulin syringe 31G X 5/16\" 0.5 ML MISC, FOR ADMINISTERING INSULIN AT HOME 5 TIMES PER DAY., Disp: , Rfl:      mycophenolate (GENERIC EQUIVALENT) 250 MG capsule, Take 3 capsules (750 mg) by mouth 2 times daily, Disp: 180 capsule, Rfl: 11     NIFEdipine ER (ADALAT CC) 30 MG TB24, Take 1 tablet (30 mg) by mouth 2 times daily, Disp: 60 tablet, Rfl: 3     omeprazole 20 MG tablet, Take 1 tablet (20 mg) by mouth every morning, Disp: 60 tablet, Rfl: 0     SODIUM BICARBONATE PO, Take 650 mg by mouth 2 times daily, Disp: , Rfl:      sulfamethoxazole-trimethoprim (BACTRIM/SEPTRA) 400-80 MG per tablet, Take 1 tablet by mouth three times a week, Disp: 14 tablet, Rfl: 11     tacrolimus (GENERIC EQUIVALENT) 0.5 MG capsule, Take 1 capsule (0.5 mg) by mouth 2 times daily Total dose = 2.5 mg BID, Disp: 60 capsule, Rfl: 11     tacrolimus (GENERIC EQUIVALENT) 1 MG capsule, Take 2 capsules (2 mg) by mouth 2 times daily Total dose = 2.5 mg BID, Disp: 120 capsule, Rfl: 11      Allergies:   No known drug allergies     Active Medical Problems:  Hypertension  Secondary renal hyperparathyroidism  Obesity  Diabetic peripheral neuropathy  Immunosuppressed status  Type 1 diabetes mellitus with nephropathy  Vitamin D deficiency   Hypomagnesemia  Post-transplant erythrocytosis     Social History:   Patient formerly smoked 2 packs a day for 38 years until cessation in 2005. The patient denies smokeless tobacco. Patient currently drinks 0-1 standard drinks per week.      Physical Exam:   There were no vitals taken for this visit.   Wt Readings from Last 4 Encounters:   09/12/17 123.3 kg (271 lb 12.8 oz)   06/03/17 119.7 kg (263 lb 12.8 oz)   03/02/17 114 kg (251 lb 6.4 oz)   01/05/17 109.1 kg (240 lb 9.6 oz)     GENERAL APPEARANCE: alert and no distress  HENT: mouth without ulcers or lesions  LYMPHATICS: no cervical or supraclavicular nodes  RESP: lungs clear to auscultation - no " rales, rhonchi or wheezes  CV: regular rhythm, normal rate, no rub, no murmur  EDEMA: 1+ LE edema bilaterally  ABDOMEN: soft, nondistended, nontender, bowel sounds normal  MS: extremities normal - no gross deformities noted, no evidence of inflammation in joints, no muscle tenderness  SKIN: no rash  TX KIDNEY: normal  DIALYSIS ACCESS:  LUE AV Fistula good bruit     Data:     Renal Latest Ref Rng & Units 11/2/2018 10/1/2018 8/27/2018   Na 133 - 144 mmol/L 139 139 137   K 3.4 - 5.3 mmol/L 4.0 3.9 4.3   Cl 94 - 109 mmol/L 104 104 105   CO2 20 - 32 mmol/L 25 29 23   BUN 7 - 30 mg/dL 16 16 17   Cr 0.66 - 1.25 mg/dL 0.94 1.06 0.94   Glucose 70 - 99 mg/dL 88 125(H) 144(H)   Ca  8.5 - 10.1 mg/dL 9.6 10.1 9.8   Mg 1.6 - 2.3 mg/dL - - -     Bone Health Latest Ref Rng & Units 6/3/2017 3/3/2017 2/13/2017   Phos 2.5 - 4.5 mg/dL 1.9(L) - 2.0(L)   PTHi 12 - 72 pg/mL 279(H) 277(H) -   Vit D Def 20 - 75 ug/L 17(L) 8(L) -     Heme Latest Ref Rng & Units 11/2/2018 10/1/2018 8/27/2018   WBC 4.0 - 11.0 10e9/L 8.1 7.6 8.2   Hgb 13.3 - 17.7 g/dL 17.1 17.6 16.8   Plt 150 - 450 10e9/L 227 201 232     Liver Latest Ref Rng & Units 12/22/2017 9/12/2017 5/26/2017   AP 40 - 150 U/L 71 100 110   TBili 0.2 - 1.3 mg/dL 0.4 0.6 0.5   DBili 0.0 - 0.2 mg/dL 0.1 0.1 <0.1   ALT 0 - 70 U/L 21 30 37   AST 0 - 45 U/L 15 17 24   Tot Protein 6.8 - 8.8 g/dL 7.9 7.7 7.4   Albumin 3.4 - 5.0 g/dL 3.4 3.5 3.6     Pancreas Latest Ref Rng & Units 11/25/2016 11/24/2016 4/15/2015   A1C 4.3 - 6.0 % 7.0(H) 7.0(H) 7.2(H)     Iron studies Latest Ref Rng & Units 11/27/2016   Iron 35 - 180 ug/dL 74   Iron sat 15 - 46 % 41   Ferritin 26 - 388 ng/mL 1611(H)     [facility] Txp Virology Latest Ref Rng & Units 7/27/2018 12/22/2017 8/18/2017   BK Spec - Plasma Plasma Plasma   BK Res BKNEG:BK Virus DNA Not Detected copies/mL BK Virus DNA Not Detected BK Virus DNA Not Detected BK Virus DNA Not Detected   BK Log <2.7 Log copies/mL Not Calculated Not Calculated Not Calculated   Hep B Core  NR - - -        Recent Labs   Lab Test  08/27/18   1023  10/01/18   0849  11/02/18   0948   [facility]  [id number]  [id number]@2200  22:00 11/1/18   TACROL  4.4*  5.0  5.8     Recent Labs   Lab Test  02/06/17   0733  02/13/17   0736  02/20/17   0733   DOSMPA  [id number] 2000  [id number]   MPACID  2.38  2.03  4.29*   MPAG  104.7*  102.9*  85.4     Patient was seen and evaluated by me, Erasmo Wilkins MD. I have reviewed the note and agree with the the plan of care as documented by the fellow.      Kidney/Pancreas Recipient

## 2018-12-04 NOTE — MR AVS SNAPSHOT
After Visit Summary   12/4/2018    Corey Roland    MRN: [id number]           Patient Information     Date Of Birth          1955        Visit Information        Provider Department      12/4/2018 3:48 PM Specialty, Provider [facility], [place],  Clinical Research        Today's Diagnoses     Examination of participant in clinical trial    -  1       Follow-ups after your visit        Your next 10 appointments already scheduled     Dec 18, 2018  1:00 PM CST   US RENAL COMPLETE with UCUS1   [facility] US ([facility])    [address] [address] [phone]           How do I prepare for my exam? (Food and drink instructions) No Food and Drink Restrictions.  How do I prepare for my exam? (Other instructions) You do not need to do anything special to prepare for your exam.  What should I wear: Wear comfortable clothes.  How long does the exam take: Most ultrasounds take 30 to 60 minutes.  What should I bring: Bring a list of your medicines, including vitamins, minerals and over-the-counter drugs. It is safest to leave personal items at home.  Do I need a :  No  is needed.  What do I need to tell my doctor: Tell your doctor about any allergies you may have.  What should I do after the exam: No restrictions, You may resume normal activities.  What is this test: An ultrasound uses sound waves to make pictures of the body. Sound waves do not cause pain. The only discomfort may be the pressure of the wand against your skin or full bladder.  Who should I call with questions: If you have any questions, please call the Imaging Department where you will have your exam. Directions, parking instructions, and other information is available on our website, [email or web address].            Dec 28, 2018  9:30 AM CST   LAB with CP LAB   [facility] ([facility])    [address] [place] [address]  [facility] [address]   [phone]           Please do not eat 10-12 hours before your appointment if you are coming in fasting for labs on lipids, cholesterol, or glucose (sugar). This does not apply to pregnant women. Water, hot tea and black coffee (with nothing added) are okay. Do not drink other fluids, diet soda or chew gum.            Jun 14, 2019  1:05 PM CDT   (Arrive by 12:35 PM)   Return Kidney Transplant with  Kidney/Pancreas Recipient [address] [place] [address] Nephrology ([facility])    [address] [facility]  [address]  [facility] [address] [phone]              Future tests that were ordered for you today     Open Future Orders        Priority Expected Expires Ordered    Tacrolimus level Routine  12/4/2019 12/4/2018    Renal panel Routine  1/3/2019 12/4/2018    PRA Donor Specific Antibody Routine  12/31/2018 12/4/2018    US Retroperitoneal complete Routine  12/4/2019 12/4/2018            Who to contact     If you have questions or need follow up information about today's clinic visit or your schedule please contact [facility] directly at [phone].  Normal or non-critical lab and imaging results will be communicated to you by MyChart, letter or phone within 4 business days after the clinic has received the results. If you do not hear from us within 7 days, please contact the clinic through [employer] or phone. If you have a critical or abnormal lab result, we will notify you by phone as soon as possible.  Submit refill requests through [employer] or call your pharmacy and they will forward the refill request to us. Please allow 3 business days for your refill to be completed.          Additional Information About Your Visit        [employer] Information     [employer] gives you secure access to your electronic health record. If you see a primary care provider, you can also send messages to your care team and make appointments. If you have  questions, please call your primary care clinic.  If you do not have a primary care provider, please call [phone] and they will assist you.        Care EveryWhere ID     This is your Care EveryWhere ID. This could be used by other organizations to access your [facility] medical records  [id number]         Blood Pressure from Last 3 Encounters:   12/04/18 150/79   12/12/17 169/77   06/03/17 178/79    Weight from Last 3 Encounters:   12/04/18 129.7 kg (286 lb)   09/12/17 123.3 kg (271 lb 12.8 oz)   06/03/17 119.7 kg (263 lb 12.8 oz)              Today, you had the following     No orders found for display         Today's Medication Changes          These changes are accurate as of 12/4/18  3:51 PM.  If you have any questions, ask your nurse or doctor.               Start taking these medicines.        Dose/Directions    losartan 50 MG tablet   Commonly known as:  COZAAR   Used for:  Secondary hypertension   Started by:  1, [facility] Kidney/Pancreas Recipient        Dose:  50 mg   Take 1 tablet (50 mg) by mouth daily   Quantity:  90 tablet   Refills:  3            Where to get your medicines      These medications were sent to [id number] - [facility] [address] [facility]  [address] [facility] [address]     Phone:  [phone]     losartan 50 MG tablet                Primary Care Provider Office Phone # Fax #    Dustin Tadeo [phone]       [facility] [address] [address]        Equal Access to Services     VIJAY HAYES AH: Hadii lino ku hadasho Soomaali, waaxda luqadaha, qaybta kaalmada adeegyada, gonzález herbert. So [facility] [phone].    ATENCIÓN: Si habla español, tiene a sanchez disposición servicios gratuitos de asistencia lingüística. Llame al [phone].    We comply with applicable federal civil rights laws and [place] laws. We do not discriminate on the basis of race, color, national origin, age,  disability, sex, sexual orientation, or gender identity.            Thank you!     Thank you for choosing [facility],  [facility]  for your care. Our goal is always to provide you with excellent care. Hearing back from our patients is one way we can continue to improve our services. Please take a few minutes to complete the written survey that you may receive in the mail after your visit with us. Thank you!             Your Updated Medication List - Protect others around you: Learn how to safely use, store and throw away your medicines at www.disposemymeds.org.          This list is accurate as of 12/4/18  3:51 PM.  Always use your most recent med list.                   Brand Name Dispense Instructions for use Diagnosis    ACCU-CHEK EBONIE PLUS test strip   Generic drug:  blood glucose monitoring           aspirin 81 MG EC tablet    ASA    30 tablet    Take 1 tablet (81 mg) by mouth daily    Kidney transplant recipient       atorvastatin 20 MG tablet    LIPITOR     Take 1 tablet (20 mg) by mouth daily    Kidney transplant recipient       carvedilol 25 MG tablet    COREG    60 tablet    TAKE ONE TABLET BY MOUTH TWICE DAILY WITH MEALS    Immunosuppressed status (H)       cholecalciferol 1000 units (25 mcg) capsule    VITAMIN D3    180 capsule    Take 2 capsules (2,000 Units) by mouth daily    Vitamin D deficiency       cloNIDine 0.3 MG tablet    CATAPRES    180 tablet    Take 0.6 mg by mouth 2 times daily Patient reports    Hypertension secondary to other renal disorders (CODE)       DIPHENHYDRAMINE HCL PO      Take 50 mg by mouth At Bedtime        FLUZONE QUADRIVALENT injection   Generic drug:  influenza quadrivalent (w/PRESERVATIVE) vacc age 3 yrs and older           FUROSEMIDE PO      Take 60 mg by mouth 2 times daily        * insulin aspart 100 UNIT/ML pen    NovoLOG PEN    1 mL    Inject 1 Units Subcutaneous Take with snacks or supplements for high blood sugar 1 unit per 4 grams carbohydrate    Kidney  "transplant recipient       * insulin aspart 100 UNIT/ML pen    NovoLOG PEN     Inject 1 Units Subcutaneous 3 times daily (with meals) DOSE:  1 units per 4 grams of carbohydrate.  Only chart total amount of units given.  Do not give if pre-prandial glucose is less than 60 mg/dL.    Kidney transplant recipient       * insulin aspart 100 UNIT/ML pen    NovoLOG PEN     Inject 1-22 Units Subcutaneous 3 times daily (before meals) Correction Scale - VERY HIGH INSULIN RESISTANCE DOSING    Do Not give Correction Insulin if Pre-Meal BG less than 250.  For Pre-Meal  - 260 give 1 unit.  For Pre-Meal -269  give 2 units.  For Pre-Meal  - 279 give 3 units.  For Pre-Meal  - 289 give 4 units.  For Pre-Meal  - 299 give 5 units.  For Pre-Meal  - 309 give 6 units.  ...    Kidney transplant recipient       * insulin aspart 100 UNIT/ML pen    NovoLOG PEN     Inject 1-16 Units Subcutaneous At Bedtime Correction Scale - VERY HIGH INSULIN RESISTANCE DOSING    Do Not give Bedtime Correction Insulin if BG less than 200.  For  - 209 give 1 units.  For  - 219 give 2 units.  For  - 229 give 3 units.  For  - 239 give 4 units.  For  - 249 give 5 units.  For  - 259 give 6 units.  For  - 269 give 7 units.  For  - 279 give 8 units ...    Kidney transplant recipient       * insulin glargine 100 UNIT/ML pen    LANTUS PEN     Inject 30 Units Subcutaneous    Kidney transplant recipient       * insulin glargine 100 UNIT/ML pen    LANTUS PEN     Inject 40 Units Subcutaneous At Bedtime    Kidney transplant recipient       insulin syringe 31G X 5/16\" 0.5 ML Misc      FOR ADMINISTERING INSULIN AT HOME 5 TIMES PER DAY.        losartan 50 MG tablet    COZAAR    90 tablet    Take 1 tablet (50 mg) by mouth daily    Secondary hypertension       mycophenolate 250 MG capsule    GENERIC EQUIVALENT    180 capsule    Take 3 capsules (750 mg) by mouth 2 times daily    Kidney replaced by " transplant       NIFEdipine ER 30 MG 24 hr tablet    ADALAT CC    60 tablet    Take 1 tablet (30 mg) by mouth 2 times daily    Hypertension secondary to other renal disorders (CODE)       omeprazole 20 MG tablet     60 tablet    Take 1 tablet (20 mg) by mouth every morning    Kidney transplant recipient       SODIUM BICARBONATE PO      Take 650 mg by mouth 2 times daily        sulfamethoxazole-trimethoprim 400-80 MG tablet    BACTRIM/SEPTRA    14 tablet    Take 1 tablet by mouth three times a week    Kidney transplant recipient       * tacrolimus 0.5 MG capsule    GENERIC EQUIVALENT    60 capsule    Take 1 capsule (0.5 mg) by mouth 2 times daily Total dose = 2.5 mg BID    Kidney transplanted, Kidney transplant recipient       * tacrolimus 1 MG capsule    GENERIC EQUIVALENT    120 capsule    Take 2 capsules (2 mg) by mouth 2 times daily Total dose = 2.5 mg BID    Kidney transplant recipient, Kidney transplanted       * Notice:  This list has 8 medication(s) that are the same as other medications prescribed for you. Read the directions carefully, and ask your doctor or other care provider to review them with you.

## 2018-12-06 LAB
DONOR IDENTIFICATION: NORMAL
DSA COMMENTS: NORMAL
DSA PRESENT: NO
DSA TEST METHOD: NORMAL
ORGAN: NORMAL
SA1 CELL: NORMAL
SA1 COMMENTS: NORMAL
SA1 HI RISK ABY: NORMAL
SA1 MOD RISK ABY: NORMAL
SA1 TEST METHOD: NORMAL
SA2 CELL: NORMAL
SA2 COMMENTS: NORMAL
SA2 HI RISK ABY UA: NORMAL
SA2 MOD RISK ABY: NORMAL
SA2 TEST METHOD: NORMAL
UNACCEPTABLE ANTIGEN: NORMAL
UNOS CPRA: 84

## 2018-12-18 ENCOUNTER — TELEPHONE (OUTPATIENT)
Dept: TRANSPLANT | Facility: CLINIC | Age: 63
End: 2018-12-18

## 2018-12-18 ENCOUNTER — ANCILLARY PROCEDURE (OUTPATIENT)
Dept: ULTRASOUND IMAGING | Facility: CLINIC | Age: 63
End: 2018-12-18
Attending: INTERNAL MEDICINE
Payer: MEDICARE

## 2018-12-18 DIAGNOSIS — D75.1 POST-TRANSPLANT ERYTHROCYTOSIS: ICD-10-CM

## 2018-12-18 DIAGNOSIS — Z94.0 KIDNEY TRANSPLANTED: Primary | ICD-10-CM

## 2018-12-18 NOTE — TELEPHONE ENCOUNTER
Pt. seen in clinic at the beginning of December and started on Losartan for increase proteinuria.    Pt. was to repeat UPC, DSA, and renal panel in a few weeks and consider a biopsy if urine protein wasn't improved.      Attempted to call Corey, but received a busy line with no v/m option.  Will try back.

## 2018-12-19 DIAGNOSIS — Z94.0 KIDNEY TRANSPLANTED: Primary | ICD-10-CM

## 2018-12-19 DIAGNOSIS — N28.1 RENAL CYST: ICD-10-CM

## 2018-12-19 NOTE — TELEPHONE ENCOUNTER
Spoke with Corey regarding Dr. Wilkins's recommendation to follow up with urology for renal cysts.  Explained to pt. that a  will contact him to schedule an appointment.  Pt. was recently started on Losartan for proteinuria.  He plans to repeat UPC and DSA on 12/28.      Erasmo Wilkins MD [place]ase[place]ock, Rebecca, RN             Needs urology follow up. For enlarging renal cysts   Needs to repeat pr/cr   SR

## 2018-12-21 ENCOUNTER — PRE VISIT (OUTPATIENT)
Dept: UROLOGY | Facility: CLINIC | Age: 63
End: 2018-12-21

## 2018-12-21 NOTE — TELEPHONE ENCOUNTER
MEDICAL RECORDS REQUEST   [facility]  Urology Clinic  [address] [place] [address]  PHONE: [phone]  Fax: [phone]        FUTURE VISIT INFORMATION                                                   Corey Roland, : 1955 scheduled for future visit at [facility]    APPOINTMENT INFORMATION:    Date: 2019    Provider:  SEAMUS HURLEY    Reason for Visit/Diagnosis: RENAL CYST    REFERRAL INFORMATION:    Referring provider:  VALERI PORRAS    Specialty: MD    Referring providers clinic:  [facility] contact number: [phone]     RECORDS REQUESTED FOR VISIT                                                     NOTES  STATUS/DETAILS   OFFICE NOTE from referring provider  yes   OFFICE NOTE from other specialist  yes   DISCHARGE SUMMARY from hospital  no   DISCHARGE REPORT from the ER  no   OPERATIVE REPORT  no   MEDICATION LIST  yes       PRE-VISIT CHECKLIST      Record collection complete Yes   Appointment appropriately scheduled           (right time/right provider) Yes   MyChart activation Yes   Questionnaire complete If no, please explain IN PROCESS     Completed by: Italia Bravo

## 2018-12-26 DIAGNOSIS — Z94.0 KIDNEY REPLACED BY TRANSPLANT: Primary | ICD-10-CM

## 2018-12-26 RX ORDER — MYCOPHENOLATE MOFETIL 250 MG/1
750 CAPSULE ORAL 2 TIMES DAILY
Qty: 180 CAPSULE | Refills: 11 | Status: SHIPPED | OUTPATIENT
Start: 2018-12-26 | End: 2019-12-26

## 2018-12-28 DIAGNOSIS — Z48.298 AFTERCARE FOLLOWING ORGAN TRANSPLANT: ICD-10-CM

## 2018-12-28 DIAGNOSIS — Z94.0 IMMUNOSUPPRESSIVE MANAGEMENT ENCOUNTER FOLLOWING KIDNEY TRANSPLANT: ICD-10-CM

## 2018-12-28 DIAGNOSIS — Z79.899 IMMUNOSUPPRESSIVE MANAGEMENT ENCOUNTER FOLLOWING KIDNEY TRANSPLANT: ICD-10-CM

## 2018-12-28 DIAGNOSIS — Z94.0 KIDNEY TRANSPLANTED: ICD-10-CM

## 2018-12-28 DIAGNOSIS — R80.9 PROTEINURIA, UNSPECIFIED TYPE: ICD-10-CM

## 2018-12-28 LAB
ALBUMIN SERPL-MCNC: 3.6 G/DL (ref 3.4–5)
ANION GAP SERPL CALCULATED.3IONS-SCNC: 6 MMOL/L (ref 3–14)
ANION GAP SERPL CALCULATED.3IONS-SCNC: 7 MMOL/L (ref 3–14)
BUN SERPL-MCNC: 18 MG/DL (ref 7–30)
BUN SERPL-MCNC: 19 MG/DL (ref 7–30)
CALCIUM SERPL-MCNC: 9.9 MG/DL (ref 8.5–10.1)
CALCIUM SERPL-MCNC: 9.9 MG/DL (ref 8.5–10.1)
CHLORIDE SERPL-SCNC: 105 MMOL/L (ref 94–109)
CHLORIDE SERPL-SCNC: 106 MMOL/L (ref 94–109)
CO2 SERPL-SCNC: 27 MMOL/L (ref 20–32)
CO2 SERPL-SCNC: 28 MMOL/L (ref 20–32)
CREAT SERPL-MCNC: 1.13 MG/DL (ref 0.66–1.25)
CREAT SERPL-MCNC: 1.17 MG/DL (ref 0.66–1.25)
ERYTHROCYTE [DISTWIDTH] IN BLOOD BY AUTOMATED COUNT: 13.8 % (ref 10–15)
GFR SERPL CREATININE-BSD FRML MDRD: 66 ML/MIN/{1.73_M2}
GFR SERPL CREATININE-BSD FRML MDRD: 69 ML/MIN/{1.73_M2}
GLUCOSE SERPL-MCNC: 67 MG/DL (ref 70–99)
GLUCOSE SERPL-MCNC: 68 MG/DL (ref 70–99)
HCT VFR BLD AUTO: 51.9 % (ref 40–53)
HGB BLD-MCNC: 17.1 G/DL (ref 13.3–17.7)
MCH RBC QN AUTO: 28.7 PG (ref 26.5–33)
MCHC RBC AUTO-ENTMCNC: 32.9 G/DL (ref 31.5–36.5)
MCV RBC AUTO: 87 FL (ref 78–100)
PHOSPHATE SERPL-MCNC: 2.8 MG/DL (ref 2.5–4.5)
PLATELET # BLD AUTO: 219 10E9/L (ref 150–450)
POTASSIUM SERPL-SCNC: 4 MMOL/L (ref 3.4–5.3)
POTASSIUM SERPL-SCNC: 4.3 MMOL/L (ref 3.4–5.3)
PROT UR-MCNC: 0.24 G/L
PROT/CREAT 24H UR: 0.48 G/G CR (ref 0–0.2)
RBC # BLD AUTO: 5.96 10E12/L (ref 4.4–5.9)
SODIUM SERPL-SCNC: 139 MMOL/L (ref 133–144)
SODIUM SERPL-SCNC: 140 MMOL/L (ref 133–144)
WBC # BLD AUTO: 6.9 10E9/L (ref 4–11)

## 2018-12-28 PROCEDURE — 36415 COLL VENOUS BLD VENIPUNCTURE: CPT | Performed by: INTERNAL MEDICINE

## 2018-12-28 PROCEDURE — 80048 BASIC METABOLIC PNL TOTAL CA: CPT | Performed by: INTERNAL MEDICINE

## 2018-12-28 PROCEDURE — 80069 RENAL FUNCTION PANEL: CPT | Performed by: INTERNAL MEDICINE

## 2018-12-28 PROCEDURE — 84156 ASSAY OF PROTEIN URINE: CPT | Performed by: INTERNAL MEDICINE

## 2018-12-28 PROCEDURE — 85027 COMPLETE CBC AUTOMATED: CPT | Performed by: INTERNAL MEDICINE

## 2018-12-28 PROCEDURE — 80197 ASSAY OF TACROLIMUS: CPT | Performed by: INTERNAL MEDICINE

## 2018-12-29 LAB
TACROLIMUS BLD-MCNC: 6.1 UG/L (ref 5–15)
TME LAST DOSE: NORMAL H

## 2019-01-17 ENCOUNTER — PATIENT OUTREACH (OUTPATIENT)
Dept: CARE COORDINATION | Facility: CLINIC | Age: 64
End: 2019-01-17

## 2019-01-18 DIAGNOSIS — Z94.0 KIDNEY TRANSPLANT RECIPIENT: ICD-10-CM

## 2019-01-18 RX ORDER — SULFAMETHOXAZOLE AND TRIMETHOPRIM 400; 80 MG/1; MG/1
1 TABLET ORAL
Qty: 13 TABLET | Refills: 11 | Status: SHIPPED | OUTPATIENT
Start: 2019-01-18 | End: 2020-02-10

## 2019-01-20 ASSESSMENT — ENCOUNTER SYMPTOMS
EYE REDNESS: 0
DOUBLE VISION: 0
EYE WATERING: 0
EYE PAIN: 0
EYE IRRITATION: 0

## 2019-01-21 ENCOUNTER — PRE VISIT (OUTPATIENT)
Dept: UROLOGY | Facility: CLINIC | Age: 64
End: 2019-01-21

## 2019-01-21 NOTE — TELEPHONE ENCOUNTER
Chief Complaint   Patient presents with     Previsit     no need for call records are available in patient chart         Hang Garcia MA

## 2019-01-25 DIAGNOSIS — Z79.899 IMMUNOSUPPRESSIVE MANAGEMENT ENCOUNTER FOLLOWING KIDNEY TRANSPLANT: ICD-10-CM

## 2019-01-25 DIAGNOSIS — Z94.0 IMMUNOSUPPRESSIVE MANAGEMENT ENCOUNTER FOLLOWING KIDNEY TRANSPLANT: ICD-10-CM

## 2019-01-25 DIAGNOSIS — Z48.298 AFTERCARE FOLLOWING ORGAN TRANSPLANT: ICD-10-CM

## 2019-01-25 DIAGNOSIS — Z94.0 KIDNEY TRANSPLANTED: ICD-10-CM

## 2019-01-25 LAB
ANION GAP SERPL CALCULATED.3IONS-SCNC: 8 MMOL/L (ref 3–14)
BUN SERPL-MCNC: 18 MG/DL (ref 7–30)
CALCIUM SERPL-MCNC: 9.4 MG/DL (ref 8.5–10.1)
CHLORIDE SERPL-SCNC: 104 MMOL/L (ref 94–109)
CO2 SERPL-SCNC: 25 MMOL/L (ref 20–32)
CREAT SERPL-MCNC: 1.02 MG/DL (ref 0.66–1.25)
ERYTHROCYTE [DISTWIDTH] IN BLOOD BY AUTOMATED COUNT: 13.8 % (ref 10–15)
GFR SERPL CREATININE-BSD FRML MDRD: 78 ML/MIN/{1.73_M2}
GLUCOSE SERPL-MCNC: 211 MG/DL (ref 70–99)
HCT VFR BLD AUTO: 50.9 % (ref 40–53)
HGB BLD-MCNC: 16.6 G/DL (ref 13.3–17.7)
MCH RBC QN AUTO: 28.5 PG (ref 26.5–33)
MCHC RBC AUTO-ENTMCNC: 32.6 G/DL (ref 31.5–36.5)
MCV RBC AUTO: 88 FL (ref 78–100)
PLATELET # BLD AUTO: 215 10E9/L (ref 150–450)
POTASSIUM SERPL-SCNC: 4.7 MMOL/L (ref 3.4–5.3)
RBC # BLD AUTO: 5.82 10E12/L (ref 4.4–5.9)
SODIUM SERPL-SCNC: 137 MMOL/L (ref 133–144)
WBC # BLD AUTO: 8.4 10E9/L (ref 4–11)

## 2019-01-25 PROCEDURE — 85027 COMPLETE CBC AUTOMATED: CPT | Performed by: INTERNAL MEDICINE

## 2019-01-25 PROCEDURE — 80197 ASSAY OF TACROLIMUS: CPT | Performed by: INTERNAL MEDICINE

## 2019-01-25 PROCEDURE — 80048 BASIC METABOLIC PNL TOTAL CA: CPT | Performed by: INTERNAL MEDICINE

## 2019-01-25 PROCEDURE — 36415 COLL VENOUS BLD VENIPUNCTURE: CPT | Performed by: INTERNAL MEDICINE

## 2019-01-26 LAB
TACROLIMUS BLD-MCNC: 5.6 UG/L (ref 5–15)
TME LAST DOSE: NORMAL H

## 2019-01-31 ENCOUNTER — OFFICE VISIT (OUTPATIENT)
Dept: UROLOGY | Facility: CLINIC | Age: 64
End: 2019-01-31
Attending: INTERNAL MEDICINE
Payer: MEDICARE

## 2019-01-31 VITALS
BODY MASS INDEX: 39.29 KG/M2 | SYSTOLIC BLOOD PRESSURE: 128 MMHG | DIASTOLIC BLOOD PRESSURE: 71 MMHG | HEART RATE: 95 BPM | WEIGHT: 290.1 LBS | HEIGHT: 72 IN

## 2019-01-31 DIAGNOSIS — N28.1 ACQUIRED CYST OF KIDNEY: Primary | ICD-10-CM

## 2019-01-31 ASSESSMENT — MIFFLIN-ST. JEOR: SCORE: 2148.88

## 2019-01-31 ASSESSMENT — PAIN SCALES - GENERAL: PAINLEVEL: NO PAIN (0)

## 2019-01-31 NOTE — LETTER
RE: Corey Roland  [address]  [facility] [address]     Dear Colleague,    Thank you for referring your patient, Corey Roland, to the [facility] UROLOGY AND INST FOR PROSTATE AND UROLOGIC CANCERS at [facility]. Please see a copy of my visit note below.      Duplicate    Urology Clinic  ##RENAL MASS INITIAL ENCOUNTER##    Landon Vo MD  Date of Service: 2019     Name: Corey Roland  MRN: [id number]  Age: 63 year old  : 1955  Referring provider: Erasmo Wilkins     Assessment and Plan:    1. Cystic renal mass: consistent with a fluid filled cyst that is without concerning features from a malignant standpoint.     Generally these do not need follow up    Bosniak Grade 1    Attestation:  This patient was seen and evaluated by me, with a scribe taking notes.  I have reviewed the note above and agree.  The physical exam and or any procedures were performed by me and the pertinant details are outlined below.       Landon Vo MD  Department of Urology  [facility]        Chief Complaint:   Consult for cystic renal mass     HPI:   Corey Roland is a very pleasant 63 year old male who presents with a history of a renal lesion/mass.  This was discovered incidentally.  Initially diagnosed in 2017. The lesion is cystic ( Category 1 (thin walled, density less than 20 Hounsfield units, no septa, calcifications or solid components).  The maximal dimension of the renal lesion is 2.7 cm centimeters and located on the left side. There is also a smaller bosniak 1 cyst measuring 1.5 cm. Patient was referred to me for further evaluation. Patient is denying any significant back pain, hematuria, or other urinary symptoms at this time.    He is also s/p kidney transplant (2016) and is followed by the transplant team for this.     Imaging:   I personally viewed all applicable imaging and interpreted them and went  over the results with the patient.  Mass/lesion details are as follows    The cyst is simple appearing    Active Medications:      ACCU-CHEK EBONIE PLUS test strip, , Disp: , Rfl: 3     aspirin EC 81 MG EC tablet, Take 1 tablet (81 mg) by mouth daily, Disp: 30 tablet, Rfl: 3     atorvastatin (LIPITOR) 20 MG tablet, Take 1 tablet (20 mg) by mouth daily, Disp: , Rfl:      carvedilol (COREG) 25 MG tablet, TAKE ONE TABLET BY MOUTH TWICE DAILY WITH MEALS, Disp: 60 tablet, Rfl: 11     cloNIDine (CATAPRES) 0.3 MG tablet, Take 0.6 mg by mouth 2 times daily Patient reports, Disp: 180 tablet, Rfl: 3     DIPHENHYDRAMINE HCL PO, Take 50 mg by mouth At Bedtime, Disp: , Rfl:      FLUZONE QUADRIVALENT injection, , Disp: , Rfl: 0     FUROSEMIDE PO, Take 60 mg by mouth 2 times daily, Disp: , Rfl:      insulin aspart (NOVOLOG PEN) 100 UNIT/ML injection, Inject 1 Units Subcutaneous Take with snacks or supplements for high blood sugar 1 unit per 4 grams carbohydrate, Disp: 1 mL, Rfl: 0     insulin aspart (NOVOLOG PEN) 100 UNIT/ML injection, Inject 1 Units Subcutaneous 3 times daily (with meals) DOSE:  1 units per 4 grams of carbohydrate.  Only chart total amount of units given.  Do not give if pre-prandial glucose is less than 60 mg/dL., Disp: , Rfl:      insulin aspart (NOVOLOG PEN) 100 UNIT/ML injection, Inject 1-22 Units Subcutaneous 3 times daily (before meals) Correction Scale - VERY HIGH INSULIN RESISTANCE DOSING    Do Not give Correction Insulin if Pre-Meal BG less than 250.  For Pre-Meal  - 260 give 1 unit.  For Pre-Meal -269  give 2 units.  For Pre-Meal  - 279 give 3 units.  For Pre-Meal  - 289 give 4 units.  For Pre-Meal  - 299 give 5 units.  For Pre-Meal  - 309 give 6 units.  ..., Disp: , Rfl:      insulin aspart (NOVOLOG PEN) 100 UNIT/ML injection, Inject 1-16 Units Subcutaneous At Bedtime Correction Scale - VERY HIGH INSULIN RESISTANCE DOSING    Do Not give Bedtime Correction Insulin if  "BG less than 200.  For  - 209 give 1 units.  For  - 219 give 2 units.  For  - 229 give 3 units.  For  - 239 give 4 units.  For  - 249 give 5 units.  For  - 259 give 6 units.  For  - 269 give 7 units.  For  - 279 give 8 units ..., Disp: , Rfl:      insulin glargine (LANTUS) 100 UNIT/ML injection, Inject 30 Units Subcutaneous, Disp: , Rfl:      insulin glargine (LANTUS) 100 UNIT/ML injection, Inject 40 Units Subcutaneous At Bedtime, Disp: , Rfl:      insulin syringe 31G X 5/16\" 0.5 ML MISC, FOR ADMINISTERING INSULIN AT HOME 5 TIMES PER DAY., Disp: , Rfl:      losartan (COZAAR) 50 MG tablet, Take 1 tablet (50 mg) by mouth daily, Disp: 90 tablet, Rfl: 3     mycophenolate (GENERIC EQUIVALENT) 250 MG capsule, Take 3 capsules (750 mg) by mouth 2 times daily, Disp: 180 capsule, Rfl: 11     NIFEdipine ER (ADALAT CC) 30 MG TB24, Take 1 tablet (30 mg) by mouth 2 times daily, Disp: 60 tablet, Rfl: 3     omeprazole 20 MG tablet, Take 1 tablet (20 mg) by mouth every morning, Disp: 60 tablet, Rfl: 0     SODIUM BICARBONATE PO, Take 650 mg by mouth 2 times daily, Disp: , Rfl:      sulfamethoxazole-trimethoprim (BACTRIM/SEPTRA) 400-80 MG tablet, Take 1 tablet by mouth three times a week, Disp: 13 tablet, Rfl: 11     tacrolimus (GENERIC EQUIVALENT) 0.5 MG capsule, Take 1 capsule (0.5 mg) by mouth 2 times daily Total dose = 2.5 mg BID, Disp: 60 capsule, Rfl: 11     tacrolimus (GENERIC EQUIVALENT) 1 MG capsule, Take 2 capsules (2 mg) by mouth 2 times daily Total dose = 2.5 mg BID, Disp: 120 capsule, Rfl: 11     cholecalciferol (VITAMIN  -D) 1000 UNITS capsule, Take 2 capsules (2,000 Units) by mouth daily (Patient not taking: Reported on 12/4/2018), Disp: 180 capsule, Rfl: 3      Allergies:   Patient has no known allergies.      Past Medical History:  ESRD  Anemia   Diabetes, type 1   Dialysis patient   Hypertension   Diabetic peripheral neuropathy   Obesity   Immunosuppressed status    "   Past Surgical History:  BENCH kidney   AV fistula   Cystoscopy   Orthopedic surgery to foot     Family History:   Diabetes- father   Hypertension- brother   Colon cancer- mother       Social History:   Former smoker.      Physical Exam:   /71 (BP Location: Right arm, Patient Position: Sitting, Cuff Size: Adult Large)   Pulse 95   Ht 1.829 m (6')   Wt 131.6 kg (290 lb 1.6 oz)   BMI 39.34 kg/m      General Appearance Adult: Alert, no acute distress, oriented  Lungs: no respiratory distress, or pursed lip breathing  Heart: Regular rate   Neuro: Alert, oriented, speech and mentation normal  Psych: affect and mood normal  Gait: Normal    Laboratory:   I personally reviewed all applicable laboratory data and went over findings with patient  Significant for:    CBC RESULTS:  Recent Labs   Lab Test 01/25/19  0952 12/28/18  0933 12/04/18  1222 11/02/18  0947   WBC 8.4 6.9 8.1 8.1   HGB 16.6 17.1 17.1 17.1    219 236 227        BMP RESULTS:  Recent Labs   Lab Test 01/25/19  0952 12/28/18  0934 12/28/18  0933 12/04/18  1222    140 139 138   POTASSIUM 4.7 4.3 4.0 4.0   CHLORIDE 104 106 105 105   CO2 25 27 28 26   ANIONGAP 8 7 6 7   * 68* 67* 126*   BUN 18 19 18 21   CR 1.02 1.17 1.13 0.97   GFRESTIMATED 78 66 69 78   GFRESTBLACK 90 76 80 >90   ASHLEY 9.4 9.9 9.9 9.8       UA RESULTS:   Recent Labs   Lab Test 12/16/16  0949 11/29/16  1742 11/29/16  1728   SG 1.010 1.013 Canceled, Test credited   Duplicate request     URINEPH 5.5 5.5 Canceled, Test credited   Duplicate request     NITRITE Negative Negative Canceled, Test credited   Duplicate request  *   RBCU 1 >182* Canceled, Test credited   Duplicate request     WBCU 1 76* Canceled, Test credited   Duplicate request         PSA RESULTS:   PSA   Date Value Ref Range Status   08/29/2016 0.29 0 - 4 ug/L Final     Comment:     Assay Method:  Chemiluminescence using Siemens Vista analyzer   04/15/2015 0.23 0 - 4 ug/L Final       I spent over 15 minutes  with the patient. Over half this time was spent on counseling.     Scribe Disclosure:   I, Karla Boyd, am serving as a scribe to document services personally performed by Landon Vo MD at this visit, based upon the provider's statements to me. All documentation has been reviewed by the aforementioned provider prior to being entered into the official medical record.     Portions of this medical record were completed by a scribe. UPON MY REVIEW AND AUTHENTICATION BY ELECTRONIC SIGNATURE, this confirms (a) I performed the applicable clinical services, and (b) the record is accurate.    Landon Vo MD  Department of Urology Staff  [facility]    Note, dictation software may have been used for this note and the content was not proofread for accuracy

## 2019-01-31 NOTE — PROGRESS NOTES
Urology Clinic  ##RENAL MASS INITIAL ENCOUNTER##    Landon Vo MD  Date of Service: 2019     Name: Corey Roland  MRN: [id number]  Age: 63 year old  : 1955  Referring provider: Erasmo Wilkins     Assessment and Plan:    1. Cystic renal mass: consistent with a fluid filled cyst that is without concerning features from a malignant standpoint.     Generally these do not need follow up    Bosniak Grade 1    Attestation:  This patient was seen and evaluated by me, with a scribe taking notes.  I have reviewed the note above and agree.  The physical exam and or any procedures were performed by me and the pertinant details are outlined below.       Landon Vo MD  Department of Urology  [facility]        Chief Complaint:   Consult for cystic renal mass     HPI:   Corey Roland is a very pleasant 63 year old male who presents with a history of a renal lesion/mass.  This was discovered incidentally.  Initially diagnosed in 2017. The lesion is cystic ( Category 1 (thin walled, density less than 20 Hounsfield units, no septa, calcifications or solid components).  The maximal dimension of the renal lesion is 2.7 cm centimeters and located on the left side. There is also a smaller bosniak 1 cyst measuring 1.5 cm. Patient was referred to me for further evaluation. Patient is denying any significant back pain, hematuria, or other urinary symptoms at this time.    He is also s/p kidney transplant (2016) and is followed by the transplant team for this.     Imaging:   I personally viewed all applicable imaging and interpreted them and went over the results with the patient.  Mass/lesion details are as follows    The cyst is simple appearing    Review of Systems:   Pertinent items are noted in HPI or as below, remainder of complete ROS is negative.    Answers for HPI/ROS submitted by the patient on 2019   General Symptoms: No  Skin Symptoms: No  HENT Symptoms:  No  EYE SYMPTOMS: Yes  HEART SYMPTOMS: No  LUNG SYMPTOMS: No  INTESTINAL SYMPTOMS: No  URINARY SYMPTOMS: No  REPRODUCTIVE SYMPTOMS: No  SKELETAL SYMPTOMS: No  BLOOD SYMPTOMS: No  NERVOUS SYSTEM SYMPTOMS: No  MENTAL HEALTH SYMPTOMS: No  Eye pain: No  Vision loss: No  Dry eyes: No  Watery eyes: No  Eye bulging: No  Double vision: No  Flashing of lights: No  Spots: Yes  Floaters: No  Redness: No  Crossed eyes: No  Tunnel Vision: No  Yellowing of eyes: No  Eye irritation: No    Active Medications:      ACCU-CHEK EBONIE PLUS test strip, , Disp: , Rfl: 3     aspirin EC 81 MG EC tablet, Take 1 tablet (81 mg) by mouth daily, Disp: 30 tablet, Rfl: 3     atorvastatin (LIPITOR) 20 MG tablet, Take 1 tablet (20 mg) by mouth daily, Disp: , Rfl:      carvedilol (COREG) 25 MG tablet, TAKE ONE TABLET BY MOUTH TWICE DAILY WITH MEALS, Disp: 60 tablet, Rfl: 11     cloNIDine (CATAPRES) 0.3 MG tablet, Take 0.6 mg by mouth 2 times daily Patient reports, Disp: 180 tablet, Rfl: 3     DIPHENHYDRAMINE HCL PO, Take 50 mg by mouth At Bedtime, Disp: , Rfl:      FLUZONE QUADRIVALENT injection, , Disp: , Rfl: 0     FUROSEMIDE PO, Take 60 mg by mouth 2 times daily, Disp: , Rfl:      insulin aspart (NOVOLOG PEN) 100 UNIT/ML injection, Inject 1 Units Subcutaneous Take with snacks or supplements for high blood sugar 1 unit per 4 grams carbohydrate, Disp: 1 mL, Rfl: 0     insulin aspart (NOVOLOG PEN) 100 UNIT/ML injection, Inject 1 Units Subcutaneous 3 times daily (with meals) DOSE:  1 units per 4 grams of carbohydrate.  Only chart total amount of units given.  Do not give if pre-prandial glucose is less than 60 mg/dL., Disp: , Rfl:      insulin aspart (NOVOLOG PEN) 100 UNIT/ML injection, Inject 1-22 Units Subcutaneous 3 times daily (before meals) Correction Scale - VERY HIGH INSULIN RESISTANCE DOSING    Do Not give Correction Insulin if Pre-Meal BG less than 250.  For Pre-Meal  - 260 give 1 unit.  For Pre-Meal -269  give 2 units.  For  "Pre-Meal  - 279 give 3 units.  For Pre-Meal  - 289 give 4 units.  For Pre-Meal  - 299 give 5 units.  For Pre-Meal  - 309 give 6 units.  ..., Disp: , Rfl:      insulin aspart (NOVOLOG PEN) 100 UNIT/ML injection, Inject 1-16 Units Subcutaneous At Bedtime Correction Scale - VERY HIGH INSULIN RESISTANCE DOSING    Do Not give Bedtime Correction Insulin if BG less than 200.  For  - 209 give 1 units.  For  - 219 give 2 units.  For  - 229 give 3 units.  For  - 239 give 4 units.  For  - 249 give 5 units.  For  - 259 give 6 units.  For  - 269 give 7 units.  For  - 279 give 8 units ..., Disp: , Rfl:      insulin glargine (LANTUS) 100 UNIT/ML injection, Inject 30 Units Subcutaneous, Disp: , Rfl:      insulin glargine (LANTUS) 100 UNIT/ML injection, Inject 40 Units Subcutaneous At Bedtime, Disp: , Rfl:      insulin syringe 31G X 5/16\" 0.5 ML MISC, FOR ADMINISTERING INSULIN AT HOME 5 TIMES PER DAY., Disp: , Rfl:      losartan (COZAAR) 50 MG tablet, Take 1 tablet (50 mg) by mouth daily, Disp: 90 tablet, Rfl: 3     mycophenolate (GENERIC EQUIVALENT) 250 MG capsule, Take 3 capsules (750 mg) by mouth 2 times daily, Disp: 180 capsule, Rfl: 11     NIFEdipine ER (ADALAT CC) 30 MG TB24, Take 1 tablet (30 mg) by mouth 2 times daily, Disp: 60 tablet, Rfl: 3     omeprazole 20 MG tablet, Take 1 tablet (20 mg) by mouth every morning, Disp: 60 tablet, Rfl: 0     SODIUM BICARBONATE PO, Take 650 mg by mouth 2 times daily, Disp: , Rfl:      sulfamethoxazole-trimethoprim (BACTRIM/SEPTRA) 400-80 MG tablet, Take 1 tablet by mouth three times a week, Disp: 13 tablet, Rfl: 11     tacrolimus (GENERIC EQUIVALENT) 0.5 MG capsule, Take 1 capsule (0.5 mg) by mouth 2 times daily Total dose = 2.5 mg BID, Disp: 60 capsule, Rfl: 11     tacrolimus (GENERIC EQUIVALENT) 1 MG capsule, Take 2 capsules (2 mg) by mouth 2 times daily Total dose = 2.5 mg BID, Disp: 120 capsule, Rfl: 11     " cholecalciferol (VITAMIN  -D) 1000 UNITS capsule, Take 2 capsules (2,000 Units) by mouth daily (Patient not taking: Reported on 12/4/2018), Disp: 180 capsule, Rfl: 3      Allergies:   Patient has no known allergies.      Past Medical History:  ESRD  Anemia   Diabetes, type 1   Dialysis patient   Hypertension   Diabetic peripheral neuropathy   Obesity   Immunosuppressed status      Past Surgical History:  BENCH kidney   AV fistula   Cystoscopy   Orthopedic surgery to foot     Family History:   Diabetes- father   Hypertension- brother   Colon cancer- mother       Social History:   Former smoker.      Physical Exam:   /71 (BP Location: Right arm, Patient Position: Sitting, Cuff Size: Adult Large)   Pulse 95   Ht 1.829 m (6')   Wt 131.6 kg (290 lb 1.6 oz)   BMI 39.34 kg/m     General Appearance Adult: Alert, no acute distress, oriented  Lungs: no respiratory distress, or pursed lip breathing  Heart: Regular rate   Neuro: Alert, oriented, speech and mentation normal  Psych: affect and mood normal  Gait: Normal    Laboratory:   I personally reviewed all applicable laboratory data and went over findings with patient  Significant for:    CBC RESULTS:  Recent Labs   Lab Test 01/25/19  0952 12/28/18  0933 12/04/18  1222 11/02/18  0947   WBC 8.4 6.9 8.1 8.1   HGB 16.6 17.1 17.1 17.1    219 236 227        BMP RESULTS:  Recent Labs   Lab Test 01/25/19  0952 12/28/18  0934 12/28/18  0933 12/04/18  1222    140 139 138   POTASSIUM 4.7 4.3 4.0 4.0   CHLORIDE 104 106 105 105   CO2 25 27 28 26   ANIONGAP 8 7 6 7   * 68* 67* 126*   BUN 18 19 18 21   CR 1.02 1.17 1.13 0.97   GFRESTIMATED 78 66 69 78   GFRESTBLACK 90 76 80 >90   ASHLEY 9.4 9.9 9.9 9.8       UA RESULTS:   Recent Labs   Lab Test 12/16/16  0949 11/29/16  1742 11/29/16  1728   SG 1.010 1.013 Canceled, Test credited   Duplicate request     URINEPH 5.5 5.5 Canceled, Test credited   Duplicate request     NITRITE Negative Negative Canceled, Test  credited   Duplicate request  *   RBCU 1 >182* Canceled, Test credited   Duplicate request     WBCU 1 76* Canceled, Test credited   Duplicate request         PSA RESULTS:   PSA   Date Value Ref Range Status   08/29/2016 0.29 0 - 4 ug/L Final     Comment:     Assay Method:  Chemiluminescence using Siemens Vista analyzer   04/15/2015 0.23 0 - 4 ug/L Final       I spent over 15 minutes with the patient. Over half this time was spent on counseling.     Scribe Disclosure:   I, Karla Boyd, am serving as a scribe to document services personally performed by Landon Vo MD at this visit, based upon the provider's statements to me. All documentation has been reviewed by the aforementioned provider prior to being entered into the official medical record.     Portions of this medical record were completed by a scribe. UPON MY REVIEW AND AUTHENTICATION BY ELECTRONIC SIGNATURE, this confirms (a) I performed the applicable clinical services, and (b) the record is accurate.    Landon Vo MD  Department of Urology Staff  [facility]    Note, dictation software may have been used for this note and the content was not proofread for accuracy

## 2019-03-01 DIAGNOSIS — Z94.0 KIDNEY TRANSPLANTED: ICD-10-CM

## 2019-03-01 DIAGNOSIS — Z79.899 IMMUNOSUPPRESSIVE MANAGEMENT ENCOUNTER FOLLOWING KIDNEY TRANSPLANT: ICD-10-CM

## 2019-03-01 DIAGNOSIS — Z94.0 IMMUNOSUPPRESSIVE MANAGEMENT ENCOUNTER FOLLOWING KIDNEY TRANSPLANT: ICD-10-CM

## 2019-03-01 DIAGNOSIS — Z48.298 AFTERCARE FOLLOWING ORGAN TRANSPLANT: ICD-10-CM

## 2019-03-01 LAB
ANION GAP SERPL CALCULATED.3IONS-SCNC: 6 MMOL/L (ref 3–14)
BUN SERPL-MCNC: 23 MG/DL (ref 7–30)
CALCIUM SERPL-MCNC: 10.1 MG/DL (ref 8.5–10.1)
CHLORIDE SERPL-SCNC: 105 MMOL/L (ref 94–109)
CO2 SERPL-SCNC: 27 MMOL/L (ref 20–32)
CREAT SERPL-MCNC: 0.97 MG/DL (ref 0.66–1.25)
ERYTHROCYTE [DISTWIDTH] IN BLOOD BY AUTOMATED COUNT: 14.1 % (ref 10–15)
GFR SERPL CREATININE-BSD FRML MDRD: 82 ML/MIN/{1.73_M2}
GLUCOSE SERPL-MCNC: 143 MG/DL (ref 70–99)
HCT VFR BLD AUTO: 50.5 % (ref 40–53)
HGB BLD-MCNC: 16.4 G/DL (ref 13.3–17.7)
MCH RBC QN AUTO: 28.3 PG (ref 26.5–33)
MCHC RBC AUTO-ENTMCNC: 32.5 G/DL (ref 31.5–36.5)
MCV RBC AUTO: 87 FL (ref 78–100)
PLATELET # BLD AUTO: 220 10E9/L (ref 150–450)
POTASSIUM SERPL-SCNC: 4.4 MMOL/L (ref 3.4–5.3)
RBC # BLD AUTO: 5.8 10E12/L (ref 4.4–5.9)
SODIUM SERPL-SCNC: 138 MMOL/L (ref 133–144)
WBC # BLD AUTO: 7.9 10E9/L (ref 4–11)

## 2019-03-01 PROCEDURE — 80197 ASSAY OF TACROLIMUS: CPT | Performed by: INTERNAL MEDICINE

## 2019-03-01 PROCEDURE — 85027 COMPLETE CBC AUTOMATED: CPT | Performed by: INTERNAL MEDICINE

## 2019-03-01 PROCEDURE — 80048 BASIC METABOLIC PNL TOTAL CA: CPT | Performed by: INTERNAL MEDICINE

## 2019-03-01 PROCEDURE — 36415 COLL VENOUS BLD VENIPUNCTURE: CPT | Performed by: INTERNAL MEDICINE

## 2019-03-02 LAB
TACROLIMUS BLD-MCNC: 5.6 UG/L (ref 5–15)
TME LAST DOSE: NORMAL H

## 2019-04-01 DIAGNOSIS — Z48.298 AFTERCARE FOLLOWING ORGAN TRANSPLANT: ICD-10-CM

## 2019-04-01 DIAGNOSIS — Z94.0 IMMUNOSUPPRESSIVE MANAGEMENT ENCOUNTER FOLLOWING KIDNEY TRANSPLANT: ICD-10-CM

## 2019-04-01 DIAGNOSIS — Z79.899 IMMUNOSUPPRESSIVE MANAGEMENT ENCOUNTER FOLLOWING KIDNEY TRANSPLANT: ICD-10-CM

## 2019-04-01 DIAGNOSIS — Z94.0 KIDNEY TRANSPLANTED: ICD-10-CM

## 2019-04-01 LAB
ANION GAP SERPL CALCULATED.3IONS-SCNC: 9 MMOL/L (ref 3–14)
BUN SERPL-MCNC: 26 MG/DL (ref 7–30)
CALCIUM SERPL-MCNC: 10.4 MG/DL (ref 8.5–10.1)
CHLORIDE SERPL-SCNC: 104 MMOL/L (ref 94–109)
CO2 SERPL-SCNC: 23 MMOL/L (ref 20–32)
CREAT SERPL-MCNC: 0.99 MG/DL (ref 0.66–1.25)
ERYTHROCYTE [DISTWIDTH] IN BLOOD BY AUTOMATED COUNT: 13.8 % (ref 10–15)
GFR SERPL CREATININE-BSD FRML MDRD: 80 ML/MIN/{1.73_M2}
GLUCOSE SERPL-MCNC: 140 MG/DL (ref 70–99)
HCT VFR BLD AUTO: 49.6 % (ref 40–53)
HGB BLD-MCNC: 16.4 G/DL (ref 13.3–17.7)
MCH RBC QN AUTO: 28.6 PG (ref 26.5–33)
MCHC RBC AUTO-ENTMCNC: 33.1 G/DL (ref 31.5–36.5)
MCV RBC AUTO: 87 FL (ref 78–100)
PLATELET # BLD AUTO: 210 10E9/L (ref 150–450)
POTASSIUM SERPL-SCNC: 4.3 MMOL/L (ref 3.4–5.3)
RBC # BLD AUTO: 5.73 10E12/L (ref 4.4–5.9)
SODIUM SERPL-SCNC: 136 MMOL/L (ref 133–144)
WBC # BLD AUTO: 9.3 10E9/L (ref 4–11)

## 2019-04-01 PROCEDURE — 80048 BASIC METABOLIC PNL TOTAL CA: CPT | Performed by: INTERNAL MEDICINE

## 2019-04-01 PROCEDURE — 85027 COMPLETE CBC AUTOMATED: CPT | Performed by: INTERNAL MEDICINE

## 2019-04-01 PROCEDURE — 36415 COLL VENOUS BLD VENIPUNCTURE: CPT | Performed by: INTERNAL MEDICINE

## 2019-04-01 PROCEDURE — 80197 ASSAY OF TACROLIMUS: CPT | Performed by: INTERNAL MEDICINE

## 2019-04-02 LAB
TACROLIMUS BLD-MCNC: 6 UG/L (ref 5–15)
TME LAST DOSE: NORMAL H

## 2019-04-26 DIAGNOSIS — Z48.298 AFTERCARE FOLLOWING ORGAN TRANSPLANT: ICD-10-CM

## 2019-04-26 DIAGNOSIS — Z79.899 IMMUNOSUPPRESSIVE MANAGEMENT ENCOUNTER FOLLOWING KIDNEY TRANSPLANT: ICD-10-CM

## 2019-04-26 DIAGNOSIS — Z94.0 IMMUNOSUPPRESSIVE MANAGEMENT ENCOUNTER FOLLOWING KIDNEY TRANSPLANT: ICD-10-CM

## 2019-04-26 DIAGNOSIS — Z94.0 KIDNEY TRANSPLANTED: ICD-10-CM

## 2019-04-26 LAB
ANION GAP SERPL CALCULATED.3IONS-SCNC: 10 MMOL/L (ref 3–14)
BUN SERPL-MCNC: 19 MG/DL (ref 7–30)
CALCIUM SERPL-MCNC: 9.7 MG/DL (ref 8.5–10.1)
CHLORIDE SERPL-SCNC: 106 MMOL/L (ref 94–109)
CO2 SERPL-SCNC: 23 MMOL/L (ref 20–32)
CREAT SERPL-MCNC: 1 MG/DL (ref 0.66–1.25)
ERYTHROCYTE [DISTWIDTH] IN BLOOD BY AUTOMATED COUNT: 13.9 % (ref 10–15)
GFR SERPL CREATININE-BSD FRML MDRD: 79 ML/MIN/{1.73_M2}
GLUCOSE SERPL-MCNC: 102 MG/DL (ref 70–99)
HCT VFR BLD AUTO: 49.1 % (ref 40–53)
HGB BLD-MCNC: 16.3 G/DL (ref 13.3–17.7)
MCH RBC QN AUTO: 28.7 PG (ref 26.5–33)
MCHC RBC AUTO-ENTMCNC: 33.2 G/DL (ref 31.5–36.5)
MCV RBC AUTO: 86 FL (ref 78–100)
PLATELET # BLD AUTO: 227 10E9/L (ref 150–450)
POTASSIUM SERPL-SCNC: 4 MMOL/L (ref 3.4–5.3)
RBC # BLD AUTO: 5.68 10E12/L (ref 4.4–5.9)
SODIUM SERPL-SCNC: 139 MMOL/L (ref 133–144)
WBC # BLD AUTO: 8.2 10E9/L (ref 4–11)

## 2019-04-26 PROCEDURE — 36415 COLL VENOUS BLD VENIPUNCTURE: CPT | Performed by: INTERNAL MEDICINE

## 2019-04-26 PROCEDURE — 85027 COMPLETE CBC AUTOMATED: CPT | Performed by: INTERNAL MEDICINE

## 2019-04-26 PROCEDURE — 80197 ASSAY OF TACROLIMUS: CPT | Performed by: INTERNAL MEDICINE

## 2019-04-26 PROCEDURE — 80048 BASIC METABOLIC PNL TOTAL CA: CPT | Performed by: INTERNAL MEDICINE

## 2019-04-27 LAB
TACROLIMUS BLD-MCNC: 6.6 UG/L (ref 5–15)
TME LAST DOSE: 2200 H

## 2019-05-08 ENCOUNTER — TELEPHONE (OUTPATIENT)
Dept: NEPHROLOGY | Facility: CLINIC | Age: 64
End: 2019-05-08

## 2019-05-08 DIAGNOSIS — I15.1 HYPERTENSION SECONDARY TO OTHER RENAL DISORDERS (CODE): Primary | ICD-10-CM

## 2019-05-08 RX ORDER — LISINOPRIL 20 MG/1
20 TABLET ORAL DAILY
Qty: 90 TABLET | Refills: 3 | Status: SHIPPED | OUTPATIENT
Start: 2019-05-08 | End: 2019-05-14 | Stop reason: SINTOL

## 2019-05-08 NOTE — TELEPHONE ENCOUNTER
Received notice from pharmacy to change patient's BP medication due to losartan recall.      Per Dr. Wilkins:  Would start him on 20 mg daily. And reassess his BP control and check renal panel.     Called patient, who agreed to plan. He will have labs checked on 5/24.    Keli Gibson RN

## 2019-05-14 ENCOUNTER — TELEPHONE (OUTPATIENT)
Dept: TRANSPLANT | Facility: CLINIC | Age: 64
End: 2019-05-14

## 2019-05-14 DIAGNOSIS — I15.1 HYPERTENSION SECONDARY TO OTHER RENAL DISORDERS: Primary | ICD-10-CM

## 2019-05-14 RX ORDER — LOSARTAN POTASSIUM 50 MG/1
50 TABLET ORAL DAILY
Qty: 90 TABLET | Refills: 3 | Status: SHIPPED | OUTPATIENT
Start: 2019-05-14 | End: 2020-06-04

## 2019-05-14 NOTE — TELEPHONE ENCOUNTER
Pt called -stated he was recently changed to Lisinopril- since taking it has had increased cramping of hands and feet- ringing in ears Does not like the effects of the med  Wants to go back to the med he was taking before  He stated he is stopping the lisinopril   Call him back

## 2019-05-14 NOTE — TELEPHONE ENCOUNTER
Spoke with Corey. He started lisinopril on Friday, and side effects kicked in on Saturday. Said this previously happened to him with lisinopril. He will stop taking it today, would like to go back to losartan. BP has been well controlled with his other regimen (losartan was more for proteinuria than BP). Before meds this morning, BP was 143/69.    Update sent to Dr. Wilkins.    Keli Gibson RN

## 2019-05-31 ENCOUNTER — RESULTS ONLY (OUTPATIENT)
Dept: OTHER | Facility: CLINIC | Age: 64
End: 2019-05-31

## 2019-05-31 DIAGNOSIS — Z79.899 IMMUNOSUPPRESSIVE MANAGEMENT ENCOUNTER FOLLOWING KIDNEY TRANSPLANT: ICD-10-CM

## 2019-05-31 DIAGNOSIS — Z48.298 AFTERCARE FOLLOWING ORGAN TRANSPLANT: ICD-10-CM

## 2019-05-31 DIAGNOSIS — Z94.0 IMMUNOSUPPRESSIVE MANAGEMENT ENCOUNTER FOLLOWING KIDNEY TRANSPLANT: ICD-10-CM

## 2019-05-31 DIAGNOSIS — Z94.0 KIDNEY TRANSPLANTED: ICD-10-CM

## 2019-05-31 DIAGNOSIS — I15.1 HYPERTENSION SECONDARY TO OTHER RENAL DISORDERS (CODE): ICD-10-CM

## 2019-05-31 LAB
ALBUMIN SERPL-MCNC: 3.9 G/DL (ref 3.4–5)
ANION GAP SERPL CALCULATED.3IONS-SCNC: 8 MMOL/L (ref 3–14)
BUN SERPL-MCNC: 12 MG/DL (ref 7–30)
CALCIUM SERPL-MCNC: 9.5 MG/DL (ref 8.5–10.1)
CHLORIDE SERPL-SCNC: 107 MMOL/L (ref 94–109)
CO2 SERPL-SCNC: 25 MMOL/L (ref 20–32)
CREAT SERPL-MCNC: 0.97 MG/DL (ref 0.66–1.25)
CREAT UR-MCNC: 21 MG/DL
ERYTHROCYTE [DISTWIDTH] IN BLOOD BY AUTOMATED COUNT: 13.5 % (ref 10–15)
GFR SERPL CREATININE-BSD FRML MDRD: 82 ML/MIN/{1.73_M2}
GLUCOSE SERPL-MCNC: 117 MG/DL (ref 70–99)
HCT VFR BLD AUTO: 49.2 % (ref 40–53)
HGB BLD-MCNC: 16.5 G/DL (ref 13.3–17.7)
MCH RBC QN AUTO: 28.8 PG (ref 26.5–33)
MCHC RBC AUTO-ENTMCNC: 33.5 G/DL (ref 31.5–36.5)
MCV RBC AUTO: 86 FL (ref 78–100)
PHOSPHATE SERPL-MCNC: 1.7 MG/DL (ref 2.5–4.5)
PLATELET # BLD AUTO: 234 10E9/L (ref 150–450)
POTASSIUM SERPL-SCNC: 3.9 MMOL/L (ref 3.4–5.3)
PROT UR-MCNC: 0.07 G/L
PROT/CREAT 24H UR: 0.32 G/G CR (ref 0–0.2)
RBC # BLD AUTO: 5.73 10E12/L (ref 4.4–5.9)
SODIUM SERPL-SCNC: 140 MMOL/L (ref 133–144)
WBC # BLD AUTO: 7.6 10E9/L (ref 4–11)

## 2019-05-31 PROCEDURE — 80197 ASSAY OF TACROLIMUS: CPT | Performed by: INTERNAL MEDICINE

## 2019-05-31 PROCEDURE — 85027 COMPLETE CBC AUTOMATED: CPT | Performed by: INTERNAL MEDICINE

## 2019-05-31 PROCEDURE — 86833 HLA CLASS II HIGH DEFIN QUAL: CPT | Performed by: INTERNAL MEDICINE

## 2019-05-31 PROCEDURE — 86832 HLA CLASS I HIGH DEFIN QUAL: CPT | Performed by: INTERNAL MEDICINE

## 2019-05-31 PROCEDURE — 80069 RENAL FUNCTION PANEL: CPT | Performed by: INTERNAL MEDICINE

## 2019-05-31 PROCEDURE — 36415 COLL VENOUS BLD VENIPUNCTURE: CPT | Performed by: INTERNAL MEDICINE

## 2019-05-31 PROCEDURE — 84156 ASSAY OF PROTEIN URINE: CPT | Performed by: INTERNAL MEDICINE

## 2019-05-31 PROCEDURE — 87799 DETECT AGENT NOS DNA QUANT: CPT | Performed by: INTERNAL MEDICINE

## 2019-06-01 LAB
TACROLIMUS BLD-MCNC: 6.2 UG/L (ref 5–15)
TME LAST DOSE: 2130 H

## 2019-06-13 ENCOUNTER — TELEPHONE (OUTPATIENT)
Dept: NEPHROLOGY | Facility: CLINIC | Age: 64
End: 2019-06-13

## 2019-06-13 DIAGNOSIS — Z94.0 KIDNEY TRANSPLANT RECIPIENT: ICD-10-CM

## 2019-06-13 DIAGNOSIS — Z94.0 KIDNEY TRANSPLANTED: ICD-10-CM

## 2019-06-13 RX ORDER — TACROLIMUS 1 MG/1
2 CAPSULE ORAL 2 TIMES DAILY
Qty: 120 CAPSULE | Refills: 11 | Status: SHIPPED | OUTPATIENT
Start: 2019-06-13 | End: 2019-07-01

## 2019-06-13 RX ORDER — TACROLIMUS 0.5 MG/1
0.5 CAPSULE ORAL 2 TIMES DAILY
Qty: 60 CAPSULE | Refills: 11 | Status: SHIPPED | OUTPATIENT
Start: 2019-06-13 | End: 2019-07-01

## 2019-06-13 NOTE — TELEPHONE ENCOUNTER
Left message for pt reminding them of upcoming appointment.  Instructed pt to bring updated medications list.  Kizzy Gifford CMA    6/13/2019 4:04 PM

## 2019-06-14 ENCOUNTER — TELEPHONE (OUTPATIENT)
Dept: TRANSPLANT | Facility: CLINIC | Age: 64
End: 2019-06-14

## 2019-06-14 ENCOUNTER — OFFICE VISIT (OUTPATIENT)
Dept: NEPHROLOGY | Facility: CLINIC | Age: 64
End: 2019-06-14
Attending: INTERNAL MEDICINE
Payer: MEDICARE

## 2019-06-14 VITALS
OXYGEN SATURATION: 96 % | HEART RATE: 77 BPM | BODY MASS INDEX: 38.41 KG/M2 | WEIGHT: 283.2 LBS | TEMPERATURE: 98.2 F | DIASTOLIC BLOOD PRESSURE: 76 MMHG | SYSTOLIC BLOOD PRESSURE: 135 MMHG

## 2019-06-14 DIAGNOSIS — E55.9 VITAMIN D DEFICIENCY: ICD-10-CM

## 2019-06-14 DIAGNOSIS — Z48.298 AFTERCARE FOLLOWING ORGAN TRANSPLANT: Primary | ICD-10-CM

## 2019-06-14 PROCEDURE — G0463 HOSPITAL OUTPT CLINIC VISIT: HCPCS | Mod: ZF

## 2019-06-14 RX ORDER — INSULIN DEGLUDEC 200 U/ML
35 INJECTION, SOLUTION SUBCUTANEOUS DAILY
Refills: 3 | COMMUNITY
Start: 2019-06-04 | End: 2024-05-07

## 2019-06-14 ASSESSMENT — PAIN SCALES - GENERAL: PAINLEVEL: NO PAIN (0)

## 2019-06-14 NOTE — NURSING NOTE
Chief Complaint   Patient presents with     RECHECK     Follow up Kidney TX     Vital signs:  Temp: 98.2  F (36.8  C) Temp src: Oral BP: 135/76 Pulse: 77     SpO2: 96 %       Weight: 128.5 kg (283 lb 3.2 oz)  Estimated body mass index is 38.41 kg/m  as calculated from the following:    Height as of 1/31/19: 1.829 m (6').    Weight as of this encounter: 128.5 kg (283 lb 3.2 oz).        Justina Castellanos CMA

## 2019-06-14 NOTE — LETTER
2019      RE: Corey Roland  [address]  [facility] [address]       CHRONIC TRANSPLANT NEPHROLOGY VISIT    Assessment & Plan   # DDKT: Stable   - Baseline Cr ~ 0.9-1.1; 0.97 today   - Proteinuria: Minimal (0.2-0.5 grams)   - Date DSA Last Checked: May/2019       Latest DSA: No   - BK Viremia: No   - Kidney Tx Biopsy: No    - Minimal proteinuria. Counseled on weight loss in order to maintain stable blood pressure and kidney function.     # Immunosuppression: Tacrolimus immediate release (goal  4-6) and Mycophenolate mofetil (goal  1-3.5)              - Changes: No    # Prophylaxis:   - PJP: Sulfa/TMP (Bactrim)    # Hypertension:  Controlled; Goal BP: < 140/90              - Changes: No clonidine 0.6 bid, coreg 25mg bid, lasix 60mg bid, nifedipine 30mg bid  . Losartan 50mg daily.    # Diabetes: Controlled in insulin f/u with endocrinology last A1c 7    # Post transplant erythrocytosis Hgb:  Stable at 16.5 as of 19.     # Mineral Bone Disorder:    - Secondary renal hyperparathyroidism; PTH level: Not check recently.   - Vitamin D; level: Not check recently.   - Calcium; level: Normal  - Phosphorus; level: Low     # Electrolytes:   - Potassium; level: Normal  - Magnesium; level: Normal  - Bicarbonate; level: Normal  - Sodium; level: Normal    # Skin Cancer Risk:    - Discussed sun protection and recommend regular follow up with Dermatology.    # Medical Compliance: Yes       # Transplant History:  Etiology of kidney failure: diabetes mellitus type 2  Tx: DDKT  Transplant: 2016 (Kidney)  Donor Type:  - Cardiac Death        Donor Class:   Crossmatch at time of Tx: negative  Significant changes in immunosuppression: None  Significant transplant-related complications: None    Transplant Office Phone Number: [phone]    Assessment and plan was discussed with the patient and he voiced his understanding and agreement.    Return visit: Return in about 6 months (around  12/14/2019).       Chief Complaint   Mr. Roland is a 63 year old here for routine follow up.    History of Present Illness   Corey Roland is a 63 year old male with a history of ESKD secondary to diabetes mellitus type 2 and is status post DDKT on 11/24/16. Patient was last evaluated by Dr. Wilkins on 12/4/18; Please see that note for further details.     Today, he reports that he has been doing well since our last visit. No recent hospitalizations or medical complaints. He does note some difficulty exercising due to a limited left ankle mobility from previous surgery, but is still able to swim. Reports that his home blood pressure was 131/68 this morning. He continues to use compression stockings to alleviate lower extremity swelling. No pain over kidney. No other concerns.        Recent Hospitalizations:  [x] No [] Yes    New Medical Issues: [x] No [] Yes    Decreased energy: [x] No [] Yes    Chest pain or SOB with exertion:  [x] No [] Yes    Appetite change or weight change: [x] No [] Yes    Nausea, vomiting or diarrhea:  [x] No [] Yes    Fever, sweats or chills: [x] No [] Yes    Leg swelling: [x] No [] Yes          Review of Systems   A comprehensive review of systems was obtained and negative, except as noted in the HPI or PMH.    Problem List   Patient Active Problem List   Diagnosis     Hypertension secondary to other renal disorders     Secondary renal hyperparathyroidism (H)     Obesity     Diabetic peripheral neuropathy (H)     Kidney replaced by transplant     Immunosuppressed status (H)     Type 1 diabetes mellitus with nephropathy (H)     Aftercare following organ transplant     Vitamin D deficiency     Hypomagnesemia     Post-transplant erythrocytosis       Social History   Social History     Tobacco Use     Smoking status: Former Smoker     Packs/day: 2.00     Years: 38.00     Pack years: 76.00     Types: Cigarettes     Start date: 1/1/1967     Last attempt to quit: 4/1/2005     Years since  "quittin.2     Smokeless tobacco: Former User     Quit date: 2007   Substance Use Topics     Alcohol use: Yes     Alcohol/week: 0.0 - 0.5 oz     Comment: les than 4 drinks a month     Drug use: No     Comment: past (, marijuana, heroin IV, cocaine, hallucinogens, methamphetamine)       Allergies   No Known Allergies    Medications   Current Outpatient Medications   Medication Sig     ACCU-CHEK EBONIE PLUS test strip      aspirin EC 81 MG EC tablet Take 1 tablet (81 mg) by mouth daily     atorvastatin (LIPITOR) 20 MG tablet Take 1 tablet (20 mg) by mouth daily     carvedilol (COREG) 25 MG tablet TAKE ONE TABLET BY MOUTH TWICE DAILY WITH MEALS     cholecalciferol (VITAMIN D3) 1000 units (25 mcg) capsule Take 2 capsules (2,000 Units) by mouth daily     cloNIDine (CATAPRES) 0.3 MG tablet Take 0.6 mg by mouth 2 times daily Patient reports     DIPHENHYDRAMINE HCL PO Take 50 mg by mouth At Bedtime     FUROSEMIDE PO Take 60 mg by mouth 2 times daily     insulin aspart (NOVOLOG PEN) 100 UNIT/ML injection Inject 1 Units Subcutaneous Take with snacks or supplements for high blood sugar 1 unit per 4 grams carbohydrate     insulin aspart (NOVOLOG PEN) 100 UNIT/ML injection Inject 1 Units Subcutaneous 3 times daily (with meals) DOSE:  1 units per 4 grams of carbohydrate.  Only chart total amount of units given.  Do not give if pre-prandial glucose is less than 60 mg/dL.     insulin aspart (NOVOLOG PEN) 100 UNIT/ML injection Inject 1-22 Units Subcutaneous 3 times daily (before meals) Correction Scale - VERY HIGH INSULIN RESISTANCE DOSING     Do Not give Correction Insulin if Pre-Meal BG less than 250.   For Pre-Meal  - 260 give 1 unit.   For Pre-Meal -269  give 2 units.   For Pre-Meal  - 279 give 3 units.   For Pre-Meal  - 289 give 4 units.   For Pre-Meal  - 299 give 5 units.   For Pre-Meal  - 309 give 6 units.   ...     insulin syringe 31G X \" 0.5 ML MISC FOR ADMINISTERING " INSULIN AT HOME 5 TIMES PER DAY.     losartan (COZAAR) 50 MG tablet Take 1 tablet (50 mg) by mouth daily     mycophenolate (GENERIC EQUIVALENT) 250 MG capsule Take 3 capsules (750 mg) by mouth 2 times daily     NIFEdipine ER (ADALAT CC) 30 MG TB24 Take 1 tablet (30 mg) by mouth 2 times daily     omeprazole 20 MG tablet Take 1 tablet (20 mg) by mouth every morning     sulfamethoxazole-trimethoprim (BACTRIM/SEPTRA) 400-80 MG tablet Take 1 tablet by mouth three times a week     tacrolimus (GENERIC EQUIVALENT) 0.5 MG capsule Take 1 capsule (0.5 mg) by mouth 2 times daily Total dose = 2.5 mg BID     tacrolimus (GENERIC EQUIVALENT) 1 MG capsule Take 2 capsules (2 mg) by mouth 2 times daily Total dose = 2.5 mg BID     TRESIBA FLEXTOUCH 200 UNIT/ML pen INJECT 70-90 UNITS SUBCUTANEOUS EVERY 24 HOURS.     insulin aspart (NOVOLOG PEN) 100 UNIT/ML injection Inject 1-16 Units Subcutaneous At Bedtime Correction Scale - VERY HIGH INSULIN RESISTANCE DOSING     Do Not give Bedtime Correction Insulin if BG less than 200.   For  - 209 give 1 units.   For  - 219 give 2 units.   For  - 229 give 3 units.   For  - 239 give 4 units.   For  - 249 give 5 units.   For  - 259 give 6 units.   For  - 269 give 7 units.   For  - 279 give 8 units  ... (Patient not taking: Reported on 6/14/2019)     insulin glargine (LANTUS) 100 UNIT/ML injection Inject 30 Units Subcutaneous     insulin glargine (LANTUS) 100 UNIT/ML injection Inject 40 Units Subcutaneous At Bedtime     SODIUM BICARBONATE PO Take 650 mg by mouth 2 times daily     No current facility-administered medications for this visit.      Medications Discontinued During This Encounter   Medication Reason     cholecalciferol (VITAMIN  -D) 1000 UNITS capsule Reorder     FLUZONE QUADRIVALENT injection        Physical Exam   Vital Signs: /76 (BP Location: Right arm, Cuff Size: Adult Large)   Pulse 77   Temp 98.2  F (36.8  C) (Oral)   Wt 128.5  kg (283 lb 3.2 oz)   SpO2 96%   BMI 38.41 kg/m       GENERAL APPEARANCE: alert and no distress  HENT: mouth without ulcers or lesions  LYMPHATICS: no cervical or supraclavicular nodes  RESP: lungs clear to auscultation - no rales, rhonchi or wheezes  CV: regular rhythm, normal rate, no rub, no murmur  EDEMA: no LE edema bilaterally  ABDOMEN: soft, nondistended, nontender, bowel sounds normal  MS: extremities normal - no gross deformities noted, no evidence of inflammation in joints, no muscle tenderness  SKIN: no rash      Data     Renal Latest Ref Rng & Units 5/31/2019 4/26/2019 4/1/2019   Na 133 - 144 mmol/L 140 139 136   K 3.4 - 5.3 mmol/L 3.9 4.0 4.3   Cl 94 - 109 mmol/L 107 106 104   CO2 20 - 32 mmol/L 25 23 23   BUN 7 - 30 mg/dL 12 19 26   Cr 0.66 - 1.25 mg/dL 0.97 1.00 0.99   Glucose 70 - 99 mg/dL 117(H) 102(H) 140(H)   Ca  8.5 - 10.1 mg/dL 9.5 9.7 10.4(H)   Mg 1.6 - 2.3 mg/dL - - -     Bone Health Latest Ref Rng & Units 5/31/2019 12/28/2018 6/3/2017   Phos 2.5 - 4.5 mg/dL 1.7(L) 2.8 1.9(L)   PTHi 12 - 72 pg/mL - - 279(H)   Vit D Def 20 - 75 ug/L - - 17(L)     Heme Latest Ref Rng & Units 5/31/2019 4/26/2019 4/1/2019   WBC 4.0 - 11.0 10e9/L 7.6 8.2 9.3   Hgb 13.3 - 17.7 g/dL 16.5 16.3 16.4   Plt 150 - 450 10e9/L 234 227 210     Liver Latest Ref Rng & Units 5/31/2019 12/28/2018 12/22/2017   AP 40 - 150 U/L - - 71   TBili 0.2 - 1.3 mg/dL - - 0.4   DBili 0.0 - 0.2 mg/dL - - 0.1   ALT 0 - 70 U/L - - 21   AST 0 - 45 U/L - - 15   Tot Protein 6.8 - 8.8 g/dL - - 7.9   Albumin 3.4 - 5.0 g/dL 3.9 3.6 3.4     Pancreas Latest Ref Rng & Units 11/25/2016 11/24/2016 4/15/2015   A1C 4.3 - 6.0 % 7.0(H) 7.0(H) 7.2(H)     Iron studies Latest Ref Rng & Units 11/27/2016   Iron 35 - 180 ug/dL 74   Iron sat 15 - 46 % 41   Ferritin 26 - 388 ng/mL 1,611(H)     [facility] Txp Virology Latest Ref Rng & Units 5/31/2019 12/4/2018 7/27/2018   BK Spec - Plasma Plasma Plasma   BK Res BKNEG:BK Virus DNA Not Detected copies/mL BK Virus DNA Not  Detected BK Virus DNA Not Detected BK Virus DNA Not Detected   BK Log <2.7 Log copies/mL Not Calculated Not Calculated Not Calculated   Hep B Core NR - - -        Recent Labs   Lab Test 04/01/19  0943 04/26/19  0946 05/31/19  0939   DOSTAC 22:00 3/31/19 2,200 2130   TACROL 6.0 6.6 6.2     Recent Labs   Lab Test 02/06/17  0733 02/13/17  0736 02/20/17  0733   DOSMPA 2,052,017 2,000 2,192,017   MPACID 2.38 2.03 4.29*   MPAG 104.7* 102.9* 85.4         Scribe Disclosure:  I, Kain Larsen, am serving as a scribe to document services personally performed by Erasmo Wilkins M.D. at this visit, based upon the provider's statements to me. All documentation has been reviewed by the aforementioned provider prior to being entered into the official medical record.     IKain, a scribe, prepared the chart for today's encounter.       Kidney/Pancreas Recipient

## 2019-06-14 NOTE — PROGRESS NOTES
CHRONIC TRANSPLANT NEPHROLOGY VISIT    Assessment & Plan   # DDKT: Stable   - Baseline Cr ~ 0.9-1.1; 0.97 today   - Proteinuria: Minimal (0.2-0.5 grams)   - Date DSA Last Checked: May/2019       Latest DSA: No   - BK Viremia: No   - Kidney Tx Biopsy: No    - Minimal proteinuria. Counseled on weight loss in order to maintain stable blood pressure and kidney function.     # Immunosuppression: Tacrolimus immediate release (goal  4-6) and Mycophenolate mofetil (goal  1-3.5)              - Changes: No    # Prophylaxis:   - PJP: Sulfa/TMP (Bactrim)    # Hypertension: Controlled; Goal BP: < 140/90              - Changes: No clonidine 0.6 bid, coreg 25mg bid, lasix 60mg bid, nifedipine 30mg bid . Losartan 50mg daily.    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     # Mineral Bone Disorder:    - Secondary renal hyperparathyroidism; PTH level: Not check recently.   - Vitamin D; level: Not check recently.   - Calcium; level: Normal  - Phosphorus; level: Low     # Electrolytes:   - Potassium; level: Normal  - Magnesium; level: Normal  - Bicarbonate; level: Normal  - Sodium; level: Normal    # Skin Cancer Risk:    - Discussed sun protection and recommend regular follow up with Dermatology.    # Medical Compliance: Yes       # Transplant History:  Etiology of kidney failure: diabetes mellitus type 2  Tx: DDKT  Transplant: 2016 (Kidney)  Donor Type:  - Cardiac Death        Donor Class:   Crossmatch at time of Tx: negative  Significant changes in immunosuppression: None  Significant transplant-related complications: None    Transplant Office Phone Number: [phone]    Assessment and plan was discussed with the patient and he voiced his understanding and agreement.    Return visit: Return in about 6 months (around 2019).       Chief Complaint   Mr. Roland is a 63 year old here for routine follow up.    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 Patient was last evaluated by Dr. Wilkins on 18; Please see that note for further details.     Today, he reports that he has been doing well since our last visit. No recent hospitalizations or medical complaints. He does note some difficulty exercising due to a limited left ankle mobility from previous surgery, but is still able to swim. Reports that his home blood pressure was 131/68 this morning. He continues to use compression stockings to alleviate lower extremity swelling. No pain over kidney. No other concerns.        Recent Hospitalizations:  [x] No [] Yes    New Medical Issues: [x] No [] Yes    Decreased energy: [x] No [] Yes    Chest pain or SOB with exertion:  [x] No [] Yes    Appetite change or weight change: [x] No [] Yes    Nausea, vomiting or diarrhea:  [x] No [] Yes    Fever, sweats or chills: [x] No [] Yes    Leg swelling: [x] No [] Yes          Review of Systems   A comprehensive review of systems was obtained and negative, except as noted in the HPI or PMH.    Problem List   Patient Active Problem List   Diagnosis     Hypertension secondary to other renal disorders     Secondary renal hyperparathyroidism (H)     Obesity     Diabetic peripheral neuropathy (H)     Kidney replaced by transplant     Immunosuppressed status (H)     Type 1 diabetes mellitus with nephropathy (H)     Aftercare following organ transplant     Vitamin D deficiency     Hypomagnesemia     Post-transplant erythrocytosis       Social History   Social History     Tobacco Use     Smoking status: Former Smoker     Packs/day: 2.00     Years: 38.00     Pack years: 76.00     Types: Cigarettes     Start date: 1967     Last attempt to quit: 2005     Years since quittin.2     Smokeless tobacco: Former User     Quit date: 2007   Substance Use Topics     Alcohol use:  "Yes     Alcohol/week: 0.0 - 0.5 oz     Comment: les than 4 drinks a month     Drug use: No     Comment: past (1980's, marijuana, heroin IV, cocaine, hallucinogens, methamphetamine)       Allergies   No Known Allergies    Medications   Current Outpatient Medications   Medication Sig     ACCU-CHEK EBONIE PLUS test strip      aspirin EC 81 MG EC tablet Take 1 tablet (81 mg) by mouth daily     atorvastatin (LIPITOR) 20 MG tablet Take 1 tablet (20 mg) by mouth daily     carvedilol (COREG) 25 MG tablet TAKE ONE TABLET BY MOUTH TWICE DAILY WITH MEALS     cholecalciferol (VITAMIN D3) 1000 units (25 mcg) capsule Take 2 capsules (2,000 Units) by mouth daily     cloNIDine (CATAPRES) 0.3 MG tablet Take 0.6 mg by mouth 2 times daily Patient reports     DIPHENHYDRAMINE HCL PO Take 50 mg by mouth At Bedtime     FUROSEMIDE PO Take 60 mg by mouth 2 times daily     insulin aspart (NOVOLOG PEN) 100 UNIT/ML injection Inject 1 Units Subcutaneous Take with snacks or supplements for high blood sugar 1 unit per 4 grams carbohydrate     insulin aspart (NOVOLOG PEN) 100 UNIT/ML injection Inject 1 Units Subcutaneous 3 times daily (with meals) DOSE:  1 units per 4 grams of carbohydrate.  Only chart total amount of units given.  Do not give if pre-prandial glucose is less than 60 mg/dL.     insulin aspart (NOVOLOG PEN) 100 UNIT/ML injection Inject 1-22 Units Subcutaneous 3 times daily (before meals) Correction Scale - VERY HIGH INSULIN RESISTANCE DOSING     Do Not give Correction Insulin if Pre-Meal BG less than 250.   For Pre-Meal  - 260 give 1 unit.   For Pre-Meal -269  give 2 units.   For Pre-Meal  - 279 give 3 units.   For Pre-Meal  - 289 give 4 units.   For Pre-Meal  - 299 give 5 units.   For Pre-Meal  - 309 give 6 units.   ...     insulin syringe 31G X 5/16\" 0.5 ML MISC FOR ADMINISTERING INSULIN AT HOME 5 TIMES PER DAY.     losartan (COZAAR) 50 MG tablet Take 1 tablet (50 mg) by mouth daily     " mycophenolate (GENERIC EQUIVALENT) 250 MG capsule Take 3 capsules (750 mg) by mouth 2 times daily     NIFEdipine ER (ADALAT CC) 30 MG TB24 Take 1 tablet (30 mg) by mouth 2 times daily     omeprazole 20 MG tablet Take 1 tablet (20 mg) by mouth every morning     sulfamethoxazole-trimethoprim (BACTRIM/SEPTRA) 400-80 MG tablet Take 1 tablet by mouth three times a week     tacrolimus (GENERIC EQUIVALENT) 0.5 MG capsule Take 1 capsule (0.5 mg) by mouth 2 times daily Total dose = 2.5 mg BID     tacrolimus (GENERIC EQUIVALENT) 1 MG capsule Take 2 capsules (2 mg) by mouth 2 times daily Total dose = 2.5 mg BID     TRESIBA FLEXTOUCH 200 UNIT/ML pen INJECT 70-90 UNITS SUBCUTANEOUS EVERY 24 HOURS.     insulin aspart (NOVOLOG PEN) 100 UNIT/ML injection Inject 1-16 Units Subcutaneous At Bedtime Correction Scale - VERY HIGH INSULIN RESISTANCE DOSING     Do Not give Bedtime Correction Insulin if BG less than 200.   For  - 209 give 1 units.   For  - 219 give 2 units.   For  - 229 give 3 units.   For  - 239 give 4 units.   For  - 249 give 5 units.   For  - 259 give 6 units.   For  - 269 give 7 units.   For  - 279 give 8 units  ... (Patient not taking: Reported on 6/14/2019)     insulin glargine (LANTUS) 100 UNIT/ML injection Inject 30 Units Subcutaneous     insulin glargine (LANTUS) 100 UNIT/ML injection Inject 40 Units Subcutaneous At Bedtime     SODIUM BICARBONATE PO Take 650 mg by mouth 2 times daily     No current facility-administered medications for this visit.      Medications Discontinued During This Encounter   Medication Reason     cholecalciferol (VITAMIN  -D) 1000 UNITS capsule Reorder     FLUZONE QUADRIVALENT injection        Physical Exam   Vital Signs: /76 (BP Location: Right arm, Cuff Size: Adult Large)   Pulse 77   Temp 98.2  F (36.8  C) (Oral)   Wt 128.5 kg (283 lb 3.2 oz)   SpO2 96%   BMI 38.41 kg/m      GENERAL APPEARANCE: alert and no distress  HENT: mouth  without ulcers or lesions  LYMPHATICS: no cervical or supraclavicular nodes  RESP: lungs clear to auscultation - no rales, rhonchi or wheezes  CV: regular rhythm, normal rate, no rub, no murmur  EDEMA: no LE edema bilaterally  ABDOMEN: soft, nondistended, nontender, bowel sounds normal  MS: extremities normal - no gross deformities noted, no evidence of inflammation in joints, no muscle tenderness  SKIN: no rash      Data     Renal Latest Ref Rng & Units 5/31/2019 4/26/2019 4/1/2019   Na 133 - 144 mmol/L 140 139 136   K 3.4 - 5.3 mmol/L 3.9 4.0 4.3   Cl 94 - 109 mmol/L 107 106 104   CO2 20 - 32 mmol/L 25 23 23   BUN 7 - 30 mg/dL 12 19 26   Cr 0.66 - 1.25 mg/dL 0.97 1.00 0.99   Glucose 70 - 99 mg/dL 117(H) 102(H) 140(H)   Ca  8.5 - 10.1 mg/dL 9.5 9.7 10.4(H)   Mg 1.6 - 2.3 mg/dL - - -     Bone Health Latest Ref Rng & Units 5/31/2019 12/28/2018 6/3/2017   Phos 2.5 - 4.5 mg/dL 1.7(L) 2.8 1.9(L)   PTHi 12 - 72 pg/mL - - 279(H)   Vit D Def 20 - 75 ug/L - - 17(L)     Heme Latest Ref Rng & Units 5/31/2019 4/26/2019 4/1/2019   WBC 4.0 - 11.0 10e9/L 7.6 8.2 9.3   Hgb 13.3 - 17.7 g/dL 16.5 16.3 16.4   Plt 150 - 450 10e9/L 234 227 210     Liver Latest Ref Rng & Units 5/31/2019 12/28/2018 12/22/2017   AP 40 - 150 U/L - - 71   TBili 0.2 - 1.3 mg/dL - - 0.4   DBili 0.0 - 0.2 mg/dL - - 0.1   ALT 0 - 70 U/L - - 21   AST 0 - 45 U/L - - 15   Tot Protein 6.8 - 8.8 g/dL - - 7.9   Albumin 3.4 - 5.0 g/dL 3.9 3.6 3.4     Pancreas Latest Ref Rng & Units 11/25/2016 11/24/2016 4/15/2015   A1C 4.3 - 6.0 % 7.0(H) 7.0(H) 7.2(H)     Iron studies Latest Ref Rng & Units 11/27/2016   Iron 35 - 180 ug/dL 74   Iron sat 15 - 46 % 41   Ferritin 26 - 388 ng/mL 1,611(H)     [facility] Txp Virology Latest Ref Rng & Units 5/31/2019 12/4/2018 7/27/2018   BK Spec - Plasma Plasma Plasma   BK Res BKNEG:BK Virus DNA Not Detected copies/mL BK Virus DNA Not Detected BK Virus DNA Not Detected BK Virus DNA Not Detected   BK Log <2.7 Log copies/mL Not Calculated Not  Calculated Not Calculated   Hep B Core NR - - -        Recent Labs   Lab Test 04/01/19  0943 04/26/19  0946 05/31/19  0939   DOSTAC 22:00 3/31/19 2,200 2130   TACROL 6.0 6.6 6.2     Recent Labs   Lab Test 02/06/17  0733 02/13/17  0736 02/20/17  0733   DOSMPA 2,052,017 2,000 2,192,017   MPACID 2.38 2.03 4.29*   MPAG 104.7* 102.9* 85.4         Scribe Disclosure:  I, Kain Larsen, am serving as a scribe to document services personally performed by Erasmo Wilkins M.D. at this visit, based upon the provider's statements to me. All documentation has been reviewed by the aforementioned provider prior to being entered into the official medical record.     IKain, a scribe, prepared the chart for today's encounter.

## 2019-06-14 NOTE — TELEPHONE ENCOUNTER
Called Corey Roland and confirmed he had enough Tacrolimus 0.5 mg stock.  States he will be receiving it tomorrow. Appreciates call.

## 2019-06-28 DIAGNOSIS — Z94.0 IMMUNOSUPPRESSIVE MANAGEMENT ENCOUNTER FOLLOWING KIDNEY TRANSPLANT: ICD-10-CM

## 2019-06-28 DIAGNOSIS — Z79.899 IMMUNOSUPPRESSIVE MANAGEMENT ENCOUNTER FOLLOWING KIDNEY TRANSPLANT: ICD-10-CM

## 2019-06-28 DIAGNOSIS — Z94.0 KIDNEY TRANSPLANTED: ICD-10-CM

## 2019-06-28 DIAGNOSIS — Z48.298 AFTERCARE FOLLOWING ORGAN TRANSPLANT: ICD-10-CM

## 2019-06-28 LAB
ANION GAP SERPL CALCULATED.3IONS-SCNC: 7 MMOL/L (ref 3–14)
BUN SERPL-MCNC: 13 MG/DL (ref 7–30)
CALCIUM SERPL-MCNC: 9.8 MG/DL (ref 8.5–10.1)
CHLORIDE SERPL-SCNC: 106 MMOL/L (ref 94–109)
CO2 SERPL-SCNC: 23 MMOL/L (ref 20–32)
CREAT SERPL-MCNC: 1.02 MG/DL (ref 0.66–1.25)
ERYTHROCYTE [DISTWIDTH] IN BLOOD BY AUTOMATED COUNT: 13.6 % (ref 10–15)
GFR SERPL CREATININE-BSD FRML MDRD: 77 ML/MIN/{1.73_M2}
GLUCOSE SERPL-MCNC: 105 MG/DL (ref 70–99)
HCT VFR BLD AUTO: 49 % (ref 40–53)
HGB BLD-MCNC: 16.3 G/DL (ref 13.3–17.7)
MCH RBC QN AUTO: 28.3 PG (ref 26.5–33)
MCHC RBC AUTO-ENTMCNC: 33.3 G/DL (ref 31.5–36.5)
MCV RBC AUTO: 85 FL (ref 78–100)
PLATELET # BLD AUTO: 227 10E9/L (ref 150–450)
POTASSIUM SERPL-SCNC: 4.2 MMOL/L (ref 3.4–5.3)
RBC # BLD AUTO: 5.76 10E12/L (ref 4.4–5.9)
SODIUM SERPL-SCNC: 136 MMOL/L (ref 133–144)
WBC # BLD AUTO: 9.8 10E9/L (ref 4–11)

## 2019-06-28 PROCEDURE — 85027 COMPLETE CBC AUTOMATED: CPT | Performed by: INTERNAL MEDICINE

## 2019-06-28 PROCEDURE — 80197 ASSAY OF TACROLIMUS: CPT | Performed by: INTERNAL MEDICINE

## 2019-06-28 PROCEDURE — 80048 BASIC METABOLIC PNL TOTAL CA: CPT | Performed by: INTERNAL MEDICINE

## 2019-06-28 PROCEDURE — 36415 COLL VENOUS BLD VENIPUNCTURE: CPT | Performed by: INTERNAL MEDICINE

## 2019-06-29 LAB
TACROLIMUS BLD-MCNC: 7.1 UG/L (ref 5–15)
TME LAST DOSE: 2200 H

## 2019-07-01 ENCOUNTER — TELEPHONE (OUTPATIENT)
Dept: TRANSPLANT | Facility: CLINIC | Age: 64
End: 2019-07-01

## 2019-07-01 DIAGNOSIS — Z94.0 KIDNEY TRANSPLANTED: ICD-10-CM

## 2019-07-01 DIAGNOSIS — Z94.0 KIDNEY TRANSPLANT RECIPIENT: ICD-10-CM

## 2019-07-01 RX ORDER — TACROLIMUS 0.5 MG/1
CAPSULE ORAL
Qty: 60 CAPSULE | Refills: 11 | Status: SHIPPED | OUTPATIENT
Start: 2019-07-01 | End: 2020-03-02

## 2019-07-01 RX ORDER — TACROLIMUS 1 MG/1
2 CAPSULE ORAL 2 TIMES DAILY
Qty: 120 CAPSULE | Refills: 11 | Status: SHIPPED | OUTPATIENT
Start: 2019-07-01 | End: 2019-12-26

## 2019-07-01 NOTE — TELEPHONE ENCOUNTER
Spoke with Corey regarding elevated tac level of 7.1, goal 4-6.  Corey confirms a 12 hour trough and current dose of 2.5 mg twice daily.   He denies any illness, diarrhea, or medication changes.  His last couple of levels have been above the goal.    Recommended he decrease his dose to 2 mg twice daily and repeat tac level in 1 week.   Corey verbalized understanding.  Lab order placed/rx updated.

## 2019-07-09 DIAGNOSIS — Z79.899 IMMUNOSUPPRESSIVE MANAGEMENT ENCOUNTER FOLLOWING KIDNEY TRANSPLANT: ICD-10-CM

## 2019-07-09 DIAGNOSIS — Z48.298 AFTERCARE FOLLOWING ORGAN TRANSPLANT: ICD-10-CM

## 2019-07-09 DIAGNOSIS — Z94.0 KIDNEY TRANSPLANTED: ICD-10-CM

## 2019-07-09 DIAGNOSIS — Z94.0 IMMUNOSUPPRESSIVE MANAGEMENT ENCOUNTER FOLLOWING KIDNEY TRANSPLANT: ICD-10-CM

## 2019-07-09 PROCEDURE — 36415 COLL VENOUS BLD VENIPUNCTURE: CPT | Performed by: INTERNAL MEDICINE

## 2019-07-09 PROCEDURE — 80197 ASSAY OF TACROLIMUS: CPT | Performed by: INTERNAL MEDICINE

## 2019-07-10 LAB
TACROLIMUS BLD-MCNC: 5.9 UG/L (ref 5–15)
TME LAST DOSE: 2200 H

## 2019-07-23 DIAGNOSIS — E55.9 VITAMIN D DEFICIENCY: ICD-10-CM

## 2019-07-26 DIAGNOSIS — Z94.0 KIDNEY TRANSPLANTED: ICD-10-CM

## 2019-07-26 DIAGNOSIS — Z48.298 AFTERCARE FOLLOWING ORGAN TRANSPLANT: ICD-10-CM

## 2019-07-26 DIAGNOSIS — Z94.0 IMMUNOSUPPRESSIVE MANAGEMENT ENCOUNTER FOLLOWING KIDNEY TRANSPLANT: ICD-10-CM

## 2019-07-26 DIAGNOSIS — Z79.899 IMMUNOSUPPRESSIVE MANAGEMENT ENCOUNTER FOLLOWING KIDNEY TRANSPLANT: ICD-10-CM

## 2019-07-26 LAB
ANION GAP SERPL CALCULATED.3IONS-SCNC: 8 MMOL/L (ref 3–14)
BUN SERPL-MCNC: 17 MG/DL (ref 7–30)
CALCIUM SERPL-MCNC: 10.3 MG/DL (ref 8.5–10.1)
CHLORIDE SERPL-SCNC: 103 MMOL/L (ref 94–109)
CO2 SERPL-SCNC: 26 MMOL/L (ref 20–32)
CREAT SERPL-MCNC: 1.16 MG/DL (ref 0.66–1.25)
ERYTHROCYTE [DISTWIDTH] IN BLOOD BY AUTOMATED COUNT: 13.8 % (ref 10–15)
GFR SERPL CREATININE-BSD FRML MDRD: 66 ML/MIN/{1.73_M2}
GLUCOSE SERPL-MCNC: 97 MG/DL (ref 70–99)
HCT VFR BLD AUTO: 51 % (ref 40–53)
HGB BLD-MCNC: 17 G/DL (ref 13.3–17.7)
MCH RBC QN AUTO: 28.5 PG (ref 26.5–33)
MCHC RBC AUTO-ENTMCNC: 33.3 G/DL (ref 31.5–36.5)
MCV RBC AUTO: 85 FL (ref 78–100)
PLATELET # BLD AUTO: 231 10E9/L (ref 150–450)
POTASSIUM SERPL-SCNC: 3.9 MMOL/L (ref 3.4–5.3)
RBC # BLD AUTO: 5.97 10E12/L (ref 4.4–5.9)
SODIUM SERPL-SCNC: 137 MMOL/L (ref 133–144)
WBC # BLD AUTO: 8.7 10E9/L (ref 4–11)

## 2019-07-26 PROCEDURE — 80048 BASIC METABOLIC PNL TOTAL CA: CPT | Performed by: INTERNAL MEDICINE

## 2019-07-26 PROCEDURE — 85027 COMPLETE CBC AUTOMATED: CPT | Performed by: INTERNAL MEDICINE

## 2019-07-26 PROCEDURE — 80197 ASSAY OF TACROLIMUS: CPT | Performed by: INTERNAL MEDICINE

## 2019-07-26 PROCEDURE — 36415 COLL VENOUS BLD VENIPUNCTURE: CPT | Performed by: INTERNAL MEDICINE

## 2019-07-27 LAB
TACROLIMUS BLD-MCNC: 5.1 UG/L (ref 5–15)
TME LAST DOSE: NORMAL H

## 2019-07-30 ENCOUNTER — TELEPHONE (OUTPATIENT)
Dept: NEPHROLOGY | Facility: CLINIC | Age: 64
End: 2019-07-30

## 2019-07-30 DIAGNOSIS — N25.81 SECONDARY RENAL HYPERPARATHYROIDISM (H): ICD-10-CM

## 2019-07-30 DIAGNOSIS — E55.9 VITAMIN D DEFICIENCY: Primary | ICD-10-CM

## 2019-07-30 NOTE — TELEPHONE ENCOUNTER
Per Dr. Wilkins, patient needs a repeat PTH / vitamin D level checked. Hold off ca and vit d supplement if any.    Updated patient, he said he'll have them checked with his next set of labs. Denies taking any calcium or vitamin d supplements at this time.    Keli Gibson RN

## 2019-08-30 DIAGNOSIS — Z94.0 IMMUNOSUPPRESSIVE MANAGEMENT ENCOUNTER FOLLOWING KIDNEY TRANSPLANT: ICD-10-CM

## 2019-08-30 DIAGNOSIS — Z79.899 IMMUNOSUPPRESSIVE MANAGEMENT ENCOUNTER FOLLOWING KIDNEY TRANSPLANT: ICD-10-CM

## 2019-08-30 DIAGNOSIS — Z48.298 AFTERCARE FOLLOWING ORGAN TRANSPLANT: ICD-10-CM

## 2019-08-30 DIAGNOSIS — Z94.0 KIDNEY TRANSPLANTED: ICD-10-CM

## 2019-08-30 LAB
ANION GAP SERPL CALCULATED.3IONS-SCNC: 8 MMOL/L (ref 3–14)
BUN SERPL-MCNC: 20 MG/DL (ref 7–30)
CALCIUM SERPL-MCNC: 9.9 MG/DL (ref 8.5–10.1)
CHLORIDE SERPL-SCNC: 104 MMOL/L (ref 94–109)
CO2 SERPL-SCNC: 23 MMOL/L (ref 20–32)
CREAT SERPL-MCNC: 0.99 MG/DL (ref 0.66–1.25)
ERYTHROCYTE [DISTWIDTH] IN BLOOD BY AUTOMATED COUNT: 13.8 % (ref 10–15)
GFR SERPL CREATININE-BSD FRML MDRD: 80 ML/MIN/{1.73_M2}
GLUCOSE SERPL-MCNC: 406 MG/DL (ref 70–99)
HCT VFR BLD AUTO: 48.2 % (ref 40–53)
HGB BLD-MCNC: 16 G/DL (ref 13.3–17.7)
MCH RBC QN AUTO: 28.3 PG (ref 26.5–33)
MCHC RBC AUTO-ENTMCNC: 33.2 G/DL (ref 31.5–36.5)
MCV RBC AUTO: 85 FL (ref 78–100)
PLATELET # BLD AUTO: 227 10E9/L (ref 150–450)
POTASSIUM SERPL-SCNC: 4 MMOL/L (ref 3.4–5.3)
RBC # BLD AUTO: 5.66 10E12/L (ref 4.4–5.9)
SODIUM SERPL-SCNC: 135 MMOL/L (ref 133–144)
WBC # BLD AUTO: 8 10E9/L (ref 4–11)

## 2019-08-30 PROCEDURE — 36415 COLL VENOUS BLD VENIPUNCTURE: CPT | Performed by: INTERNAL MEDICINE

## 2019-08-30 PROCEDURE — 85027 COMPLETE CBC AUTOMATED: CPT | Performed by: INTERNAL MEDICINE

## 2019-08-30 PROCEDURE — 80197 ASSAY OF TACROLIMUS: CPT | Performed by: INTERNAL MEDICINE

## 2019-08-30 PROCEDURE — 80048 BASIC METABOLIC PNL TOTAL CA: CPT | Performed by: INTERNAL MEDICINE

## 2019-08-31 LAB
TACROLIMUS BLD-MCNC: 5 UG/L (ref 5–15)
TME LAST DOSE: 2200 H

## 2019-09-03 ENCOUNTER — TELEPHONE (OUTPATIENT)
Dept: TRANSPLANT | Facility: CLINIC | Age: 64
End: 2019-09-03

## 2019-09-03 NOTE — TELEPHONE ENCOUNTER
ISSUE:  Glucose 406 on 8/30.     PLAN:  Call and confirm he has used insulin and his blood sugars are back down.   Encourage him to follow up with endocrinology if he is having frequent highs     OUTCOME:  Spoke with Corey.  His blood sugars have come down, it was 110 this morning.   He verbalized understanding to follow up with endo if he starts having frequent highs.

## 2019-10-01 DIAGNOSIS — N25.81 SECONDARY RENAL HYPERPARATHYROIDISM (H): ICD-10-CM

## 2019-10-01 DIAGNOSIS — Z94.0 KIDNEY TRANSPLANTED: ICD-10-CM

## 2019-10-01 DIAGNOSIS — E55.9 VITAMIN D DEFICIENCY: ICD-10-CM

## 2019-10-01 LAB — PTH-INTACT SERPL-MCNC: 300 PG/ML (ref 18–80)

## 2019-10-01 PROCEDURE — 82306 VITAMIN D 25 HYDROXY: CPT | Performed by: INTERNAL MEDICINE

## 2019-10-01 PROCEDURE — 36415 COLL VENOUS BLD VENIPUNCTURE: CPT | Performed by: INTERNAL MEDICINE

## 2019-10-01 PROCEDURE — 83970 ASSAY OF PARATHORMONE: CPT | Performed by: INTERNAL MEDICINE

## 2019-10-01 PROCEDURE — 80197 ASSAY OF TACROLIMUS: CPT | Performed by: INTERNAL MEDICINE

## 2019-10-02 LAB
DEPRECATED CALCIDIOL+CALCIFEROL SERPL-MC: 7 UG/L (ref 20–75)
TACROLIMUS BLD-MCNC: 5 UG/L (ref 5–15)
TME LAST DOSE: NORMAL H

## 2019-10-04 ENCOUNTER — HEALTH MAINTENANCE LETTER (OUTPATIENT)
Age: 64
End: 2019-10-04

## 2019-10-04 ENCOUNTER — TELEPHONE (OUTPATIENT)
Dept: NEPHROLOGY | Facility: CLINIC | Age: 64
End: 2019-10-04

## 2019-10-04 DIAGNOSIS — N25.81 SECONDARY RENAL HYPERPARATHYROIDISM (H): Primary | ICD-10-CM

## 2019-10-04 DIAGNOSIS — E55.9 VITAMIN D DEFICIENCY: ICD-10-CM

## 2019-10-04 RX ORDER — CHOLECALCIFEROL (VITAMIN D3) 50 MCG
2000 TABLET ORAL DAILY
Qty: 90 TABLET | Refills: 3 | Status: SHIPPED | OUTPATIENT
Start: 2019-10-04 | End: 2023-03-06

## 2019-10-04 NOTE — TELEPHONE ENCOUNTER
Per Dr. Wilkins: Please start D3 2000 units daily  Lets set him up for for parathyroid NM dual phase CT    Called patient. He is on a vitamin D supplement, will make sure pharmacy has this prescription. Message sent to scheduling team to arrange NM scan.    Keli Gibson RN

## 2019-10-11 ENCOUNTER — DOCUMENTATION ONLY (OUTPATIENT)
Dept: LAB | Facility: CLINIC | Age: 64
End: 2019-10-11

## 2019-10-11 DIAGNOSIS — Z94.0 KIDNEY REPLACED BY TRANSPLANT: ICD-10-CM

## 2019-10-11 DIAGNOSIS — Z48.298 AFTERCARE FOLLOWING ORGAN TRANSPLANT: Primary | ICD-10-CM

## 2019-10-11 DIAGNOSIS — Z79.899 ENCOUNTER FOR LONG-TERM CURRENT USE OF MEDICATION: ICD-10-CM

## 2019-10-17 ENCOUNTER — HOSPITAL ENCOUNTER (OUTPATIENT)
Dept: NUCLEAR MEDICINE | Facility: CLINIC | Age: 64
Setting detail: NUCLEAR MEDICINE
Discharge: HOME | End: 2019-10-17
Attending: INTERNAL MEDICINE | Admitting: INTERNAL MEDICINE
Payer: MEDICARE

## 2019-10-17 DIAGNOSIS — N25.81 SECONDARY RENAL HYPERPARATHYROIDISM (H): ICD-10-CM

## 2019-10-17 PROCEDURE — A9516 IODINE I-123 SOD IODIDE MIC: HCPCS | Performed by: INTERNAL MEDICINE

## 2019-10-17 PROCEDURE — 78072 PARATHYRD PLANAR W/SPECT&CT: CPT

## 2019-10-17 PROCEDURE — 34300033 ZZH RX 343: Performed by: INTERNAL MEDICINE

## 2019-10-17 PROCEDURE — A9500 TC99M SESTAMIBI: HCPCS | Performed by: INTERNAL MEDICINE

## 2019-10-17 RX ADMIN — Medication 22.7 MILLICURIE: at 16:40

## 2019-10-17 RX ADMIN — Medication 746 UCI.: at 12:24

## 2019-10-25 DIAGNOSIS — Z48.298 AFTERCARE FOLLOWING ORGAN TRANSPLANT: ICD-10-CM

## 2019-10-25 DIAGNOSIS — Z79.899 ENCOUNTER FOR LONG-TERM CURRENT USE OF MEDICATION: ICD-10-CM

## 2019-10-25 DIAGNOSIS — Z94.0 KIDNEY REPLACED BY TRANSPLANT: ICD-10-CM

## 2019-10-25 LAB
ANION GAP SERPL CALCULATED.3IONS-SCNC: 10 MMOL/L (ref 3–14)
BUN SERPL-MCNC: 16 MG/DL (ref 7–30)
CALCIUM SERPL-MCNC: 9.6 MG/DL (ref 8.5–10.1)
CHLORIDE SERPL-SCNC: 104 MMOL/L (ref 94–109)
CO2 SERPL-SCNC: 25 MMOL/L (ref 20–32)
CREAT SERPL-MCNC: 1.14 MG/DL (ref 0.66–1.25)
ERYTHROCYTE [DISTWIDTH] IN BLOOD BY AUTOMATED COUNT: 13.7 % (ref 10–15)
GFR SERPL CREATININE-BSD FRML MDRD: 67 ML/MIN/{1.73_M2}
GLUCOSE SERPL-MCNC: 147 MG/DL (ref 70–99)
HCT VFR BLD AUTO: 50.3 % (ref 40–53)
HGB BLD-MCNC: 16.3 G/DL (ref 13.3–17.7)
MCH RBC QN AUTO: 27.7 PG (ref 26.5–33)
MCHC RBC AUTO-ENTMCNC: 32.4 G/DL (ref 31.5–36.5)
MCV RBC AUTO: 85 FL (ref 78–100)
PLATELET # BLD AUTO: 234 10E9/L (ref 150–450)
POTASSIUM SERPL-SCNC: 4.1 MMOL/L (ref 3.4–5.3)
RBC # BLD AUTO: 5.89 10E12/L (ref 4.4–5.9)
SODIUM SERPL-SCNC: 139 MMOL/L (ref 133–144)
WBC # BLD AUTO: 7.6 10E9/L (ref 4–11)

## 2019-10-25 PROCEDURE — 80048 BASIC METABOLIC PNL TOTAL CA: CPT | Performed by: INTERNAL MEDICINE

## 2019-10-25 PROCEDURE — 85027 COMPLETE CBC AUTOMATED: CPT | Performed by: INTERNAL MEDICINE

## 2019-10-25 PROCEDURE — 80197 ASSAY OF TACROLIMUS: CPT | Performed by: INTERNAL MEDICINE

## 2019-10-25 PROCEDURE — 36415 COLL VENOUS BLD VENIPUNCTURE: CPT | Performed by: INTERNAL MEDICINE

## 2019-10-26 LAB
TACROLIMUS BLD-MCNC: 5 UG/L (ref 5–15)
TME LAST DOSE: NORMAL H

## 2019-12-02 ENCOUNTER — RESULTS ONLY (OUTPATIENT)
Dept: OTHER | Facility: CLINIC | Age: 64
End: 2019-12-02

## 2019-12-02 DIAGNOSIS — Z79.899 ENCOUNTER FOR LONG-TERM CURRENT USE OF MEDICATION: ICD-10-CM

## 2019-12-02 DIAGNOSIS — Z94.0 KIDNEY REPLACED BY TRANSPLANT: ICD-10-CM

## 2019-12-02 DIAGNOSIS — Z48.298 AFTERCARE FOLLOWING ORGAN TRANSPLANT: ICD-10-CM

## 2019-12-02 LAB
ANION GAP SERPL CALCULATED.3IONS-SCNC: 7 MMOL/L (ref 3–14)
BUN SERPL-MCNC: 22 MG/DL (ref 7–30)
CALCIUM SERPL-MCNC: 9.9 MG/DL (ref 8.5–10.1)
CHLORIDE SERPL-SCNC: 107 MMOL/L (ref 94–109)
CO2 SERPL-SCNC: 21 MMOL/L (ref 20–32)
CREAT SERPL-MCNC: 1.08 MG/DL (ref 0.66–1.25)
CREAT UR-MCNC: 40 MG/DL
ERYTHROCYTE [DISTWIDTH] IN BLOOD BY AUTOMATED COUNT: 13.9 % (ref 10–15)
GFR SERPL CREATININE-BSD FRML MDRD: 72 ML/MIN/{1.73_M2}
GLUCOSE SERPL-MCNC: 145 MG/DL (ref 70–99)
HCT VFR BLD AUTO: 50.2 % (ref 40–53)
HGB BLD-MCNC: 16 G/DL (ref 13.3–17.7)
MCH RBC QN AUTO: 28.1 PG (ref 26.5–33)
MCHC RBC AUTO-ENTMCNC: 31.9 G/DL (ref 31.5–36.5)
MCV RBC AUTO: 88 FL (ref 78–100)
PLATELET # BLD AUTO: 110 10E9/L (ref 150–450)
POTASSIUM SERPL-SCNC: 4.7 MMOL/L (ref 3.4–5.3)
PROT UR-MCNC: 0.08 G/L
PROT/CREAT 24H UR: 0.21 G/G CR (ref 0–0.2)
RBC # BLD AUTO: 5.7 10E12/L (ref 4.4–5.9)
SODIUM SERPL-SCNC: 135 MMOL/L (ref 133–144)
WBC # BLD AUTO: 7.9 10E9/L (ref 4–11)

## 2019-12-02 PROCEDURE — 85027 COMPLETE CBC AUTOMATED: CPT | Performed by: INTERNAL MEDICINE

## 2019-12-02 PROCEDURE — 86832 HLA CLASS I HIGH DEFIN QUAL: CPT | Performed by: INTERNAL MEDICINE

## 2019-12-02 PROCEDURE — 86833 HLA CLASS II HIGH DEFIN QUAL: CPT | Performed by: INTERNAL MEDICINE

## 2019-12-02 PROCEDURE — 80048 BASIC METABOLIC PNL TOTAL CA: CPT | Performed by: INTERNAL MEDICINE

## 2019-12-02 PROCEDURE — 84156 ASSAY OF PROTEIN URINE: CPT | Performed by: INTERNAL MEDICINE

## 2019-12-02 PROCEDURE — 36415 COLL VENOUS BLD VENIPUNCTURE: CPT | Performed by: INTERNAL MEDICINE

## 2019-12-02 PROCEDURE — 80197 ASSAY OF TACROLIMUS: CPT | Performed by: INTERNAL MEDICINE

## 2019-12-03 LAB
DONOR IDENTIFICATION: NORMAL
DSA COMMENTS: NORMAL
DSA PRESENT: NO
DSA TEST METHOD: NORMAL
ORGAN: NORMAL
SA1 CELL: NORMAL
SA1 COMMENTS: NORMAL
SA1 HI RISK ABY: NORMAL
SA1 MOD RISK ABY: NORMAL
SA1 TEST METHOD: NORMAL
SA2 CELL: NORMAL
SA2 COMMENTS: NORMAL
SA2 HI RISK ABY UA: NORMAL
SA2 MOD RISK ABY: NORMAL
SA2 TEST METHOD: NORMAL
TACROLIMUS BLD-MCNC: 4.4 UG/L (ref 5–15)
TME LAST DOSE: ABNORMAL H
UNACCEPTABLE ANTIGEN: NORMAL
UNOS CPRA: 84

## 2019-12-16 NOTE — PROGRESS NOTES
CHRONIC TRANSPLANT NEPHROLOGY VISIT    Assessment & Plan   # DDKT: Stable   - Baseline Cr ~ 1.0-1.1   - Proteinuria: Minimal (0.2-0.5 grams) (0.21)   - Date DSA Last Checked: Dec/2019      Latest DSA: No   - BK Viremia: No   - Kidney Tx Biopsy: No    # Immunosuppression: Tacrolimus immediate release (goal 4-6) and Mycophenolate mofetil (goal not followed)   - Changes: No    # Infection Prophylaxis:   - PJP: Sulfa/TMP (Bactrim)    # Hypertension: Controlled;  Goal BP: < 130/80   - Changes: No    # Diabetes: Borderline control (HbA1c 7-9%) Last HbA1c: 7.2%   - Management as per Endocrinology.    # Post-Transplant Erythrocytosis: Hgb: Stable;  On ACEI/ARB: Yes    # Mineral Bone Disorder:   - Secondary renal hyperparathyroidism; PTH level: Moderately elevated (301-600 pg/ml)  - Vitamin D; level: Low        On Supplement: Yes  - Calcium; level: Normal        On Supplement: No  - Phosphorus; level: Not checked recently, but was low last check        On Supplement: No    # Electrolytes:   - Potassium; level: Normal        On Supplement: No  - Magnesium; level: Not checked recently        On Supplement: No  - Bicarbonate; level: Low normal        On Supplement: Yes  - Sodium; level: Normal        On Supplement: No    # Overweight: Advised patient to lose weight. Advised patient to look into sleeve gastrectomy or the ketogenic diet to help him lose weight.     # Skin Cancer Risk:    - Discussed sun protection and recommend regular follow up with Dermatology.    # Medical Compliance: Yes    # Transplant History:  Etiology of Kidney Failure: Diabetes mellitus type 2  Tx: DDKT  Transplant: 11/24/2016 (Kidney)  Donor Type: Donation after Circulatory Death  Donor Class:   Significant changes in immunosuppression: None  Significant transplant-related complications: None    Transplant Office Phone Number: [phone]    Assessment and plan was discussed with the patient and he voiced his understanding and agreement.    Return  visit: Return in about 6 months (around 6/17/2020).    Chief Complaint   Mr. Roland is a 64 year old here for routine follow up.    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 He was last seen in clinic by Dr. Wilkins on 6/14/19; please see that note for further details.      The patient has been doing well overall. He reports that the swelling in his feet has gone down. He states that he is not worried about his hemoglobin A1C and his endocrinologist is following his type 1 diabetes. He reports that he is having trouble losing weight since his body is almost completely insulin resistant. He states that he does not want to undergo a sleeve gastrectomy surgery. He reports that he does not think he can execute the ketogenic diet since he is a vegetarian. He states that he will look into weight loss options.    Recent Hospitalizations:  [x] No [] Yes    New Medical Issues: [x] No [] Yes    Decreased energy: [x] No [] Yes    Chest pain or SOB with exertion:  [x] No [] Yes    Appetite change or weight change: [x] No [] Yes    Nausea, vomiting or diarrhea:  [x] No [] Yes    Fever, sweats or chills: [x] No [] Yes    Leg swelling: [] No [] Yes      Home BP: 120s-130s/60s-70s    Review of Systems   A comprehensive review of systems was obtained and negative, except as noted in the HPI or PMH.    Problem List   Patient Active Problem List   Diagnosis     Hypertension secondary to other renal disorders     Secondary renal hyperparathyroidism (H)     Obesity     Diabetic peripheral neuropathy (H)     Kidney replaced by transplant     Immunosuppressed status (H)     Type 1 diabetes mellitus with nephropathy (H)     Aftercare following organ transplant     Vitamin D deficiency     Hypomagnesemia     Post-transplant erythrocytosis       Social History   Social History     Tobacco Use     Smoking status: Former Smoker      Packs/day: 2.00     Years: 38.00     Pack years: 76.00     Types: Cigarettes     Start date: 1967     Last attempt to quit: 2005     Years since quittin.7     Smokeless tobacco: Former User     Quit date: 2007   Substance Use Topics     Alcohol use: Yes     Alcohol/week: 0.0 - 0.8 standard drinks     Comment: les than 4 drinks a month     Drug use: No     Comment: past (, marijuana, heroin IV, cocaine, hallucinogens, methamphetamine)       Allergies   No Known Allergies    Medications   Current Outpatient Medications   Medication Sig     aspirin EC 81 MG EC tablet Take 1 tablet (81 mg) by mouth daily     atorvastatin (LIPITOR) 20 MG tablet Take 1 tablet (20 mg) by mouth daily     carvedilol (COREG) 25 MG tablet TAKE ONE TABLET BY MOUTH TWICE DAILY WITH MEALS     Cholecalciferol (VITAMIN D3) 2000 units TABS Take 2,000 Units by mouth daily     cloNIDine (CATAPRES) 0.3 MG tablet Take 0.6 mg by mouth 2 times daily Patient reports     DIPHENHYDRAMINE HCL PO Take 50 mg by mouth At Bedtime     FUROSEMIDE PO Take 60 mg by mouth 2 times daily     insulin aspart (NOVOLOG PEN) 100 UNIT/ML injection Inject 1 Units Subcutaneous Take with snacks or supplements for high blood sugar 1 unit per 4 grams carbohydrate     insulin aspart (NOVOLOG PEN) 100 UNIT/ML injection Inject 1 Units Subcutaneous 3 times daily (with meals) DOSE:  1 units per 4 grams of carbohydrate.  Only chart total amount of units given.  Do not give if pre-prandial glucose is less than 60 mg/dL.     insulin aspart (NOVOLOG PEN) 100 UNIT/ML injection Inject 1-22 Units Subcutaneous 3 times daily (before meals) Correction Scale - VERY HIGH INSULIN RESISTANCE DOSING     Do Not give Correction Insulin if Pre-Meal BG less than 250.   For Pre-Meal  - 260 give 1 unit.   For Pre-Meal -269  give 2 units.   For Pre-Meal  - 279 give 3 units.   For Pre-Meal  - 289 give 4 units.   For Pre-Meal  - 299 give 5 units.   For  "Pre-Meal  - 309 give 6 units.   ...     insulin glargine (LANTUS) 100 UNIT/ML injection Inject 30 Units Subcutaneous     insulin glargine (LANTUS) 100 UNIT/ML injection Inject 40 Units Subcutaneous At Bedtime     insulin syringe 31G X 5/16\" 0.5 ML MISC FOR ADMINISTERING INSULIN AT HOME 5 TIMES PER DAY.     losartan (COZAAR) 50 MG tablet Take 1 tablet (50 mg) by mouth daily     mycophenolate (GENERIC EQUIVALENT) 250 MG capsule Take 3 capsules (750 mg) by mouth 2 times daily     NIFEdipine ER (ADALAT CC) 30 MG TB24 Take 1 tablet (30 mg) by mouth 2 times daily     omeprazole 20 MG tablet Take 1 tablet (20 mg) by mouth every morning     sulfamethoxazole-trimethoprim (BACTRIM/SEPTRA) 400-80 MG tablet Take 1 tablet by mouth three times a week     tacrolimus (GENERIC EQUIVALENT) 1 MG capsule Take 2 capsules (2 mg) by mouth 2 times daily     TRESIBA FLEXTOUCH 200 UNIT/ML pen INJECT 70-90 UNITS SUBCUTANEOUS EVERY 24 HOURS.     ACCU-CHEK EBONIE PLUS test strip      insulin aspart (NOVOLOG PEN) 100 UNIT/ML injection Inject 1-16 Units Subcutaneous At Bedtime Correction Scale - VERY HIGH INSULIN RESISTANCE DOSING     Do Not give Bedtime Correction Insulin if BG less than 200.   For  - 209 give 1 units.   For  - 219 give 2 units.   For  - 229 give 3 units.   For  - 239 give 4 units.   For  - 249 give 5 units.   For  - 259 give 6 units.   For  - 269 give 7 units.   For  - 279 give 8 units  ... (Patient not taking: Reported on 6/14/2019)     SODIUM BICARBONATE PO Take 650 mg by mouth 2 times daily     tacrolimus (GENERIC EQUIVALENT) 0.5 MG capsule HOLD     No current facility-administered medications for this visit.      There are no discontinued medications.    Physical Exam   Vital Signs: BP (!) 145/78 (BP Location: Right arm)   Pulse 81   Temp 97.9  F (36.6  C) (Oral)   Wt 130.6 kg (288 lb)   SpO2 94%   BMI 39.06 kg/m      GENERAL APPEARANCE: alert and no distress  HENT: " mouth without ulcers or lesions  LYMPHATICS: no cervical or supraclavicular nodes  RESP: lungs clear to auscultation - no rales, rhonchi or wheezes  CV: regular rhythm, normal rate, no rub, no murmur  EDEMA: no LE edema bilaterally  ABDOMEN: soft, nondistended, nontender, bowel sounds normal  MS: extremities normal - no gross deformities noted, no evidence of inflammation in joints, no muscle tenderness  SKIN: no rash  TX KIDNEY: normal  DIALYSIS ACCESS:  LUE AV Fistula Good thrill      Data     Renal Latest Ref Rng & Units 12/2/2019 10/25/2019 8/30/2019   Na 133 - 144 mmol/L 135 139 135   K 3.4 - 5.3 mmol/L 4.7 4.1 4.0   Cl 94 - 109 mmol/L 107 104 104   CO2 20 - 32 mmol/L 21 25 23   BUN 7 - 30 mg/dL 22 16 20   Cr 0.66 - 1.25 mg/dL 1.08 1.14 0.99   Glucose 70 - 99 mg/dL 145(H) 147(H) 406(H)   Ca  8.5 - 10.1 mg/dL 9.9 9.6 9.9   Mg 1.6 - 2.3 mg/dL - - -     Bone Health Latest Ref Rng & Units 10/1/2019 5/31/2019 12/28/2018   Phos 2.5 - 4.5 mg/dL - 1.7(L) 2.8   PTHi 18 - 80 pg/mL 300(H) - -   Vit D Def 20 - 75 ug/L 7(L) - -     Heme Latest Ref Rng & Units 12/2/2019 10/25/2019 8/30/2019   WBC 4.0 - 11.0 10e9/L 7.9 7.6 8.0   Hgb 13.3 - 17.7 g/dL 16.0 16.3 16.0   Plt 150 - 450 10e9/L 110(L) 234 227     Liver Latest Ref Rng & Units 5/31/2019 12/28/2018 12/22/2017   AP 40 - 150 U/L - - 71   TBili 0.2 - 1.3 mg/dL - - 0.4   DBili 0.0 - 0.2 mg/dL - - 0.1   ALT 0 - 70 U/L - - 21   AST 0 - 45 U/L - - 15   Tot Protein 6.8 - 8.8 g/dL - - 7.9   Albumin 3.4 - 5.0 g/dL 3.9 3.6 3.4     Pancreas Latest Ref Rng & Units 11/25/2016 11/24/2016 4/15/2015   A1C 4.3 - 6.0 % 7.0(H) 7.0(H) 7.2(H)     Iron studies Latest Ref Rng & Units 11/27/2016   Iron 35 - 180 ug/dL 74   Iron sat 15 - 46 % 41   Ferritin 26 - 388 ng/mL 1,611(H)     [facility] Txp Virology Latest Ref Rng & Units 5/31/2019 12/4/2018 7/27/2018   BK Spec - Plasma Plasma Plasma   BK Res BKNEG:BK Virus DNA Not Detected copies/mL BK Virus DNA Not Detected BK Virus DNA Not Detected BK Virus  DNA Not Detected   BK Log <2.7 Log copies/mL Not Calculated Not Calculated Not Calculated   Hep B Core NR - - -        Recent Labs   Lab Test 10/01/19  0944 10/25/19  0948 12/02/19  0950   DOSTAC 22:00 9/30/19 22:45 10/24/19 20:00 12/1/19   TACROL 5.0 5.0 4.4*     Recent Labs   Lab Test 02/06/17  0733 02/13/17  0736 02/20/17  0733   DOSMPA 2,052,017 2,000 2,192,017   MPACID 2.38 2.03 4.29*   MPAG 104.7* 102.9* 85.4     Scribe Disclosure:  I, Travis Marquez, am serving as a scribe to document services personally performed by Dr. Wilkins at this visit, based upon the provider's statements to me. All documentation has been reviewed by the aforementioned provider prior to being entered into the official medical record.     I, Paty Ellison, a scribe, prepared the chart for today's encounter.

## 2019-12-17 ENCOUNTER — OFFICE VISIT (OUTPATIENT)
Dept: NEPHROLOGY | Facility: CLINIC | Age: 64
End: 2019-12-17
Attending: INTERNAL MEDICINE
Payer: MEDICAID

## 2019-12-17 VITALS
DIASTOLIC BLOOD PRESSURE: 78 MMHG | BODY MASS INDEX: 39.06 KG/M2 | WEIGHT: 288 LBS | OXYGEN SATURATION: 94 % | SYSTOLIC BLOOD PRESSURE: 145 MMHG | HEART RATE: 81 BPM | TEMPERATURE: 97.9 F

## 2019-12-17 DIAGNOSIS — Z94.0 KIDNEY REPLACED BY TRANSPLANT: ICD-10-CM

## 2019-12-17 DIAGNOSIS — D84.9 IMMUNOSUPPRESSED STATUS (H): Primary | Chronic | ICD-10-CM

## 2019-12-17 DIAGNOSIS — Z48.298 AFTERCARE FOLLOWING ORGAN TRANSPLANT: ICD-10-CM

## 2019-12-17 PROCEDURE — G0463 HOSPITAL OUTPT CLINIC VISIT: HCPCS | Mod: ZF

## 2019-12-17 ASSESSMENT — PAIN SCALES - GENERAL: PAINLEVEL: NO PAIN (0)

## 2019-12-17 NOTE — NURSING NOTE
Chief Complaint   Patient presents with     RECHECK     Follow up Kidney TX     Vital signs:  Temp: 97.9  F (36.6  C) Temp src: Oral BP: (!) 145/78 Pulse: 81     SpO2: 94 %       Weight: 130.6 kg (288 lb)  Estimated body mass index is 39.06 kg/m  as calculated from the following:    Height as of 1/31/19: 1.829 m (6').    Weight as of this encounter: 130.6 kg (288 lb).      Justina Castellanos, CMA

## 2019-12-17 NOTE — LETTER
12/17/2019      RE: Corey Roland  [address]  [facility] [address]       CHRONIC TRANSPLANT NEPHROLOGY VISIT    Assessment & Plan   # DDKT: Stable   - Baseline Cr ~ 1.0-1.1   - Proteinuria: Minimal (0.2-0.5 grams) (0.21)   - Date DSA Last Checked: Dec/2019      Latest DSA: No   - BK Viremia: No   - Kidney Tx Biopsy: No    # Immunosuppression: Tacrolimus immediate release (goal 4-6) and Mycophenolate mofetil (goal not followed)   - Changes: No    # Infection Prophylaxis:   - PJP: Sulfa/TMP (Bactrim)    # Hypertension: Controlled;  Goal BP: < 130/80   - Changes: No    # Diabetes: Borderline control (HbA1c 7-9%) Last HbA1c: 7.2%   - Management as per Endocrinology.    # Post-Transplant Erythrocytosis: Hgb: Stable;  On ACEI/ARB: Yes    # Mineral Bone Disorder:   - Secondary renal hyperparathyroidism; PTH level: Moderately elevated (301-600 pg/ml)  - Vitamin D; level: Low        On Supplement: Yes  - Calcium; level: Normal        On Supplement: No  - Phosphorus; level: Not checked recently, but was low last check        On Supplement: No    # Electrolytes:   - Potassium; level: Normal        On Supplement: No  - Magnesium; level: Not checked recently        On Supplement: No  - Bicarbonate; level: Low normal        On Supplement: Yes  - Sodium; level: Normal        On Supplement: No    # Overweight: Advised patient to lose weight. Advised patient to look into sleeve gastrectomy or the ketogenic diet to help him lose weight.     # Skin Cancer Risk:    - Discussed sun protection and recommend regular follow up with Dermatology.    # Medical Compliance: Yes    # Transplant History:  Etiology of Kidney Failure: Diabetes mellitus type 2  Tx: DDKT  Transplant: 11/24/2016 (Kidney)  Donor Type: Donation after Circulatory Death  Donor Class:   Significant changes in immunosuppression: None  Significant transplant-related complications: None    Transplant Office Phone Number: [phone]    Assessment and  plan was discussed with the patient and he voiced his understanding and agreement.    Return visit: Return in about 6 months (around 6/17/2020).    Chief Complaint   Mr. Roland is a 64 year old here for routine follow up.    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 He was last seen in clinic by Dr. Wilkins on 6/14/19; please see that note for further details.      The patient has been doing well overall. He reports that the swelling in his feet has gone down. He states that he is not worried about his hemoglobin A1C and his endocrinologist is following his type 1 diabetes. He reports that he is having trouble losing weight since his body is almost completely insulin resistant. He states that he does not want to undergo a sleeve gastrectomy surgery. He reports that he does not think he can execute the ketogenic diet since he is a vegetarian. He states that he will look into weight loss options.    Recent Hospitalizations:  [x] No [] Yes    New Medical Issues: [x] No [] Yes    Decreased energy: [x] No [] Yes    Chest pain or SOB with exertion:  [x] No [] Yes    Appetite change or weight change: [x] No [] Yes    Nausea, vomiting or diarrhea:  [x] No [] Yes    Fever, sweats or chills: [x] No [] Yes    Leg swelling: [] No [] Yes      Home BP: 120s-130s/60s-70s    Review of Systems   A comprehensive review of systems was obtained and negative, except as noted in the HPI or PMH.    Problem List   Patient Active Problem List   Diagnosis     Hypertension secondary to other renal disorders     Secondary renal hyperparathyroidism (H)     Obesity     Diabetic peripheral neuropathy (H)     Kidney replaced by transplant     Immunosuppressed status (H)     Type 1 diabetes mellitus with nephropathy (H)     Aftercare following organ transplant     Vitamin D deficiency     Hypomagnesemia     Post-transplant erythrocytosis        Social History   Social History     Tobacco Use     Smoking status: Former Smoker     Packs/day: 2.00     Years: 38.00     Pack years: 76.00     Types: Cigarettes     Start date: 1967     Last attempt to quit: 2005     Years since quittin.7     Smokeless tobacco: Former User     Quit date: 2007   Substance Use Topics     Alcohol use: Yes     Alcohol/week: 0.0 - 0.8 standard drinks     Comment: les than 4 drinks a month     Drug use: No     Comment: past (, marijuana, heroin IV, cocaine, hallucinogens, methamphetamine)       Allergies   No Known Allergies    Medications   Current Outpatient Medications   Medication Sig     aspirin EC 81 MG EC tablet Take 1 tablet (81 mg) by mouth daily     atorvastatin (LIPITOR) 20 MG tablet Take 1 tablet (20 mg) by mouth daily     carvedilol (COREG) 25 MG tablet TAKE ONE TABLET BY MOUTH TWICE DAILY WITH MEALS     Cholecalciferol (VITAMIN D3) 2000 units TABS Take 2,000 Units by mouth daily     cloNIDine (CATAPRES) 0.3 MG tablet Take 0.6 mg by mouth 2 times daily Patient reports     DIPHENHYDRAMINE HCL PO Take 50 mg by mouth At Bedtime     FUROSEMIDE PO Take 60 mg by mouth 2 times daily     insulin aspart (NOVOLOG PEN) 100 UNIT/ML injection Inject 1 Units Subcutaneous Take with snacks or supplements for high blood sugar 1 unit per 4 grams carbohydrate     insulin aspart (NOVOLOG PEN) 100 UNIT/ML injection Inject 1 Units Subcutaneous 3 times daily (with meals) DOSE:  1 units per 4 grams of carbohydrate.  Only chart total amount of units given.  Do not give if pre-prandial glucose is less than 60 mg/dL.     insulin aspart (NOVOLOG PEN) 100 UNIT/ML injection Inject 1-22 Units Subcutaneous 3 times daily (before meals) Correction Scale - VERY HIGH INSULIN RESISTANCE DOSING     Do Not give Correction Insulin if Pre-Meal BG less than 250.   For Pre-Meal  - 260 give 1 unit.   For Pre-Meal -269  give 2 units.   For Pre-Meal  - 279 give 3 units.  "  For Pre-Meal  - 289 give 4 units.   For Pre-Meal  - 299 give 5 units.   For Pre-Meal  - 309 give 6 units.   ...     insulin glargine (LANTUS) 100 UNIT/ML injection Inject 30 Units Subcutaneous     insulin glargine (LANTUS) 100 UNIT/ML injection Inject 40 Units Subcutaneous At Bedtime     insulin syringe 31G X 5/16\" 0.5 ML MISC FOR ADMINISTERING INSULIN AT HOME 5 TIMES PER DAY.     losartan (COZAAR) 50 MG tablet Take 1 tablet (50 mg) by mouth daily     mycophenolate (GENERIC EQUIVALENT) 250 MG capsule Take 3 capsules (750 mg) by mouth 2 times daily     NIFEdipine ER (ADALAT CC) 30 MG TB24 Take 1 tablet (30 mg) by mouth 2 times daily     omeprazole 20 MG tablet Take 1 tablet (20 mg) by mouth every morning     sulfamethoxazole-trimethoprim (BACTRIM/SEPTRA) 400-80 MG tablet Take 1 tablet by mouth three times a week     tacrolimus (GENERIC EQUIVALENT) 1 MG capsule Take 2 capsules (2 mg) by mouth 2 times daily     TRESIBA FLEXTOUCH 200 UNIT/ML pen INJECT 70-90 UNITS SUBCUTANEOUS EVERY 24 HOURS.     ACCU-CHEK EBONIE PLUS test strip      insulin aspart (NOVOLOG PEN) 100 UNIT/ML injection Inject 1-16 Units Subcutaneous At Bedtime Correction Scale - VERY HIGH INSULIN RESISTANCE DOSING     Do Not give Bedtime Correction Insulin if BG less than 200.   For  - 209 give 1 units.   For  - 219 give 2 units.   For  - 229 give 3 units.   For  - 239 give 4 units.   For  - 249 give 5 units.   For  - 259 give 6 units.   For  - 269 give 7 units.   For  - 279 give 8 units  ... (Patient not taking: Reported on 6/14/2019)     SODIUM BICARBONATE PO Take 650 mg by mouth 2 times daily     tacrolimus (GENERIC EQUIVALENT) 0.5 MG capsule HOLD     No current facility-administered medications for this visit.      There are no discontinued medications.    Physical Exam   Vital Signs: BP (!) 145/78 (BP Location: Right arm)   Pulse 81   Temp 97.9  F (36.6  C) (Oral)   Wt 130.6 kg " (288 lb)   SpO2 94%   BMI 39.06 kg/m       GENERAL APPEARANCE: alert and no distress  HENT: mouth without ulcers or lesions  LYMPHATICS: no cervical or supraclavicular nodes  RESP: lungs clear to auscultation - no rales, rhonchi or wheezes  CV: regular rhythm, normal rate, no rub, no murmur  EDEMA: no LE edema bilaterally  ABDOMEN: soft, nondistended, nontender, bowel sounds normal  MS: extremities normal - no gross deformities noted, no evidence of inflammation in joints, no muscle tenderness  SKIN: no rash  TX KIDNEY: normal  DIALYSIS ACCESS:  LUE AV Fistula Good thrill      Data     Renal Latest Ref Rng & Units 12/2/2019 10/25/2019 8/30/2019   Na 133 - 144 mmol/L 135 139 135   K 3.4 - 5.3 mmol/L 4.7 4.1 4.0   Cl 94 - 109 mmol/L 107 104 104   CO2 20 - 32 mmol/L 21 25 23   BUN 7 - 30 mg/dL 22 16 20   Cr 0.66 - 1.25 mg/dL 1.08 1.14 0.99   Glucose 70 - 99 mg/dL 145(H) 147(H) 406(H)   Ca  8.5 - 10.1 mg/dL 9.9 9.6 9.9   Mg 1.6 - 2.3 mg/dL - - -     Bone Health Latest Ref Rng & Units 10/1/2019 5/31/2019 12/28/2018   Phos 2.5 - 4.5 mg/dL - 1.7(L) 2.8   PTHi 18 - 80 pg/mL 300(H) - -   Vit D Def 20 - 75 ug/L 7(L) - -     Heme Latest Ref Rng & Units 12/2/2019 10/25/2019 8/30/2019   WBC 4.0 - 11.0 10e9/L 7.9 7.6 8.0   Hgb 13.3 - 17.7 g/dL 16.0 16.3 16.0   Plt 150 - 450 10e9/L 110(L) 234 227     Liver Latest Ref Rng & Units 5/31/2019 12/28/2018 12/22/2017   AP 40 - 150 U/L - - 71   TBili 0.2 - 1.3 mg/dL - - 0.4   DBili 0.0 - 0.2 mg/dL - - 0.1   ALT 0 - 70 U/L - - 21   AST 0 - 45 U/L - - 15   Tot Protein 6.8 - 8.8 g/dL - - 7.9   Albumin 3.4 - 5.0 g/dL 3.9 3.6 3.4     Pancreas Latest Ref Rng & Units 11/25/2016 11/24/2016 4/15/2015   A1C 4.3 - 6.0 % 7.0(H) 7.0(H) 7.2(H)     Iron studies Latest Ref Rng & Units 11/27/2016   Iron 35 - 180 ug/dL 74   Iron sat 15 - 46 % 41   Ferritin 26 - 388 ng/mL 1,611(H)     [facility] Txp Virology Latest Ref Rng & Units 5/31/2019 12/4/2018 7/27/2018   BK Spec - Plasma Plasma Plasma   BK Res BKNEG:BK  Virus DNA Not Detected copies/mL BK Virus DNA Not Detected BK Virus DNA Not Detected BK Virus DNA Not Detected   BK Log <2.7 Log copies/mL Not Calculated Not Calculated Not Calculated   Hep B Core NR - - -        Recent Labs   Lab Test 10/01/19  0944 10/25/19  0948 12/02/19  0950   DOSTAC 22:00 9/30/19 22:45 10/24/19 20:00 12/1/19   TACROL 5.0 5.0 4.4*     Recent Labs   Lab Test 02/06/17  0733 02/13/17  0736 02/20/17  0733   DOSMPA 2,052,017 2,000 2,192,017   MPACID 2.38 2.03 4.29*   MPAG 104.7* 102.9* 85.4     Scribe Disclosure:  I, Travis Marquez, am serving as a scribe to document services personally performed by Dr. Wilkins at this visit, based upon the provider's statements to me. All documentation has been reviewed by the aforementioned provider prior to being entered into the official medical record.     I, Paty Ellison, a scribe, prepared the chart for today's encounter.       Kidney/Pancreas Recipient

## 2019-12-26 DIAGNOSIS — Z94.0 KIDNEY REPLACED BY TRANSPLANT: ICD-10-CM

## 2019-12-26 DIAGNOSIS — Z94.0 KIDNEY TRANSPLANTED: ICD-10-CM

## 2019-12-26 DIAGNOSIS — Z94.0 KIDNEY TRANSPLANT RECIPIENT: ICD-10-CM

## 2019-12-26 RX ORDER — TACROLIMUS 1 MG/1
2 CAPSULE ORAL 2 TIMES DAILY
Qty: 120 CAPSULE | Refills: 11 | Status: SHIPPED | OUTPATIENT
Start: 2019-12-26 | End: 2020-03-02

## 2019-12-26 RX ORDER — MYCOPHENOLATE MOFETIL 250 MG/1
750 CAPSULE ORAL 2 TIMES DAILY
Qty: 180 CAPSULE | Refills: 11 | Status: SHIPPED | OUTPATIENT
Start: 2019-12-26 | End: 2020-12-30

## 2019-12-26 NOTE — TELEPHONE ENCOUNTER
Patient Call: Medication Refill  Route to LPN  Instruct the patient to first contact their pharmacy. If they have called their pharmacy and require further assistance, route to LPN.     mycophenolate (GENERIC EQUIVALENT) 250 MG capsule     tacrolimus (GENERIC EQUIVALENT) 1 MG capsule  [address] [phone] (Phone)  [phone] (Fax)       When will the patient be out of this medication?: Less than 3 days (Route high priority)

## 2019-12-30 DIAGNOSIS — Z48.298 AFTERCARE FOLLOWING ORGAN TRANSPLANT: ICD-10-CM

## 2019-12-30 DIAGNOSIS — Z94.0 KIDNEY REPLACED BY TRANSPLANT: ICD-10-CM

## 2019-12-30 DIAGNOSIS — Z79.899 ENCOUNTER FOR LONG-TERM CURRENT USE OF MEDICATION: ICD-10-CM

## 2019-12-30 LAB
ANION GAP SERPL CALCULATED.3IONS-SCNC: 8 MMOL/L (ref 3–14)
BUN SERPL-MCNC: 17 MG/DL (ref 7–30)
CALCIUM SERPL-MCNC: 10.4 MG/DL (ref 8.5–10.1)
CHLORIDE SERPL-SCNC: 106 MMOL/L (ref 94–109)
CO2 SERPL-SCNC: 25 MMOL/L (ref 20–32)
CREAT SERPL-MCNC: 1.11 MG/DL (ref 0.66–1.25)
ERYTHROCYTE [DISTWIDTH] IN BLOOD BY AUTOMATED COUNT: 13.9 % (ref 10–15)
GFR SERPL CREATININE-BSD FRML MDRD: 70 ML/MIN/{1.73_M2}
GLUCOSE SERPL-MCNC: 120 MG/DL (ref 70–99)
HCT VFR BLD AUTO: 49.4 % (ref 40–53)
HGB BLD-MCNC: 16 G/DL (ref 13.3–17.7)
MCH RBC QN AUTO: 27.9 PG (ref 26.5–33)
MCHC RBC AUTO-ENTMCNC: 32.4 G/DL (ref 31.5–36.5)
MCV RBC AUTO: 86 FL (ref 78–100)
PLATELET # BLD AUTO: 217 10E9/L (ref 150–450)
POTASSIUM SERPL-SCNC: 3.9 MMOL/L (ref 3.4–5.3)
RBC # BLD AUTO: 5.73 10E12/L (ref 4.4–5.9)
SODIUM SERPL-SCNC: 139 MMOL/L (ref 133–144)
WBC # BLD AUTO: 7.6 10E9/L (ref 4–11)

## 2019-12-30 PROCEDURE — 85027 COMPLETE CBC AUTOMATED: CPT | Performed by: INTERNAL MEDICINE

## 2019-12-30 PROCEDURE — 80048 BASIC METABOLIC PNL TOTAL CA: CPT | Performed by: INTERNAL MEDICINE

## 2019-12-30 PROCEDURE — 36415 COLL VENOUS BLD VENIPUNCTURE: CPT | Performed by: INTERNAL MEDICINE

## 2019-12-30 PROCEDURE — 80197 ASSAY OF TACROLIMUS: CPT | Performed by: INTERNAL MEDICINE

## 2019-12-31 LAB
TACROLIMUS BLD-MCNC: 4.5 UG/L (ref 5–15)
TME LAST DOSE: ABNORMAL H

## 2020-01-31 DIAGNOSIS — Z79.899 ENCOUNTER FOR LONG-TERM CURRENT USE OF MEDICATION: ICD-10-CM

## 2020-01-31 DIAGNOSIS — Z94.0 KIDNEY REPLACED BY TRANSPLANT: ICD-10-CM

## 2020-01-31 DIAGNOSIS — Z48.298 AFTERCARE FOLLOWING ORGAN TRANSPLANT: ICD-10-CM

## 2020-01-31 LAB
ANION GAP SERPL CALCULATED.3IONS-SCNC: 5 MMOL/L (ref 3–14)
BUN SERPL-MCNC: 17 MG/DL (ref 7–30)
CALCIUM SERPL-MCNC: 10.1 MG/DL (ref 8.5–10.1)
CHLORIDE SERPL-SCNC: 107 MMOL/L (ref 94–109)
CO2 SERPL-SCNC: 26 MMOL/L (ref 20–32)
CREAT SERPL-MCNC: 1 MG/DL (ref 0.66–1.25)
ERYTHROCYTE [DISTWIDTH] IN BLOOD BY AUTOMATED COUNT: 13.9 % (ref 10–15)
GFR SERPL CREATININE-BSD FRML MDRD: 79 ML/MIN/{1.73_M2}
GLUCOSE SERPL-MCNC: 110 MG/DL (ref 70–99)
HCT VFR BLD AUTO: 47.9 % (ref 40–53)
HGB BLD-MCNC: 15.6 G/DL (ref 13.3–17.7)
MCH RBC QN AUTO: 28.2 PG (ref 26.5–33)
MCHC RBC AUTO-ENTMCNC: 32.6 G/DL (ref 31.5–36.5)
MCV RBC AUTO: 87 FL (ref 78–100)
PLATELET # BLD AUTO: 210 10E9/L (ref 150–450)
POTASSIUM SERPL-SCNC: 3.8 MMOL/L (ref 3.4–5.3)
RBC # BLD AUTO: 5.53 10E12/L (ref 4.4–5.9)
SODIUM SERPL-SCNC: 138 MMOL/L (ref 133–144)
WBC # BLD AUTO: 7.4 10E9/L (ref 4–11)

## 2020-01-31 PROCEDURE — 80197 ASSAY OF TACROLIMUS: CPT | Performed by: INTERNAL MEDICINE

## 2020-01-31 PROCEDURE — 36415 COLL VENOUS BLD VENIPUNCTURE: CPT | Performed by: INTERNAL MEDICINE

## 2020-01-31 PROCEDURE — 85027 COMPLETE CBC AUTOMATED: CPT | Performed by: INTERNAL MEDICINE

## 2020-01-31 PROCEDURE — 80048 BASIC METABOLIC PNL TOTAL CA: CPT | Performed by: INTERNAL MEDICINE

## 2020-02-01 LAB
TACROLIMUS BLD-MCNC: <3 UG/L (ref 5–15)
TME LAST DOSE: 2200 H

## 2020-02-03 ENCOUNTER — TELEPHONE (OUTPATIENT)
Dept: TRANSPLANT | Facility: CLINIC | Age: 65
End: 2020-02-03

## 2020-02-03 DIAGNOSIS — Z79.899 IMMUNOSUPPRESSIVE MANAGEMENT ENCOUNTER FOLLOWING KIDNEY TRANSPLANT: Primary | ICD-10-CM

## 2020-02-03 DIAGNOSIS — Z94.0 KIDNEY TRANSPLANTED: ICD-10-CM

## 2020-02-03 DIAGNOSIS — Z94.0 IMMUNOSUPPRESSIVE MANAGEMENT ENCOUNTER FOLLOWING KIDNEY TRANSPLANT: Primary | ICD-10-CM

## 2020-02-03 DIAGNOSIS — Z48.298 AFTERCARE FOLLOWING ORGAN TRANSPLANT: ICD-10-CM

## 2020-02-03 NOTE — TELEPHONE ENCOUNTER
Tacrolimus = <3.0 (1/31/20)  Goal 4-6  Previous levels within goal.  Current tac dose 2 mg BID    PLAN:   Call Corey Roland and confirm this was a good 12-hour trough. Verify dose 2 mg BID.   Confirm no new medications or illness (kathryn. Diarrhea). Any missed doses?   Otherwise, if not a good level, have Tac levels rechecked and make sure it is a good trough.    OUTCOME:  No missed doses. good trough.   Previous levels at goal.  Will recheck tac levels tomorrow.

## 2020-02-04 DIAGNOSIS — Z79.899 IMMUNOSUPPRESSIVE MANAGEMENT ENCOUNTER FOLLOWING KIDNEY TRANSPLANT: ICD-10-CM

## 2020-02-04 DIAGNOSIS — Z94.0 IMMUNOSUPPRESSIVE MANAGEMENT ENCOUNTER FOLLOWING KIDNEY TRANSPLANT: ICD-10-CM

## 2020-02-04 DIAGNOSIS — Z48.298 AFTERCARE FOLLOWING ORGAN TRANSPLANT: ICD-10-CM

## 2020-02-04 DIAGNOSIS — Z94.0 KIDNEY TRANSPLANTED: ICD-10-CM

## 2020-02-04 PROCEDURE — 80197 ASSAY OF TACROLIMUS: CPT | Performed by: INTERNAL MEDICINE

## 2020-02-04 PROCEDURE — 36415 COLL VENOUS BLD VENIPUNCTURE: CPT | Performed by: INTERNAL MEDICINE

## 2020-02-05 LAB
TACROLIMUS BLD-MCNC: 3.9 UG/L (ref 5–15)
TME LAST DOSE: 2200 H

## 2020-02-10 ENCOUNTER — HEALTH MAINTENANCE LETTER (OUTPATIENT)
Age: 65
End: 2020-02-10

## 2020-02-10 DIAGNOSIS — Z94.0 KIDNEY TRANSPLANT RECIPIENT: Primary | ICD-10-CM

## 2020-02-10 RX ORDER — SULFAMETHOXAZOLE AND TRIMETHOPRIM 400; 80 MG/1; MG/1
1 TABLET ORAL
Qty: 13 TABLET | Refills: 11 | Status: SHIPPED | OUTPATIENT
Start: 2020-02-10 | End: 2021-02-25

## 2020-02-11 ENCOUNTER — TELEPHONE (OUTPATIENT)
Dept: TRANSPLANT | Facility: CLINIC | Age: 65
End: 2020-02-11

## 2020-02-11 NOTE — TELEPHONE ENCOUNTER
Patient Call: Voicemail  Date/Time: Feb 11, 2020 12:53:36 PM  Reason for call: Please connect with pt regarding his current dose of tacro

## 2020-02-28 DIAGNOSIS — Z48.298 AFTERCARE FOLLOWING ORGAN TRANSPLANT: ICD-10-CM

## 2020-02-28 DIAGNOSIS — Z94.0 KIDNEY REPLACED BY TRANSPLANT: ICD-10-CM

## 2020-02-28 DIAGNOSIS — Z79.899 ENCOUNTER FOR LONG-TERM CURRENT USE OF MEDICATION: ICD-10-CM

## 2020-02-28 LAB
ANION GAP SERPL CALCULATED.3IONS-SCNC: 9 MMOL/L (ref 3–14)
BUN SERPL-MCNC: 19 MG/DL (ref 7–30)
CALCIUM SERPL-MCNC: 10.3 MG/DL (ref 8.5–10.1)
CHLORIDE SERPL-SCNC: 103 MMOL/L (ref 94–109)
CO2 SERPL-SCNC: 26 MMOL/L (ref 20–32)
CREAT SERPL-MCNC: 1.08 MG/DL (ref 0.66–1.25)
ERYTHROCYTE [DISTWIDTH] IN BLOOD BY AUTOMATED COUNT: 14.1 % (ref 10–15)
GFR SERPL CREATININE-BSD FRML MDRD: 72 ML/MIN/{1.73_M2}
GLUCOSE SERPL-MCNC: 135 MG/DL (ref 70–99)
HCT VFR BLD AUTO: 49 % (ref 40–53)
HGB BLD-MCNC: 15.8 G/DL (ref 13.3–17.7)
MCH RBC QN AUTO: 28 PG (ref 26.5–33)
MCHC RBC AUTO-ENTMCNC: 32.2 G/DL (ref 31.5–36.5)
MCV RBC AUTO: 87 FL (ref 78–100)
PLATELET # BLD AUTO: 218 10E9/L (ref 150–450)
POTASSIUM SERPL-SCNC: 4.3 MMOL/L (ref 3.4–5.3)
RBC # BLD AUTO: 5.64 10E12/L (ref 4.4–5.9)
SODIUM SERPL-SCNC: 138 MMOL/L (ref 133–144)
WBC # BLD AUTO: 7.6 10E9/L (ref 4–11)

## 2020-02-28 PROCEDURE — 36415 COLL VENOUS BLD VENIPUNCTURE: CPT | Performed by: INTERNAL MEDICINE

## 2020-02-28 PROCEDURE — 80197 ASSAY OF TACROLIMUS: CPT | Performed by: INTERNAL MEDICINE

## 2020-02-28 PROCEDURE — 80048 BASIC METABOLIC PNL TOTAL CA: CPT | Performed by: INTERNAL MEDICINE

## 2020-02-28 PROCEDURE — 85027 COMPLETE CBC AUTOMATED: CPT | Performed by: FAMILY MEDICINE

## 2020-02-29 LAB
TACROLIMUS BLD-MCNC: 3.5 UG/L (ref 5–15)
TME LAST DOSE: ABNORMAL H

## 2020-03-02 ENCOUNTER — TELEPHONE (OUTPATIENT)
Dept: TRANSPLANT | Facility: CLINIC | Age: 65
End: 2020-03-02

## 2020-03-02 DIAGNOSIS — Z94.0 KIDNEY TRANSPLANT RECIPIENT: Primary | ICD-10-CM

## 2020-03-02 DIAGNOSIS — Z79.899 LONG TERM CURRENT USE OF IMMUNOSUPPRESSIVE DRUG: ICD-10-CM

## 2020-03-02 DIAGNOSIS — Z94.0 KIDNEY TRANSPLANTED: ICD-10-CM

## 2020-03-02 RX ORDER — TACROLIMUS 0.5 MG/1
CAPSULE ORAL
Qty: 60 CAPSULE | Refills: 11 | Status: SHIPPED | OUTPATIENT
Start: 2020-03-02 | End: 2021-03-09

## 2020-03-02 RX ORDER — TACROLIMUS 1 MG/1
2 CAPSULE ORAL 2 TIMES DAILY
Qty: 120 CAPSULE | Refills: 11 | Status: SHIPPED | OUTPATIENT
Start: 2020-03-02 | End: 2021-03-09

## 2020-03-02 NOTE — TELEPHONE ENCOUNTER
ISSUE:   Tacrolimus IR level 3.5 on 2/28, goal 4-6, dose 2 mg BID.    PLAN:   Please call patient and confirm this was an accurate 12-hour trough. Verify Tacrolimus IR dose 2 mg BID. Confirm no new medications or illness. Confirm no missed doses. If accurate trough and accurate dose, increase Tacrolimus IR dose to 2.5 mg BID and repeat labs in 1 weeks    OUTCOME:   Spoke with patient, they confirm accurate trough level and current dose 2 mg BID. Patient confirmed dose change to 2.5 mg BID and to repeat labs in 1 weeks. Orders sent to preferred pharmacy for dose change and lab for repeat labs. Patient voiced understanding of plan.

## 2020-03-10 DIAGNOSIS — Z79.899 LONG TERM CURRENT USE OF IMMUNOSUPPRESSIVE DRUG: ICD-10-CM

## 2020-03-10 DIAGNOSIS — Z94.0 KIDNEY TRANSPLANTED: ICD-10-CM

## 2020-03-10 PROCEDURE — 36415 COLL VENOUS BLD VENIPUNCTURE: CPT | Performed by: INTERNAL MEDICINE

## 2020-03-10 PROCEDURE — 80197 ASSAY OF TACROLIMUS: CPT | Performed by: INTERNAL MEDICINE

## 2020-03-11 LAB
TACROLIMUS BLD-MCNC: 5.3 UG/L (ref 5–15)
TME LAST DOSE: NORMAL H

## 2020-03-27 DIAGNOSIS — Z94.0 KIDNEY REPLACED BY TRANSPLANT: ICD-10-CM

## 2020-03-27 DIAGNOSIS — Z48.298 AFTERCARE FOLLOWING ORGAN TRANSPLANT: ICD-10-CM

## 2020-03-27 DIAGNOSIS — Z79.899 ENCOUNTER FOR LONG-TERM CURRENT USE OF MEDICATION: ICD-10-CM

## 2020-03-27 LAB
ANION GAP SERPL CALCULATED.3IONS-SCNC: 9 MMOL/L (ref 3–14)
BUN SERPL-MCNC: 19 MG/DL (ref 7–30)
CALCIUM SERPL-MCNC: 10.6 MG/DL (ref 8.5–10.1)
CHLORIDE SERPL-SCNC: 106 MMOL/L (ref 94–109)
CO2 SERPL-SCNC: 27 MMOL/L (ref 20–32)
CREAT SERPL-MCNC: 1 MG/DL (ref 0.66–1.25)
ERYTHROCYTE [DISTWIDTH] IN BLOOD BY AUTOMATED COUNT: 14 % (ref 10–15)
GFR SERPL CREATININE-BSD FRML MDRD: 79 ML/MIN/{1.73_M2}
GLUCOSE SERPL-MCNC: 159 MG/DL (ref 70–99)
HCT VFR BLD AUTO: 49 % (ref 40–53)
HGB BLD-MCNC: 15.9 G/DL (ref 13.3–17.7)
MCH RBC QN AUTO: 28.5 PG (ref 26.5–33)
MCHC RBC AUTO-ENTMCNC: 32.4 G/DL (ref 31.5–36.5)
MCV RBC AUTO: 88 FL (ref 78–100)
PLATELET # BLD AUTO: 208 10E9/L (ref 150–450)
POTASSIUM SERPL-SCNC: 4.3 MMOL/L (ref 3.4–5.3)
RBC # BLD AUTO: 5.58 10E12/L (ref 4.4–5.9)
SODIUM SERPL-SCNC: 142 MMOL/L (ref 133–144)
WBC # BLD AUTO: 7.8 10E9/L (ref 4–11)

## 2020-03-27 PROCEDURE — 36415 COLL VENOUS BLD VENIPUNCTURE: CPT | Performed by: INTERNAL MEDICINE

## 2020-03-27 PROCEDURE — 80048 BASIC METABOLIC PNL TOTAL CA: CPT | Performed by: INTERNAL MEDICINE

## 2020-03-27 PROCEDURE — 80197 ASSAY OF TACROLIMUS: CPT | Performed by: INTERNAL MEDICINE

## 2020-03-27 PROCEDURE — 85027 COMPLETE CBC AUTOMATED: CPT | Performed by: INTERNAL MEDICINE

## 2020-03-28 LAB
TACROLIMUS BLD-MCNC: 4.2 UG/L (ref 5–15)
TME LAST DOSE: ABNORMAL H

## 2020-04-08 ENCOUNTER — DOCUMENTATION ONLY (OUTPATIENT)
Dept: LAB | Facility: CLINIC | Age: 65
End: 2020-04-08

## 2020-04-14 DIAGNOSIS — Z94.0 KIDNEY TRANSPLANT RECIPIENT: ICD-10-CM

## 2020-04-14 RX ORDER — NICOTINE POLACRILEX 4 MG/1
20 GUM, CHEWING ORAL EVERY MORNING
Qty: 90 TABLET | Refills: 3 | Status: SHIPPED | OUTPATIENT
Start: 2020-04-14 | End: 2020-11-30

## 2020-04-17 ENCOUNTER — TELEPHONE (OUTPATIENT)
Dept: TRANSPLANT | Facility: CLINIC | Age: 65
End: 2020-04-17

## 2020-04-17 NOTE — TELEPHONE ENCOUNTER
Patient Call: Voicemail  Date/Time:4/16/20 at 4:02 pm  Reason for call: wondering were he could get any mask's to go to his appointments.

## 2020-04-17 NOTE — TELEPHONE ENCOUNTER
LPN task:  Call back Croey Roland. Unfortunately, there is a shortage of masks and can no longer find them to buy in stores or websites. He can make a homemade mask (jose molina) going into facility and switch to surgical mask when he gets to his appointment if it is available there.

## 2020-04-24 DIAGNOSIS — Z94.0 KIDNEY REPLACED BY TRANSPLANT: ICD-10-CM

## 2020-04-24 DIAGNOSIS — Z79.899 ENCOUNTER FOR LONG-TERM CURRENT USE OF MEDICATION: ICD-10-CM

## 2020-04-24 DIAGNOSIS — Z48.298 AFTERCARE FOLLOWING ORGAN TRANSPLANT: ICD-10-CM

## 2020-04-24 LAB
ANION GAP SERPL CALCULATED.3IONS-SCNC: 4 MMOL/L (ref 3–14)
BUN SERPL-MCNC: 18 MG/DL (ref 7–30)
CALCIUM SERPL-MCNC: 10.1 MG/DL (ref 8.5–10.1)
CHLORIDE SERPL-SCNC: 108 MMOL/L (ref 94–109)
CO2 SERPL-SCNC: 28 MMOL/L (ref 20–32)
CREAT SERPL-MCNC: 0.9 MG/DL (ref 0.66–1.25)
ERYTHROCYTE [DISTWIDTH] IN BLOOD BY AUTOMATED COUNT: 14.5 % (ref 10–15)
GFR SERPL CREATININE-BSD FRML MDRD: 90 ML/MIN/{1.73_M2}
GLUCOSE SERPL-MCNC: 118 MG/DL (ref 70–99)
HCT VFR BLD AUTO: 49.6 % (ref 40–53)
HGB BLD-MCNC: 16 G/DL (ref 13.3–17.7)
MCH RBC QN AUTO: 28.2 PG (ref 26.5–33)
MCHC RBC AUTO-ENTMCNC: 32.3 G/DL (ref 31.5–36.5)
MCV RBC AUTO: 87 FL (ref 78–100)
PLATELET # BLD AUTO: 204 10E9/L (ref 150–450)
POTASSIUM SERPL-SCNC: 3.9 MMOL/L (ref 3.4–5.3)
RBC # BLD AUTO: 5.68 10E12/L (ref 4.4–5.9)
SODIUM SERPL-SCNC: 140 MMOL/L (ref 133–144)
WBC # BLD AUTO: 7.1 10E9/L (ref 4–11)

## 2020-04-24 PROCEDURE — 85027 COMPLETE CBC AUTOMATED: CPT | Performed by: INTERNAL MEDICINE

## 2020-04-24 PROCEDURE — 80197 ASSAY OF TACROLIMUS: CPT | Performed by: INTERNAL MEDICINE

## 2020-04-24 PROCEDURE — 80048 BASIC METABOLIC PNL TOTAL CA: CPT | Performed by: INTERNAL MEDICINE

## 2020-04-24 PROCEDURE — 36415 COLL VENOUS BLD VENIPUNCTURE: CPT | Performed by: INTERNAL MEDICINE

## 2020-04-25 LAB
TACROLIMUS BLD-MCNC: 4.5 UG/L (ref 5–15)
TME LAST DOSE: ABNORMAL H

## 2020-06-01 ENCOUNTER — RESULTS ONLY (OUTPATIENT)
Dept: OTHER | Facility: CLINIC | Age: 65
End: 2020-06-01

## 2020-06-01 DIAGNOSIS — Z48.298 AFTERCARE FOLLOWING ORGAN TRANSPLANT: ICD-10-CM

## 2020-06-01 DIAGNOSIS — Z94.0 KIDNEY REPLACED BY TRANSPLANT: ICD-10-CM

## 2020-06-01 DIAGNOSIS — Z79.899 ENCOUNTER FOR LONG-TERM CURRENT USE OF MEDICATION: ICD-10-CM

## 2020-06-01 LAB
ANION GAP SERPL CALCULATED.3IONS-SCNC: 7 MMOL/L (ref 3–14)
BUN SERPL-MCNC: 21 MG/DL (ref 7–30)
CALCIUM SERPL-MCNC: 10.2 MG/DL (ref 8.5–10.1)
CHLORIDE SERPL-SCNC: 107 MMOL/L (ref 94–109)
CO2 SERPL-SCNC: 24 MMOL/L (ref 20–32)
CREAT SERPL-MCNC: 0.98 MG/DL (ref 0.66–1.25)
CREAT UR-MCNC: 44 MG/DL
ERYTHROCYTE [DISTWIDTH] IN BLOOD BY AUTOMATED COUNT: 14.2 % (ref 10–15)
GFR SERPL CREATININE-BSD FRML MDRD: 81 ML/MIN/{1.73_M2}
GLUCOSE SERPL-MCNC: 162 MG/DL (ref 70–99)
HCT VFR BLD AUTO: 50.5 % (ref 40–53)
HGB BLD-MCNC: 16.4 G/DL (ref 13.3–17.7)
MCH RBC QN AUTO: 28.6 PG (ref 26.5–33)
MCHC RBC AUTO-ENTMCNC: 32.5 G/DL (ref 31.5–36.5)
MCV RBC AUTO: 88 FL (ref 78–100)
PLATELET # BLD AUTO: 210 10E9/L (ref 150–450)
POTASSIUM SERPL-SCNC: 4.1 MMOL/L (ref 3.4–5.3)
PROT UR-MCNC: 0.14 G/L
PROT/CREAT 24H UR: 0.32 G/G CR (ref 0–0.2)
RBC # BLD AUTO: 5.74 10E12/L (ref 4.4–5.9)
SODIUM SERPL-SCNC: 138 MMOL/L (ref 133–144)
WBC # BLD AUTO: 7.5 10E9/L (ref 4–11)

## 2020-06-01 PROCEDURE — 85027 COMPLETE CBC AUTOMATED: CPT | Performed by: INTERNAL MEDICINE

## 2020-06-01 PROCEDURE — 86832 HLA CLASS I HIGH DEFIN QUAL: CPT | Performed by: INTERNAL MEDICINE

## 2020-06-01 PROCEDURE — 84156 ASSAY OF PROTEIN URINE: CPT | Performed by: INTERNAL MEDICINE

## 2020-06-01 PROCEDURE — 80197 ASSAY OF TACROLIMUS: CPT | Performed by: INTERNAL MEDICINE

## 2020-06-01 PROCEDURE — 80048 BASIC METABOLIC PNL TOTAL CA: CPT | Performed by: INTERNAL MEDICINE

## 2020-06-01 PROCEDURE — 36415 COLL VENOUS BLD VENIPUNCTURE: CPT | Performed by: INTERNAL MEDICINE

## 2020-06-01 PROCEDURE — 86833 HLA CLASS II HIGH DEFIN QUAL: CPT | Performed by: INTERNAL MEDICINE

## 2020-06-02 LAB
TACROLIMUS BLD-MCNC: 5.5 UG/L (ref 5–15)
TME LAST DOSE: NORMAL H

## 2020-06-04 DIAGNOSIS — I15.1 HYPERTENSION SECONDARY TO OTHER RENAL DISORDERS: ICD-10-CM

## 2020-06-04 RX ORDER — LOSARTAN POTASSIUM 50 MG/1
50 TABLET ORAL DAILY
Qty: 90 TABLET | Refills: 3 | Status: SHIPPED | OUTPATIENT
Start: 2020-06-04 | End: 2021-06-08

## 2020-06-16 ENCOUNTER — VIRTUAL VISIT (OUTPATIENT)
Dept: NEPHROLOGY | Facility: CLINIC | Age: 65
End: 2020-06-16
Attending: INTERNAL MEDICINE
Payer: MEDICAID

## 2020-06-16 DIAGNOSIS — Z94.0 KIDNEY TRANSPLANT RECIPIENT: Primary | ICD-10-CM

## 2020-06-16 DIAGNOSIS — E83.52 HYPERCALCEMIA: ICD-10-CM

## 2020-06-16 DIAGNOSIS — Z12.83 SKIN CANCER SCREENING: ICD-10-CM

## 2020-06-16 DIAGNOSIS — Z29.89 NEED FOR PNEUMOCYSTIS PROPHYLAXIS: ICD-10-CM

## 2020-06-16 DIAGNOSIS — D84.9 IMMUNOSUPPRESSED STATUS (H): ICD-10-CM

## 2020-06-16 RX ORDER — CLONIDINE HYDROCHLORIDE 0.3 MG/1
0.6 TABLET ORAL 2 TIMES DAILY
COMMUNITY
Start: 2020-06-01

## 2020-06-16 ASSESSMENT — PAIN SCALES - GENERAL: PAINLEVEL: NO PAIN (0)

## 2020-06-16 NOTE — PROGRESS NOTES
"TRANSPLANT NEPHROLOGY TELEMEDICINE VISIT    Corey Roland is a 64 year old male who is being evaluated via a billable telemedicine (video/telephone) visit on June 16, 2020.     The patient has been notified of following:     \"This telemedicine (video/telephone) visit will be conducted via a video/phone call between you and your physician. We have found that certain health care needs can be provided without the need for a physical exam.  This service lets us provide the care you need with a short video/phone conversation.  If a prescription is necessary, we can send it directly to your pharmacy.  If lab work is needed, we can place an order for that and you can then stop by our lab to have the test done at a later time.    If during the course of this video/phone call the physician feels a telemedicine (video/telephone) visit is not appropriate, you will not be charged for this service and an in clinic visit will be arranged at a later date.\"     Assessment & Plan   # DDKT (SPK): stable   - Baseline Cr ~ 0.9   - Proteinuria: Minimal (0.2-0.5 grams)   - Date DSA Last Checked: negative in 2020       - BK Viremia: No   - Kidney Tx Biopsy: no.    # Immunosuppression: Tacrolimus immediate release (goal 4-6) and Mycophenolate mofetil (dose 750 mg every 12 hours)   - Changes: No     Last tac was 5.5 ng/ ml on 2.5 mg po bid    # Infection Prophylaxis:   - PJP: Sulfa/TMP (Bactrim)    # Hypertension: Controlled;  Goal BP: < 130/80   - Changes: No    # Diabetes: Controlled (HbA1c <7%) Last HbA1c: 7%   - Management as per endo    # Mineral Bone Disorder:   - Calcium; level: High      Check pth and vit d.    # Skin Cancer Risk:    - Discussed sun protection and recommend regular follow up with Dermatology.    # Medical Compliance: Yes    # COVID-19 Virus Review: Discussed COVID-19 virus and the potential medical risks.  Reviewed preventative health recommendations, which includes washing hands for 20 seconds, avoid touching " To clinic for port flush and de-access of port. Tolerated  Well. TRC in one month for labs via port. your face, and social distancing.  Asked patient to inform the transplant center if they are exposed or diagnosed with this virus.    # Transplant History:  Etiology of Kidney Failure: Diabetes mellitus type 2  Tx: DDKT  Significant changes in immunosuppression: None  Significant transplant-related complications: None    Transplant Office Phone Number: [phone]    Assessment and plan was discussed with the patient and he voiced his understanding and agreement.    Return Visit: 12 months    Corey Roland has a clinical telephone visit for kidney transplant and immunosuppression management.    History of Present Illness     The patient is a 64-year-old male with history of end-stage kidney disease due to diabetes who is status post kidney transplant from a  donor in 2016 who is taking this video call for follow-up of his kidney transplant and immunosuppression management.    Since the patient was last seen, he has been feeling well.  He specifically denies any chest pain or breathing difficulties.  He has no nausea or vomiting.  Is no fever shakes or chills.  He is moving his bowels appropriately.    He is currently on immunosuppression with tacrolimus and mycophenolate.  His most recent tacrolimus level was 5.5 ng/mL on 2.5 mg p.o. twice daily.    His blood pressures been well managed in the 120s over 70s.    Recent Hospitalizations:  [x] No [] Yes    New Medical Issues: [x] No [] Yes    Decreased energy: [x] No [] Yes    Chest pain or SOB with exertion:  [x] No [] Yes    Appetite change or weight change: [x] No [] Yes    Nausea, vomiting or diarrhea:  [x] No [] Yes    Fever, sweats or chills: [x] No [] Yes    Leg swelling: [x] No [] Yes      Home BP: 120    Review of Systems   A comprehensive review of systems was obtained and negative, except as noted in the HPI or PMH.    I have reviewed and updated the patient's Past Medical History, Social History, and Medication List.    Active Medical  Problems  Patient Active Problem List   Diagnosis     Hypertension secondary to other renal disorders     Secondary renal hyperparathyroidism (H)     Obesity     Diabetic peripheral neuropathy (H)     Kidney replaced by transplant     Immunosuppressed status (H)     Type 1 diabetes mellitus with nephropathy (H)     Aftercare following organ transplant     Vitamin D deficiency     Hypomagnesemia     Post-transplant erythrocytosis     Allergies  Patient has no known allergies.    Medications  Current Outpatient Medications   Medication Sig     ACCU-CHEK EBONIE PLUS test strip      aspirin EC 81 MG EC tablet Take 1 tablet (81 mg) by mouth daily     atorvastatin (LIPITOR) 20 MG tablet Take 1 tablet (20 mg) by mouth daily     carvedilol (COREG) 25 MG tablet TAKE ONE TABLET BY MOUTH TWICE DAILY WITH MEALS     Cholecalciferol (VITAMIN D3) 2000 units TABS Take 2,000 Units by mouth daily     cloNIDine (CATAPRES) 0.3 MG tablet Take 0.3 mg by mouth 2 times daily      cloNIDine (CATAPRES) 0.3 MG tablet Take 0.6 mg by mouth 2 times daily Patient reports     DIPHENHYDRAMINE HCL PO Take 50 mg by mouth At Bedtime     FUROSEMIDE PO Take 60 mg by mouth 2 times daily     insulin aspart (NOVOLOG PEN) 100 UNIT/ML injection Inject 1 Units Subcutaneous Take with snacks or supplements for high blood sugar 1 unit per 4 grams carbohydrate     insulin aspart (NOVOLOG PEN) 100 UNIT/ML injection Inject 1 Units Subcutaneous 3 times daily (with meals) DOSE:  1 units per 4 grams of carbohydrate.  Only chart total amount of units given.  Do not give if pre-prandial glucose is less than 60 mg/dL.     insulin aspart (NOVOLOG PEN) 100 UNIT/ML injection Inject 1-22 Units Subcutaneous 3 times daily (before meals) Correction Scale - VERY HIGH INSULIN RESISTANCE DOSING     Do Not give Correction Insulin if Pre-Meal BG less than 250.   For Pre-Meal  - 260 give 1 unit.   For Pre-Meal -269  give 2 units.   For Pre-Meal  - 279 give 3 units.  "  For Pre-Meal  - 289 give 4 units.   For Pre-Meal  - 299 give 5 units.   For Pre-Meal  - 309 give 6 units.   ...     insulin aspart (NOVOLOG PEN) 100 UNIT/ML injection Inject 1-16 Units Subcutaneous At Bedtime Correction Scale - VERY HIGH INSULIN RESISTANCE DOSING     Do Not give Bedtime Correction Insulin if BG less than 200.   For  - 209 give 1 units.   For  - 219 give 2 units.   For  - 229 give 3 units.   For  - 239 give 4 units.   For  - 249 give 5 units.   For  - 259 give 6 units.   For  - 269 give 7 units.   For  - 279 give 8 units  ...     insulin glargine (LANTUS) 100 UNIT/ML injection Inject 30 Units Subcutaneous     insulin glargine (LANTUS) 100 UNIT/ML injection Inject 40 Units Subcutaneous At Bedtime     insulin syringe 31G X 5/16\" 0.5 ML MISC FOR ADMINISTERING INSULIN AT HOME 5 TIMES PER DAY.     losartan (COZAAR) 50 MG tablet Take 1 tablet (50 mg) by mouth daily     mycophenolate (GENERIC EQUIVALENT) 250 MG capsule Take 3 capsules (750 mg) by mouth 2 times daily     NIFEdipine ER (ADALAT CC) 30 MG TB24 Take 1 tablet (30 mg) by mouth 2 times daily     omeprazole 20 MG tablet Take 1 tablet (20 mg) by mouth every morning     SODIUM BICARBONATE PO Take 650 mg by mouth 2 times daily     sulfamethoxazole-trimethoprim (BACTRIM/SEPTRA) 400-80 MG tablet Take 1 tablet by mouth three times a week     tacrolimus (GENERIC EQUIVALENT) 0.5 MG capsule Total 2.5 mg PO twice daily     tacrolimus (GENERIC EQUIVALENT) 1 MG capsule Take 2 capsules (2 mg) by mouth 2 times daily Take with 0.5 mg tablets for total dose of 2.5 mg PO twice daily.     TRESIBA FLEXTOUCH 200 UNIT/ML pen INJECT 70-90 UNITS SUBCUTANEOUS EVERY 24 HOURS.     No current facility-administered medications for this visit.      Jair Hernández MD     I spent 15 minutes with this patient total time of which 50% was spent face-to-face counseling regarding the management of his kidney " transplant and immunosuppression.  All questions were answered.

## 2020-06-16 NOTE — LETTER
"6/16/2020       RE: Corey Roland  [address]  [facility] [address]     Dear Colleague,    Thank you for referring your patient, Corey Roland, to the [facility] NEPHROLOG[facility] at [facility]. Please see a copy of my visit note below.    Corey Roland is a 64 year old male who is being evaluated via a billable video visit.      The patient has been notified of following:     \"This video visit will be conducted via a call between you and your physician/provider. We have found that certain health care needs can be provided without the need for an in-person physical exam.  This service lets us provide the care you need with a video conversation.  If a prescription is necessary we can send it directly to your pharmacy.  If lab work is needed we can place an order for that and you can then stop by our lab to have the test done at a later time.    Video visits are billed at different rates depending on your insurance coverage.  Please reach out to your insurance provider with any questions.    If during the course of the call the physician/provider feels a video visit is not appropriate, you will not be charged for this service.\"    Patient has given verbal consent for Video visit? Yes    Will anyone else be joining your video visit? No        Video-Visit Details    Type of service:  Video Visit    Video Start Time: 1510  Video End Time: 3:27 PM    Originating Location (pt. Location): Home    Distant Location (provider location):  [facility] NEPHROLOGY     Platform used for Video Visit: Guy Hernández MD          TRANSPLANT NEPHROLOGY TELEMEDICINE VISIT    Corey Roland is a 64 year old male who is being evaluated via a billable telemedicine (video/telephone) visit on June 16, 2020.     The patient has been notified of following:     \"This telemedicine (video/telephone) visit will be conducted via a video/phone call between you and your physician. " "We have found that certain health care needs can be provided without the need for a physical exam.  This service lets us provide the care you need with a short video/phone conversation.  If a prescription is necessary, we can send it directly to your pharmacy.  If lab work is needed, we can place an order for that and you can then stop by our lab to have the test done at a later time.    If during the course of this video/phone call the physician feels a telemedicine (video/telephone) visit is not appropriate, you will not be charged for this service and an in clinic visit will be arranged at a later date.\"     Assessment & Plan   # DDKT (SPK): stable   - Baseline Cr ~ 0.9   - Proteinuria: Minimal (0.2-0.5 grams)   - Date DSA Last Checked: negative in 2020       - BK Viremia: No   - Kidney Tx Biopsy: no.    # Immunosuppression: Tacrolimus immediate release (goal 4-6) and Mycophenolate mofetil (dose 750 mg every 12 hours)   - Changes: No     Last tac was 5.5 ng/ ml on 2.5 mg po bid    # Infection Prophylaxis:   - PJP: Sulfa/TMP (Bactrim)    # Hypertension: Controlled;  Goal BP: < 130/80   - Changes: No    # Diabetes: Controlled (HbA1c <7%) Last HbA1c: 7%   - Management as per endo    # Mineral Bone Disorder:   - Calcium; level: High      Check pth and vit d.    # Skin Cancer Risk:    - Discussed sun protection and recommend regular follow up with Dermatology.    # Medical Compliance: Yes    # COVID-19 Virus Review: Discussed COVID-19 virus and the potential medical risks.  Reviewed preventative health recommendations, which includes washing hands for 20 seconds, avoid touching your face, and social distancing.  Asked patient to inform the transplant center if they are exposed or diagnosed with this virus.    # Transplant History:  Etiology of Kidney Failure: Diabetes mellitus type 2  Tx: DDKT  Significant changes in immunosuppression: None  Significant transplant-related complications: None    Transplant Office Phone " Number: [phone]    Assessment and plan was discussed with the patient and he voiced his understanding and agreement.    Return Visit: 12 months    Corey Roland has a clinical telephone visit for kidney transplant and immunosuppression management.    History of Present Illness     The patient is a 64-year-old male with history of end-stage kidney disease due to diabetes who is status post kidney transplant from a  donor in 2016 who is taking this video call for follow-up of his kidney transplant and immunosuppression management.    Since the patient was last seen, he has been feeling well.  He specifically denies any chest pain or breathing difficulties.  He has no nausea or vomiting.  Is no fever shakes or chills.  He is moving his bowels appropriately.    He is currently on immunosuppression with tacrolimus and mycophenolate.  His most recent tacrolimus level was 5.5 ng/mL on 2.5 mg p.o. twice daily.    His blood pressures been well managed in the 120s over 70s.    Recent Hospitalizations:  [x] No [] Yes    New Medical Issues: [x] No [] Yes    Decreased energy: [x] No [] Yes    Chest pain or SOB with exertion:  [x] No [] Yes    Appetite change or weight change: [x] No [] Yes    Nausea, vomiting or diarrhea:  [x] No [] Yes    Fever, sweats or chills: [x] No [] Yes    Leg swelling: [x] No [] Yes      Home BP: 120    Review of Systems   A comprehensive review of systems was obtained and negative, except as noted in the HPI or PMH.    I have reviewed and updated the patient's Past Medical History, Social History, and Medication List.    Active Medical Problems  Patient Active Problem List   Diagnosis     Hypertension secondary to other renal disorders     Secondary renal hyperparathyroidism (H)     Obesity     Diabetic peripheral neuropathy (H)     Kidney replaced by transplant     Immunosuppressed status (H)     Type 1 diabetes mellitus with nephropathy (H)     Aftercare following organ transplant      Vitamin D deficiency     Hypomagnesemia     Post-transplant erythrocytosis     Allergies  Patient has no known allergies.    Medications  Current Outpatient Medications   Medication Sig     ACCU-CHEK EBONIE PLUS test strip      aspirin EC 81 MG EC tablet Take 1 tablet (81 mg) by mouth daily     atorvastatin (LIPITOR) 20 MG tablet Take 1 tablet (20 mg) by mouth daily     carvedilol (COREG) 25 MG tablet TAKE ONE TABLET BY MOUTH TWICE DAILY WITH MEALS     Cholecalciferol (VITAMIN D3) 2000 units TABS Take 2,000 Units by mouth daily     cloNIDine (CATAPRES) 0.3 MG tablet Take 0.3 mg by mouth 2 times daily      cloNIDine (CATAPRES) 0.3 MG tablet Take 0.6 mg by mouth 2 times daily Patient reports     DIPHENHYDRAMINE HCL PO Take 50 mg by mouth At Bedtime     FUROSEMIDE PO Take 60 mg by mouth 2 times daily     insulin aspart (NOVOLOG PEN) 100 UNIT/ML injection Inject 1 Units Subcutaneous Take with snacks or supplements for high blood sugar 1 unit per 4 grams carbohydrate     insulin aspart (NOVOLOG PEN) 100 UNIT/ML injection Inject 1 Units Subcutaneous 3 times daily (with meals) DOSE:  1 units per 4 grams of carbohydrate.  Only chart total amount of units given.  Do not give if pre-prandial glucose is less than 60 mg/dL.     insulin aspart (NOVOLOG PEN) 100 UNIT/ML injection Inject 1-22 Units Subcutaneous 3 times daily (before meals) Correction Scale - VERY HIGH INSULIN RESISTANCE DOSING     Do Not give Correction Insulin if Pre-Meal BG less than 250.   For Pre-Meal  - 260 give 1 unit.   For Pre-Meal -269  give 2 units.   For Pre-Meal  - 279 give 3 units.   For Pre-Meal  - 289 give 4 units.   For Pre-Meal  - 299 give 5 units.   For Pre-Meal  - 309 give 6 units.   ...     insulin aspart (NOVOLOG PEN) 100 UNIT/ML injection Inject 1-16 Units Subcutaneous At Bedtime Correction Scale - VERY HIGH INSULIN RESISTANCE DOSING     Do Not give Bedtime Correction Insulin if BG less than 200.   For  " - 209 give 1 units.   For  - 219 give 2 units.   For  - 229 give 3 units.   For  - 239 give 4 units.   For  - 249 give 5 units.   For  - 259 give 6 units.   For  - 269 give 7 units.   For  - 279 give 8 units  ...     insulin glargine (LANTUS) 100 UNIT/ML injection Inject 30 Units Subcutaneous     insulin glargine (LANTUS) 100 UNIT/ML injection Inject 40 Units Subcutaneous At Bedtime     insulin syringe 31G X 5/16\" 0.5 ML MISC FOR ADMINISTERING INSULIN AT HOME 5 TIMES PER DAY.     losartan (COZAAR) 50 MG tablet Take 1 tablet (50 mg) by mouth daily     mycophenolate (GENERIC EQUIVALENT) 250 MG capsule Take 3 capsules (750 mg) by mouth 2 times daily     NIFEdipine ER (ADALAT CC) 30 MG TB24 Take 1 tablet (30 mg) by mouth 2 times daily     omeprazole 20 MG tablet Take 1 tablet (20 mg) by mouth every morning     SODIUM BICARBONATE PO Take 650 mg by mouth 2 times daily     sulfamethoxazole-trimethoprim (BACTRIM/SEPTRA) 400-80 MG tablet Take 1 tablet by mouth three times a week     tacrolimus (GENERIC EQUIVALENT) 0.5 MG capsule Total 2.5 mg PO twice daily     tacrolimus (GENERIC EQUIVALENT) 1 MG capsule Take 2 capsules (2 mg) by mouth 2 times daily Take with 0.5 mg tablets for total dose of 2.5 mg PO twice daily.     TRESIBA FLEXTOUCH 200 UNIT/ML pen INJECT 70-90 UNITS SUBCUTANEOUS EVERY 24 HOURS.     No current facility-administered medications for this visit.      Jair Hernández MD     I spent 15 minutes with this patient total time of which 50% was spent face-to-face counseling regarding the management of his kidney transplant and immunosuppression.  All questions were answered.    Again, thank you for allowing me to participate in the care of your patient.      Sincerely,    Kidney/Pancreas Recipient      "

## 2020-06-16 NOTE — PROGRESS NOTES
"Corey Roland is a 64 year old male who is being evaluated via a billable video visit.      The patient has been notified of following:     \"This video visit will be conducted via a call between you and your physician/provider. We have found that certain health care needs can be provided without the need for an in-person physical exam.  This service lets us provide the care you need with a video conversation.  If a prescription is necessary we can send it directly to your pharmacy.  If lab work is needed we can place an order for that and you can then stop by our lab to have the test done at a later time.    Video visits are billed at different rates depending on your insurance coverage.  Please reach out to your insurance provider with any questions.    If during the course of the call the physician/provider feels a video visit is not appropriate, you will not be charged for this service.\"    Patient has given verbal consent for Video visit? Yes    Will anyone else be joining your video visit? No        Video-Visit Details    Type of service:  Video Visit    Video Start Time: 1510  Video End Time: 3:27 PM    Originating Location (pt. Location): Home    Distant Location (provider location):  [facility] NEPHROLOGY     Platform used for Video Visit: Guy Hernández MD        "

## 2020-06-26 DIAGNOSIS — E83.52 HYPERCALCEMIA: ICD-10-CM

## 2020-06-26 LAB — PTH-INTACT SERPL-MCNC: 101 PG/ML (ref 18–80)

## 2020-06-26 PROCEDURE — 83970 ASSAY OF PARATHORMONE: CPT | Performed by: INTERNAL MEDICINE

## 2020-06-26 PROCEDURE — 82306 VITAMIN D 25 HYDROXY: CPT | Performed by: INTERNAL MEDICINE

## 2020-06-26 PROCEDURE — 36415 COLL VENOUS BLD VENIPUNCTURE: CPT | Performed by: INTERNAL MEDICINE

## 2020-06-29 LAB — DEPRECATED CALCIDIOL+CALCIFEROL SERPL-MC: 18 UG/L (ref 20–75)

## 2020-07-01 ENCOUNTER — TELEPHONE (OUTPATIENT)
Dept: TRANSPLANT | Facility: CLINIC | Age: 65
End: 2020-07-01

## 2020-07-01 NOTE — TELEPHONE ENCOUNTER
Issue:  Vitamin D level 18.    Outcome:  Called Corey to see if he is taking Cholecalciferol 2,000 units daily as ordered. Corey states he had only been taking 1,000 units daily, he will increase it to 2,000 units daily. Updated Dr. Hernández.

## 2020-07-31 DIAGNOSIS — Z48.298 AFTERCARE FOLLOWING ORGAN TRANSPLANT: ICD-10-CM

## 2020-07-31 DIAGNOSIS — Z79.899 ENCOUNTER FOR LONG-TERM CURRENT USE OF MEDICATION: ICD-10-CM

## 2020-07-31 DIAGNOSIS — Z94.0 KIDNEY REPLACED BY TRANSPLANT: ICD-10-CM

## 2020-07-31 LAB
ANION GAP SERPL CALCULATED.3IONS-SCNC: 5 MMOL/L (ref 3–14)
BUN SERPL-MCNC: 19 MG/DL (ref 7–30)
CALCIUM SERPL-MCNC: 9.8 MG/DL (ref 8.5–10.1)
CHLORIDE SERPL-SCNC: 107 MMOL/L (ref 94–109)
CO2 SERPL-SCNC: 28 MMOL/L (ref 20–32)
CREAT SERPL-MCNC: 1.14 MG/DL (ref 0.66–1.25)
ERYTHROCYTE [DISTWIDTH] IN BLOOD BY AUTOMATED COUNT: 14.1 % (ref 10–15)
GFR SERPL CREATININE-BSD FRML MDRD: 67 ML/MIN/{1.73_M2}
GLUCOSE SERPL-MCNC: 170 MG/DL (ref 70–99)
HCT VFR BLD AUTO: 51 % (ref 40–53)
HGB BLD-MCNC: 17.1 G/DL (ref 13.3–17.7)
MCH RBC QN AUTO: 29.3 PG (ref 26.5–33)
MCHC RBC AUTO-ENTMCNC: 33.5 G/DL (ref 31.5–36.5)
MCV RBC AUTO: 88 FL (ref 78–100)
PLATELET # BLD AUTO: 218 10E9/L (ref 150–450)
POTASSIUM SERPL-SCNC: 4.2 MMOL/L (ref 3.4–5.3)
RBC # BLD AUTO: 5.83 10E12/L (ref 4.4–5.9)
SODIUM SERPL-SCNC: 140 MMOL/L (ref 133–144)
WBC # BLD AUTO: 7.9 10E9/L (ref 4–11)

## 2020-07-31 PROCEDURE — 80048 BASIC METABOLIC PNL TOTAL CA: CPT | Performed by: INTERNAL MEDICINE

## 2020-07-31 PROCEDURE — 85027 COMPLETE CBC AUTOMATED: CPT | Performed by: INTERNAL MEDICINE

## 2020-07-31 PROCEDURE — 36415 COLL VENOUS BLD VENIPUNCTURE: CPT | Performed by: INTERNAL MEDICINE

## 2020-07-31 PROCEDURE — 80197 ASSAY OF TACROLIMUS: CPT | Performed by: INTERNAL MEDICINE

## 2020-08-01 LAB
TACROLIMUS BLD-MCNC: 5.4 UG/L (ref 5–15)
TME LAST DOSE: 2200 H

## 2020-08-28 DIAGNOSIS — Z79.899 ENCOUNTER FOR LONG-TERM CURRENT USE OF MEDICATION: ICD-10-CM

## 2020-08-28 DIAGNOSIS — Z94.0 KIDNEY REPLACED BY TRANSPLANT: ICD-10-CM

## 2020-08-28 DIAGNOSIS — Z48.298 AFTERCARE FOLLOWING ORGAN TRANSPLANT: ICD-10-CM

## 2020-08-28 LAB
ANION GAP SERPL CALCULATED.3IONS-SCNC: 8 MMOL/L (ref 3–14)
BUN SERPL-MCNC: 22 MG/DL (ref 7–30)
CALCIUM SERPL-MCNC: 9.5 MG/DL (ref 8.5–10.1)
CHLORIDE SERPL-SCNC: 109 MMOL/L (ref 94–109)
CO2 SERPL-SCNC: 24 MMOL/L (ref 20–32)
CREAT SERPL-MCNC: 1.15 MG/DL (ref 0.66–1.25)
ERYTHROCYTE [DISTWIDTH] IN BLOOD BY AUTOMATED COUNT: 13.9 % (ref 10–15)
GFR SERPL CREATININE-BSD FRML MDRD: 66 ML/MIN/{1.73_M2}
GLUCOSE SERPL-MCNC: 128 MG/DL (ref 70–99)
HCT VFR BLD AUTO: 48.3 % (ref 40–53)
HGB BLD-MCNC: 16.2 G/DL (ref 13.3–17.7)
MCH RBC QN AUTO: 29.5 PG (ref 26.5–33)
MCHC RBC AUTO-ENTMCNC: 33.5 G/DL (ref 31.5–36.5)
MCV RBC AUTO: 88 FL (ref 78–100)
PLATELET # BLD AUTO: 211 10E9/L (ref 150–450)
POTASSIUM SERPL-SCNC: 3.9 MMOL/L (ref 3.4–5.3)
RBC # BLD AUTO: 5.49 10E12/L (ref 4.4–5.9)
SODIUM SERPL-SCNC: 141 MMOL/L (ref 133–144)
WBC # BLD AUTO: 7.8 10E9/L (ref 4–11)

## 2020-08-28 PROCEDURE — 80048 BASIC METABOLIC PNL TOTAL CA: CPT | Performed by: INTERNAL MEDICINE

## 2020-08-28 PROCEDURE — 36415 COLL VENOUS BLD VENIPUNCTURE: CPT | Performed by: INTERNAL MEDICINE

## 2020-08-28 PROCEDURE — 80197 ASSAY OF TACROLIMUS: CPT | Performed by: INTERNAL MEDICINE

## 2020-08-28 PROCEDURE — 85027 COMPLETE CBC AUTOMATED: CPT | Performed by: FAMILY MEDICINE

## 2020-08-29 LAB
TACROLIMUS BLD-MCNC: 5.9 UG/L (ref 5–15)
TME LAST DOSE: NORMAL H

## 2020-09-25 DIAGNOSIS — Z48.298 AFTERCARE FOLLOWING ORGAN TRANSPLANT: ICD-10-CM

## 2020-09-25 DIAGNOSIS — Z79.899 ENCOUNTER FOR LONG-TERM CURRENT USE OF MEDICATION: ICD-10-CM

## 2020-09-25 DIAGNOSIS — Z94.0 KIDNEY REPLACED BY TRANSPLANT: ICD-10-CM

## 2020-09-25 LAB
ANION GAP SERPL CALCULATED.3IONS-SCNC: 8 MMOL/L (ref 3–14)
BUN SERPL-MCNC: 15 MG/DL (ref 7–30)
CALCIUM SERPL-MCNC: 9.8 MG/DL (ref 8.5–10.1)
CHLORIDE SERPL-SCNC: 104 MMOL/L (ref 94–109)
CO2 SERPL-SCNC: 25 MMOL/L (ref 20–32)
CREAT SERPL-MCNC: 1.01 MG/DL (ref 0.66–1.25)
ERYTHROCYTE [DISTWIDTH] IN BLOOD BY AUTOMATED COUNT: 13.8 % (ref 10–15)
GFR SERPL CREATININE-BSD FRML MDRD: 78 ML/MIN/{1.73_M2}
GLUCOSE SERPL-MCNC: 234 MG/DL (ref 70–99)
HCT VFR BLD AUTO: 47.8 % (ref 40–53)
HGB BLD-MCNC: 16.1 G/DL (ref 13.3–17.7)
MCH RBC QN AUTO: 29.3 PG (ref 26.5–33)
MCHC RBC AUTO-ENTMCNC: 33.7 G/DL (ref 31.5–36.5)
MCV RBC AUTO: 87 FL (ref 78–100)
PLATELET # BLD AUTO: 207 10E9/L (ref 150–450)
POTASSIUM SERPL-SCNC: 4.3 MMOL/L (ref 3.4–5.3)
RBC # BLD AUTO: 5.49 10E12/L (ref 4.4–5.9)
SODIUM SERPL-SCNC: 137 MMOL/L (ref 133–144)
WBC # BLD AUTO: 7.1 10E9/L (ref 4–11)

## 2020-09-25 PROCEDURE — 80197 ASSAY OF TACROLIMUS: CPT | Performed by: INTERNAL MEDICINE

## 2020-09-25 PROCEDURE — 85027 COMPLETE CBC AUTOMATED: CPT | Performed by: FAMILY MEDICINE

## 2020-09-25 PROCEDURE — 80048 BASIC METABOLIC PNL TOTAL CA: CPT | Performed by: INTERNAL MEDICINE

## 2020-09-25 PROCEDURE — 36415 COLL VENOUS BLD VENIPUNCTURE: CPT | Performed by: INTERNAL MEDICINE

## 2020-09-26 LAB
TACROLIMUS BLD-MCNC: 4 UG/L (ref 5–15)
TME LAST DOSE: ABNORMAL H

## 2020-09-28 ENCOUNTER — TELEPHONE (OUTPATIENT)
Dept: TRANSPLANT | Facility: CLINIC | Age: 65
End: 2020-09-28

## 2020-09-28 NOTE — TELEPHONE ENCOUNTER
Left message and sent mychart message to patient regarding:  Glucose 234  Creatinine at baseline 1.01  Tac at goal 4.0     Plan:  Call Corey:  Was this a fasting lab?  How have blood sugars been running at home?  Please follow closely with endocrinology or PCP for optimal blood sugar control.

## 2020-09-28 NOTE — TELEPHONE ENCOUNTER
Issue:  Glucose 234  Creatinine at baseline 1.01  Tac at goal 4.0    Plan:  Call Corey:  Was this a fasting lab?  How have blood sugars been running at home?  Please follow closely with endocrinology or PCP for optimal blood sugar control.    LPN Task:  Please call with above plan.  Thanks!

## 2020-10-29 DIAGNOSIS — Z94.0 KIDNEY TRANSPLANTED: Primary | ICD-10-CM

## 2020-10-29 DIAGNOSIS — Z48.298 AFTERCARE FOLLOWING ORGAN TRANSPLANT: ICD-10-CM

## 2020-10-30 ENCOUNTER — RESULTS ONLY (OUTPATIENT)
Dept: OTHER | Facility: CLINIC | Age: 65
End: 2020-10-30

## 2020-10-30 DIAGNOSIS — Z94.0 KIDNEY REPLACED BY TRANSPLANT: ICD-10-CM

## 2020-10-30 DIAGNOSIS — Z79.899 ENCOUNTER FOR LONG-TERM CURRENT USE OF MEDICATION: ICD-10-CM

## 2020-10-30 DIAGNOSIS — Z94.0 KIDNEY TRANSPLANTED: ICD-10-CM

## 2020-10-30 DIAGNOSIS — Z48.298 AFTERCARE FOLLOWING ORGAN TRANSPLANT: ICD-10-CM

## 2020-10-30 LAB
ANION GAP SERPL CALCULATED.3IONS-SCNC: 10 MMOL/L (ref 3–14)
BUN SERPL-MCNC: 22 MG/DL (ref 7–30)
CALCIUM SERPL-MCNC: 10.1 MG/DL (ref 8.5–10.1)
CHLORIDE SERPL-SCNC: 104 MMOL/L (ref 94–109)
CO2 SERPL-SCNC: 23 MMOL/L (ref 20–32)
CREAT SERPL-MCNC: 0.84 MG/DL (ref 0.66–1.25)
CREAT UR-MCNC: 36 MG/DL
ERYTHROCYTE [DISTWIDTH] IN BLOOD BY AUTOMATED COUNT: 13.8 % (ref 10–15)
GFR SERPL CREATININE-BSD FRML MDRD: >90 ML/MIN/{1.73_M2}
GLUCOSE SERPL-MCNC: 147 MG/DL (ref 70–99)
HCT VFR BLD AUTO: 50.6 % (ref 40–53)
HGB BLD-MCNC: 16.8 G/DL (ref 13.3–17.7)
MCH RBC QN AUTO: 28.5 PG (ref 26.5–33)
MCHC RBC AUTO-ENTMCNC: 33.2 G/DL (ref 31.5–36.5)
MCV RBC AUTO: 86 FL (ref 78–100)
PLATELET # BLD AUTO: 190 10E9/L (ref 150–450)
POTASSIUM SERPL-SCNC: 3.8 MMOL/L (ref 3.4–5.3)
PROT UR-MCNC: 0.19 G/L
PROT/CREAT 24H UR: 0.53 G/G CR (ref 0–0.2)
RBC # BLD AUTO: 5.9 10E12/L (ref 4.4–5.9)
SODIUM SERPL-SCNC: 137 MMOL/L (ref 133–144)
WBC # BLD AUTO: 7.4 10E9/L (ref 4–11)

## 2020-10-30 PROCEDURE — 86832 HLA CLASS I HIGH DEFIN QUAL: CPT | Performed by: INTERNAL MEDICINE

## 2020-10-30 PROCEDURE — 85027 COMPLETE CBC AUTOMATED: CPT | Performed by: FAMILY MEDICINE

## 2020-10-30 PROCEDURE — 36415 COLL VENOUS BLD VENIPUNCTURE: CPT | Performed by: FAMILY MEDICINE

## 2020-10-30 PROCEDURE — 80197 ASSAY OF TACROLIMUS: CPT | Performed by: FAMILY MEDICINE

## 2020-10-30 PROCEDURE — 86833 HLA CLASS II HIGH DEFIN QUAL: CPT | Performed by: INTERNAL MEDICINE

## 2020-10-30 PROCEDURE — 80048 BASIC METABOLIC PNL TOTAL CA: CPT | Performed by: FAMILY MEDICINE

## 2020-10-30 PROCEDURE — 84156 ASSAY OF PROTEIN URINE: CPT | Performed by: INTERNAL MEDICINE

## 2020-10-31 LAB
TACROLIMUS BLD-MCNC: 4.4 UG/L (ref 5–15)
TME LAST DOSE: ABNORMAL H

## 2020-11-14 ENCOUNTER — HEALTH MAINTENANCE LETTER (OUTPATIENT)
Age: 65
End: 2020-11-14

## 2020-11-25 ENCOUNTER — DOCUMENTATION ONLY (OUTPATIENT)
Dept: NEPHROLOGY | Facility: CLINIC | Age: 65
End: 2020-11-25

## 2020-11-25 DIAGNOSIS — Z48.298 AFTERCARE FOLLOWING ORGAN TRANSPLANT: Primary | ICD-10-CM

## 2020-11-25 DIAGNOSIS — Z94.0 KIDNEY REPLACED BY TRANSPLANT: ICD-10-CM

## 2020-11-25 DIAGNOSIS — Z79.899 ENCOUNTER FOR LONG-TERM CURRENT USE OF MEDICATION: ICD-10-CM

## 2020-11-27 DIAGNOSIS — Z94.0 KIDNEY REPLACED BY TRANSPLANT: ICD-10-CM

## 2020-11-27 DIAGNOSIS — Z48.298 AFTERCARE FOLLOWING ORGAN TRANSPLANT: ICD-10-CM

## 2020-11-27 DIAGNOSIS — Z79.899 ENCOUNTER FOR LONG-TERM CURRENT USE OF MEDICATION: ICD-10-CM

## 2020-11-27 LAB
ANION GAP SERPL CALCULATED.3IONS-SCNC: 4 MMOL/L (ref 3–14)
BUN SERPL-MCNC: 18 MG/DL (ref 7–30)
CALCIUM SERPL-MCNC: 9.6 MG/DL (ref 8.5–10.1)
CHLORIDE SERPL-SCNC: 105 MMOL/L (ref 94–109)
CO2 SERPL-SCNC: 29 MMOL/L (ref 20–32)
CREAT SERPL-MCNC: 1.01 MG/DL (ref 0.66–1.25)
ERYTHROCYTE [DISTWIDTH] IN BLOOD BY AUTOMATED COUNT: 14 % (ref 10–15)
GFR SERPL CREATININE-BSD FRML MDRD: 78 ML/MIN/{1.73_M2}
GLUCOSE SERPL-MCNC: 223 MG/DL (ref 70–99)
HCT VFR BLD AUTO: 49 % (ref 40–53)
HGB BLD-MCNC: 16.1 G/DL (ref 13.3–17.7)
MCH RBC QN AUTO: 28.6 PG (ref 26.5–33)
MCHC RBC AUTO-ENTMCNC: 32.9 G/DL (ref 31.5–36.5)
MCV RBC AUTO: 87 FL (ref 78–100)
PLATELET # BLD AUTO: 188 10E9/L (ref 150–450)
POTASSIUM SERPL-SCNC: 3.9 MMOL/L (ref 3.4–5.3)
RBC # BLD AUTO: 5.63 10E12/L (ref 4.4–5.9)
SODIUM SERPL-SCNC: 138 MMOL/L (ref 133–144)
WBC # BLD AUTO: 6.3 10E9/L (ref 4–11)

## 2020-11-27 PROCEDURE — 36415 COLL VENOUS BLD VENIPUNCTURE: CPT | Performed by: FAMILY MEDICINE

## 2020-11-27 PROCEDURE — 80048 BASIC METABOLIC PNL TOTAL CA: CPT | Performed by: FAMILY MEDICINE

## 2020-11-27 PROCEDURE — 80197 ASSAY OF TACROLIMUS: CPT | Performed by: FAMILY MEDICINE

## 2020-11-27 PROCEDURE — 85027 COMPLETE CBC AUTOMATED: CPT | Performed by: FAMILY MEDICINE

## 2020-11-28 LAB
TACROLIMUS BLD-MCNC: 4.6 UG/L (ref 5–15)
TME LAST DOSE: ABNORMAL H

## 2020-11-30 ENCOUNTER — TELEPHONE (OUTPATIENT)
Dept: TRANSPLANT | Facility: CLINIC | Age: 65
End: 2020-11-30

## 2020-11-30 DIAGNOSIS — Z94.0 KIDNEY TRANSPLANT RECIPIENT: ICD-10-CM

## 2020-11-30 RX ORDER — NICOTINE POLACRILEX 4 MG/1
20 GUM, CHEWING ORAL EVERY MORNING
Qty: 90 TABLET | Refills: 3 | Status: SHIPPED | OUTPATIENT
Start: 2020-11-30

## 2020-11-30 NOTE — TELEPHONE ENCOUNTER
Patient Call: Medication Refill      Pharmacy Name: [id number] [place], [place] [address] [facility]    Name of Medication: Omeprazole 20mg    When will the patient be out of this medication?: Less than 3 days (Route high priority)

## 2020-12-30 DIAGNOSIS — Z94.0 KIDNEY REPLACED BY TRANSPLANT: ICD-10-CM

## 2020-12-30 RX ORDER — MYCOPHENOLATE MOFETIL 250 MG/1
750 CAPSULE ORAL 2 TIMES DAILY
Qty: 180 CAPSULE | Refills: 11 | Status: SHIPPED | OUTPATIENT
Start: 2020-12-30 | End: 2021-12-23

## 2021-01-11 DIAGNOSIS — Z94.0 KIDNEY REPLACED BY TRANSPLANT: ICD-10-CM

## 2021-01-11 DIAGNOSIS — Z79.899 ENCOUNTER FOR LONG-TERM CURRENT USE OF MEDICATION: ICD-10-CM

## 2021-01-11 DIAGNOSIS — Z48.298 AFTERCARE FOLLOWING ORGAN TRANSPLANT: ICD-10-CM

## 2021-01-11 LAB
ANION GAP SERPL CALCULATED.3IONS-SCNC: 9 MMOL/L (ref 3–14)
BUN SERPL-MCNC: 19 MG/DL (ref 7–30)
CALCIUM SERPL-MCNC: 10.5 MG/DL (ref 8.5–10.1)
CHLORIDE SERPL-SCNC: 105 MMOL/L (ref 94–109)
CO2 SERPL-SCNC: 25 MMOL/L (ref 20–32)
CREAT SERPL-MCNC: 0.97 MG/DL (ref 0.66–1.25)
ERYTHROCYTE [DISTWIDTH] IN BLOOD BY AUTOMATED COUNT: 15.1 % (ref 10–15)
GFR SERPL CREATININE-BSD FRML MDRD: 81 ML/MIN/{1.73_M2}
GLUCOSE SERPL-MCNC: 229 MG/DL (ref 70–99)
HCT VFR BLD AUTO: 51.5 % (ref 40–53)
HGB BLD-MCNC: 17 G/DL (ref 13.3–17.7)
MCH RBC QN AUTO: 28.2 PG (ref 26.5–33)
MCHC RBC AUTO-ENTMCNC: 33 G/DL (ref 31.5–36.5)
MCV RBC AUTO: 86 FL (ref 78–100)
PLATELET # BLD AUTO: 204 10E9/L (ref 150–450)
POTASSIUM SERPL-SCNC: 4 MMOL/L (ref 3.4–5.3)
RBC # BLD AUTO: 6.02 10E12/L (ref 4.4–5.9)
SODIUM SERPL-SCNC: 139 MMOL/L (ref 133–144)
TACROLIMUS BLD-MCNC: 4.3 UG/L (ref 5–15)
TME LAST DOSE: ABNORMAL H
WBC # BLD AUTO: 8 10E9/L (ref 4–11)

## 2021-01-11 PROCEDURE — 36415 COLL VENOUS BLD VENIPUNCTURE: CPT | Performed by: INTERNAL MEDICINE

## 2021-01-11 PROCEDURE — 80197 ASSAY OF TACROLIMUS: CPT | Performed by: INTERNAL MEDICINE

## 2021-01-11 PROCEDURE — 85027 COMPLETE CBC AUTOMATED: CPT | Performed by: INTERNAL MEDICINE

## 2021-01-11 PROCEDURE — 80048 BASIC METABOLIC PNL TOTAL CA: CPT | Performed by: INTERNAL MEDICINE

## 2021-02-05 DIAGNOSIS — Z94.0 KIDNEY REPLACED BY TRANSPLANT: ICD-10-CM

## 2021-02-05 DIAGNOSIS — Z79.899 ENCOUNTER FOR LONG-TERM CURRENT USE OF MEDICATION: ICD-10-CM

## 2021-02-05 DIAGNOSIS — Z48.298 AFTERCARE FOLLOWING ORGAN TRANSPLANT: ICD-10-CM

## 2021-02-05 LAB
ANION GAP SERPL CALCULATED.3IONS-SCNC: 7 MMOL/L (ref 3–14)
BUN SERPL-MCNC: 20 MG/DL (ref 7–30)
CALCIUM SERPL-MCNC: 10.7 MG/DL (ref 8.5–10.1)
CHLORIDE SERPL-SCNC: 112 MMOL/L (ref 94–109)
CO2 SERPL-SCNC: 22 MMOL/L (ref 20–32)
CREAT SERPL-MCNC: 0.91 MG/DL (ref 0.66–1.25)
ERYTHROCYTE [DISTWIDTH] IN BLOOD BY AUTOMATED COUNT: 14.6 % (ref 10–15)
GFR SERPL CREATININE-BSD FRML MDRD: 87 ML/MIN/{1.73_M2}
GLUCOSE SERPL-MCNC: 66 MG/DL (ref 70–99)
HCT VFR BLD AUTO: 49.3 % (ref 40–53)
HGB BLD-MCNC: 16.5 G/DL (ref 13.3–17.7)
MCH RBC QN AUTO: 28.5 PG (ref 26.5–33)
MCHC RBC AUTO-ENTMCNC: 33.5 G/DL (ref 31.5–36.5)
MCV RBC AUTO: 85 FL (ref 78–100)
PLATELET # BLD AUTO: 204 10E9/L (ref 150–450)
POTASSIUM SERPL-SCNC: 4 MMOL/L (ref 3.4–5.3)
RBC # BLD AUTO: 5.78 10E12/L (ref 4.4–5.9)
SODIUM SERPL-SCNC: 141 MMOL/L (ref 133–144)
TACROLIMUS BLD-MCNC: 5.6 UG/L (ref 5–15)
TME LAST DOSE: NORMAL H
WBC # BLD AUTO: 7 10E9/L (ref 4–11)

## 2021-02-05 PROCEDURE — 36415 COLL VENOUS BLD VENIPUNCTURE: CPT | Performed by: INTERNAL MEDICINE

## 2021-02-05 PROCEDURE — 80048 BASIC METABOLIC PNL TOTAL CA: CPT | Performed by: INTERNAL MEDICINE

## 2021-02-05 PROCEDURE — 80197 ASSAY OF TACROLIMUS: CPT | Performed by: INTERNAL MEDICINE

## 2021-02-05 PROCEDURE — 85027 COMPLETE CBC AUTOMATED: CPT | Performed by: INTERNAL MEDICINE

## 2021-02-25 DIAGNOSIS — Z94.0 KIDNEY TRANSPLANT RECIPIENT: ICD-10-CM

## 2021-02-25 RX ORDER — SULFAMETHOXAZOLE AND TRIMETHOPRIM 400; 80 MG/1; MG/1
1 TABLET ORAL
Qty: 13 TABLET | Refills: 11 | Status: SHIPPED | OUTPATIENT
Start: 2021-02-26 | End: 2022-02-25

## 2021-02-25 NOTE — TELEPHONE ENCOUNTER
Patient Call: Medication Refill  Route to LPN  Instruct the patient to first contact their pharmacy. If they have called their pharmacy and require further assistance, route to LPN.    Pharm[facility] Name: [facility]  [facility] [place] [place]  Name of Medication: Backtrim  When will the patient be out of this medication?: Less than 3 days (Route high priority)

## 2021-03-08 ENCOUNTER — IMMUNIZATION (OUTPATIENT)
Dept: NURSING | Facility: CLINIC | Age: 66
End: 2021-03-08
Payer: MEDICAID

## 2021-03-08 PROCEDURE — 91303 PR COVID VAC JANSSEN AD26 0.5ML: CPT

## 2021-03-08 PROCEDURE — 0031A PR COVID VAC JANSSEN AD26 0.5ML: CPT

## 2021-03-09 DIAGNOSIS — Z94.0 KIDNEY TRANSPLANT RECIPIENT: ICD-10-CM

## 2021-03-09 DIAGNOSIS — Z94.0 KIDNEY TRANSPLANTED: Primary | ICD-10-CM

## 2021-03-09 RX ORDER — TACROLIMUS 1 MG/1
2 CAPSULE ORAL 2 TIMES DAILY
Qty: 120 CAPSULE | Refills: 11 | Status: SHIPPED | OUTPATIENT
Start: 2021-03-09 | End: 2021-05-19

## 2021-03-09 RX ORDER — TACROLIMUS 0.5 MG/1
0.5 CAPSULE ORAL 2 TIMES DAILY
Qty: 60 CAPSULE | Refills: 11 | Status: SHIPPED | OUTPATIENT
Start: 2021-03-09 | End: 2021-05-19

## 2021-03-09 NOTE — TELEPHONE ENCOUNTER
Patient Call: Medication Refill  Route to LPN  Instruct the patient to first contact their pharmacy. If they have called their pharmacy and require further assistance, route to LPN.      tacrolimus (GENERIC EQUIVALENT) 0.5 MG capsule  tacrolimus (GENERIC EQUIVALENT) 1 MG capsule    [place], [place] - [address] [place] [address] [address] Phone:  [phone]   Fax:  [phone]            When will the patient be out of this medication?: Less than 24 hours ([facility] LPN, then page if no answer)

## 2021-03-12 DIAGNOSIS — Z94.0 KIDNEY REPLACED BY TRANSPLANT: ICD-10-CM

## 2021-03-12 DIAGNOSIS — Z48.298 AFTERCARE FOLLOWING ORGAN TRANSPLANT: ICD-10-CM

## 2021-03-12 DIAGNOSIS — Z79.899 ENCOUNTER FOR LONG-TERM CURRENT USE OF MEDICATION: ICD-10-CM

## 2021-03-12 LAB
ANION GAP SERPL CALCULATED.3IONS-SCNC: 5 MMOL/L (ref 3–14)
BUN SERPL-MCNC: 22 MG/DL (ref 7–30)
CALCIUM SERPL-MCNC: 10.3 MG/DL (ref 8.5–10.1)
CHLORIDE SERPL-SCNC: 108 MMOL/L (ref 94–109)
CO2 SERPL-SCNC: 28 MMOL/L (ref 20–32)
CREAT SERPL-MCNC: 1.01 MG/DL (ref 0.66–1.25)
ERYTHROCYTE [DISTWIDTH] IN BLOOD BY AUTOMATED COUNT: 14.8 % (ref 10–15)
GFR SERPL CREATININE-BSD FRML MDRD: 77 ML/MIN/{1.73_M2}
GLUCOSE SERPL-MCNC: 83 MG/DL (ref 70–99)
HCT VFR BLD AUTO: 49.7 % (ref 40–53)
HGB BLD-MCNC: 16.4 G/DL (ref 13.3–17.7)
MCH RBC QN AUTO: 28.6 PG (ref 26.5–33)
MCHC RBC AUTO-ENTMCNC: 33 G/DL (ref 31.5–36.5)
MCV RBC AUTO: 87 FL (ref 78–100)
PLATELET # BLD AUTO: 193 10E9/L (ref 150–450)
POTASSIUM SERPL-SCNC: 4.1 MMOL/L (ref 3.4–5.3)
RBC # BLD AUTO: 5.73 10E12/L (ref 4.4–5.9)
SODIUM SERPL-SCNC: 141 MMOL/L (ref 133–144)
TACROLIMUS BLD-MCNC: 4.1 UG/L (ref 5–15)
TME LAST DOSE: ABNORMAL H
WBC # BLD AUTO: 6 10E9/L (ref 4–11)

## 2021-03-12 PROCEDURE — 80048 BASIC METABOLIC PNL TOTAL CA: CPT | Performed by: INTERNAL MEDICINE

## 2021-03-12 PROCEDURE — 36415 COLL VENOUS BLD VENIPUNCTURE: CPT | Performed by: INTERNAL MEDICINE

## 2021-03-12 PROCEDURE — 80197 ASSAY OF TACROLIMUS: CPT | Performed by: INTERNAL MEDICINE

## 2021-03-12 PROCEDURE — 85027 COMPLETE CBC AUTOMATED: CPT | Performed by: INTERNAL MEDICINE

## 2021-04-09 ENCOUNTER — RESULTS ONLY (OUTPATIENT)
Dept: OTHER | Facility: CLINIC | Age: 66
End: 2021-04-09

## 2021-04-09 DIAGNOSIS — Z48.298 AFTERCARE FOLLOWING ORGAN TRANSPLANT: ICD-10-CM

## 2021-04-09 DIAGNOSIS — Z94.0 KIDNEY REPLACED BY TRANSPLANT: ICD-10-CM

## 2021-04-09 DIAGNOSIS — Z79.899 ENCOUNTER FOR LONG-TERM CURRENT USE OF MEDICATION: ICD-10-CM

## 2021-04-09 LAB
ANION GAP SERPL CALCULATED.3IONS-SCNC: 6 MMOL/L (ref 3–14)
BUN SERPL-MCNC: 24 MG/DL (ref 7–30)
CALCIUM SERPL-MCNC: 10 MG/DL (ref 8.5–10.1)
CHLORIDE SERPL-SCNC: 103 MMOL/L (ref 94–109)
CO2 SERPL-SCNC: 27 MMOL/L (ref 20–32)
CREAT SERPL-MCNC: 1.03 MG/DL (ref 0.66–1.25)
ERYTHROCYTE [DISTWIDTH] IN BLOOD BY AUTOMATED COUNT: 14.3 % (ref 10–15)
GFR SERPL CREATININE-BSD FRML MDRD: 76 ML/MIN/{1.73_M2}
GLUCOSE SERPL-MCNC: 165 MG/DL (ref 70–99)
HCT VFR BLD AUTO: 48.5 % (ref 40–53)
HGB BLD-MCNC: 16.1 G/DL (ref 13.3–17.7)
MCH RBC QN AUTO: 29 PG (ref 26.5–33)
MCHC RBC AUTO-ENTMCNC: 33.2 G/DL (ref 31.5–36.5)
MCV RBC AUTO: 87 FL (ref 78–100)
PLATELET # BLD AUTO: 185 10E9/L (ref 150–450)
POTASSIUM SERPL-SCNC: 4.5 MMOL/L (ref 3.4–5.3)
RBC # BLD AUTO: 5.56 10E12/L (ref 4.4–5.9)
SODIUM SERPL-SCNC: 136 MMOL/L (ref 133–144)
TACROLIMUS BLD-MCNC: 3.6 UG/L (ref 5–15)
TME LAST DOSE: ABNORMAL H
WBC # BLD AUTO: 6.6 10E9/L (ref 4–11)

## 2021-04-09 PROCEDURE — 86833 HLA CLASS II HIGH DEFIN QUAL: CPT | Performed by: INTERNAL MEDICINE

## 2021-04-09 PROCEDURE — 80197 ASSAY OF TACROLIMUS: CPT | Performed by: INTERNAL MEDICINE

## 2021-04-09 PROCEDURE — 80048 BASIC METABOLIC PNL TOTAL CA: CPT | Performed by: INTERNAL MEDICINE

## 2021-04-09 PROCEDURE — 36415 COLL VENOUS BLD VENIPUNCTURE: CPT | Performed by: INTERNAL MEDICINE

## 2021-04-09 PROCEDURE — 85027 COMPLETE CBC AUTOMATED: CPT | Performed by: INTERNAL MEDICINE

## 2021-04-09 PROCEDURE — 86832 HLA CLASS I HIGH DEFIN QUAL: CPT | Performed by: INTERNAL MEDICINE

## 2021-04-26 NOTE — TELEPHONE ENCOUNTER
Patient is scheduled for a kidney biopsy on 1/17/2017 @ 9 AM with Dr. Qureshi.   Surgery at bedside

## 2021-05-07 DIAGNOSIS — Z48.298 AFTERCARE FOLLOWING ORGAN TRANSPLANT: ICD-10-CM

## 2021-05-07 DIAGNOSIS — Z94.0 KIDNEY REPLACED BY TRANSPLANT: ICD-10-CM

## 2021-05-07 DIAGNOSIS — Z79.899 ENCOUNTER FOR LONG-TERM CURRENT USE OF MEDICATION: ICD-10-CM

## 2021-05-07 LAB
ANION GAP SERPL CALCULATED.3IONS-SCNC: 11 MMOL/L (ref 3–14)
BUN SERPL-MCNC: 23 MG/DL (ref 7–30)
CALCIUM SERPL-MCNC: 10.4 MG/DL (ref 8.5–10.1)
CHLORIDE SERPL-SCNC: 103 MMOL/L (ref 94–109)
CO2 SERPL-SCNC: 24 MMOL/L (ref 20–32)
CREAT SERPL-MCNC: 1 MG/DL (ref 0.66–1.25)
ERYTHROCYTE [DISTWIDTH] IN BLOOD BY AUTOMATED COUNT: 14.7 % (ref 10–15)
GFR SERPL CREATININE-BSD FRML MDRD: 78 ML/MIN/{1.73_M2}
GLUCOSE SERPL-MCNC: 157 MG/DL (ref 70–99)
HCT VFR BLD AUTO: 49 % (ref 40–53)
HGB BLD-MCNC: 16.3 G/DL (ref 13.3–17.7)
MCH RBC QN AUTO: 29.7 PG (ref 26.5–33)
MCHC RBC AUTO-ENTMCNC: 33.3 G/DL (ref 31.5–36.5)
MCV RBC AUTO: 89 FL (ref 78–100)
PLATELET # BLD AUTO: 201 10E9/L (ref 150–450)
POTASSIUM SERPL-SCNC: 4.3 MMOL/L (ref 3.4–5.3)
RBC # BLD AUTO: 5.49 10E12/L (ref 4.4–5.9)
SODIUM SERPL-SCNC: 138 MMOL/L (ref 133–144)
TACROLIMUS BLD-MCNC: 3.7 UG/L (ref 5–15)
TME LAST DOSE: ABNORMAL H
WBC # BLD AUTO: 8.1 10E9/L (ref 4–11)

## 2021-05-07 PROCEDURE — 80197 ASSAY OF TACROLIMUS: CPT | Performed by: INTERNAL MEDICINE

## 2021-05-07 PROCEDURE — 85027 COMPLETE CBC AUTOMATED: CPT | Performed by: INTERNAL MEDICINE

## 2021-05-07 PROCEDURE — 80048 BASIC METABOLIC PNL TOTAL CA: CPT | Performed by: INTERNAL MEDICINE

## 2021-05-07 PROCEDURE — 36415 COLL VENOUS BLD VENIPUNCTURE: CPT | Performed by: INTERNAL MEDICINE

## 2021-05-09 ENCOUNTER — TELEPHONE (OUTPATIENT)
Dept: TRANSPLANT | Facility: CLINIC | Age: 66
End: 2021-05-09

## 2021-05-09 NOTE — TELEPHONE ENCOUNTER
ISSUE:   Tacrolimus IR level 3.7 on 5/7/21, goal 4-6, dose 2.5 mg BID.  Previous level slightly low w/no dose change.    PLAN:   Please call patient and confirm this was an accurate 12-hour trough. Verify Tacrolimus IR dose 2.5 mg BID. Confirm no new medications or illness. Confirm no missed doses. If accurate trough and accurate dose, increase Tacrolimus IR dose to 3 mg BID and repeat labs in 1 week.    Ceci White, RNCC

## 2021-05-10 NOTE — TELEPHONE ENCOUNTER
[employer] message sent to patient regarding:  Tacrolimus IR level 3.7 on 5/7/21, goal 4-6, dose 2.5 mg BID.  Previous level slightly low w/no dose change.     PLAN:   Please call patient and confirm this was an accurate 12-hour trough. Verify Tacrolimus IR dose 2.5 mg BID. Confirm no new medications or illness. Confirm no missed doses. If accurate trough and accurate dose, increase Tacrolimus IR dose to 3 mg BID and repeat labs in 1 week.

## 2021-05-10 NOTE — TELEPHONE ENCOUNTER
Left message for patient regarding:  Tacrolimus IR level 3.7 on 5/7/21, goal 4-6, dose 2.5 mg BID.  Previous level slightly low w/no dose change.     PLAN:   Please call patient and confirm this was an accurate 12-hour trough. Verify Tacrolimus IR dose 2.5 mg BID. Confirm no new medications or illness. Confirm no missed doses. If accurate trough and accurate dose, increase Tacrolimus IR dose to 3 mg BID and repeat labs in 1 week.

## 2021-05-11 NOTE — TELEPHONE ENCOUNTER
Patient Call: Voicemail  Date/Time: Monday 5/10/2021, 5:00 PM  Reason for call: pt received message and will get labs complete

## 2021-05-18 DIAGNOSIS — Z48.298 AFTERCARE FOLLOWING ORGAN TRANSPLANT: ICD-10-CM

## 2021-05-18 DIAGNOSIS — Z79.899 ENCOUNTER FOR LONG-TERM CURRENT USE OF MEDICATION: ICD-10-CM

## 2021-05-18 DIAGNOSIS — Z94.0 KIDNEY REPLACED BY TRANSPLANT: ICD-10-CM

## 2021-05-18 LAB
TACROLIMUS BLD-MCNC: 4.5 UG/L (ref 5–15)
TME LAST DOSE: ABNORMAL H

## 2021-05-18 PROCEDURE — 80197 ASSAY OF TACROLIMUS: CPT | Performed by: INTERNAL MEDICINE

## 2021-05-18 PROCEDURE — 36415 COLL VENOUS BLD VENIPUNCTURE: CPT | Performed by: INTERNAL MEDICINE

## 2021-05-19 ENCOUNTER — TELEPHONE (OUTPATIENT)
Dept: TRANSPLANT | Facility: CLINIC | Age: 66
End: 2021-05-19

## 2021-05-19 DIAGNOSIS — Z94.0 KIDNEY TRANSPLANT RECIPIENT: ICD-10-CM

## 2021-05-19 DIAGNOSIS — Z94.0 KIDNEY TRANSPLANTED: ICD-10-CM

## 2021-05-19 RX ORDER — TACROLIMUS 1 MG/1
3 CAPSULE ORAL 2 TIMES DAILY
Qty: 180 CAPSULE | Refills: 11 | Status: SHIPPED | OUTPATIENT
Start: 2021-05-19 | End: 2022-05-24

## 2021-05-19 RX ORDER — TACROLIMUS 0.5 MG/1
CAPSULE ORAL
Qty: 60 CAPSULE | Refills: 11
Start: 2021-05-19 | End: 2024-05-07

## 2021-05-19 NOTE — TELEPHONE ENCOUNTER
Patient Call: Medication Refill  Route to LPN  Instruct the patient to first contact their pharmacy. If they have called their pharmacy and require further assistance, route to LPN.    Pharmacy Name:  outpatient [facility]  Pharmacy Location: [address] [place] [address]   Name of Medication: Tacrolimus 1 mg  When will the patient be out of this medication?: Greater than 3 days (Route standard priority)  Patient needs a new script sent to pharmacy with the changed dose.

## 2021-05-23 ENCOUNTER — HEALTH MAINTENANCE LETTER (OUTPATIENT)
Age: 66
End: 2021-05-23

## 2021-06-08 ENCOUNTER — TELEPHONE (OUTPATIENT)
Dept: TRANSPLANT | Facility: CLINIC | Age: 66
End: 2021-06-08

## 2021-06-08 DIAGNOSIS — I15.1 HYPERTENSION SECONDARY TO OTHER RENAL DISORDERS: ICD-10-CM

## 2021-06-08 RX ORDER — LOSARTAN POTASSIUM 50 MG/1
50 TABLET ORAL DAILY
Qty: 90 TABLET | Refills: 3 | Status: SHIPPED | OUTPATIENT
Start: 2021-06-08 | End: 2021-12-09

## 2021-06-08 NOTE — TELEPHONE ENCOUNTER
Patient Call: Medication Refill  Route to LPN  Instruct the patient to first contact their pharmacy. If they have called their pharmacy and require further assistance, route to LPN.    losartan (COZAAR) 50 MG tablet  [id number] [address] Phone:  [phone]   Fax:  [phone]          When will the patient be out of this medication?: Less than 3 days (Route high priority)

## 2021-06-10 DIAGNOSIS — Z79.899 ENCOUNTER FOR LONG-TERM CURRENT USE OF MEDICATION: ICD-10-CM

## 2021-06-10 DIAGNOSIS — Z94.0 KIDNEY REPLACED BY TRANSPLANT: ICD-10-CM

## 2021-06-10 DIAGNOSIS — Z48.298 AFTERCARE FOLLOWING ORGAN TRANSPLANT: ICD-10-CM

## 2021-06-10 LAB
ANION GAP SERPL CALCULATED.3IONS-SCNC: 7 MMOL/L (ref 3–14)
BUN SERPL-MCNC: 22 MG/DL (ref 7–30)
CALCIUM SERPL-MCNC: 9.4 MG/DL (ref 8.5–10.1)
CHLORIDE SERPL-SCNC: 105 MMOL/L (ref 94–109)
CO2 SERPL-SCNC: 27 MMOL/L (ref 20–32)
CREAT SERPL-MCNC: 1.01 MG/DL (ref 0.66–1.25)
ERYTHROCYTE [DISTWIDTH] IN BLOOD BY AUTOMATED COUNT: 14.6 % (ref 10–15)
GFR SERPL CREATININE-BSD FRML MDRD: 77 ML/MIN/{1.73_M2}
GLUCOSE SERPL-MCNC: 154 MG/DL (ref 70–99)
HCT VFR BLD AUTO: 49.5 % (ref 40–53)
HGB BLD-MCNC: 16.1 G/DL (ref 13.3–17.7)
MCH RBC QN AUTO: 28.8 PG (ref 26.5–33)
MCHC RBC AUTO-ENTMCNC: 32.5 G/DL (ref 31.5–36.5)
MCV RBC AUTO: 89 FL (ref 78–100)
PLATELET # BLD AUTO: 202 10E9/L (ref 150–450)
POTASSIUM SERPL-SCNC: 4 MMOL/L (ref 3.4–5.3)
RBC # BLD AUTO: 5.59 10E12/L (ref 4.4–5.9)
SODIUM SERPL-SCNC: 139 MMOL/L (ref 133–144)
TACROLIMUS BLD-MCNC: 5.3 UG/L (ref 5–15)
TME LAST DOSE: NORMAL H
WBC # BLD AUTO: 8.2 10E9/L (ref 4–11)

## 2021-06-10 PROCEDURE — 85027 COMPLETE CBC AUTOMATED: CPT | Performed by: INTERNAL MEDICINE

## 2021-06-10 PROCEDURE — 80197 ASSAY OF TACROLIMUS: CPT | Performed by: INTERNAL MEDICINE

## 2021-06-10 PROCEDURE — 36415 COLL VENOUS BLD VENIPUNCTURE: CPT | Performed by: INTERNAL MEDICINE

## 2021-06-10 PROCEDURE — 80048 BASIC METABOLIC PNL TOTAL CA: CPT | Performed by: INTERNAL MEDICINE

## 2021-06-14 ENCOUNTER — OFFICE VISIT (OUTPATIENT)
Dept: NEPHROLOGY | Facility: CLINIC | Age: 66
End: 2021-06-14
Attending: INTERNAL MEDICINE
Payer: MEDICAID

## 2021-06-14 VITALS
SYSTOLIC BLOOD PRESSURE: 151 MMHG | DIASTOLIC BLOOD PRESSURE: 76 MMHG | WEIGHT: 272.7 LBS | OXYGEN SATURATION: 97 % | BODY MASS INDEX: 36.98 KG/M2 | HEART RATE: 84 BPM | TEMPERATURE: 98.4 F

## 2021-06-14 DIAGNOSIS — E08.00 DIABETES MELLITUS DUE TO UNDERLYING CONDITION WITH HYPEROSMOLARITY WITHOUT COMA, WITH LONG-TERM CURRENT USE OF INSULIN (H): ICD-10-CM

## 2021-06-14 DIAGNOSIS — Z94.0 STATUS POST KIDNEY TRANSPLANT: Primary | ICD-10-CM

## 2021-06-14 DIAGNOSIS — E66.01 MORBID OBESITY (H): ICD-10-CM

## 2021-06-14 DIAGNOSIS — Z79.4 DIABETES MELLITUS DUE TO UNDERLYING CONDITION WITH HYPEROSMOLARITY WITHOUT COMA, WITH LONG-TERM CURRENT USE OF INSULIN (H): ICD-10-CM

## 2021-06-14 LAB
ALBUMIN UR-MCNC: NEGATIVE MG/DL
APPEARANCE UR: CLEAR
BILIRUB UR QL STRIP: NEGATIVE
COLOR UR AUTO: ABNORMAL
CREAT UR-MCNC: 21 MG/DL
GLUCOSE UR STRIP-MCNC: NEGATIVE MG/DL
HGB UR QL STRIP: NEGATIVE
HYALINE CASTS #/AREA URNS LPF: 1 /LPF (ref 0–2)
KETONES UR STRIP-MCNC: 5 MG/DL
LEUKOCYTE ESTERASE UR QL STRIP: NEGATIVE
MUCOUS THREADS #/AREA URNS LPF: PRESENT /LPF
NITRATE UR QL: NEGATIVE
PH UR STRIP: 5 PH (ref 5–7)
PROT UR-MCNC: 0.08 G/L
PROT/CREAT 24H UR: 0.36 G/G CR (ref 0–0.2)
RBC #/AREA URNS AUTO: <1 /HPF (ref 0–2)
SOURCE: ABNORMAL
SP GR UR STRIP: 1.01 (ref 1–1.03)
SQUAMOUS #/AREA URNS AUTO: 1 /HPF (ref 0–1)
UROBILINOGEN UR STRIP-MCNC: 0 MG/DL (ref 0–2)
WBC #/AREA URNS AUTO: <1 /HPF (ref 0–5)

## 2021-06-14 PROCEDURE — 84156 ASSAY OF PROTEIN URINE: CPT | Performed by: INTERNAL MEDICINE

## 2021-06-14 PROCEDURE — G0463 HOSPITAL OUTPT CLINIC VISIT: HCPCS

## 2021-06-14 PROCEDURE — 99214 OFFICE O/P EST MOD 30 MIN: CPT | Mod: GC | Performed by: INTERNAL MEDICINE

## 2021-06-14 PROCEDURE — 81001 URINALYSIS AUTO W/SCOPE: CPT | Performed by: INTERNAL MEDICINE

## 2021-06-14 NOTE — NURSING NOTE
Chief Complaint   Patient presents with     RECHECK     semi annual kidney tx follow up         BP (!) 151/76   Pulse 84   Temp 98.4  F (36.9  C)   Wt 123.7 kg (272 lb 11.2 oz)   SpO2 97%   BMI 36.98 kg/m        Rosa MENDOZA CMA

## 2021-06-14 NOTE — PROGRESS NOTES
CHRONIC TRANSPLANT NEPHROLOGY VISIT    Assessment & Plan   # DDKT (SPK): stable              - Baseline Cr ~ 0.9              - Proteinuria: Minimal (0.2-0.5 grams)              - Date DSA Last Checked: negative in 2020                  - BK Viremia: No              - Kidney Tx Biopsy: no.     # Immunosuppression: Tacrolimus immediate release (goal 4-6) and Mycophenolate mofetil (dose 750 mg every 12 hours)              - Changes: No      # Infection Prophylaxis:   - PJP: Sulfa/TMP (Bactrim)     # Hypertension: Controlled;   Goal BP: < 130/80              - Changes: No    # LE swelling : pt have had left lower extremity swelling for a long time, after his surgery. But noted it is slightly getting worse in past few weeks to months. Continued to take his lasix, but his swelling is not improving. No pain/redness in his leg.  - UA and UPCR to rule out proteinuria  - will consider LE US if swelling is worsening or develop fever     # Diabetes: Controlled (HbA1c <7%)            Last HbA1c: 7%, does having intermittent low's into mid 50's, encouraged him to discuss with primary and endo. Will check C-Peptide              - Management as per endo     # Mineral Bone Disorder:   - Calcium; level: intermittently high, last one was normal. Last vit D in June 2020 was low, continue vit D supplementation now.     # Skin Cancer Risk: last visit was little over a year, no skin lesions that needed biopsy per pt. His dermatologist left, he will make new appointment with a different doctor.              - Discussed sun protection and recommend regular follow up with Dermatology.     # Medical Compliance: Yes     # COVID-19 Virus Review: Discussed COVID-19 virus and the potential medical risks.  Reviewed preventative health recommendations, which includes washing hands for 20 seconds, avoid touching your face, and social distancing.  Asked patient to inform the transplant center if they are exposed or diagnosed with this virus.     #  Transplant History:  Etiology of Kidney Failure: Diabetes mellitus type 2  Tx: DDKT  Significant changes in immunosuppression: None  Significant transplant-related complications: None    Transplant Office Phone Number: [phone]    Assessment and plan was discussed with the patient and he voiced his understanding and agreement.    Return visit: No follow-ups on file.    Huyen Larios MD    Chief Complaint   Mr. Roland is a 65 year old here for kidney transplant and immunosuppression management.    History of Present Illness    Pt endorses doing over all well. He started to note more left lower extremity swelling, more so in past couple months and worried about proteinuria, as it happened before. no h/o blood clots and no pain associated with swelling. Denied chest pain, SOB,abdominal pain, nausea/vomiting/diarrhea, dysuria, urgency or frequency of urination. No urinary obstructive symptoms.    oprotein in urine going uop as his LE swelling    Home BP: Not checked    Problem List   Patient Active Problem List   Diagnosis     Hypertension secondary to other renal disorders     Secondary renal hyperparathyroidism (H)     Obesity     Diabetic peripheral neuropathy (H)     Kidney replaced by transplant     Immunosuppressed status (H)     Type 1 diabetes mellitus with nephropathy (H)     Aftercare following organ transplant     Vitamin D deficiency     Hypomagnesemia     Post-transplant erythrocytosis       Allergies   No Known Allergies    Medications   Current Outpatient Medications   Medication Sig     aspirin EC 81 MG EC tablet Take 1 tablet (81 mg) by mouth daily     atorvastatin (LIPITOR) 20 MG tablet Take 1 tablet (20 mg) by mouth daily     carvedilol (COREG) 25 MG tablet TAKE ONE TABLET BY MOUTH TWICE DAILY WITH MEALS     Cholecalciferol (VITAMIN D3) 2000 units TABS Take 2,000 Units by mouth daily     cloNIDine (CATAPRES) 0.3 MG tablet Take 0.3 mg by mouth 2 times daily      DIPHENHYDRAMINE HCL PO Take 50  "mg by mouth At Bedtime     FUROSEMIDE PO Take 60 mg by mouth 2 times daily     insulin aspart (NOVOLOG PEN) 100 UNIT/ML injection Inject 1 Units Subcutaneous Take with snacks or supplements for high blood sugar 1 unit per 4 grams carbohydrate     insulin aspart (NOVOLOG PEN) 100 UNIT/ML injection Inject 1 Units Subcutaneous 3 times daily (with meals) DOSE:  1 units per 4 grams of carbohydrate.  Only chart total amount of units given.  Do not give if pre-prandial glucose is less than 60 mg/dL.     insulin aspart (NOVOLOG PEN) 100 UNIT/ML injection Inject 1-22 Units Subcutaneous 3 times daily (before meals) Correction Scale - VERY HIGH INSULIN RESISTANCE DOSING     Do Not give Correction Insulin if Pre-Meal BG less than 250.   For Pre-Meal  - 260 give 1 unit.   For Pre-Meal -269  give 2 units.   For Pre-Meal  - 279 give 3 units.   For Pre-Meal  - 289 give 4 units.   For Pre-Meal  - 299 give 5 units.   For Pre-Meal  - 309 give 6 units.   ...     insulin aspart (NOVOLOG PEN) 100 UNIT/ML injection Inject 1-16 Units Subcutaneous At Bedtime Correction Scale - VERY HIGH INSULIN RESISTANCE DOSING     Do Not give Bedtime Correction Insulin if BG less than 200.   For  - 209 give 1 units.   For  - 219 give 2 units.   For  - 229 give 3 units.   For  - 239 give 4 units.   For  - 249 give 5 units.   For  - 259 give 6 units.   For  - 269 give 7 units.   For  - 279 give 8 units  ...     insulin syringe 31G X 5/16\" 0.5 ML MISC FOR ADMINISTERING INSULIN AT HOME 5 TIMES PER DAY.     losartan (COZAAR) 50 MG tablet Take 1 tablet (50 mg) by mouth daily     mycophenolate (GENERIC EQUIVALENT) 250 MG capsule Take 3 capsules (750 mg) by mouth 2 times daily     NIFEdipine ER (ADALAT CC) 30 MG TB24 Take 1 tablet (30 mg) by mouth 2 times daily     omeprazole 20 MG tablet Take 1 tablet (20 mg) by mouth every morning     sulfamethoxazole-trimethoprim (BACTRIM) " 400-80 MG tablet Take 1 tablet by mouth three times a week     tacrolimus (GENERIC EQUIVALENT) 0.5 MG capsule HOLD. Total dose 3.0 mg twice daily.     tacrolimus (GENERIC EQUIVALENT) 1 MG capsule Take 3 capsules (3 mg) by mouth 2 times daily     TRESIBA FLEXTOUCH 200 UNIT/ML pen INJECT 70-90 UNITS SUBCUTANEOUS EVERY 24 HOURS.     ACCU-CHEK EBONIE PLUS test strip      cloNIDine (CATAPRES) 0.3 MG tablet Take 0.6 mg by mouth 2 times daily Patient reports     insulin glargine (LANTUS) 100 UNIT/ML injection Inject 30 Units Subcutaneous     insulin glargine (LANTUS) 100 UNIT/ML injection Inject 40 Units Subcutaneous At Bedtime     SODIUM BICARBONATE PO Take 650 mg by mouth 2 times daily     No current facility-administered medications for this visit.      There are no discontinued medications.    Physical Exam   Vital Signs: BP (!) 151/76   Pulse 84   Temp 98.4  F (36.9  C)   Wt 123.7 kg (272 lb 11.2 oz)   SpO2 97%   BMI 36.98 kg/m      GENERAL APPEARANCE: alert and no distress  EYES: eyes grossly normal to inspection  LYMPHATICS: no cervical or supraclavicular nodes  RESP: lungs clear to auscultation  CV: regular rhythm, normal rate, no rub, no murmur  EDEMA: trace to 1+ LE edema bilaterally  ABDOMEN: soft, nondistended, nontender  MS: extremities normal - no gross deformities noted  SKIN: Lower extremity hyperpigmentation   NEURO: mentation intact and speech normal  PSYCH: mentation appears normal and affect normal/bright    Data     Renal Latest Ref Rng & Units 6/10/2021 5/7/2021 4/9/2021   Na 133 - 144 mmol/L 139 138 136   K 3.4 - 5.3 mmol/L 4.0 4.3 4.5   Cl 94 - 109 mmol/L 105 103 103   CO2 20 - 32 mmol/L 27 24 27   BUN 7 - 30 mg/dL 22 23 24   Cr 0.66 - 1.25 mg/dL 1.01 1.00 1.03   Glucose 70 - 99 mg/dL 154(H) 157(H) 165(H)   Ca  8.5 - 10.1 mg/dL 9.4 10.4(H) 10.0   Mg 1.6 - 2.3 mg/dL - - -     Bone Health Latest Ref Rng & Units 6/26/2020 10/1/2019 5/31/2019   Phos 2.5 - 4.5 mg/dL - - 1.7(L)   PTHi 18 - 80 pg/mL  101(H) 300(H) -   Vit D Def 20 - 75 ug/L 18(L) 7(L) -     Heme Latest Ref Rng & Units 6/10/2021 5/7/2021 4/9/2021   WBC 4.0 - 11.0 10e9/L 8.2 8.1 6.6   Hgb 13.3 - 17.7 g/dL 16.1 16.3 16.1   Plt 150 - 450 10e9/L 202 201 185   ABSOLUTE NEUTROPHIL 1.6 - 8.3 10e9/L - - -   ABSOLUTE LYMPHOCYTES 0.8 - 5.3 10e9/L - - -   ABSOLUTE MONOCYTES 0.0 - 1.3 10e9/L - - -   ABSOLUTE EOSINOPHILS 0.0 - 0.7 10e9/L - - -   ABSOLUTE BASOPHILS 0.0 - 0.2 10e9/L - - -   ABS IMMATURE GRANULOCYTES 0 - 0.4 10e9/L - - -   ABSOLUTE NUCLEATED RBC - - - -     Liver Latest Ref Rng & Units 5/31/2019 12/28/2018 12/22/2017   AP 40 - 150 U/L - - 71   TBili 0.2 - 1.3 mg/dL - - 0.4   DBili 0.0 - 0.2 mg/dL - - 0.1   ALT 0 - 70 U/L - - 21   AST 0 - 45 U/L - - 15   Tot Protein 6.8 - 8.8 g/dL - - 7.9   Albumin 3.4 - 5.0 g/dL 3.9 3.6 3.4     Pancreas Latest Ref Rng & Units 11/25/2016 11/24/2016 4/15/2015   A1C 4.3 - 6.0 % 7.0(H) 7.0(H) 7.2(H)     Iron studies Latest Ref Rng & Units 11/27/2016   Iron 35 - 180 ug/dL 74   Iron sat 15 - 46 % 41   Ferritin 26 - 388 ng/mL 1,611(H)     [facility] Txp Virology Latest Ref Rng & Units 5/31/2019 12/4/2018 7/27/2018   BK Spec - Plasma Plasma Plasma   BK Res BKNEG:BK Virus DNA Not Detected copies/mL BK Virus DNA Not Detected BK Virus DNA Not Detected BK Virus DNA Not Detected   BK Log <2.7 Log copies/mL Not Calculated Not Calculated Not Calculated   EBV CAPSID ANTIBODY IGG 0.0 - 0.8 AI - - -   Hep B Core NR - - -        Recent Labs   Lab Test 05/07/21  0957 05/18/21  1005 06/10/21  0944   DOSTAC Not Provided 5/17/21@2200 06/09/21@22:10   TACROL 3.7* 4.5* 5.3     Recent Labs   Lab Test 02/06/17  0733 02/13/17  0736 02/20/17  0733   DOSMPA 2,052,017 2,000 2,192,017   MPACID 2.38 2.03 4.29*   MPAG 104.7* 102.9* 85.4     Patient was seen and evaluated by me, Erasmo Wilkins MD. I have reviewed the note and agree with the the plan of care as documented by the fellow.  We increased the losartan to 100 mg and we reduced the clonidine  to 0.2 mg po bid.   Corey will check with his endocrine provider on the suitability of SGLT2 agents to reduce his insulin requirements.

## 2021-06-14 NOTE — LETTER
6/14/2021       RE: Corey Makannelise  [address]  [facility] [address]     Dear Colleague,    Thank you for referring your patient, Corey Roland, to the [facility] [place] [facility]. Please see a copy of my visit note below.    CHRONIC TRANSPLANT NEPHROLOGY VISIT    Assessment & Plan   # DDKT (SPK): stable              - Baseline Cr ~ 0.9              - Proteinuria: Minimal (0.2-0.5 grams)              - Date DSA Last Checked: negative in 2020                  - BK Viremia: No              - Kidney Tx Biopsy: no.     # Immunosuppression: Tacrolimus immediate release (goal 4-6) and Mycophenolate mofetil (dose 750 mg every 12 hours)              - Changes: No      # Infection Prophylaxis:   - PJP: Sulfa/TMP (Bactrim)     # Hypertension: Controlled;   Goal BP: < 130/80              - Changes: No    # LE swelling : pt have had left lower extremity swelling for a long time, after his surgery. But noted it is slightly getting worse in past few weeks to months. Continued to take his lasix, but his swelling is not improving. No pain/redness in his leg.  - UA and UPCR to rule out proteinuria  - will consider LE US if swelling is worsening or develop fever     # Diabetes: Controlled (HbA1c <7%)            Last HbA1c: 7%, does having intermittent low's into mid 50's, encouraged him to discuss with primary and endo. Will check C-Peptide              - Management as per endo     # Mineral Bone Disorder:   - Calcium; level: intermittently high, last one was normal. Last vit D in June 2020 was low, continue vit D supplementation now.     # Skin Cancer Risk: last visit was little over a year, no skin lesions that needed biopsy per pt. His dermatologist left, he will make new appointment with a different doctor.              - Discussed sun protection and recommend regular follow up with Dermatology.     # Medical Compliance:  Yes     # COVID-19 Virus Review: Discussed COVID-19 virus and the potential medical risks.  Reviewed preventative health recommendations, which includes washing hands for 20 seconds, avoid touching your face, and social distancing.  Asked patient to inform the transplant center if they are exposed or diagnosed with this virus.     # Transplant History:  Etiology of Kidney Failure: Diabetes mellitus type 2  Tx: DDKT  Significant changes in immunosuppression: None  Significant transplant-related complications: None    Transplant Office Phone Number: [phone]    Assessment and plan was discussed with the patient and he voiced his understanding and agreement.    Return visit: No follow-ups on file.    Huyen Larios MD    Chief Complaint   Mr. Roland is a 65 year old here for kidney transplant and immunosuppression management.    History of Present Illness    Pt endorses doing over all well. He started to note more left lower extremity swelling, more so in past couple months and worried about proteinuria, as it happened before. no h/o blood clots and no pain associated with swelling. Denied chest pain, SOB,abdominal pain, nausea/vomiting/diarrhea, dysuria, urgency or frequency of urination. No urinary obstructive symptoms.    oprotein in urine going uop as his LE swelling    Home BP: Not checked    Problem List   Patient Active Problem List   Diagnosis     Hypertension secondary to other renal disorders     Secondary renal hyperparathyroidism (H)     Obesity     Diabetic peripheral neuropathy (H)     Kidney replaced by transplant     Immunosuppressed status (H)     Type 1 diabetes mellitus with nephropathy (H)     Aftercare following organ transplant     Vitamin D deficiency     Hypomagnesemia     Post-transplant erythrocytosis       Allergies   No Known Allergies    Medications   Current Outpatient Medications   Medication Sig     aspirin EC 81 MG EC tablet Take 1 tablet (81 mg) by mouth daily     atorvastatin  "(LIPITOR) 20 MG tablet Take 1 tablet (20 mg) by mouth daily     carvedilol (COREG) 25 MG tablet TAKE ONE TABLET BY MOUTH TWICE DAILY WITH MEALS     Cholecalciferol (VITAMIN D3) 2000 units TABS Take 2,000 Units by mouth daily     cloNIDine (CATAPRES) 0.3 MG tablet Take 0.3 mg by mouth 2 times daily      DIPHENHYDRAMINE HCL PO Take 50 mg by mouth At Bedtime     FUROSEMIDE PO Take 60 mg by mouth 2 times daily     insulin aspart (NOVOLOG PEN) 100 UNIT/ML injection Inject 1 Units Subcutaneous Take with snacks or supplements for high blood sugar 1 unit per 4 grams carbohydrate     insulin aspart (NOVOLOG PEN) 100 UNIT/ML injection Inject 1 Units Subcutaneous 3 times daily (with meals) DOSE:  1 units per 4 grams of carbohydrate.  Only chart total amount of units given.  Do not give if pre-prandial glucose is less than 60 mg/dL.     insulin aspart (NOVOLOG PEN) 100 UNIT/ML injection Inject 1-22 Units Subcutaneous 3 times daily (before meals) Correction Scale - VERY HIGH INSULIN RESISTANCE DOSING     Do Not give Correction Insulin if Pre-Meal BG less than 250.   For Pre-Meal  - 260 give 1 unit.   For Pre-Meal -269  give 2 units.   For Pre-Meal  - 279 give 3 units.   For Pre-Meal  - 289 give 4 units.   For Pre-Meal  - 299 give 5 units.   For Pre-Meal  - 309 give 6 units.   ...     insulin aspart (NOVOLOG PEN) 100 UNIT/ML injection Inject 1-16 Units Subcutaneous At Bedtime Correction Scale - VERY HIGH INSULIN RESISTANCE DOSING     Do Not give Bedtime Correction Insulin if BG less than 200.   For  - 209 give 1 units.   For  - 219 give 2 units.   For  - 229 give 3 units.   For  - 239 give 4 units.   For  - 249 give 5 units.   For  - 259 give 6 units.   For  - 269 give 7 units.   For  - 279 give 8 units  ...     insulin syringe 31G X 5/16\" 0.5 ML MISC FOR ADMINISTERING INSULIN AT HOME 5 TIMES PER DAY.     losartan (COZAAR) 50 MG tablet Take 1 " tablet (50 mg) by mouth daily     mycophenolate (GENERIC EQUIVALENT) 250 MG capsule Take 3 capsules (750 mg) by mouth 2 times daily     NIFEdipine ER (ADALAT CC) 30 MG TB24 Take 1 tablet (30 mg) by mouth 2 times daily     omeprazole 20 MG tablet Take 1 tablet (20 mg) by mouth every morning     sulfamethoxazole-trimethoprim (BACTRIM) 400-80 MG tablet Take 1 tablet by mouth three times a week     tacrolimus (GENERIC EQUIVALENT) 0.5 MG capsule HOLD. Total dose 3.0 mg twice daily.     tacrolimus (GENERIC EQUIVALENT) 1 MG capsule Take 3 capsules (3 mg) by mouth 2 times daily     TRESIBA FLEXTOUCH 200 UNIT/ML pen INJECT 70-90 UNITS SUBCUTANEOUS EVERY 24 HOURS.     ACCU-CHEK EBONIE PLUS test strip      cloNIDine (CATAPRES) 0.3 MG tablet Take 0.6 mg by mouth 2 times daily Patient reports     insulin glargine (LANTUS) 100 UNIT/ML injection Inject 30 Units Subcutaneous     insulin glargine (LANTUS) 100 UNIT/ML injection Inject 40 Units Subcutaneous At Bedtime     SODIUM BICARBONATE PO Take 650 mg by mouth 2 times daily     No current facility-administered medications for this visit.      There are no discontinued medications.    Physical Exam   Vital Signs: BP (!) 151/76   Pulse 84   Temp 98.4  F (36.9  C)   Wt 123.7 kg (272 lb 11.2 oz)   SpO2 97%   BMI 36.98 kg/m      GENERAL APPEARANCE: alert and no distress  EYES: eyes grossly normal to inspection  LYMPHATICS: no cervical or supraclavicular nodes  RESP: lungs clear to auscultation  CV: regular rhythm, normal rate, no rub, no murmur  EDEMA: trace to 1+ LE edema bilaterally  ABDOMEN: soft, nondistended, nontender  MS: extremities normal - no gross deformities noted  SKIN: Lower extremity hyperpigmentation   NEURO: mentation intact and speech normal  PSYCH: mentation appears normal and affect normal/bright    Data     Renal Latest Ref Rng & Units 6/10/2021 5/7/2021 4/9/2021   Na 133 - 144 mmol/L 139 138 136   K 3.4 - 5.3 mmol/L 4.0 4.3 4.5   Cl 94 - 109 mmol/L 105 103 103    CO2 20 - 32 mmol/L 27 24 27   BUN 7 - 30 mg/dL 22 23 24   Cr 0.66 - 1.25 mg/dL 1.01 1.00 1.03   Glucose 70 - 99 mg/dL 154(H) 157(H) 165(H)   Ca  8.5 - 10.1 mg/dL 9.4 10.4(H) 10.0   Mg 1.6 - 2.3 mg/dL - - -     Bone Health Latest Ref Rng & Units 6/26/2020 10/1/2019 5/31/2019   Phos 2.5 - 4.5 mg/dL - - 1.7(L)   PTHi 18 - 80 pg/mL 101(H) 300(H) -   Vit D Def 20 - 75 ug/L 18(L) 7(L) -     Heme Latest Ref Rng & Units 6/10/2021 5/7/2021 4/9/2021   WBC 4.0 - 11.0 10e9/L 8.2 8.1 6.6   Hgb 13.3 - 17.7 g/dL 16.1 16.3 16.1   Plt 150 - 450 10e9/L 202 201 185   ABSOLUTE NEUTROPHIL 1.6 - 8.3 10e9/L - - -   ABSOLUTE LYMPHOCYTES 0.8 - 5.3 10e9/L - - -   ABSOLUTE MONOCYTES 0.0 - 1.3 10e9/L - - -   ABSOLUTE EOSINOPHILS 0.0 - 0.7 10e9/L - - -   ABSOLUTE BASOPHILS 0.0 - 0.2 10e9/L - - -   ABS IMMATURE GRANULOCYTES 0 - 0.4 10e9/L - - -   ABSOLUTE NUCLEATED RBC - - - -     Liver Latest Ref Rng & Units 5/31/2019 12/28/2018 12/22/2017   AP 40 - 150 U/L - - 71   TBili 0.2 - 1.3 mg/dL - - 0.4   DBili 0.0 - 0.2 mg/dL - - 0.1   ALT 0 - 70 U/L - - 21   AST 0 - 45 U/L - - 15   Tot Protein 6.8 - 8.8 g/dL - - 7.9   Albumin 3.4 - 5.0 g/dL 3.9 3.6 3.4     Pancreas Latest Ref Rng & Units 11/25/2016 11/24/2016 4/15/2015   A1C 4.3 - 6.0 % 7.0(H) 7.0(H) 7.2(H)     Iron studies Latest Ref Rng & Units 11/27/2016   Iron 35 - 180 ug/dL 74   Iron sat 15 - 46 % 41   Ferritin 26 - 388 ng/mL 1,611(H)     [facility] Txp Virology Latest Ref Rng & Units 5/31/2019 12/4/2018 7/27/2018   BK Spec - Plasma Plasma Plasma   BK Res BKNEG:BK Virus DNA Not Detected copies/mL BK Virus DNA Not Detected BK Virus DNA Not Detected BK Virus DNA Not Detected   BK Log <2.7 Log copies/mL Not Calculated Not Calculated Not Calculated   EBV CAPSID ANTIBODY IGG 0.0 - 0.8 AI - - -   Hep B Core NR - - -        Recent Labs   Lab Test 05/07/21  0957 05/18/21  1005 06/10/21  0944   DOSTAC Not Provided 5/17/21@2200 06/09/21@22:10   TACROL 3.7* 4.5* 5.3     Recent Labs   Lab Test 02/06/17  0733  02/13/17  0736 02/20/17  0733   DOSMPA 2,052,017 2,000 2,192,017   MPACID 2.38 2.03 4.29*   MPAG 104.7* 102.9* 85.4     Patient was seen and evaluated by me, Erasmo Wilkins MD. I have reviewed the note and agree with the the plan of care as documented by the fellow.  We increased the losartan to 100 mg and we reduced the clonidine to 0.2 mg po bid.   Corey will check with his endocrine provider on the suitability of SGLT2 agents to reduce his insulin requirements.          Again, thank you for allowing me to participate in the care of your patient.      Sincerely,    Erasmo Wilkins MD

## 2021-06-14 NOTE — PATIENT INSTRUCTIONS
Check with your endocrine provider if Jardiance is an option for you   Please increase your losartan dose to 100 mg daily and reduce clonidine dose to 0.2 mg twice daily

## 2021-06-28 DIAGNOSIS — Z94.0 KIDNEY REPLACED BY TRANSPLANT: ICD-10-CM

## 2021-06-28 DIAGNOSIS — Z48.298 AFTERCARE FOLLOWING ORGAN TRANSPLANT: ICD-10-CM

## 2021-06-28 DIAGNOSIS — Z79.899 ENCOUNTER FOR LONG-TERM CURRENT USE OF MEDICATION: ICD-10-CM

## 2021-06-28 LAB
ERYTHROCYTE [DISTWIDTH] IN BLOOD BY AUTOMATED COUNT: 14.6 % (ref 10–15)
HCT VFR BLD AUTO: 49.5 % (ref 40–53)
HGB BLD-MCNC: 16 G/DL (ref 13.3–17.7)
MCH RBC QN AUTO: 28.9 PG (ref 26.5–33)
MCHC RBC AUTO-ENTMCNC: 32.3 G/DL (ref 31.5–36.5)
MCV RBC AUTO: 89 FL (ref 78–100)
PLATELET # BLD AUTO: 213 10E9/L (ref 150–450)
RBC # BLD AUTO: 5.54 10E12/L (ref 4.4–5.9)
TACROLIMUS BLD-MCNC: <3 UG/L (ref 5–15)
TME LAST DOSE: ABNORMAL H
WBC # BLD AUTO: 7.1 10E9/L (ref 4–11)

## 2021-06-28 PROCEDURE — 80197 ASSAY OF TACROLIMUS: CPT | Performed by: INTERNAL MEDICINE

## 2021-06-28 PROCEDURE — 85027 COMPLETE CBC AUTOMATED: CPT | Performed by: INTERNAL MEDICINE

## 2021-06-28 PROCEDURE — 80048 BASIC METABOLIC PNL TOTAL CA: CPT | Performed by: INTERNAL MEDICINE

## 2021-06-28 PROCEDURE — 36415 COLL VENOUS BLD VENIPUNCTURE: CPT | Performed by: INTERNAL MEDICINE

## 2021-06-29 ENCOUNTER — TELEPHONE (OUTPATIENT)
Dept: TRANSPLANT | Facility: CLINIC | Age: 66
End: 2021-06-29

## 2021-06-29 DIAGNOSIS — Z94.0 KIDNEY TRANSPLANTED: Primary | ICD-10-CM

## 2021-06-29 LAB
ANION GAP SERPL CALCULATED.3IONS-SCNC: 5 MMOL/L (ref 3–14)
BUN SERPL-MCNC: 18 MG/DL (ref 7–30)
CALCIUM SERPL-MCNC: 9.8 MG/DL (ref 8.5–10.1)
CHLORIDE SERPL-SCNC: 106 MMOL/L (ref 94–109)
CO2 SERPL-SCNC: 29 MMOL/L (ref 20–32)
CREAT SERPL-MCNC: 0.94 MG/DL (ref 0.66–1.25)
GFR SERPL CREATININE-BSD FRML MDRD: 84 ML/MIN/{1.73_M2}
GLUCOSE SERPL-MCNC: 123 MG/DL (ref 70–99)
POTASSIUM SERPL-SCNC: 4.7 MMOL/L (ref 3.4–5.3)
SODIUM SERPL-SCNC: 140 MMOL/L (ref 133–144)

## 2021-06-29 NOTE — TELEPHONE ENCOUNTER
ISSUE:  Tacrolimus < 3, goal 4-6    PLAN:  Call and confirm a 12 hour trough and current dose of 3 mg BID  Any recent illness, medication changes, or missed doses?  Recommend increasing dose to 3.5 mg BID  Repeat level in 1 week    OUTCOME:  Spoke with Corey.  He made a mistake when filling his medication box.  There was a couple of days prior to labs where he was only taking 2 mg BID instead of 3 mg BID.  He realized the mistake and fixed it immediately.  He will continue on 3 mg BID and repeat his level within 1 week.

## 2021-07-09 DIAGNOSIS — Z94.0 KIDNEY REPLACED BY TRANSPLANT: ICD-10-CM

## 2021-07-09 DIAGNOSIS — Z79.899 ENCOUNTER FOR LONG-TERM CURRENT USE OF MEDICATION: ICD-10-CM

## 2021-07-09 DIAGNOSIS — Z48.298 AFTERCARE FOLLOWING ORGAN TRANSPLANT: ICD-10-CM

## 2021-07-09 LAB
ANION GAP SERPL CALCULATED.3IONS-SCNC: 5 MMOL/L (ref 3–14)
BUN SERPL-MCNC: 19 MG/DL (ref 7–30)
CALCIUM SERPL-MCNC: 9.8 MG/DL (ref 8.5–10.1)
CHLORIDE SERPL-SCNC: 106 MMOL/L (ref 94–109)
CO2 SERPL-SCNC: 25 MMOL/L (ref 20–32)
CREAT SERPL-MCNC: 1.14 MG/DL (ref 0.66–1.25)
ERYTHROCYTE [DISTWIDTH] IN BLOOD BY AUTOMATED COUNT: 14.4 % (ref 10–15)
GFR SERPL CREATININE-BSD FRML MDRD: 67 ML/MIN/{1.73_M2}
GLUCOSE SERPL-MCNC: 158 MG/DL (ref 70–99)
HCT VFR BLD AUTO: 48.7 % (ref 40–53)
HGB BLD-MCNC: 15.7 G/DL (ref 13.3–17.7)
MCH RBC QN AUTO: 29 PG (ref 26.5–33)
MCHC RBC AUTO-ENTMCNC: 32.2 G/DL (ref 31.5–36.5)
MCV RBC AUTO: 90 FL (ref 78–100)
PLATELET # BLD AUTO: 220 10E9/L (ref 150–450)
POTASSIUM SERPL-SCNC: 4.2 MMOL/L (ref 3.4–5.3)
RBC # BLD AUTO: 5.42 10E12/L (ref 4.4–5.9)
SODIUM SERPL-SCNC: 136 MMOL/L (ref 133–144)
TACROLIMUS BLD-MCNC: 5.9 UG/L (ref 5–15)
TME LAST DOSE: NORMAL H
WBC # BLD AUTO: 7.9 10E9/L (ref 4–11)

## 2021-07-09 PROCEDURE — 80197 ASSAY OF TACROLIMUS: CPT | Performed by: INTERNAL MEDICINE

## 2021-07-09 PROCEDURE — 85027 COMPLETE CBC AUTOMATED: CPT | Performed by: INTERNAL MEDICINE

## 2021-07-09 PROCEDURE — 36415 COLL VENOUS BLD VENIPUNCTURE: CPT | Performed by: INTERNAL MEDICINE

## 2021-07-09 PROCEDURE — 80048 BASIC METABOLIC PNL TOTAL CA: CPT | Performed by: INTERNAL MEDICINE

## 2021-08-06 ENCOUNTER — LAB (OUTPATIENT)
Dept: LAB | Facility: CLINIC | Age: 66
End: 2021-08-06
Payer: MEDICARE

## 2021-08-06 DIAGNOSIS — Z79.899 ENCOUNTER FOR LONG-TERM CURRENT USE OF MEDICATION: ICD-10-CM

## 2021-08-06 DIAGNOSIS — Z48.298 AFTERCARE FOLLOWING ORGAN TRANSPLANT: ICD-10-CM

## 2021-08-06 DIAGNOSIS — Z94.0 KIDNEY REPLACED BY TRANSPLANT: ICD-10-CM

## 2021-08-06 LAB
ERYTHROCYTE [DISTWIDTH] IN BLOOD BY AUTOMATED COUNT: 14.7 % (ref 10–15)
HCT VFR BLD AUTO: 51.3 % (ref 40–53)
HGB BLD-MCNC: 16.7 G/DL (ref 13.3–17.7)
MCH RBC QN AUTO: 28.7 PG (ref 26.5–33)
MCHC RBC AUTO-ENTMCNC: 32.6 G/DL (ref 31.5–36.5)
MCV RBC AUTO: 88 FL (ref 78–100)
PLATELET # BLD AUTO: 223 10E3/UL (ref 150–450)
RBC # BLD AUTO: 5.82 10E6/UL (ref 4.4–5.9)
TACROLIMUS BLD-MCNC: 4.8 UG/L (ref 5–15)
TME LAST DOSE: ABNORMAL H
TME LAST DOSE: ABNORMAL H
WBC # BLD AUTO: 7.2 10E3/UL (ref 4–11)

## 2021-08-06 PROCEDURE — 80197 ASSAY OF TACROLIMUS: CPT | Performed by: NURSE PRACTITIONER

## 2021-08-06 PROCEDURE — 85027 COMPLETE CBC AUTOMATED: CPT | Performed by: NURSE PRACTITIONER

## 2021-08-06 PROCEDURE — 80048 BASIC METABOLIC PNL TOTAL CA: CPT | Performed by: NURSE PRACTITIONER

## 2021-08-06 PROCEDURE — 36415 COLL VENOUS BLD VENIPUNCTURE: CPT | Performed by: NURSE PRACTITIONER

## 2021-08-07 LAB
ANION GAP SERPL CALCULATED.3IONS-SCNC: 11 MMOL/L (ref 3–14)
BUN SERPL-MCNC: 22 MG/DL (ref 7–30)
CALCIUM SERPL-MCNC: 9.9 MG/DL (ref 8.5–10.1)
CHLORIDE BLD-SCNC: 106 MMOL/L (ref 94–109)
CO2 SERPL-SCNC: 22 MMOL/L (ref 20–32)
CREAT SERPL-MCNC: 1.01 MG/DL (ref 0.66–1.25)
GFR SERPL CREATININE-BSD FRML MDRD: 77 ML/MIN/1.73M2
GLUCOSE BLD-MCNC: 199 MG/DL (ref 70–99)
POTASSIUM BLD-SCNC: 3.6 MMOL/L (ref 3.4–5.3)
SODIUM SERPL-SCNC: 139 MMOL/L (ref 133–144)

## 2021-08-09 ENCOUNTER — TRANSFERRED RECORDS (OUTPATIENT)
Dept: HEALTH INFORMATION MANAGEMENT | Facility: CLINIC | Age: 66
End: 2021-08-09
Payer: MEDICARE

## 2021-09-03 ENCOUNTER — LAB (OUTPATIENT)
Dept: LAB | Facility: CLINIC | Age: 66
End: 2021-09-03
Payer: MEDICARE

## 2021-09-03 DIAGNOSIS — Z79.899 ENCOUNTER FOR LONG-TERM CURRENT USE OF MEDICATION: ICD-10-CM

## 2021-09-03 DIAGNOSIS — Z94.0 KIDNEY REPLACED BY TRANSPLANT: ICD-10-CM

## 2021-09-03 DIAGNOSIS — Z48.298 AFTERCARE FOLLOWING ORGAN TRANSPLANT: ICD-10-CM

## 2021-09-03 LAB
ERYTHROCYTE [DISTWIDTH] IN BLOOD BY AUTOMATED COUNT: 14.4 % (ref 10–15)
HCT VFR BLD AUTO: 51 % (ref 40–53)
HGB BLD-MCNC: 16.6 G/DL (ref 13.3–17.7)
MCH RBC QN AUTO: 28.9 PG (ref 26.5–33)
MCHC RBC AUTO-ENTMCNC: 32.5 G/DL (ref 31.5–36.5)
MCV RBC AUTO: 89 FL (ref 78–100)
PLATELET # BLD AUTO: 199 10E3/UL (ref 150–450)
RBC # BLD AUTO: 5.75 10E6/UL (ref 4.4–5.9)
TACROLIMUS BLD-MCNC: 5.2 UG/L (ref 5–15)
TME LAST DOSE: NORMAL H
TME LAST DOSE: NORMAL H
WBC # BLD AUTO: 6.8 10E3/UL (ref 4–11)

## 2021-09-03 PROCEDURE — 80048 BASIC METABOLIC PNL TOTAL CA: CPT | Performed by: NURSE PRACTITIONER

## 2021-09-03 PROCEDURE — 80197 ASSAY OF TACROLIMUS: CPT | Performed by: NURSE PRACTITIONER

## 2021-09-03 PROCEDURE — 36415 COLL VENOUS BLD VENIPUNCTURE: CPT | Performed by: NURSE PRACTITIONER

## 2021-09-03 PROCEDURE — 85027 COMPLETE CBC AUTOMATED: CPT | Performed by: NURSE PRACTITIONER

## 2021-09-04 LAB
ANION GAP SERPL CALCULATED.3IONS-SCNC: 9 MMOL/L (ref 3–14)
BUN SERPL-MCNC: 21 MG/DL (ref 7–30)
CALCIUM SERPL-MCNC: 9.5 MG/DL (ref 8.5–10.1)
CHLORIDE BLD-SCNC: 107 MMOL/L (ref 94–109)
CO2 SERPL-SCNC: 23 MMOL/L (ref 20–32)
CREAT SERPL-MCNC: 1.11 MG/DL (ref 0.66–1.25)
GFR SERPL CREATININE-BSD FRML MDRD: 69 ML/MIN/1.73M2
GLUCOSE BLD-MCNC: 109 MG/DL (ref 70–99)
POTASSIUM BLD-SCNC: 3.5 MMOL/L (ref 3.4–5.3)
SODIUM SERPL-SCNC: 139 MMOL/L (ref 133–144)

## 2021-09-12 ENCOUNTER — HEALTH MAINTENANCE LETTER (OUTPATIENT)
Age: 66
End: 2021-09-12

## 2021-10-12 ENCOUNTER — LAB (OUTPATIENT)
Dept: LAB | Facility: CLINIC | Age: 66
End: 2021-10-12
Payer: MEDICARE

## 2021-10-12 DIAGNOSIS — Z48.298 AFTERCARE FOLLOWING ORGAN TRANSPLANT: ICD-10-CM

## 2021-10-12 DIAGNOSIS — Z94.0 KIDNEY REPLACED BY TRANSPLANT: ICD-10-CM

## 2021-10-12 DIAGNOSIS — Z79.899 ENCOUNTER FOR LONG-TERM CURRENT USE OF MEDICATION: ICD-10-CM

## 2021-10-12 DIAGNOSIS — Z94.0 KIDNEY TRANSPLANTED: ICD-10-CM

## 2021-10-12 DIAGNOSIS — Z94.0 KIDNEY REPLACED BY TRANSPLANT: Primary | ICD-10-CM

## 2021-10-12 LAB
ERYTHROCYTE [DISTWIDTH] IN BLOOD BY AUTOMATED COUNT: 14.3 % (ref 10–15)
HCT VFR BLD AUTO: 49.9 % (ref 40–53)
HGB BLD-MCNC: 16.1 G/DL (ref 13.3–17.7)
HOLD SPECIMEN: NORMAL
HOLD SPECIMEN: NORMAL
MCH RBC QN AUTO: 28.7 PG (ref 26.5–33)
MCHC RBC AUTO-ENTMCNC: 32.3 G/DL (ref 31.5–36.5)
MCV RBC AUTO: 89 FL (ref 78–100)
PLATELET # BLD AUTO: 231 10E3/UL (ref 150–450)
RBC # BLD AUTO: 5.61 10E6/UL (ref 4.4–5.9)
TACROLIMUS BLD-MCNC: 5.3 UG/L (ref 5–15)
TME LAST DOSE: NORMAL H
TME LAST DOSE: NORMAL H
WBC # BLD AUTO: 5.9 10E3/UL (ref 4–11)

## 2021-10-12 PROCEDURE — 85027 COMPLETE CBC AUTOMATED: CPT

## 2021-10-12 PROCEDURE — 80048 BASIC METABOLIC PNL TOTAL CA: CPT

## 2021-10-12 PROCEDURE — 80197 ASSAY OF TACROLIMUS: CPT

## 2021-10-12 PROCEDURE — 36415 COLL VENOUS BLD VENIPUNCTURE: CPT

## 2021-10-13 LAB
ANION GAP SERPL CALCULATED.3IONS-SCNC: 8 MMOL/L (ref 3–14)
BUN SERPL-MCNC: 23 MG/DL (ref 7–30)
CALCIUM SERPL-MCNC: 9.4 MG/DL (ref 8.5–10.1)
CHLORIDE BLD-SCNC: 107 MMOL/L (ref 94–109)
CO2 SERPL-SCNC: 20 MMOL/L (ref 20–32)
CREAT SERPL-MCNC: 1.18 MG/DL (ref 0.66–1.25)
GFR SERPL CREATININE-BSD FRML MDRD: 64 ML/MIN/1.73M2
GLUCOSE BLD-MCNC: 138 MG/DL (ref 70–99)
POTASSIUM BLD-SCNC: 5.2 MMOL/L (ref 3.4–5.3)
SODIUM SERPL-SCNC: 135 MMOL/L (ref 133–144)

## 2021-11-19 ENCOUNTER — LAB (OUTPATIENT)
Dept: LAB | Facility: CLINIC | Age: 66
End: 2021-11-19
Payer: MEDICARE

## 2021-11-19 DIAGNOSIS — Z79.899 ENCOUNTER FOR LONG-TERM CURRENT USE OF MEDICATION: ICD-10-CM

## 2021-11-19 DIAGNOSIS — Z94.0 KIDNEY REPLACED BY TRANSPLANT: ICD-10-CM

## 2021-11-19 DIAGNOSIS — Z48.298 AFTERCARE FOLLOWING ORGAN TRANSPLANT: ICD-10-CM

## 2021-11-19 LAB
ANION GAP SERPL CALCULATED.3IONS-SCNC: 7 MMOL/L (ref 3–14)
BUN SERPL-MCNC: 25 MG/DL (ref 7–30)
CALCIUM SERPL-MCNC: 9.8 MG/DL (ref 8.5–10.1)
CHLORIDE BLD-SCNC: 107 MMOL/L (ref 94–109)
CO2 SERPL-SCNC: 26 MMOL/L (ref 20–32)
CREAT SERPL-MCNC: 1.07 MG/DL (ref 0.66–1.25)
ERYTHROCYTE [DISTWIDTH] IN BLOOD BY AUTOMATED COUNT: 14.4 % (ref 10–15)
GFR SERPL CREATININE-BSD FRML MDRD: 72 ML/MIN/1.73M2
GLUCOSE BLD-MCNC: 91 MG/DL (ref 70–99)
HCT VFR BLD AUTO: 53.4 % (ref 40–53)
HGB BLD-MCNC: 16.9 G/DL (ref 13.3–17.7)
MCH RBC QN AUTO: 28.3 PG (ref 26.5–33)
MCHC RBC AUTO-ENTMCNC: 31.6 G/DL (ref 31.5–36.5)
MCV RBC AUTO: 89 FL (ref 78–100)
PLATELET # BLD AUTO: 217 10E3/UL (ref 150–450)
POTASSIUM BLD-SCNC: 4.1 MMOL/L (ref 3.4–5.3)
RBC # BLD AUTO: 5.97 10E6/UL (ref 4.4–5.9)
SODIUM SERPL-SCNC: 140 MMOL/L (ref 133–144)
TACROLIMUS BLD-MCNC: 6.2 UG/L (ref 5–15)
TME LAST DOSE: NORMAL H
TME LAST DOSE: NORMAL H
WBC # BLD AUTO: 6.6 10E3/UL (ref 4–11)

## 2021-11-19 PROCEDURE — 36415 COLL VENOUS BLD VENIPUNCTURE: CPT

## 2021-11-19 PROCEDURE — 80197 ASSAY OF TACROLIMUS: CPT

## 2021-11-19 PROCEDURE — 80048 BASIC METABOLIC PNL TOTAL CA: CPT

## 2021-11-19 PROCEDURE — 85027 COMPLETE CBC AUTOMATED: CPT

## 2021-12-08 ENCOUNTER — LAB (OUTPATIENT)
Dept: LAB | Facility: CLINIC | Age: 66
End: 2021-12-08
Payer: MEDICARE

## 2021-12-08 DIAGNOSIS — Z79.899 ENCOUNTER FOR LONG-TERM CURRENT USE OF MEDICATION: ICD-10-CM

## 2021-12-08 DIAGNOSIS — Z48.298 AFTERCARE FOLLOWING ORGAN TRANSPLANT: ICD-10-CM

## 2021-12-08 DIAGNOSIS — Z94.0 KIDNEY REPLACED BY TRANSPLANT: ICD-10-CM

## 2021-12-08 LAB
CREAT UR-MCNC: 44 MG/DL
ERYTHROCYTE [DISTWIDTH] IN BLOOD BY AUTOMATED COUNT: 14.9 % (ref 10–15)
HCT VFR BLD AUTO: 54.1 % (ref 40–53)
HGB BLD-MCNC: 17.3 G/DL (ref 13.3–17.7)
MCH RBC QN AUTO: 28.3 PG (ref 26.5–33)
MCHC RBC AUTO-ENTMCNC: 32 G/DL (ref 31.5–36.5)
MCV RBC AUTO: 88 FL (ref 78–100)
PLATELET # BLD AUTO: 206 10E3/UL (ref 150–450)
PROT UR-MCNC: 0.61 G/L
PROT/CREAT 24H UR: 1.39 G/G CR (ref 0–0.2)
RBC # BLD AUTO: 6.12 10E6/UL (ref 4.4–5.9)
TACROLIMUS BLD-MCNC: 4.1 UG/L (ref 5–15)
TME LAST DOSE: ABNORMAL H
TME LAST DOSE: ABNORMAL H
WBC # BLD AUTO: 5.8 10E3/UL (ref 4–11)

## 2021-12-08 PROCEDURE — 85027 COMPLETE CBC AUTOMATED: CPT

## 2021-12-08 PROCEDURE — 84156 ASSAY OF PROTEIN URINE: CPT

## 2021-12-08 PROCEDURE — 80048 BASIC METABOLIC PNL TOTAL CA: CPT

## 2021-12-08 PROCEDURE — 86833 HLA CLASS II HIGH DEFIN QUAL: CPT

## 2021-12-08 PROCEDURE — 36415 COLL VENOUS BLD VENIPUNCTURE: CPT

## 2021-12-08 PROCEDURE — 80197 ASSAY OF TACROLIMUS: CPT

## 2021-12-08 PROCEDURE — 86832 HLA CLASS I HIGH DEFIN QUAL: CPT

## 2021-12-09 ENCOUNTER — OFFICE VISIT (OUTPATIENT)
Dept: NEPHROLOGY | Facility: CLINIC | Age: 66
End: 2021-12-09
Attending: INTERNAL MEDICINE
Payer: MEDICARE

## 2021-12-09 ENCOUNTER — TELEPHONE (OUTPATIENT)
Dept: TRANSPLANT | Facility: CLINIC | Age: 66
End: 2021-12-09

## 2021-12-09 VITALS
HEIGHT: 72 IN | BODY MASS INDEX: 36.96 KG/M2 | WEIGHT: 272.9 LBS | SYSTOLIC BLOOD PRESSURE: 130 MMHG | RESPIRATION RATE: 22 BRPM | OXYGEN SATURATION: 95 % | HEART RATE: 67 BPM | TEMPERATURE: 98.4 F | DIASTOLIC BLOOD PRESSURE: 73 MMHG

## 2021-12-09 DIAGNOSIS — Z94.0 KIDNEY REPLACED BY TRANSPLANT: ICD-10-CM

## 2021-12-09 DIAGNOSIS — Z48.298 AFTERCARE FOLLOWING ORGAN TRANSPLANT: Primary | ICD-10-CM

## 2021-12-09 DIAGNOSIS — R80.9 PROTEINURIA: ICD-10-CM

## 2021-12-09 DIAGNOSIS — I15.1 HYPERTENSION SECONDARY TO OTHER RENAL DISORDERS: ICD-10-CM

## 2021-12-09 LAB
ALBUMIN UR-MCNC: NEGATIVE MG/DL
ANION GAP SERPL CALCULATED.3IONS-SCNC: 4 MMOL/L (ref 3–14)
APPEARANCE UR: CLEAR
BILIRUB UR QL STRIP: NEGATIVE
BUN SERPL-MCNC: 22 MG/DL (ref 7–30)
CALCIUM SERPL-MCNC: 10 MG/DL (ref 8.5–10.1)
CHLORIDE BLD-SCNC: 110 MMOL/L (ref 94–109)
CO2 SERPL-SCNC: 27 MMOL/L (ref 20–32)
COLOR UR AUTO: YELLOW
CREAT SERPL-MCNC: 0.99 MG/DL (ref 0.66–1.25)
CREAT UR-MCNC: 36 MG/DL
DONOR IDENTIFICATION: NORMAL
DSA COMMENTS: NORMAL
DSA PRESENT: NO
DSA TEST METHOD: NORMAL
GFR SERPL CREATININE-BSD FRML MDRD: 79 ML/MIN/1.73M2
GLUCOSE BLD-MCNC: 75 MG/DL (ref 70–99)
GLUCOSE UR STRIP-MCNC: >499 MG/DL
HGB UR QL STRIP: NEGATIVE
KETONES UR STRIP-MCNC: NEGATIVE MG/DL
LEUKOCYTE ESTERASE UR QL STRIP: NEGATIVE
NITRATE UR QL: NEGATIVE
ORGAN: NORMAL
PH UR STRIP: 5 [PH] (ref 5–7)
POTASSIUM BLD-SCNC: 4.4 MMOL/L (ref 3.4–5.3)
PROT UR-MCNC: 0.06 G/L
PROT/CREAT 24H UR: 0.17 G/G CR (ref 0–0.2)
RBC URINE: 1 /HPF
SA 1 CELL: NORMAL
SA 1 TEST METHOD: NORMAL
SA 2 CELL: NORMAL
SA 2 TEST METHOD: NORMAL
SA1 HI RISK ABY: NORMAL
SA1 MOD RISK ABY: NORMAL
SA2 HI RISK ABY: NORMAL
SA2 MOD RISK ABY: NORMAL
SODIUM SERPL-SCNC: 141 MMOL/L (ref 133–144)
SP GR UR STRIP: 1.01 (ref 1–1.03)
SQUAMOUS EPITHELIAL: 1 /HPF
UNACCEPTABLE ANTIGENS: NORMAL
UNOS CPRA: 84
UROBILINOGEN UR STRIP-MCNC: NORMAL MG/DL
WBC URINE: <1 /HPF
ZZZSA 1  COMMENTS: NORMAL
ZZZSA 2 COMMENTS: NORMAL

## 2021-12-09 PROCEDURE — G0463 HOSPITAL OUTPT CLINIC VISIT: HCPCS

## 2021-12-09 PROCEDURE — 84156 ASSAY OF PROTEIN URINE: CPT | Performed by: INTERNAL MEDICINE

## 2021-12-09 PROCEDURE — 81001 URINALYSIS AUTO W/SCOPE: CPT | Performed by: INTERNAL MEDICINE

## 2021-12-09 PROCEDURE — 99214 OFFICE O/P EST MOD 30 MIN: CPT | Performed by: INTERNAL MEDICINE

## 2021-12-09 RX ORDER — EMPAGLIFLOZIN 10 MG/1
10 TABLET, FILM COATED ORAL DAILY
COMMUNITY
Start: 2021-12-07 | End: 2022-01-19 | Stop reason: DRUGHIGH

## 2021-12-09 RX ORDER — LOSARTAN POTASSIUM 50 MG/1
50 TABLET ORAL 2 TIMES DAILY
Qty: 180 TABLET | Refills: 3 | Status: SHIPPED | OUTPATIENT
Start: 2021-12-09

## 2021-12-09 ASSESSMENT — PAIN SCALES - GENERAL: PAINLEVEL: NO PAIN (0)

## 2021-12-09 ASSESSMENT — MIFFLIN-ST. JEOR: SCORE: 2056

## 2021-12-09 NOTE — LETTER
12/9/2021       RE: Corey Neilkenneth Roland  [address]  [facility] [address]     Dear Colleague,    Thank you for referring your patient, Corey Roland, to the [facility] [place] at [facility]. Please see a copy of my visit note below.    CHRONIC TRANSPLANT NEPHROLOGY VISIT    Assessment & Plan   # DDKT (SPK): stable              - Baseline Cr ~ 1 mg/dL               - Proteinuria: Usually minimal (0.2-0.5 grams), but elevated today, will repeat. In the meantime will increase losartan to keep the BP<130/80              - Date DSA Last Checked: negative in 2020                  - BK Viremia: No              - Kidney Tx Biopsy: no.     # Immunosuppression: Tacrolimus immediate release (goal 4-6) and Mycophenolate mofetil (dose 750 mg every 12 hours)              - Changes: No      # Infection Prophylaxis:   - PJP: Sulfa/TMP (Bactrim)     # Hypertension: Controlled;   Goal BP: < 130/80              - Changes: increase losartan to 50 mg po bid.      # Diabetes: Controlled (HbA1c <7%)            Last HbA1c: 7%              - Management as per endo     # Mineral Bone Disorder:   -  continue vit D supplementation now.     # Skin Cancer Risk:               - Discussed sun protection and recommend regular follow up with Dermatology.     # Medical Compliance: Yes     # COVID-19 Virus Review: Discussed COVID-19 virus and the potential medical risks.  Reviewed preventative health recommendations, which includes washing hands for 20 seconds, avoid touching your face, and social distancing.  Asked patient to inform the transplant center if they are exposed or diagnosed with this virus.     # Transplant History:  Etiology of Kidney Failure: Diabetes mellitus type 2  Tx: DDKT  Significant changes in immunosuppression: None  Significant transplant-related complications: None    Transplant Office Phone Number: [phone]    Assessment and  plan was discussed with the patient and he voiced his understanding and agreement.    Return visit: Return in about 6 months (around 6/9/2022).    Erasmo Wilkins MD    Chief Complaint   Mr. Roland is a 66 year old here for kidney transplant and immunosuppression management.    History of Present Illness    Pt endorses doing over all well. He started Jardiance but has not seen any reduction in his insulin requirements or weight loss. His BP is above goal at home andhis proteinuria recently was higher than usual. He denied chest pain, SOB,abdominal pain, nausea/vomiting/diarrhea, dysuria, urgency or frequency of urination. No urinary obstructive symptoms.    Home BP: controlled but above goal     Problem List   Patient Active Problem List   Diagnosis     Hypertension secondary to other renal disorders     Secondary renal hyperparathyroidism (H)     Obesity     Diabetic peripheral neuropathy (H)     Kidney replaced by transplant     Immunosuppressed status (H)     Type 1 diabetes mellitus with nephropathy (H)     Aftercare following organ transplant     Vitamin D deficiency     Hypomagnesemia     Post-transplant erythrocytosis     Morbid obesity (H)       Allergies   No Known Allergies    Medications   Current Outpatient Medications   Medication Sig     aspirin EC 81 MG EC tablet Take 1 tablet (81 mg) by mouth daily     atorvastatin (LIPITOR) 20 MG tablet Take 1 tablet (20 mg) by mouth daily     carvedilol (COREG) 25 MG tablet TAKE ONE TABLET BY MOUTH TWICE DAILY WITH MEALS     Cholecalciferol (VITAMIN D3) 2000 units TABS Take 2,000 Units by mouth daily     cloNIDine (CATAPRES) 0.3 MG tablet Take 0.6 mg by mouth 2 times daily      DIPHENHYDRAMINE HCL PO Take 50 mg by mouth At Bedtime     FUROSEMIDE PO Take 60 mg by mouth 2 times daily     insulin aspart (NOVOLOG PEN) 100 UNIT/ML injection Inject 1 Units Subcutaneous Take with snacks or supplements for high blood sugar 1 unit per 4 grams carbohydrate     insulin  "aspart (NOVOLOG PEN) 100 UNIT/ML injection Inject 1 Units Subcutaneous 3 times daily (with meals) DOSE:  1 units per 4 grams of carbohydrate.  Only chart total amount of units given.  Do not give if pre-prandial glucose is less than 60 mg/dL.     insulin aspart (NOVOLOG PEN) 100 UNIT/ML injection Inject 1-22 Units Subcutaneous 3 times daily (before meals) Correction Scale - VERY HIGH INSULIN RESISTANCE DOSING     Do Not give Correction Insulin if Pre-Meal BG less than 250.   For Pre-Meal  - 260 give 1 unit.   For Pre-Meal -269  give 2 units.   For Pre-Meal  - 279 give 3 units.   For Pre-Meal  - 289 give 4 units.   For Pre-Meal  - 299 give 5 units.   For Pre-Meal  - 309 give 6 units.   ...     insulin aspart (NOVOLOG PEN) 100 UNIT/ML injection Inject 1-16 Units Subcutaneous At Bedtime Correction Scale - VERY HIGH INSULIN RESISTANCE DOSING     Do Not give Bedtime Correction Insulin if BG less than 200.   For  - 209 give 1 units.   For  - 219 give 2 units.   For  - 229 give 3 units.   For  - 239 give 4 units.   For  - 249 give 5 units.   For  - 259 give 6 units.   For  - 269 give 7 units.   For  - 279 give 8 units  ...     insulin syringe 31G X 5/16\" 0.5 ML MISC FOR ADMINISTERING INSULIN AT HOME 5 TIMES PER DAY.     JARDIANCE 10 MG TABS tablet Take 10 mg by mouth daily     losartan (COZAAR) 50 MG tablet Take 1 tablet (50 mg) by mouth 2 times daily     mycophenolate (GENERIC EQUIVALENT) 250 MG capsule Take 3 capsules (750 mg) by mouth 2 times daily     NIFEdipine ER (ADALAT CC) 30 MG TB24 Take 1 tablet (30 mg) by mouth 2 times daily     omeprazole 20 MG tablet Take 1 tablet (20 mg) by mouth every morning     sulfamethoxazole-trimethoprim (BACTRIM) 400-80 MG tablet Take 1 tablet by mouth three times a week     tacrolimus (GENERIC EQUIVALENT) 1 MG capsule Take 3 capsules (3 mg) by mouth 2 times daily     TRESIBA FLEXTOUCH 200 UNIT/ML pen " "Inject 35 Units Subcutaneous daily      tacrolimus (GENERIC EQUIVALENT) 0.5 MG capsule HOLD. Total dose 3.0 mg twice daily. (Patient not taking: Reported on 12/9/2021)     No current facility-administered medications for this visit.     Medications Discontinued During This Encounter   Medication Reason     losartan (COZAAR) 50 MG tablet Reorder       Physical Exam   Vital Signs: /73 (BP Location: Right arm, Patient Position: Sitting, Cuff Size: Adult Large)   Pulse 67   Temp 98.4  F (36.9  C) (Oral)   Resp 22   Ht 1.829 m (6' 0.01\")   Wt 123.8 kg (272 lb 14.4 oz)   SpO2 95%   BMI 37.00 kg/m      GENERAL APPEARANCE: alert and no distress  EYES: eyes grossly normal to inspection  LYMPHATICS: no cervical or supraclavicular nodes  RESP: lungs clear to auscultation  CV: regular rhythm, normal rate, no rub, no murmur  EDEMA: trace LE edema bilaterally  ABDOMEN: soft, nondistended, nontender  MS: extremities normal - no gross deformities noted  SKIN: Lower extremity hyperpigmentation   NEURO: mentation intact and speech normal  PSYCH: mentation appears normal and affect normal/bright    Data     Renal Latest Ref Rng & Units 12/8/2021 11/19/2021 10/12/2021   Na 133 - 144 mmol/L 141 140 135   K 3.4 - 5.3 mmol/L 4.4 4.1 5.2   Cl 94 - 109 mmol/L 110(H) 107 107   CO2 20 - 32 mmol/L 27 26 20   BUN 7 - 30 mg/dL 22 25 23   Cr 0.66 - 1.25 mg/dL 0.99 1.07 1.18   Glucose 70 - 99 mg/dL 75 91 138(H)   Ca  8.5 - 10.1 mg/dL 10.0 9.8 9.4   Mg 1.6 - 2.3 mg/dL - - -     Bone Health Latest Ref Rng & Units 6/26/2020 10/1/2019 5/31/2019   Phos 2.5 - 4.5 mg/dL - - 1.7(L)   PTHi 18 - 80 pg/mL 101(H) 300(H) -   Vit D Def 20 - 75 ug/L 18(L) 7(L) -     Heme Latest Ref Rng & Units 12/8/2021 11/19/2021 10/12/2021   WBC 4.0 - 11.0 10e3/uL 5.8 6.6 5.9   Hgb 13.3 - 17.7 g/dL 17.3 16.9 16.1   Plt 150 - 450 10e3/uL 206 217 231   ABSOLUTE NEUTROPHIL 1.6 - 8.3 10e9/L - - -   ABSOLUTE LYMPHOCYTES 0.8 - 5.3 10e9/L - - -   ABSOLUTE MONOCYTES 0.0 - " 1.3 10e9/L - - -   ABSOLUTE EOSINOPHILS 0.0 - 0.7 10e9/L - - -   ABSOLUTE BASOPHILS 0.0 - 0.2 10e9/L - - -   ABS IMMATURE GRANULOCYTES 0 - 0.4 10e9/L - - -   ABSOLUTE NUCLEATED RBC - - - -     Liver Latest Ref Rng & Units 5/31/2019 12/28/2018 12/22/2017   AP 40 - 150 U/L - - 71   TBili 0.2 - 1.3 mg/dL - - 0.4   DBili 0.0 - 0.2 mg/dL - - 0.1   ALT 0 - 70 U/L - - 21   AST 0 - 45 U/L - - 15   Tot Protein 6.8 - 8.8 g/dL - - 7.9   Albumin 3.4 - 5.0 g/dL 3.9 3.6 3.4     Pancreas Latest Ref Rng & Units 11/25/2016 11/24/2016 4/15/2015   A1C 4.3 - 6.0 % 7.0(H) 7.0(H) 7.2(H)     Iron studies Latest Ref Rng & Units 11/27/2016   Iron 35 - 180 ug/dL 74   Iron sat 15 - 46 % 41   Ferritin 26 - 388 ng/mL 1,611(H)     [facility] Txp Virology Latest Ref Rng & Units 5/31/2019 12/4/2018 7/27/2018   BK Spec - Plasma Plasma Plasma   BK Res BKNEG:BK Virus DNA Not Detected copies/mL BK Virus DNA Not Detected BK Virus DNA Not Detected BK Virus DNA Not Detected   BK Log <2.7 Log copies/mL Not Calculated Not Calculated Not Calculated   EBV CAPSID ANTIBODY IGG 0.0 - 0.8 AI - - -   Hep B Core NR - - -        Recent Labs   Lab Test 10/12/21  0957 11/19/21  0957 12/08/21  1007   DOSTAC 10/11/2021 11/18/2021 12/7/2021   TACROL 5.3 6.2 4.1*     Recent Labs   Lab Test 02/06/17  0733 02/13/17  0736 02/20/17  0733   DOSMPA 2,052,017 2,000 2,192,017   MPACID 2.38 2.03 4.29*   MPAG 104.7* 102.9* 85.4            Again, thank you for allowing me to participate in the care of your patient.      Sincerely,    Erasmo Wilkins MD

## 2021-12-09 NOTE — LETTER
12/9/2021    RE: Corey Magdaleno Roland  [address]  [facility] [address]     CHRONIC TRANSPLANT NEPHROLOGY VISIT    Assessment & Plan   # DDKT (SPK): stable              - Baseline Cr ~ 1 mg/dL               - Proteinuria: Usually minimal (0.2-0.5 grams), but elevated today, will repeat. In the meantime will increase losartan to keep the BP<130/80              - Date DSA Last Checked: negative in 2020                  - BK Viremia: No              - Kidney Tx Biopsy: no.     # Immunosuppression: Tacrolimus immediate release (goal 4-6) and Mycophenolate mofetil (dose 750 mg every 12 hours)              - Changes: No      # Infection Prophylaxis:   - PJP: Sulfa/TMP (Bactrim)     # Hypertension: Controlled;   Goal BP: < 130/80              - Changes: increase losartan to 50 mg po bid.      # Diabetes: Controlled (HbA1c <7%)            Last HbA1c: 7%              - Management as per endo     # Mineral Bone Disorder:   -  continue vit D supplementation now.     # Skin Cancer Risk:               - Discussed sun protection and recommend regular follow up with Dermatology.     # Medical Compliance: Yes     # COVID-19 Virus Review: Discussed COVID-19 virus and the potential medical risks.  Reviewed preventative health recommendations, which includes washing hands for 20 seconds, avoid touching your face, and social distancing.  Asked patient to inform the transplant center if they are exposed or diagnosed with this virus.     # Transplant History:  Etiology of Kidney Failure: Diabetes mellitus type 2  Tx: DDKT  Significant changes in immunosuppression: None  Significant transplant-related complications: None    Transplant Office Phone Number: [phone]    Assessment and plan was discussed with the patient and he voiced his understanding and agreement.    Return visit: Return in about 6 months (around 6/9/2022).    Erasmo Wilkins MD    Chief Complaint   Mr. Roland is a 66 year old here for kidney  transplant and immunosuppression management.    History of Present Illness    Pt endorses doing over all well. He started Jardiance but has not seen any reduction in his insulin requirements or weight loss. His BP is above goal at home andhis proteinuria recently was higher than usual. He denied chest pain, SOB,abdominal pain, nausea/vomiting/diarrhea, dysuria, urgency or frequency of urination. No urinary obstructive symptoms.    Home BP: controlled but above goal     Problem List   Patient Active Problem List   Diagnosis     Hypertension secondary to other renal disorders     Secondary renal hyperparathyroidism (H)     Obesity     Diabetic peripheral neuropathy (H)     Kidney replaced by transplant     Immunosuppressed status (H)     Type 1 diabetes mellitus with nephropathy (H)     Aftercare following organ transplant     Vitamin D deficiency     Hypomagnesemia     Post-transplant erythrocytosis     Morbid obesity (H)       Allergies   No Known Allergies    Medications   Current Outpatient Medications   Medication Sig     aspirin EC 81 MG EC tablet Take 1 tablet (81 mg) by mouth daily     atorvastatin (LIPITOR) 20 MG tablet Take 1 tablet (20 mg) by mouth daily     carvedilol (COREG) 25 MG tablet TAKE ONE TABLET BY MOUTH TWICE DAILY WITH MEALS     Cholecalciferol (VITAMIN D3) 2000 units TABS Take 2,000 Units by mouth daily     cloNIDine (CATAPRES) 0.3 MG tablet Take 0.6 mg by mouth 2 times daily      DIPHENHYDRAMINE HCL PO Take 50 mg by mouth At Bedtime     FUROSEMIDE PO Take 60 mg by mouth 2 times daily     insulin aspart (NOVOLOG PEN) 100 UNIT/ML injection Inject 1 Units Subcutaneous Take with snacks or supplements for high blood sugar 1 unit per 4 grams carbohydrate     insulin aspart (NOVOLOG PEN) 100 UNIT/ML injection Inject 1 Units Subcutaneous 3 times daily (with meals) DOSE:  1 units per 4 grams of carbohydrate.  Only chart total amount of units given.  Do not give if pre-prandial glucose is less than 60  "mg/dL.     insulin aspart (NOVOLOG PEN) 100 UNIT/ML injection Inject 1-22 Units Subcutaneous 3 times daily (before meals) Correction Scale - VERY HIGH INSULIN RESISTANCE DOSING     Do Not give Correction Insulin if Pre-Meal BG less than 250.   For Pre-Meal  - 260 give 1 unit.   For Pre-Meal -269  give 2 units.   For Pre-Meal  - 279 give 3 units.   For Pre-Meal  - 289 give 4 units.   For Pre-Meal  - 299 give 5 units.   For Pre-Meal  - 309 give 6 units.   ...     insulin aspart (NOVOLOG PEN) 100 UNIT/ML injection Inject 1-16 Units Subcutaneous At Bedtime Correction Scale - VERY HIGH INSULIN RESISTANCE DOSING     Do Not give Bedtime Correction Insulin if BG less than 200.   For  - 209 give 1 units.   For  - 219 give 2 units.   For  - 229 give 3 units.   For  - 239 give 4 units.   For  - 249 give 5 units.   For  - 259 give 6 units.   For  - 269 give 7 units.   For  - 279 give 8 units  ...     insulin syringe 31G X 5/16\" 0.5 ML MISC FOR ADMINISTERING INSULIN AT HOME 5 TIMES PER DAY.     JARDIANCE 10 MG TABS tablet Take 10 mg by mouth daily     losartan (COZAAR) 50 MG tablet Take 1 tablet (50 mg) by mouth 2 times daily     mycophenolate (GENERIC EQUIVALENT) 250 MG capsule Take 3 capsules (750 mg) by mouth 2 times daily     NIFEdipine ER (ADALAT CC) 30 MG TB24 Take 1 tablet (30 mg) by mouth 2 times daily     omeprazole 20 MG tablet Take 1 tablet (20 mg) by mouth every morning     sulfamethoxazole-trimethoprim (BACTRIM) 400-80 MG tablet Take 1 tablet by mouth three times a week     tacrolimus (GENERIC EQUIVALENT) 1 MG capsule Take 3 capsules (3 mg) by mouth 2 times daily     TRESIBA FLEXTOUCH 200 UNIT/ML pen Inject 35 Units Subcutaneous daily      tacrolimus (GENERIC EQUIVALENT) 0.5 MG capsule HOLD. Total dose 3.0 mg twice daily. (Patient not taking: Reported on 12/9/2021)     No current facility-administered medications for this visit. " "    Medications Discontinued During This Encounter   Medication Reason     losartan (COZAAR) 50 MG tablet Reorder       Physical Exam   Vital Signs: /73 (BP Location: Right arm, Patient Position: Sitting, Cuff Size: Adult Large)   Pulse 67   Temp 98.4  F (36.9  C) (Oral)   Resp 22   Ht 1.829 m (6' 0.01\")   Wt 123.8 kg (272 lb 14.4 oz)   SpO2 95%   BMI 37.00 kg/m      GENERAL APPEARANCE: alert and no distress  EYES: eyes grossly normal to inspection  LYMPHATICS: no cervical or supraclavicular nodes  RESP: lungs clear to auscultation  CV: regular rhythm, normal rate, no rub, no murmur  EDEMA: trace LE edema bilaterally  ABDOMEN: soft, nondistended, nontender  MS: extremities normal - no gross deformities noted  SKIN: Lower extremity hyperpigmentation   NEURO: mentation intact and speech normal  PSYCH: mentation appears normal and affect normal/bright    Data     Renal Latest Ref Rng & Units 12/8/2021 11/19/2021 10/12/2021   Na 133 - 144 mmol/L 141 140 135   K 3.4 - 5.3 mmol/L 4.4 4.1 5.2   Cl 94 - 109 mmol/L 110(H) 107 107   CO2 20 - 32 mmol/L 27 26 20   BUN 7 - 30 mg/dL 22 25 23   Cr 0.66 - 1.25 mg/dL 0.99 1.07 1.18   Glucose 70 - 99 mg/dL 75 91 138(H)   Ca  8.5 - 10.1 mg/dL 10.0 9.8 9.4   Mg 1.6 - 2.3 mg/dL - - -     Bone Health Latest Ref Rng & Units 6/26/2020 10/1/2019 5/31/2019   Phos 2.5 - 4.5 mg/dL - - 1.7(L)   PTHi 18 - 80 pg/mL 101(H) 300(H) -   Vit D Def 20 - 75 ug/L 18(L) 7(L) -     Heme Latest Ref Rng & Units 12/8/2021 11/19/2021 10/12/2021   WBC 4.0 - 11.0 10e3/uL 5.8 6.6 5.9   Hgb 13.3 - 17.7 g/dL 17.3 16.9 16.1   Plt 150 - 450 10e3/uL 206 217 231   ABSOLUTE NEUTROPHIL 1.6 - 8.3 10e9/L - - -   ABSOLUTE LYMPHOCYTES 0.8 - 5.3 10e9/L - - -   ABSOLUTE MONOCYTES 0.0 - 1.3 10e9/L - - -   ABSOLUTE EOSINOPHILS 0.0 - 0.7 10e9/L - - -   ABSOLUTE BASOPHILS 0.0 - 0.2 10e9/L - - -   ABS IMMATURE GRANULOCYTES 0 - 0.4 10e9/L - - -   ABSOLUTE NUCLEATED RBC - - - -     Liver Latest Ref Rng & Units 5/31/2019 " 12/28/2018 12/22/2017   AP 40 - 150 U/L - - 71   TBili 0.2 - 1.3 mg/dL - - 0.4   DBili 0.0 - 0.2 mg/dL - - 0.1   ALT 0 - 70 U/L - - 21   AST 0 - 45 U/L - - 15   Tot Protein 6.8 - 8.8 g/dL - - 7.9   Albumin 3.4 - 5.0 g/dL 3.9 3.6 3.4     Pancreas Latest Ref Rng & Units 11/25/2016 11/24/2016 4/15/2015   A1C 4.3 - 6.0 % 7.0(H) 7.0(H) 7.2(H)     Iron studies Latest Ref Rng & Units 11/27/2016   Iron 35 - 180 ug/dL 74   Iron sat 15 - 46 % 41   Ferritin 26 - 388 ng/mL 1,611(H)     [facility] Txp Virology Latest Ref Rng & Units 5/31/2019 12/4/2018 7/27/2018   BK Spec - Plasma Plasma Plasma   BK Res BKNEG:BK Virus DNA Not Detected copies/mL BK Virus DNA Not Detected BK Virus DNA Not Detected BK Virus DNA Not Detected   BK Log <2.7 Log copies/mL Not Calculated Not Calculated Not Calculated   EBV CAPSID ANTIBODY IGG 0.0 - 0.8 AI - - -   Hep B Core NR - - -        Recent Labs   Lab Test 10/12/21  0957 11/19/21  0957 12/08/21  1007   DOSTAC 10/11/2021 11/18/2021 12/7/2021   TACROL 5.3 6.2 4.1*     Recent Labs   Lab Test 02/06/17  0733 02/13/17  0736 02/20/17  0733   DOSMPA 2,052,017 2,000 2,192,017   MPACID 2.38 2.03 4.29*   MPAG 104.7* 102.9* 85.4       Erasmo Wilkins MD

## 2021-12-09 NOTE — NURSING NOTE
"Chief Complaint   Patient presents with     RECHECK     S/P Kidney TX 11/24/2016     Vital signs:  Temp: 98.4  F (36.9  C) Temp src: Oral BP: 130/73 Pulse: 67   Resp: 22 SpO2: 95 %     Height: 182.9 cm (6' 0.01\") Weight: 123.8 kg (272 lb 14.4 oz)  Estimated body mass index is 37 kg/m  as calculated from the following:    Height as of this encounter: 1.829 m (6' 0.01\").    Weight as of this encounter: 123.8 kg (272 lb 14.4 oz).        Angela Wallace, [facility]  12/9/2021 1:03 PM      "

## 2021-12-09 NOTE — TELEPHONE ENCOUNTER
ISSUE  UPC 1.39 (baseline 0.2-0.5)      PLAN  Erasmo Wilkins MD Harris, Kathleen, RN  Pr/cr elevated above baseline,   Please check a full UA and repeat pr/cr     Nephrology appointment today @ 1:05    OUTCOME  My chart message sent to Corey.  Lab orders placed.  Message sent to provider to discuss labs at appointment today.

## 2021-12-16 NOTE — PROGRESS NOTES
CHRONIC TRANSPLANT NEPHROLOGY VISIT    Assessment & Plan   # DDKT (SPK): stable              - Baseline Cr ~ 1 mg/dL               - Proteinuria: Usually minimal (0.2-0.5 grams), but elevated today, will repeat. In the meantime will increase losartan to keep the BP<130/80              - Date DSA Last Checked: negative in 2020                  - BK Viremia: No              - Kidney Tx Biopsy: no.     # Immunosuppression: Tacrolimus immediate release (goal 4-6) and Mycophenolate mofetil (dose 750 mg every 12 hours)              - Changes: No      # Infection Prophylaxis:   - PJP: Sulfa/TMP (Bactrim)     # Hypertension: Controlled;   Goal BP: < 130/80              - Changes: increase losartan to 50 mg po bid.      # Diabetes: Controlled (HbA1c <7%)            Last HbA1c: 7%              - Management as per endo     # Mineral Bone Disorder:   -  continue vit D supplementation now.     # Skin Cancer Risk:               - Discussed sun protection and recommend regular follow up with Dermatology.     # Medical Compliance: Yes     # COVID-19 Virus Review: Discussed COVID-19 virus and the potential medical risks.  Reviewed preventative health recommendations, which includes washing hands for 20 seconds, avoid touching your face, and social distancing.  Asked patient to inform the transplant center if they are exposed or diagnosed with this virus.     # Transplant History:  Etiology of Kidney Failure: Diabetes mellitus type 2  Tx: DDKT  Significant changes in immunosuppression: None  Significant transplant-related complications: None    Transplant Office Phone Number: [phone]    Assessment and plan was discussed with the patient and he voiced his understanding and agreement.    Return visit: Return in about 6 months (around 6/9/2022).    Erasmo Wilkins MD    Chief Complaint   Mr. Roland is a 66 year old here for kidney transplant and immunosuppression management.    History of Present Illness    Pt endorses doing over  all well. He started Jardiance but has not seen any reduction in his insulin requirements or weight loss. His BP is above goal at home andhis proteinuria recently was higher than usual. He denied chest pain, SOB,abdominal pain, nausea/vomiting/diarrhea, dysuria, urgency or frequency of urination. No urinary obstructive symptoms.    Home BP: controlled but above goal     Problem List   Patient Active Problem List   Diagnosis     Hypertension secondary to other renal disorders     Secondary renal hyperparathyroidism (H)     Obesity     Diabetic peripheral neuropathy (H)     Kidney replaced by transplant     Immunosuppressed status (H)     Type 1 diabetes mellitus with nephropathy (H)     Aftercare following organ transplant     Vitamin D deficiency     Hypomagnesemia     Post-transplant erythrocytosis     Morbid obesity (H)       Allergies   No Known Allergies    Medications   Current Outpatient Medications   Medication Sig     aspirin EC 81 MG EC tablet Take 1 tablet (81 mg) by mouth daily     atorvastatin (LIPITOR) 20 MG tablet Take 1 tablet (20 mg) by mouth daily     carvedilol (COREG) 25 MG tablet TAKE ONE TABLET BY MOUTH TWICE DAILY WITH MEALS     Cholecalciferol (VITAMIN D3) 2000 units TABS Take 2,000 Units by mouth daily     cloNIDine (CATAPRES) 0.3 MG tablet Take 0.6 mg by mouth 2 times daily      DIPHENHYDRAMINE HCL PO Take 50 mg by mouth At Bedtime     FUROSEMIDE PO Take 60 mg by mouth 2 times daily     insulin aspart (NOVOLOG PEN) 100 UNIT/ML injection Inject 1 Units Subcutaneous Take with snacks or supplements for high blood sugar 1 unit per 4 grams carbohydrate     insulin aspart (NOVOLOG PEN) 100 UNIT/ML injection Inject 1 Units Subcutaneous 3 times daily (with meals) DOSE:  1 units per 4 grams of carbohydrate.  Only chart total amount of units given.  Do not give if pre-prandial glucose is less than 60 mg/dL.     insulin aspart (NOVOLOG PEN) 100 UNIT/ML injection Inject 1-22 Units Subcutaneous 3 times  "daily (before meals) Correction Scale - VERY HIGH INSULIN RESISTANCE DOSING     Do Not give Correction Insulin if Pre-Meal BG less than 250.   For Pre-Meal  - 260 give 1 unit.   For Pre-Meal -269  give 2 units.   For Pre-Meal  - 279 give 3 units.   For Pre-Meal  - 289 give 4 units.   For Pre-Meal  - 299 give 5 units.   For Pre-Meal  - 309 give 6 units.   ...     insulin aspart (NOVOLOG PEN) 100 UNIT/ML injection Inject 1-16 Units Subcutaneous At Bedtime Correction Scale - VERY HIGH INSULIN RESISTANCE DOSING     Do Not give Bedtime Correction Insulin if BG less than 200.   For  - 209 give 1 units.   For  - 219 give 2 units.   For  - 229 give 3 units.   For  - 239 give 4 units.   For  - 249 give 5 units.   For  - 259 give 6 units.   For  - 269 give 7 units.   For  - 279 give 8 units  ...     insulin syringe 31G X 5/16\" 0.5 ML MISC FOR ADMINISTERING INSULIN AT HOME 5 TIMES PER DAY.     JARDIANCE 10 MG TABS tablet Take 10 mg by mouth daily     losartan (COZAAR) 50 MG tablet Take 1 tablet (50 mg) by mouth 2 times daily     mycophenolate (GENERIC EQUIVALENT) 250 MG capsule Take 3 capsules (750 mg) by mouth 2 times daily     NIFEdipine ER (ADALAT CC) 30 MG TB24 Take 1 tablet (30 mg) by mouth 2 times daily     omeprazole 20 MG tablet Take 1 tablet (20 mg) by mouth every morning     sulfamethoxazole-trimethoprim (BACTRIM) 400-80 MG tablet Take 1 tablet by mouth three times a week     tacrolimus (GENERIC EQUIVALENT) 1 MG capsule Take 3 capsules (3 mg) by mouth 2 times daily     TRESIBA FLEXTOUCH 200 UNIT/ML pen Inject 35 Units Subcutaneous daily      tacrolimus (GENERIC EQUIVALENT) 0.5 MG capsule HOLD. Total dose 3.0 mg twice daily. (Patient not taking: Reported on 12/9/2021)     No current facility-administered medications for this visit.     Medications Discontinued During This Encounter   Medication Reason     losartan (COZAAR) 50 MG tablet " "Reorder       Physical Exam   Vital Signs: /73 (BP Location: Right arm, Patient Position: Sitting, Cuff Size: Adult Large)   Pulse 67   Temp 98.4  F (36.9  C) (Oral)   Resp 22   Ht 1.829 m (6' 0.01\")   Wt 123.8 kg (272 lb 14.4 oz)   SpO2 95%   BMI 37.00 kg/m      GENERAL APPEARANCE: alert and no distress  EYES: eyes grossly normal to inspection  LYMPHATICS: no cervical or supraclavicular nodes  RESP: lungs clear to auscultation  CV: regular rhythm, normal rate, no rub, no murmur  EDEMA: trace LE edema bilaterally  ABDOMEN: soft, nondistended, nontender  MS: extremities normal - no gross deformities noted  SKIN: Lower extremity hyperpigmentation   NEURO: mentation intact and speech normal  PSYCH: mentation appears normal and affect normal/bright    Data     Renal Latest Ref Rng & Units 12/8/2021 11/19/2021 10/12/2021   Na 133 - 144 mmol/L 141 140 135   K 3.4 - 5.3 mmol/L 4.4 4.1 5.2   Cl 94 - 109 mmol/L 110(H) 107 107   CO2 20 - 32 mmol/L 27 26 20   BUN 7 - 30 mg/dL 22 25 23   Cr 0.66 - 1.25 mg/dL 0.99 1.07 1.18   Glucose 70 - 99 mg/dL 75 91 138(H)   Ca  8.5 - 10.1 mg/dL 10.0 9.8 9.4   Mg 1.6 - 2.3 mg/dL - - -     Bone Health Latest Ref Rng & Units 6/26/2020 10/1/2019 5/31/2019   Phos 2.5 - 4.5 mg/dL - - 1.7(L)   PTHi 18 - 80 pg/mL 101(H) 300(H) -   Vit D Def 20 - 75 ug/L 18(L) 7(L) -     Heme Latest Ref Rng & Units 12/8/2021 11/19/2021 10/12/2021   WBC 4.0 - 11.0 10e3/uL 5.8 6.6 5.9   Hgb 13.3 - 17.7 g/dL 17.3 16.9 16.1   Plt 150 - 450 10e3/uL 206 217 231   ABSOLUTE NEUTROPHIL 1.6 - 8.3 10e9/L - - -   ABSOLUTE LYMPHOCYTES 0.8 - 5.3 10e9/L - - -   ABSOLUTE MONOCYTES 0.0 - 1.3 10e9/L - - -   ABSOLUTE EOSINOPHILS 0.0 - 0.7 10e9/L - - -   ABSOLUTE BASOPHILS 0.0 - 0.2 10e9/L - - -   ABS IMMATURE GRANULOCYTES 0 - 0.4 10e9/L - - -   ABSOLUTE NUCLEATED RBC - - - -     Liver Latest Ref Rng & Units 5/31/2019 12/28/2018 12/22/2017   AP 40 - 150 U/L - - 71   TBili 0.2 - 1.3 mg/dL - - 0.4   DBili 0.0 - 0.2 mg/dL - - " 0.1   ALT 0 - 70 U/L - - 21   AST 0 - 45 U/L - - 15   Tot Protein 6.8 - 8.8 g/dL - - 7.9   Albumin 3.4 - 5.0 g/dL 3.9 3.6 3.4     Pancreas Latest Ref Rng & Units 11/25/2016 11/24/2016 4/15/2015   A1C 4.3 - 6.0 % 7.0(H) 7.0(H) 7.2(H)     Iron studies Latest Ref Rng & Units 11/27/2016   Iron 35 - 180 ug/dL 74   Iron sat 15 - 46 % 41   Ferritin 26 - 388 ng/mL 1,611(H)     [facility] Txp Virology Latest Ref Rng & Units 5/31/2019 12/4/2018 7/27/2018   BK Spec - Plasma Plasma Plasma   BK Res BKNEG:BK Virus DNA Not Detected copies/mL BK Virus DNA Not Detected BK Virus DNA Not Detected BK Virus DNA Not Detected   BK Log <2.7 Log copies/mL Not Calculated Not Calculated Not Calculated   EBV CAPSID ANTIBODY IGG 0.0 - 0.8 AI - - -   Hep B Core NR - - -        Recent Labs   Lab Test 10/12/21  0957 11/19/21  0957 12/08/21  1007   DOSTAC 10/11/2021 11/18/2021 12/7/2021   TACROL 5.3 6.2 4.1*     Recent Labs   Lab Test 02/06/17  0733 02/13/17  0736 02/20/17  0733   DOSMPA 2,052,017 2,000 2,192,017   MPACID 2.38 2.03 4.29*   MPAG 104.7* 102.9* 85.4

## 2021-12-23 DIAGNOSIS — Z94.0 KIDNEY REPLACED BY TRANSPLANT: Primary | ICD-10-CM

## 2021-12-23 RX ORDER — MYCOPHENOLATE MOFETIL 250 MG/1
750 CAPSULE ORAL 2 TIMES DAILY
Qty: 180 CAPSULE | Refills: 11 | Status: SHIPPED | OUTPATIENT
Start: 2021-12-23 | End: 2022-05-24

## 2021-12-28 ENCOUNTER — LAB (OUTPATIENT)
Dept: LAB | Facility: CLINIC | Age: 66
End: 2021-12-28
Payer: MEDICARE

## 2021-12-28 DIAGNOSIS — R80.9 PROTEINURIA: ICD-10-CM

## 2021-12-28 DIAGNOSIS — Z94.0 KIDNEY REPLACED BY TRANSPLANT: ICD-10-CM

## 2021-12-28 DIAGNOSIS — Z48.298 AFTERCARE FOLLOWING ORGAN TRANSPLANT: ICD-10-CM

## 2021-12-28 LAB
ALBUMIN SERPL-MCNC: 4.1 G/DL (ref 3.4–5)
ALBUMIN UR-MCNC: ABNORMAL MG/DL
ANION GAP SERPL CALCULATED.3IONS-SCNC: 5 MMOL/L (ref 3–14)
APPEARANCE UR: CLEAR
BACTERIA #/AREA URNS HPF: ABNORMAL /HPF
BILIRUB UR QL STRIP: NEGATIVE
BUN SERPL-MCNC: 25 MG/DL (ref 7–30)
CALCIUM SERPL-MCNC: 10.4 MG/DL (ref 8.5–10.1)
CHLORIDE BLD-SCNC: 110 MMOL/L (ref 94–109)
CO2 SERPL-SCNC: 25 MMOL/L (ref 20–32)
COLOR UR AUTO: YELLOW
CREAT SERPL-MCNC: 0.92 MG/DL (ref 0.66–1.25)
GFR SERPL CREATININE-BSD FRML MDRD: >90 ML/MIN/1.73M2
GLUCOSE BLD-MCNC: 159 MG/DL (ref 70–99)
GLUCOSE UR STRIP-MCNC: >=1000 MG/DL
HGB UR QL STRIP: ABNORMAL
KETONES UR STRIP-MCNC: NEGATIVE MG/DL
LEUKOCYTE ESTERASE UR QL STRIP: NEGATIVE
NITRATE UR QL: NEGATIVE
PH UR STRIP: 6 [PH] (ref 5–7)
PHOSPHATE SERPL-MCNC: 2.8 MG/DL (ref 2.5–4.5)
POTASSIUM BLD-SCNC: 4 MMOL/L (ref 3.4–5.3)
RBC #/AREA URNS AUTO: ABNORMAL /HPF
SODIUM SERPL-SCNC: 140 MMOL/L (ref 133–144)
SP GR UR STRIP: 1.01 (ref 1–1.03)
SQUAMOUS #/AREA URNS AUTO: ABNORMAL /LPF
UROBILINOGEN UR STRIP-ACNC: 0.2 E.U./DL
WBC #/AREA URNS AUTO: ABNORMAL /HPF

## 2021-12-28 PROCEDURE — 36415 COLL VENOUS BLD VENIPUNCTURE: CPT

## 2021-12-28 PROCEDURE — 81001 URINALYSIS AUTO W/SCOPE: CPT

## 2021-12-28 PROCEDURE — 80069 RENAL FUNCTION PANEL: CPT

## 2022-01-02 ENCOUNTER — HEALTH MAINTENANCE LETTER (OUTPATIENT)
Age: 67
End: 2022-01-02

## 2022-01-28 ENCOUNTER — LAB (OUTPATIENT)
Dept: LAB | Facility: CLINIC | Age: 67
End: 2022-01-28
Payer: MEDICARE

## 2022-01-28 DIAGNOSIS — Z94.0 KIDNEY REPLACED BY TRANSPLANT: ICD-10-CM

## 2022-01-28 DIAGNOSIS — Z48.298 AFTERCARE FOLLOWING ORGAN TRANSPLANT: ICD-10-CM

## 2022-01-28 LAB
ANION GAP SERPL CALCULATED.3IONS-SCNC: 5 MMOL/L (ref 3–14)
BUN SERPL-MCNC: 19 MG/DL (ref 7–30)
CALCIUM SERPL-MCNC: 9.9 MG/DL (ref 8.5–10.1)
CHLORIDE BLD-SCNC: 104 MMOL/L (ref 94–109)
CO2 SERPL-SCNC: 28 MMOL/L (ref 20–32)
CREAT SERPL-MCNC: 1.1 MG/DL (ref 0.66–1.25)
ERYTHROCYTE [DISTWIDTH] IN BLOOD BY AUTOMATED COUNT: 14.8 % (ref 10–15)
GFR SERPL CREATININE-BSD FRML MDRD: 74 ML/MIN/1.73M2
GLUCOSE BLD-MCNC: 192 MG/DL (ref 70–99)
HCT VFR BLD AUTO: 54 % (ref 40–53)
HGB BLD-MCNC: 16.9 G/DL (ref 13.3–17.7)
MCH RBC QN AUTO: 28.2 PG (ref 26.5–33)
MCHC RBC AUTO-ENTMCNC: 31.3 G/DL (ref 31.5–36.5)
MCV RBC AUTO: 90 FL (ref 78–100)
PLATELET # BLD AUTO: 214 10E3/UL (ref 150–450)
POTASSIUM BLD-SCNC: 4.7 MMOL/L (ref 3.4–5.3)
RBC # BLD AUTO: 6 10E6/UL (ref 4.4–5.9)
SODIUM SERPL-SCNC: 137 MMOL/L (ref 133–144)
TACROLIMUS BLD-MCNC: 5.9 UG/L (ref 5–15)
TME LAST DOSE: NORMAL H
TME LAST DOSE: NORMAL H
WBC # BLD AUTO: 7.2 10E3/UL (ref 4–11)

## 2022-01-28 PROCEDURE — 85027 COMPLETE CBC AUTOMATED: CPT

## 2022-01-28 PROCEDURE — 36415 COLL VENOUS BLD VENIPUNCTURE: CPT

## 2022-01-28 PROCEDURE — 80197 ASSAY OF TACROLIMUS: CPT

## 2022-01-28 PROCEDURE — 80048 BASIC METABOLIC PNL TOTAL CA: CPT

## 2022-02-25 ENCOUNTER — LAB (OUTPATIENT)
Dept: LAB | Facility: CLINIC | Age: 67
End: 2022-02-25
Payer: MEDICARE

## 2022-02-25 DIAGNOSIS — Z94.0 KIDNEY REPLACED BY TRANSPLANT: ICD-10-CM

## 2022-02-25 DIAGNOSIS — Z48.298 AFTERCARE FOLLOWING ORGAN TRANSPLANT: ICD-10-CM

## 2022-02-25 DIAGNOSIS — Z94.0 KIDNEY TRANSPLANT RECIPIENT: ICD-10-CM

## 2022-02-25 DIAGNOSIS — Z79.899 ENCOUNTER FOR LONG-TERM CURRENT USE OF MEDICATION: ICD-10-CM

## 2022-02-25 LAB
ANION GAP SERPL CALCULATED.3IONS-SCNC: 5 MMOL/L (ref 3–14)
BUN SERPL-MCNC: 21 MG/DL (ref 7–30)
CALCIUM SERPL-MCNC: 10 MG/DL (ref 8.5–10.1)
CHLORIDE BLD-SCNC: 105 MMOL/L (ref 94–109)
CO2 SERPL-SCNC: 28 MMOL/L (ref 20–32)
CREAT SERPL-MCNC: 1 MG/DL (ref 0.66–1.25)
ERYTHROCYTE [DISTWIDTH] IN BLOOD BY AUTOMATED COUNT: 14.9 % (ref 10–15)
GFR SERPL CREATININE-BSD FRML MDRD: 83 ML/MIN/1.73M2
GLUCOSE BLD-MCNC: 123 MG/DL (ref 70–99)
HCT VFR BLD AUTO: 53.2 % (ref 40–53)
HGB BLD-MCNC: 17.2 G/DL (ref 13.3–17.7)
MCH RBC QN AUTO: 28.8 PG (ref 26.5–33)
MCHC RBC AUTO-ENTMCNC: 32.3 G/DL (ref 31.5–36.5)
MCV RBC AUTO: 89 FL (ref 78–100)
PLATELET # BLD AUTO: 200 10E3/UL (ref 150–450)
POTASSIUM BLD-SCNC: 4.2 MMOL/L (ref 3.4–5.3)
RBC # BLD AUTO: 5.98 10E6/UL (ref 4.4–5.9)
SODIUM SERPL-SCNC: 138 MMOL/L (ref 133–144)
TACROLIMUS BLD-MCNC: 5.2 UG/L (ref 5–15)
TME LAST DOSE: NORMAL H
TME LAST DOSE: NORMAL H
WBC # BLD AUTO: 6.7 10E3/UL (ref 4–11)

## 2022-02-25 PROCEDURE — 80048 BASIC METABOLIC PNL TOTAL CA: CPT

## 2022-02-25 PROCEDURE — 80197 ASSAY OF TACROLIMUS: CPT

## 2022-02-25 PROCEDURE — 85027 COMPLETE CBC AUTOMATED: CPT

## 2022-02-25 PROCEDURE — 36415 COLL VENOUS BLD VENIPUNCTURE: CPT

## 2022-02-25 RX ORDER — SULFAMETHOXAZOLE AND TRIMETHOPRIM 400; 80 MG/1; MG/1
1 TABLET ORAL
Qty: 13 TABLET | Refills: 11 | Status: SHIPPED | OUTPATIENT
Start: 2022-02-25 | End: 2022-05-24

## 2022-03-18 ENCOUNTER — MYC MEDICAL ADVICE (OUTPATIENT)
Dept: TRANSPLANT | Facility: CLINIC | Age: 67
End: 2022-03-18
Payer: MEDICARE

## 2022-03-18 ENCOUNTER — TELEPHONE (OUTPATIENT)
Dept: TRANSPLANT | Facility: CLINIC | Age: 67
End: 2022-03-18
Payer: MEDICARE

## 2022-03-18 NOTE — TELEPHONE ENCOUNTER
Patient Call: General  Route to LPN    Reason for call: Looking to schedule appointment with Dr. Wilkins    Call back needed? Yes    Return Call Needed  Same as documented in contacts section  When to return call?: Greater than one day: Route standard priority

## 2022-03-24 ENCOUNTER — MYC MEDICAL ADVICE (OUTPATIENT)
Dept: TRANSPLANT | Facility: CLINIC | Age: 67
End: 2022-03-24
Payer: MEDICARE

## 2022-03-25 ENCOUNTER — LAB (OUTPATIENT)
Dept: LAB | Facility: CLINIC | Age: 67
End: 2022-03-25
Payer: MEDICARE

## 2022-03-25 DIAGNOSIS — Z79.4 DIABETES MELLITUS DUE TO UNDERLYING CONDITION WITH HYPEROSMOLARITY WITHOUT COMA, WITH LONG-TERM CURRENT USE OF INSULIN (H): ICD-10-CM

## 2022-03-25 DIAGNOSIS — Z94.0 KIDNEY REPLACED BY TRANSPLANT: ICD-10-CM

## 2022-03-25 DIAGNOSIS — Z48.298 AFTERCARE FOLLOWING ORGAN TRANSPLANT: ICD-10-CM

## 2022-03-25 DIAGNOSIS — E08.00 DIABETES MELLITUS DUE TO UNDERLYING CONDITION WITH HYPEROSMOLARITY WITHOUT COMA, WITH LONG-TERM CURRENT USE OF INSULIN (H): ICD-10-CM

## 2022-03-25 DIAGNOSIS — Z79.899 ENCOUNTER FOR LONG-TERM CURRENT USE OF MEDICATION: ICD-10-CM

## 2022-03-25 LAB
ANION GAP SERPL CALCULATED.3IONS-SCNC: 7 MMOL/L (ref 3–14)
BUN SERPL-MCNC: 20 MG/DL (ref 7–30)
CALCIUM SERPL-MCNC: 10.1 MG/DL (ref 8.5–10.1)
CHLORIDE BLD-SCNC: 110 MMOL/L (ref 94–109)
CO2 SERPL-SCNC: 24 MMOL/L (ref 20–32)
CREAT SERPL-MCNC: 0.99 MG/DL (ref 0.66–1.25)
ERYTHROCYTE [DISTWIDTH] IN BLOOD BY AUTOMATED COUNT: 15.3 % (ref 10–15)
GFR SERPL CREATININE-BSD FRML MDRD: 84 ML/MIN/1.73M2
GLUCOSE BLD-MCNC: 109 MG/DL (ref 70–99)
HCT VFR BLD AUTO: 52.1 % (ref 40–53)
HGB BLD-MCNC: 16.8 G/DL (ref 13.3–17.7)
MCH RBC QN AUTO: 28.7 PG (ref 26.5–33)
MCHC RBC AUTO-ENTMCNC: 32.2 G/DL (ref 31.5–36.5)
MCV RBC AUTO: 89 FL (ref 78–100)
PLATELET # BLD AUTO: 210 10E3/UL (ref 150–450)
POTASSIUM BLD-SCNC: 3.9 MMOL/L (ref 3.4–5.3)
RBC # BLD AUTO: 5.85 10E6/UL (ref 4.4–5.9)
SODIUM SERPL-SCNC: 141 MMOL/L (ref 133–144)
TACROLIMUS BLD-MCNC: 5.6 UG/L (ref 5–15)
TME LAST DOSE: NORMAL H
TME LAST DOSE: NORMAL H
WBC # BLD AUTO: 6.1 10E3/UL (ref 4–11)

## 2022-03-25 PROCEDURE — 36415 COLL VENOUS BLD VENIPUNCTURE: CPT

## 2022-03-25 PROCEDURE — 80048 BASIC METABOLIC PNL TOTAL CA: CPT

## 2022-03-25 PROCEDURE — 85027 COMPLETE CBC AUTOMATED: CPT

## 2022-03-25 PROCEDURE — 80197 ASSAY OF TACROLIMUS: CPT

## 2022-04-29 ENCOUNTER — TELEPHONE (OUTPATIENT)
Dept: TRANSPLANT | Facility: CLINIC | Age: 67
End: 2022-04-29

## 2022-04-29 ENCOUNTER — LAB (OUTPATIENT)
Dept: LAB | Facility: CLINIC | Age: 67
End: 2022-04-29
Payer: MEDICARE

## 2022-04-29 ENCOUNTER — TELEPHONE (OUTPATIENT)
Dept: NEPHROLOGY | Facility: CLINIC | Age: 67
End: 2022-04-29

## 2022-04-29 DIAGNOSIS — Z94.0 KIDNEY REPLACED BY TRANSPLANT: ICD-10-CM

## 2022-04-29 DIAGNOSIS — Z94.0 KIDNEY REPLACED BY TRANSPLANT: Primary | ICD-10-CM

## 2022-04-29 DIAGNOSIS — Z48.298 AFTERCARE FOLLOWING ORGAN TRANSPLANT: Primary | ICD-10-CM

## 2022-04-29 DIAGNOSIS — Z79.899 ENCOUNTER FOR LONG-TERM CURRENT USE OF MEDICATION: ICD-10-CM

## 2022-04-29 DIAGNOSIS — Z48.298 AFTERCARE FOLLOWING ORGAN TRANSPLANT: ICD-10-CM

## 2022-04-29 LAB
ERYTHROCYTE [DISTWIDTH] IN BLOOD BY AUTOMATED COUNT: 15 % (ref 10–15)
HCT VFR BLD AUTO: 54.5 % (ref 40–53)
HGB BLD-MCNC: 17.4 G/DL (ref 13.3–17.7)
MCH RBC QN AUTO: 29 PG (ref 26.5–33)
MCHC RBC AUTO-ENTMCNC: 31.9 G/DL (ref 31.5–36.5)
MCV RBC AUTO: 91 FL (ref 78–100)
PLATELET # BLD AUTO: 211 10E3/UL (ref 150–450)
RBC # BLD AUTO: 6 10E6/UL (ref 4.4–5.9)
TACROLIMUS BLD-MCNC: 4.5 UG/L (ref 5–15)
TME LAST DOSE: ABNORMAL H
TME LAST DOSE: ABNORMAL H
WBC # BLD AUTO: 7.5 10E3/UL (ref 4–11)

## 2022-04-29 PROCEDURE — 80048 BASIC METABOLIC PNL TOTAL CA: CPT

## 2022-04-29 PROCEDURE — 36415 COLL VENOUS BLD VENIPUNCTURE: CPT

## 2022-04-29 PROCEDURE — 85027 COMPLETE CBC AUTOMATED: CPT

## 2022-04-29 PROCEDURE — 80197 ASSAY OF TACROLIMUS: CPT

## 2022-04-29 NOTE — TELEPHONE ENCOUNTER
Patient Call: Transplant Lab/Orders  Route to LPN  Post Transplant Days: 1982  When patient is less than 60 days post-transplant, route high priority    Reason for Call: Annual lab reorder in Epic  Specimens drawn  Waiting for orders to process   Callback needed? No

## 2022-04-29 NOTE — TELEPHONE ENCOUNTER
[facility] Call Center    Phone Message    May a detailed message be left on voicemail: yes     Reason for Call: Other: Patient is at the lab now.  They dont have any orders, except Tacro.  He wants BMP nad CBC also.  They will draw the blood but need orders asap.   Please enter today if possible.      Action Taken: Message routed to:  [facility] ([facility]): Eric    Travel Screening: Not Applicable

## 2022-04-30 LAB
ANION GAP SERPL CALCULATED.3IONS-SCNC: 3 MMOL/L (ref 3–14)
BUN SERPL-MCNC: 24 MG/DL (ref 7–30)
CALCIUM SERPL-MCNC: 10.2 MG/DL (ref 8.5–10.1)
CHLORIDE BLD-SCNC: 103 MMOL/L (ref 94–109)
CO2 SERPL-SCNC: 29 MMOL/L (ref 20–32)
CREAT SERPL-MCNC: 0.89 MG/DL (ref 0.66–1.25)
GFR SERPL CREATININE-BSD FRML MDRD: >90 ML/MIN/1.73M2
GLUCOSE BLD-MCNC: 113 MG/DL (ref 70–99)
POTASSIUM BLD-SCNC: 4 MMOL/L (ref 3.4–5.3)
SODIUM SERPL-SCNC: 135 MMOL/L (ref 133–144)

## 2022-05-27 ENCOUNTER — LAB (OUTPATIENT)
Dept: LAB | Facility: CLINIC | Age: 67
End: 2022-05-27
Payer: MEDICARE

## 2022-05-27 DIAGNOSIS — Z48.298 AFTERCARE FOLLOWING ORGAN TRANSPLANT: ICD-10-CM

## 2022-05-27 DIAGNOSIS — Z94.0 KIDNEY REPLACED BY TRANSPLANT: ICD-10-CM

## 2022-05-27 LAB
ANION GAP SERPL CALCULATED.3IONS-SCNC: 5 MMOL/L (ref 3–14)
BUN SERPL-MCNC: 23 MG/DL (ref 7–30)
CALCIUM SERPL-MCNC: 10.1 MG/DL (ref 8.5–10.1)
CHLORIDE BLD-SCNC: 108 MMOL/L (ref 94–109)
CO2 SERPL-SCNC: 27 MMOL/L (ref 20–32)
CREAT SERPL-MCNC: 0.9 MG/DL (ref 0.66–1.25)
ERYTHROCYTE [DISTWIDTH] IN BLOOD BY AUTOMATED COUNT: 14.8 % (ref 10–15)
GFR SERPL CREATININE-BSD FRML MDRD: >90 ML/MIN/1.73M2
GLUCOSE BLD-MCNC: 100 MG/DL (ref 70–99)
HCT VFR BLD AUTO: 53.6 % (ref 40–53)
HGB BLD-MCNC: 17.3 G/DL (ref 13.3–17.7)
MCH RBC QN AUTO: 28.5 PG (ref 26.5–33)
MCHC RBC AUTO-ENTMCNC: 32.3 G/DL (ref 31.5–36.5)
MCV RBC AUTO: 88 FL (ref 78–100)
PLATELET # BLD AUTO: 190 10E3/UL (ref 150–450)
POTASSIUM BLD-SCNC: 3.8 MMOL/L (ref 3.4–5.3)
RBC # BLD AUTO: 6.08 10E6/UL (ref 4.4–5.9)
SODIUM SERPL-SCNC: 140 MMOL/L (ref 133–144)
TACROLIMUS BLD-MCNC: 4.1 UG/L (ref 5–15)
TME LAST DOSE: ABNORMAL H
TME LAST DOSE: ABNORMAL H
WBC # BLD AUTO: 6.1 10E3/UL (ref 4–11)

## 2022-05-27 PROCEDURE — 99000 SPECIMEN HANDLING OFFICE-LAB: CPT

## 2022-05-27 PROCEDURE — 86769 SARS-COV-2 COVID-19 ANTIBODY: CPT | Mod: 90

## 2022-05-27 PROCEDURE — 80197 ASSAY OF TACROLIMUS: CPT

## 2022-05-27 PROCEDURE — 36415 COLL VENOUS BLD VENIPUNCTURE: CPT

## 2022-05-27 PROCEDURE — 80048 BASIC METABOLIC PNL TOTAL CA: CPT

## 2022-05-27 PROCEDURE — 85027 COMPLETE CBC AUTOMATED: CPT

## 2022-05-31 LAB
SARS-COV-2 AB SERPL IA-ACNC: >250 U/ML
SARS-COV-2 AB SERPL-IMP: POSITIVE

## 2022-07-01 ENCOUNTER — LAB (OUTPATIENT)
Dept: LAB | Facility: CLINIC | Age: 67
End: 2022-07-01
Payer: MEDICARE

## 2022-07-01 DIAGNOSIS — Z94.0 KIDNEY REPLACED BY TRANSPLANT: ICD-10-CM

## 2022-07-01 LAB
ERYTHROCYTE [DISTWIDTH] IN BLOOD BY AUTOMATED COUNT: 15.1 % (ref 10–15)
HCT VFR BLD AUTO: 54 % (ref 40–53)
HGB BLD-MCNC: 17.3 G/DL (ref 13.3–17.7)
MCH RBC QN AUTO: 28.3 PG (ref 26.5–33)
MCHC RBC AUTO-ENTMCNC: 32 G/DL (ref 31.5–36.5)
MCV RBC AUTO: 88 FL (ref 78–100)
PLATELET # BLD AUTO: 209 10E3/UL (ref 150–450)
RBC # BLD AUTO: 6.12 10E6/UL (ref 4.4–5.9)
TACROLIMUS BLD-MCNC: 4.5 UG/L (ref 5–15)
TME LAST DOSE: ABNORMAL H
TME LAST DOSE: ABNORMAL H
WBC # BLD AUTO: 7.6 10E3/UL (ref 4–11)

## 2022-07-01 PROCEDURE — 85027 COMPLETE CBC AUTOMATED: CPT

## 2022-07-01 PROCEDURE — 36415 COLL VENOUS BLD VENIPUNCTURE: CPT

## 2022-07-01 PROCEDURE — 80197 ASSAY OF TACROLIMUS: CPT

## 2022-07-01 PROCEDURE — 80048 BASIC METABOLIC PNL TOTAL CA: CPT

## 2022-07-02 LAB
ANION GAP SERPL CALCULATED.3IONS-SCNC: 8 MMOL/L (ref 3–14)
BUN SERPL-MCNC: 24 MG/DL (ref 7–30)
CALCIUM SERPL-MCNC: 9.4 MG/DL (ref 8.5–10.1)
CHLORIDE BLD-SCNC: 109 MMOL/L (ref 94–109)
CO2 SERPL-SCNC: 22 MMOL/L (ref 20–32)
CREAT SERPL-MCNC: 0.97 MG/DL (ref 0.66–1.25)
GFR SERPL CREATININE-BSD FRML MDRD: 86 ML/MIN/1.73M2
GLUCOSE BLD-MCNC: 120 MG/DL (ref 70–99)
POTASSIUM BLD-SCNC: 4 MMOL/L (ref 3.4–5.3)
SODIUM SERPL-SCNC: 139 MMOL/L (ref 133–144)

## 2022-08-05 ENCOUNTER — LAB (OUTPATIENT)
Dept: LAB | Facility: CLINIC | Age: 67
End: 2022-08-05
Payer: MEDICARE

## 2022-08-05 DIAGNOSIS — Z79.899 ENCOUNTER FOR LONG-TERM CURRENT USE OF MEDICATION: ICD-10-CM

## 2022-08-05 DIAGNOSIS — Z94.0 KIDNEY REPLACED BY TRANSPLANT: ICD-10-CM

## 2022-08-05 DIAGNOSIS — Z48.298 AFTERCARE FOLLOWING ORGAN TRANSPLANT: ICD-10-CM

## 2022-08-05 LAB
CREAT UR-MCNC: 27 MG/DL
ERYTHROCYTE [DISTWIDTH] IN BLOOD BY AUTOMATED COUNT: 15.2 % (ref 10–15)
HCT VFR BLD AUTO: 52.8 % (ref 40–53)
HGB BLD-MCNC: 16.7 G/DL (ref 13.3–17.7)
MCH RBC QN AUTO: 28.4 PG (ref 26.5–33)
MCHC RBC AUTO-ENTMCNC: 31.6 G/DL (ref 31.5–36.5)
MCV RBC AUTO: 90 FL (ref 78–100)
PLATELET # BLD AUTO: 201 10E3/UL (ref 150–450)
PROT UR-MCNC: 0.12 G/L
PROT/CREAT 24H UR: 0.44 G/G CR (ref 0–0.2)
RBC # BLD AUTO: 5.89 10E6/UL (ref 4.4–5.9)
TACROLIMUS BLD-MCNC: 4.5 UG/L (ref 5–15)
TME LAST DOSE: ABNORMAL H
TME LAST DOSE: ABNORMAL H
WBC # BLD AUTO: 6.5 10E3/UL (ref 4–11)

## 2022-08-05 PROCEDURE — 36415 COLL VENOUS BLD VENIPUNCTURE: CPT

## 2022-08-05 PROCEDURE — 84156 ASSAY OF PROTEIN URINE: CPT

## 2022-08-05 PROCEDURE — 80048 BASIC METABOLIC PNL TOTAL CA: CPT

## 2022-08-05 PROCEDURE — 80197 ASSAY OF TACROLIMUS: CPT

## 2022-08-05 PROCEDURE — 85027 COMPLETE CBC AUTOMATED: CPT

## 2022-08-06 LAB
ANION GAP SERPL CALCULATED.3IONS-SCNC: 9 MMOL/L (ref 3–14)
BUN SERPL-MCNC: 24 MG/DL (ref 7–30)
CALCIUM SERPL-MCNC: 9.6 MG/DL (ref 8.5–10.1)
CHLORIDE BLD-SCNC: 108 MMOL/L (ref 94–109)
CO2 SERPL-SCNC: 22 MMOL/L (ref 20–32)
CREAT SERPL-MCNC: 0.82 MG/DL (ref 0.66–1.25)
GFR SERPL CREATININE-BSD FRML MDRD: >90 ML/MIN/1.73M2
GLUCOSE BLD-MCNC: 112 MG/DL (ref 70–99)
POTASSIUM BLD-SCNC: 4.2 MMOL/L (ref 3.4–5.3)
SODIUM SERPL-SCNC: 139 MMOL/L (ref 133–144)

## 2022-09-02 ENCOUNTER — LAB (OUTPATIENT)
Dept: LAB | Facility: CLINIC | Age: 67
End: 2022-09-02
Payer: MEDICARE

## 2022-09-02 DIAGNOSIS — Z94.0 KIDNEY REPLACED BY TRANSPLANT: ICD-10-CM

## 2022-09-02 DIAGNOSIS — Z79.899 ENCOUNTER FOR LONG-TERM CURRENT USE OF MEDICATION: ICD-10-CM

## 2022-09-02 DIAGNOSIS — Z48.298 AFTERCARE FOLLOWING ORGAN TRANSPLANT: ICD-10-CM

## 2022-09-02 LAB
ANION GAP SERPL CALCULATED.3IONS-SCNC: 12 MMOL/L (ref 3–14)
BUN SERPL-MCNC: 20 MG/DL (ref 7–30)
CALCIUM SERPL-MCNC: 9.5 MG/DL (ref 8.5–10.1)
CHLORIDE BLD-SCNC: 104 MMOL/L (ref 94–109)
CO2 SERPL-SCNC: 22 MMOL/L (ref 20–32)
CREAT SERPL-MCNC: 0.95 MG/DL (ref 0.66–1.25)
ERYTHROCYTE [DISTWIDTH] IN BLOOD BY AUTOMATED COUNT: 15.9 % (ref 10–15)
GFR SERPL CREATININE-BSD FRML MDRD: 88 ML/MIN/1.73M2
GLUCOSE BLD-MCNC: 79 MG/DL (ref 70–99)
HCT VFR BLD AUTO: 54.1 % (ref 40–53)
HGB BLD-MCNC: 17.4 G/DL (ref 13.3–17.7)
MCH RBC QN AUTO: 28.5 PG (ref 26.5–33)
MCHC RBC AUTO-ENTMCNC: 32.2 G/DL (ref 31.5–36.5)
MCV RBC AUTO: 89 FL (ref 78–100)
PLATELET # BLD AUTO: 204 10E3/UL (ref 150–450)
POTASSIUM BLD-SCNC: 3.9 MMOL/L (ref 3.4–5.3)
RBC # BLD AUTO: 6.11 10E6/UL (ref 4.4–5.9)
SODIUM SERPL-SCNC: 138 MMOL/L (ref 133–144)
TACROLIMUS BLD-MCNC: 5 UG/L (ref 5–15)
TME LAST DOSE: NORMAL H
TME LAST DOSE: NORMAL H
WBC # BLD AUTO: 7 10E3/UL (ref 4–11)

## 2022-09-02 PROCEDURE — 85027 COMPLETE CBC AUTOMATED: CPT

## 2022-09-02 PROCEDURE — 36415 COLL VENOUS BLD VENIPUNCTURE: CPT

## 2022-09-02 PROCEDURE — 80197 ASSAY OF TACROLIMUS: CPT

## 2022-09-02 PROCEDURE — 80048 BASIC METABOLIC PNL TOTAL CA: CPT

## 2022-09-12 NOTE — TELEPHONE ENCOUNTER
Call placed to patient. Patient v\u to use a homemade mask or bandana when out in public d\t lack of availability.   PPS 40%. Skin care PRN. Assist with ADL's PRN. Appreciate PT involvement.

## 2022-10-07 ENCOUNTER — LAB (OUTPATIENT)
Dept: LAB | Facility: CLINIC | Age: 67
End: 2022-10-07
Payer: MEDICARE

## 2022-10-07 DIAGNOSIS — Z94.0 KIDNEY REPLACED BY TRANSPLANT: ICD-10-CM

## 2022-10-07 LAB
ANION GAP SERPL CALCULATED.3IONS-SCNC: 12 MMOL/L (ref 3–14)
BUN SERPL-MCNC: 20 MG/DL (ref 7–30)
CALCIUM SERPL-MCNC: 10.1 MG/DL (ref 8.5–10.1)
CHLORIDE BLD-SCNC: 107 MMOL/L (ref 94–109)
CO2 SERPL-SCNC: 21 MMOL/L (ref 20–32)
CREAT SERPL-MCNC: 0.81 MG/DL (ref 0.66–1.25)
ERYTHROCYTE [DISTWIDTH] IN BLOOD BY AUTOMATED COUNT: 14.9 % (ref 10–15)
GFR SERPL CREATININE-BSD FRML MDRD: >90 ML/MIN/1.73M2
GLUCOSE BLD-MCNC: 136 MG/DL (ref 70–99)
HCT VFR BLD AUTO: 53.1 % (ref 40–53)
HGB BLD-MCNC: 17.2 G/DL (ref 13.3–17.7)
MCH RBC QN AUTO: 28.4 PG (ref 26.5–33)
MCHC RBC AUTO-ENTMCNC: 32.4 G/DL (ref 31.5–36.5)
MCV RBC AUTO: 88 FL (ref 78–100)
PLATELET # BLD AUTO: 183 10E3/UL (ref 150–450)
POTASSIUM BLD-SCNC: 3.7 MMOL/L (ref 3.4–5.3)
RBC # BLD AUTO: 6.06 10E6/UL (ref 4.4–5.9)
SODIUM SERPL-SCNC: 140 MMOL/L (ref 133–144)
TACROLIMUS BLD-MCNC: 4.5 UG/L (ref 5–15)
TME LAST DOSE: ABNORMAL H
TME LAST DOSE: ABNORMAL H
WBC # BLD AUTO: 8 10E3/UL (ref 4–11)

## 2022-10-07 PROCEDURE — 80048 BASIC METABOLIC PNL TOTAL CA: CPT

## 2022-10-07 PROCEDURE — 36415 COLL VENOUS BLD VENIPUNCTURE: CPT

## 2022-10-07 PROCEDURE — 80197 ASSAY OF TACROLIMUS: CPT

## 2022-10-07 PROCEDURE — 85027 COMPLETE CBC AUTOMATED: CPT

## 2022-10-30 ENCOUNTER — HEALTH MAINTENANCE LETTER (OUTPATIENT)
Age: 67
End: 2022-10-30

## 2022-11-07 ENCOUNTER — LAB (OUTPATIENT)
Dept: LAB | Facility: CLINIC | Age: 67
End: 2022-11-07
Payer: MEDICARE

## 2022-11-07 DIAGNOSIS — Z94.0 KIDNEY REPLACED BY TRANSPLANT: ICD-10-CM

## 2022-11-07 LAB
ANION GAP SERPL CALCULATED.3IONS-SCNC: 7 MMOL/L (ref 3–14)
BUN SERPL-MCNC: 24 MG/DL (ref 7–30)
CALCIUM SERPL-MCNC: 10.2 MG/DL (ref 8.5–10.1)
CHLORIDE BLD-SCNC: 108 MMOL/L (ref 94–109)
CO2 SERPL-SCNC: 25 MMOL/L (ref 20–32)
CREAT SERPL-MCNC: 0.92 MG/DL (ref 0.66–1.25)
ERYTHROCYTE [DISTWIDTH] IN BLOOD BY AUTOMATED COUNT: 14.8 % (ref 10–15)
GFR SERPL CREATININE-BSD FRML MDRD: >90 ML/MIN/1.73M2
GLUCOSE BLD-MCNC: 155 MG/DL (ref 70–99)
HCT VFR BLD AUTO: 52.9 % (ref 40–53)
HGB BLD-MCNC: 17 G/DL (ref 13.3–17.7)
MCH RBC QN AUTO: 28.3 PG (ref 26.5–33)
MCHC RBC AUTO-ENTMCNC: 32.1 G/DL (ref 31.5–36.5)
MCV RBC AUTO: 88 FL (ref 78–100)
PLATELET # BLD AUTO: 213 10E3/UL (ref 150–450)
POTASSIUM BLD-SCNC: 4.2 MMOL/L (ref 3.4–5.3)
RBC # BLD AUTO: 6 10E6/UL (ref 4.4–5.9)
SODIUM SERPL-SCNC: 140 MMOL/L (ref 133–144)
TACROLIMUS BLD-MCNC: 5.5 UG/L (ref 5–15)
TME LAST DOSE: NORMAL H
TME LAST DOSE: NORMAL H
WBC # BLD AUTO: 7.6 10E3/UL (ref 4–11)

## 2022-11-07 PROCEDURE — 36415 COLL VENOUS BLD VENIPUNCTURE: CPT

## 2022-11-07 PROCEDURE — 80048 BASIC METABOLIC PNL TOTAL CA: CPT

## 2022-11-07 PROCEDURE — 80197 ASSAY OF TACROLIMUS: CPT

## 2022-11-07 PROCEDURE — 85027 COMPLETE CBC AUTOMATED: CPT

## 2022-12-02 ENCOUNTER — LAB (OUTPATIENT)
Dept: LAB | Facility: CLINIC | Age: 67
End: 2022-12-02
Payer: MEDICARE

## 2022-12-02 DIAGNOSIS — Z94.0 KIDNEY REPLACED BY TRANSPLANT: ICD-10-CM

## 2022-12-02 DIAGNOSIS — Z79.899 ENCOUNTER FOR LONG-TERM CURRENT USE OF MEDICATION: ICD-10-CM

## 2022-12-02 DIAGNOSIS — Z48.298 AFTERCARE FOLLOWING ORGAN TRANSPLANT: Primary | ICD-10-CM

## 2022-12-02 DIAGNOSIS — Z48.298 AFTERCARE FOLLOWING ORGAN TRANSPLANT: ICD-10-CM

## 2022-12-02 LAB
ERYTHROCYTE [DISTWIDTH] IN BLOOD BY AUTOMATED COUNT: 14.6 % (ref 10–15)
HCT VFR BLD AUTO: 51.6 % (ref 40–53)
HGB BLD-MCNC: 16.5 G/DL (ref 13.3–17.7)
MCH RBC QN AUTO: 28.7 PG (ref 26.5–33)
MCHC RBC AUTO-ENTMCNC: 32 G/DL (ref 31.5–36.5)
MCV RBC AUTO: 90 FL (ref 78–100)
PLATELET # BLD AUTO: 188 10E3/UL (ref 150–450)
RBC # BLD AUTO: 5.74 10E6/UL (ref 4.4–5.9)
TACROLIMUS BLD-MCNC: 5.1 UG/L (ref 5–15)
TME LAST DOSE: NORMAL H
TME LAST DOSE: NORMAL H
WBC # BLD AUTO: 5.1 10E3/UL (ref 4–11)

## 2022-12-02 PROCEDURE — 80048 BASIC METABOLIC PNL TOTAL CA: CPT

## 2022-12-02 PROCEDURE — 85027 COMPLETE CBC AUTOMATED: CPT

## 2022-12-02 PROCEDURE — 36415 COLL VENOUS BLD VENIPUNCTURE: CPT

## 2022-12-02 PROCEDURE — 80197 ASSAY OF TACROLIMUS: CPT

## 2022-12-03 LAB
ANION GAP SERPL CALCULATED.3IONS-SCNC: 19 MMOL/L (ref 7–15)
BUN SERPL-MCNC: 18.7 MG/DL (ref 8–23)
CALCIUM SERPL-MCNC: 10.7 MG/DL (ref 8.8–10.2)
CHLORIDE SERPL-SCNC: 103 MMOL/L (ref 98–107)
CREAT SERPL-MCNC: 1.03 MG/DL (ref 0.67–1.17)
DEPRECATED HCO3 PLAS-SCNC: 21 MMOL/L (ref 22–29)
GFR SERPL CREATININE-BSD FRML MDRD: 80 ML/MIN/1.73M2
GLUCOSE SERPL-MCNC: 82 MG/DL (ref 70–99)
POTASSIUM SERPL-SCNC: 3.9 MMOL/L (ref 3.4–5.3)
SODIUM SERPL-SCNC: 143 MMOL/L (ref 136–145)

## 2023-01-09 ENCOUNTER — LAB (OUTPATIENT)
Dept: LAB | Facility: CLINIC | Age: 68
End: 2023-01-09
Payer: MEDICARE

## 2023-01-09 DIAGNOSIS — Z48.298 AFTERCARE FOLLOWING ORGAN TRANSPLANT: ICD-10-CM

## 2023-01-09 DIAGNOSIS — Z94.0 KIDNEY REPLACED BY TRANSPLANT: ICD-10-CM

## 2023-01-09 LAB
ANION GAP SERPL CALCULATED.3IONS-SCNC: 17 MMOL/L (ref 7–15)
BUN SERPL-MCNC: 19.9 MG/DL (ref 8–23)
CALCIUM SERPL-MCNC: 10.7 MG/DL (ref 8.8–10.2)
CHLORIDE SERPL-SCNC: 105 MMOL/L (ref 98–107)
CREAT SERPL-MCNC: 0.97 MG/DL (ref 0.67–1.17)
DEPRECATED HCO3 PLAS-SCNC: 21 MMOL/L (ref 22–29)
ERYTHROCYTE [DISTWIDTH] IN BLOOD BY AUTOMATED COUNT: 14.7 % (ref 10–15)
GFR SERPL CREATININE-BSD FRML MDRD: 86 ML/MIN/1.73M2
GLUCOSE SERPL-MCNC: 102 MG/DL (ref 70–99)
HCT VFR BLD AUTO: 54.6 % (ref 40–53)
HGB BLD-MCNC: 17.3 G/DL (ref 13.3–17.7)
MCH RBC QN AUTO: 29 PG (ref 26.5–33)
MCHC RBC AUTO-ENTMCNC: 31.7 G/DL (ref 31.5–36.5)
MCV RBC AUTO: 92 FL (ref 78–100)
PLATELET # BLD AUTO: 194 10E3/UL (ref 150–450)
POTASSIUM SERPL-SCNC: 4 MMOL/L (ref 3.4–5.3)
RBC # BLD AUTO: 5.97 10E6/UL (ref 4.4–5.9)
SODIUM SERPL-SCNC: 143 MMOL/L (ref 136–145)
TACROLIMUS BLD-MCNC: 5.4 UG/L (ref 5–15)
TME LAST DOSE: NORMAL H
TME LAST DOSE: NORMAL H
WBC # BLD AUTO: 6.7 10E3/UL (ref 4–11)

## 2023-01-09 PROCEDURE — 80048 BASIC METABOLIC PNL TOTAL CA: CPT

## 2023-01-09 PROCEDURE — 80197 ASSAY OF TACROLIMUS: CPT

## 2023-01-09 PROCEDURE — 36415 COLL VENOUS BLD VENIPUNCTURE: CPT

## 2023-01-09 PROCEDURE — 85027 COMPLETE CBC AUTOMATED: CPT

## 2023-02-03 ENCOUNTER — LAB (OUTPATIENT)
Dept: LAB | Facility: CLINIC | Age: 68
End: 2023-02-03
Payer: MEDICARE

## 2023-02-03 DIAGNOSIS — Z48.298 AFTERCARE FOLLOWING ORGAN TRANSPLANT: ICD-10-CM

## 2023-02-03 DIAGNOSIS — E83.52 HYPERCALCEMIA: ICD-10-CM

## 2023-02-03 DIAGNOSIS — Z94.0 KIDNEY REPLACED BY TRANSPLANT: ICD-10-CM

## 2023-02-03 LAB
ALBUMIN MFR UR ELPH: 12 MG/DL (ref 1–14)
ANION GAP SERPL CALCULATED.3IONS-SCNC: 17 MMOL/L (ref 7–15)
BUN SERPL-MCNC: 20.8 MG/DL (ref 8–23)
CA-I BLD-MCNC: 5 MG/DL (ref 4.4–5.2)
CALCIUM SERPL-MCNC: 10 MG/DL (ref 8.8–10.2)
CHLORIDE SERPL-SCNC: 105 MMOL/L (ref 98–107)
CREAT SERPL-MCNC: 0.93 MG/DL (ref 0.67–1.17)
CREAT UR-MCNC: 31.9 MG/DL
DEPRECATED HCO3 PLAS-SCNC: 19 MMOL/L (ref 22–29)
ERYTHROCYTE [DISTWIDTH] IN BLOOD BY AUTOMATED COUNT: 14.4 % (ref 10–15)
GFR SERPL CREATININE-BSD FRML MDRD: 90 ML/MIN/1.73M2
GLUCOSE SERPL-MCNC: 143 MG/DL (ref 70–99)
HCT VFR BLD AUTO: 52 % (ref 40–53)
HGB BLD-MCNC: 16.6 G/DL (ref 13.3–17.7)
MCH RBC QN AUTO: 29.4 PG (ref 26.5–33)
MCHC RBC AUTO-ENTMCNC: 31.9 G/DL (ref 31.5–36.5)
MCV RBC AUTO: 92 FL (ref 78–100)
PLATELET # BLD AUTO: 187 10E3/UL (ref 150–450)
POTASSIUM SERPL-SCNC: 4.4 MMOL/L (ref 3.4–5.3)
PROT/CREAT 24H UR: 0.38 MG/MG CR (ref 0–0.2)
PTH-INTACT SERPL-MCNC: 88 PG/ML (ref 15–65)
RBC # BLD AUTO: 5.65 10E6/UL (ref 4.4–5.9)
SODIUM SERPL-SCNC: 141 MMOL/L (ref 136–145)
TACROLIMUS BLD-MCNC: 5.2 UG/L (ref 5–15)
TME LAST DOSE: NORMAL H
TME LAST DOSE: NORMAL H
WBC # BLD AUTO: 5.7 10E3/UL (ref 4–11)

## 2023-02-03 PROCEDURE — 85027 COMPLETE CBC AUTOMATED: CPT

## 2023-02-03 PROCEDURE — 80048 BASIC METABOLIC PNL TOTAL CA: CPT

## 2023-02-03 PROCEDURE — 80197 ASSAY OF TACROLIMUS: CPT

## 2023-02-03 PROCEDURE — 36415 COLL VENOUS BLD VENIPUNCTURE: CPT

## 2023-02-03 PROCEDURE — 84156 ASSAY OF PROTEIN URINE: CPT

## 2023-02-03 PROCEDURE — 82330 ASSAY OF CALCIUM: CPT

## 2023-02-03 PROCEDURE — 83970 ASSAY OF PARATHORMONE: CPT

## 2023-03-02 DIAGNOSIS — Z94.0 KIDNEY TRANSPLANT RECIPIENT: ICD-10-CM

## 2023-03-02 RX ORDER — SULFAMETHOXAZOLE AND TRIMETHOPRIM 400; 80 MG/1; MG/1
TABLET ORAL
Qty: 13 TABLET | Refills: 11 | Status: SHIPPED | OUTPATIENT
Start: 2023-03-02 | End: 2023-07-18

## 2023-03-03 ENCOUNTER — LAB (OUTPATIENT)
Dept: LAB | Facility: CLINIC | Age: 68
End: 2023-03-03
Payer: MEDICARE

## 2023-03-03 DIAGNOSIS — Z94.0 KIDNEY REPLACED BY TRANSPLANT: ICD-10-CM

## 2023-03-03 DIAGNOSIS — Z48.298 AFTERCARE FOLLOWING ORGAN TRANSPLANT: ICD-10-CM

## 2023-03-03 LAB
ALBUMIN MFR UR ELPH: 22.9 MG/DL (ref 1–14)
ANION GAP SERPL CALCULATED.3IONS-SCNC: 15 MMOL/L (ref 7–15)
BUN SERPL-MCNC: 22.2 MG/DL (ref 8–23)
CALCIUM SERPL-MCNC: 10.7 MG/DL (ref 8.8–10.2)
CHLORIDE SERPL-SCNC: 104 MMOL/L (ref 98–107)
CREAT SERPL-MCNC: 1.01 MG/DL (ref 0.67–1.17)
CREAT UR-MCNC: 37.9 MG/DL
DEPRECATED HCO3 PLAS-SCNC: 21 MMOL/L (ref 22–29)
ERYTHROCYTE [DISTWIDTH] IN BLOOD BY AUTOMATED COUNT: 14.3 % (ref 10–15)
GFR SERPL CREATININE-BSD FRML MDRD: 82 ML/MIN/1.73M2
GLUCOSE SERPL-MCNC: 132 MG/DL (ref 70–99)
HCT VFR BLD AUTO: 53.3 % (ref 40–53)
HGB BLD-MCNC: 17.1 G/DL (ref 13.3–17.7)
MCH RBC QN AUTO: 29.1 PG (ref 26.5–33)
MCHC RBC AUTO-ENTMCNC: 32.1 G/DL (ref 31.5–36.5)
MCV RBC AUTO: 91 FL (ref 78–100)
PLATELET # BLD AUTO: 211 10E3/UL (ref 150–450)
POTASSIUM SERPL-SCNC: 4.7 MMOL/L (ref 3.4–5.3)
PROT/CREAT 24H UR: 0.6 MG/MG CR (ref 0–0.2)
RBC # BLD AUTO: 5.87 10E6/UL (ref 4.4–5.9)
SODIUM SERPL-SCNC: 140 MMOL/L (ref 136–145)
TACROLIMUS BLD-MCNC: 4.7 UG/L (ref 5–15)
TME LAST DOSE: ABNORMAL H
TME LAST DOSE: ABNORMAL H
WBC # BLD AUTO: 6.4 10E3/UL (ref 4–11)

## 2023-03-03 PROCEDURE — 80197 ASSAY OF TACROLIMUS: CPT

## 2023-03-03 PROCEDURE — 80048 BASIC METABOLIC PNL TOTAL CA: CPT

## 2023-03-03 PROCEDURE — 85027 COMPLETE CBC AUTOMATED: CPT

## 2023-03-03 PROCEDURE — 36415 COLL VENOUS BLD VENIPUNCTURE: CPT

## 2023-03-03 PROCEDURE — 84156 ASSAY OF PROTEIN URINE: CPT

## 2023-04-07 ENCOUNTER — LAB (OUTPATIENT)
Dept: LAB | Facility: CLINIC | Age: 68
End: 2023-04-07
Payer: MEDICARE

## 2023-04-07 DIAGNOSIS — Z94.0 KIDNEY REPLACED BY TRANSPLANT: ICD-10-CM

## 2023-04-07 DIAGNOSIS — Z48.298 AFTERCARE FOLLOWING ORGAN TRANSPLANT: ICD-10-CM

## 2023-04-07 LAB
ANION GAP SERPL CALCULATED.3IONS-SCNC: 13 MMOL/L (ref 7–15)
BUN SERPL-MCNC: 24.5 MG/DL (ref 8–23)
CALCIUM SERPL-MCNC: 10.1 MG/DL (ref 8.8–10.2)
CHLORIDE SERPL-SCNC: 104 MMOL/L (ref 98–107)
CREAT SERPL-MCNC: 0.94 MG/DL (ref 0.67–1.17)
DEPRECATED HCO3 PLAS-SCNC: 24 MMOL/L (ref 22–29)
ERYTHROCYTE [DISTWIDTH] IN BLOOD BY AUTOMATED COUNT: 14.2 % (ref 10–15)
GFR SERPL CREATININE-BSD FRML MDRD: 89 ML/MIN/1.73M2
GLUCOSE SERPL-MCNC: 103 MG/DL (ref 70–99)
HCT VFR BLD AUTO: 52.2 % (ref 40–53)
HGB BLD-MCNC: 16.3 G/DL (ref 13.3–17.7)
MCH RBC QN AUTO: 29.4 PG (ref 26.5–33)
MCHC RBC AUTO-ENTMCNC: 31.2 G/DL (ref 31.5–36.5)
MCV RBC AUTO: 94 FL (ref 78–100)
PLATELET # BLD AUTO: 200 10E3/UL (ref 150–450)
POTASSIUM SERPL-SCNC: 4.1 MMOL/L (ref 3.4–5.3)
RBC # BLD AUTO: 5.55 10E6/UL (ref 4.4–5.9)
SODIUM SERPL-SCNC: 141 MMOL/L (ref 136–145)
TACROLIMUS BLD-MCNC: 4.6 UG/L (ref 5–15)
TME LAST DOSE: ABNORMAL H
TME LAST DOSE: ABNORMAL H
WBC # BLD AUTO: 6 10E3/UL (ref 4–11)

## 2023-04-07 PROCEDURE — 80048 BASIC METABOLIC PNL TOTAL CA: CPT

## 2023-04-07 PROCEDURE — 36415 COLL VENOUS BLD VENIPUNCTURE: CPT

## 2023-04-07 PROCEDURE — 80197 ASSAY OF TACROLIMUS: CPT

## 2023-04-07 PROCEDURE — 85027 COMPLETE CBC AUTOMATED: CPT

## 2023-05-01 ENCOUNTER — LAB (OUTPATIENT)
Dept: LAB | Facility: CLINIC | Age: 68
End: 2023-05-01
Payer: MEDICARE

## 2023-05-01 DIAGNOSIS — Z48.298 AFTERCARE FOLLOWING ORGAN TRANSPLANT: ICD-10-CM

## 2023-05-01 DIAGNOSIS — Z94.0 KIDNEY REPLACED BY TRANSPLANT: ICD-10-CM

## 2023-05-01 LAB
ANION GAP SERPL CALCULATED.3IONS-SCNC: 17 MMOL/L (ref 7–15)
BUN SERPL-MCNC: 21.3 MG/DL (ref 8–23)
CALCIUM SERPL-MCNC: 10.5 MG/DL (ref 8.8–10.2)
CHLORIDE SERPL-SCNC: 104 MMOL/L (ref 98–107)
CREAT SERPL-MCNC: 0.91 MG/DL (ref 0.67–1.17)
DEPRECATED HCO3 PLAS-SCNC: 22 MMOL/L (ref 22–29)
ERYTHROCYTE [DISTWIDTH] IN BLOOD BY AUTOMATED COUNT: 13.9 % (ref 10–15)
GFR SERPL CREATININE-BSD FRML MDRD: >90 ML/MIN/1.73M2
GLUCOSE SERPL-MCNC: 101 MG/DL (ref 70–99)
HCT VFR BLD AUTO: 52.2 % (ref 40–53)
HGB BLD-MCNC: 17 G/DL (ref 13.3–17.7)
MCH RBC QN AUTO: 29.4 PG (ref 26.5–33)
MCHC RBC AUTO-ENTMCNC: 32.6 G/DL (ref 31.5–36.5)
MCV RBC AUTO: 90 FL (ref 78–100)
PLATELET # BLD AUTO: 195 10E3/UL (ref 150–450)
POTASSIUM SERPL-SCNC: 4.1 MMOL/L (ref 3.4–5.3)
RBC # BLD AUTO: 5.79 10E6/UL (ref 4.4–5.9)
SODIUM SERPL-SCNC: 143 MMOL/L (ref 136–145)
TACROLIMUS BLD-MCNC: 4.8 UG/L (ref 5–15)
TME LAST DOSE: ABNORMAL H
TME LAST DOSE: ABNORMAL H
WBC # BLD AUTO: 6.5 10E3/UL (ref 4–11)

## 2023-05-01 PROCEDURE — 85027 COMPLETE CBC AUTOMATED: CPT

## 2023-05-01 PROCEDURE — 36415 COLL VENOUS BLD VENIPUNCTURE: CPT

## 2023-05-01 PROCEDURE — 80048 BASIC METABOLIC PNL TOTAL CA: CPT

## 2023-05-01 PROCEDURE — 80197 ASSAY OF TACROLIMUS: CPT

## 2023-05-04 ENCOUNTER — OFFICE VISIT (OUTPATIENT)
Dept: NEPHROLOGY | Facility: CLINIC | Age: 68
End: 2023-05-04
Attending: INTERNAL MEDICINE
Payer: MEDICARE

## 2023-05-04 VITALS
OXYGEN SATURATION: 96 % | DIASTOLIC BLOOD PRESSURE: 74 MMHG | WEIGHT: 268.4 LBS | SYSTOLIC BLOOD PRESSURE: 129 MMHG | TEMPERATURE: 97.7 F | BODY MASS INDEX: 36.39 KG/M2 | HEART RATE: 70 BPM

## 2023-05-04 DIAGNOSIS — Z94.0 KIDNEY REPLACED BY TRANSPLANT: Primary | ICD-10-CM

## 2023-05-04 DIAGNOSIS — D84.9 IMMUNOSUPPRESSION (H): ICD-10-CM

## 2023-05-04 DIAGNOSIS — Z48.298 AFTERCARE FOLLOWING ORGAN TRANSPLANT: ICD-10-CM

## 2023-05-04 DIAGNOSIS — I15.1 HTN, KIDNEY TRANSPLANT RELATED: ICD-10-CM

## 2023-05-04 DIAGNOSIS — D75.1 POST-TRANSPLANT ERYTHROCYTOSIS: ICD-10-CM

## 2023-05-04 DIAGNOSIS — Z94.0 HTN, KIDNEY TRANSPLANT RELATED: ICD-10-CM

## 2023-05-04 PROBLEM — E83.42 HYPOMAGNESEMIA: Status: RESOLVED | Noted: 2017-03-04 | Resolved: 2023-05-04

## 2023-05-04 PROCEDURE — G0463 HOSPITAL OUTPT CLINIC VISIT: HCPCS | Performed by: INTERNAL MEDICINE

## 2023-05-04 PROCEDURE — 99215 OFFICE O/P EST HI 40 MIN: CPT | Performed by: INTERNAL MEDICINE

## 2023-05-04 ASSESSMENT — PAIN SCALES - GENERAL: PAINLEVEL: NO PAIN (0)

## 2023-05-04 ASSESSMENT — PATIENT HEALTH QUESTIONNAIRE - PHQ9: SUM OF ALL RESPONSES TO PHQ QUESTIONS 1-9: 1

## 2023-05-04 NOTE — PATIENT INSTRUCTIONS
Patient Recommendations:  - Schedule ultrasounds of native and transplanted kidneys to look for cysts/masses  -Recommend making an appointment with dermatology for a skin check  -If you want you could get another COVID booster  -Labs every 3 months    Transplant Patient Information  Your Post Transplant Coordinator is: Ceci White  For non urgent items, we encourage you to contact your coordinator/care team online via [employer]  You and your care team can also contact your transplant coordinator Monday - Friday, 8am - 5pm at [phone] (Option 2 to reach the coordinator or Option 4 to schedule an appointment).  After hours for urgent matters, please call [facility] at [phone].

## 2023-05-04 NOTE — LETTER
"5/4/2023       RE: Corey Makannelise  [address]  [place] [address]     Dear Colleague,    Thank you for referring your patient, Corey Roland, to the [facility] [place] [facility]. Please see a copy of my visit note below.    TRANSPLANT NEPHROLOGY CHRONIC POST TRANSPLANT VISIT    Assessment & Plan   # DDKT: Stable   - Baseline Creatinine:  ~ 0.8-1   - Proteinuria: Mild (0.5-1.0 grams)   - Date DSA Last Checked: Dec/2021      Latest DSA: No   - BK Viremia: Not checked recently due to time from transplant   - Kidney Tx Biopsy: No    -Labs every 3 months     # Immunosuppression: Tacrolimus immediate release (goal 4-6) and Mycophenolate mofetil (dose 750 mg every 12 hours)   - Continue with intensive monitoring of immunosuppression for efficacy and toxicity.   - Changes: Not at this time    # Infection Prophylaxis:   - PJP: Sulfa/TMP (Bactrim) MWF    # Hypertension: Controlled;  Goal BP: < 130/80   - Changes: Not at this time. Continue carvedilol 25mg bid, nifedipine 30mg bid, losartan 50mg bid, clonidine 0.6mg bid, furosemide 60mg bid    # Diabetes: Controlled (HbA1c <7%) Last HbA1c: 6.9%   - Management as per Endocrinology. Currently on insulin degludec 35U daily. He stopped empagliflozin 25mg daily because of cost and \"it wasn't effective at decreasing BGs\". I explained why this medication is important for cardio and renal protection. I will reach out to Dr. Us to see if there are any other [id number] that may be cheaper for him     # Post-Transplant Erythrocytosis: Hgb: Trend up;  On ACEI/ARB: Yes   Imaging: No, repeat imaging of native and transplanted kidneys. Last imaging 12/2018 with enlarging cysts     # Mineral Bone Disorder:   - Secondary renal hyperparathyroidism; PTH level: Minimally elevated ( pg/ml)        On treatment: None  - Vitamin D; level: Not checked recently, but was low last check       "  On supplement: Yes, cholecalciferol 2000 units daily   - Calcium; level: High        On supplement: No  - Phosphorus; level: Not checked recently        On supplement: No    # Electrolytes:   - Potassium; level: Normal        On supplement: No  - Magnesium; level: Not checked recently        On supplement: No  - Bicarbonate; level: Normal        On supplement: No    # Skin Cancer Risk:    - Discussed sun protection and recommend regular follow up with Dermatology.    # Medical Compliance: No.  Evidence of infrequent transplant follow-up.  - Discussed importance of checking labs regularly as recommended, taking medications as prescribed and attending scheduled medical appointments.      # Transplant History:  Etiology of Kidney Failure: Diabetes mellitus type 2  Tx: DDKT  Transplant: 11/24/2016 (Kidney)  Significant changes in immunosuppression: None  Significant transplant-related complications: None    Transplant Office Phone Number: [phone]    Assessment and plan was discussed with the patient and he voiced his understanding and agreement.    Return visit: Return in about 1 year (around 5/4/2024).    Chris Lubin MD     I spent 49 minutes on the date of the encounter doing chart review, review of test results, interpretation of tests, patient visit and documentation      Chief Complaint   Mr. Roland is a 67 year old here for kidney transplant, immunosuppression management and routine follow up.    History of Present Illness   The patient overall feels well. He denies any recent hospitalizations. He denies nausea, vomiting, diarrhea, fever, chills, shortness of breath, chest pain, unintentional weight loss, nights sweats, dysuria, hematuria.     He notes that his feet swell and it is worse in the evening than night. He does wear compression socks which help.     Home BP: Not checked    Problem List   Patient Active Problem List   Diagnosis    Hypertension secondary to other renal disorders    Secondary  renal hyperparathyroidism (H)    Obesity    Diabetic peripheral neuropathy (H)    Kidney replaced by transplant    Immunosuppressed status (H)    Type 1 diabetes mellitus with nephropathy (H)    Aftercare following organ transplant    Vitamin D deficiency    Hypomagnesemia    Post-transplant erythrocytosis    Morbid obesity (H)       Allergies   No Known Allergies    Medications   Current Outpatient Medications   Medication Sig    aspirin EC 81 MG EC tablet Take 1 tablet (81 mg) by mouth daily    atorvastatin (LIPITOR) 20 MG tablet Take 1 tablet (20 mg) by mouth daily    carvedilol (COREG) 25 MG tablet TAKE ONE TABLET BY MOUTH TWICE DAILY WITH MEALS    cloNIDine (CATAPRES) 0.3 MG tablet Take 0.6 mg by mouth 2 times daily     DIPHENHYDRAMINE HCL PO Take 50 mg by mouth At Bedtime    empagliflozin (JARDIANCE) 25 MG TABS tablet Take 25 mg by mouth daily    FUROSEMIDE PO Take 60 mg by mouth 2 times daily    insulin aspart (NOVOLOG PEN) 100 UNIT/ML injection Inject 1 Units Subcutaneous Take with snacks or supplements for high blood sugar 1 unit per 4 grams carbohydrate    insulin aspart (NOVOLOG PEN) 100 UNIT/ML injection Inject 1 Units Subcutaneous 3 times daily (with meals) DOSE:  1 units per 4 grams of carbohydrate.  Only chart total amount of units given.  Do not give if pre-prandial glucose is less than 60 mg/dL.    insulin aspart (NOVOLOG PEN) 100 UNIT/ML injection Inject 1-22 Units Subcutaneous 3 times daily (before meals) Correction Scale - VERY HIGH INSULIN RESISTANCE DOSING     Do Not give Correction Insulin if Pre-Meal BG less than 250.   For Pre-Meal  - 260 give 1 unit.   For Pre-Meal -269  give 2 units.   For Pre-Meal  - 279 give 3 units.   For Pre-Meal  - 289 give 4 units.   For Pre-Meal  - 299 give 5 units.   For Pre-Meal  - 309 give 6 units.   ...    insulin aspart (NOVOLOG PEN) 100 UNIT/ML injection Inject 1-16 Units Subcutaneous At Bedtime Correction Scale - VERY HIGH  "INSULIN RESISTANCE DOSING     Do Not give Bedtime Correction Insulin if BG less than 200.   For  - 209 give 1 units.   For  - 219 give 2 units.   For  - 229 give 3 units.   For  - 239 give 4 units.   For  - 249 give 5 units.   For  - 259 give 6 units.   For  - 269 give 7 units.   For  - 279 give 8 units  ...    insulin syringe 31G X 5/16\" 0.5 ML MISC FOR ADMINISTERING INSULIN AT HOME 5 TIMES PER DAY.    losartan (COZAAR) 50 MG tablet Take 1 tablet (50 mg) by mouth 2 times daily    mycophenolate (GENERIC EQUIVALENT) 250 MG capsule Take 3 capsules (750 mg) by mouth 2 times daily    NIFEdipine ER (ADALAT CC) 30 MG TB24 Take 1 tablet (30 mg) by mouth 2 times daily    omeprazole 20 MG tablet Take 1 tablet (20 mg) by mouth every morning    sulfamethoxazole-trimethoprim (BACTRIM) 400-80 MG tablet TAKE ONE TABLET BY MOUTH THREE TIMES A WEEK    tacrolimus (GENERIC EQUIVALENT) 0.5 MG capsule HOLD. Total dose 3.0 mg twice daily.    tacrolimus (GENERIC EQUIVALENT) 1 MG capsule Take 3 capsules (3 mg) by mouth 2 times daily    TRESIBA FLEXTOUCH 200 UNIT/ML pen Inject 35 Units Subcutaneous daily      No current facility-administered medications for this visit.     Medications Discontinued During This Encounter   Medication Reason    aspirin EC 81 MG EC tablet     empagliflozin (JARDIANCE) 25 MG TABS tablet     insulin aspart (NOVOLOG PEN) 100 UNIT/ML injection     insulin aspart (NOVOLOG PEN) 100 UNIT/ML injection     insulin aspart (NOVOLOG PEN) 100 UNIT/ML injection        Physical Exam   Vital Signs: /74   Pulse 70   Temp 97.7  F (36.5  C) (Oral)   Wt 121.7 kg (268 lb 6.4 oz)   SpO2 96%   BMI 36.39 kg/m      GENERAL APPEARANCE: alert and no distress  HENT: mouth without ulcers or lesions  LYMPHATICS: no cervical or supraclavicular nodes  RESP: lungs clear to auscultation - no rales, rhonchi or wheezes  CV: regular rhythm, normal rate, no rub, no murmur  EDEMA: no LE " edema bilaterally  ABDOMEN: soft, nondistended, nontender, bowel sounds normal  MS: extremities normal - no gross deformities noted, no evidence of inflammation in joints, no muscle tenderness  SKIN: no rash  NEURO: normal strength and tone, sensory exam grossly normal, mentation intact and speech normal  PSYCH: mentation appears normal and affect normal/bright  TX KIDNEY: normal      Data         Latest Ref Rng & Units 5/1/2023     9:30 AM 4/7/2023     9:53 AM 3/3/2023     9:51 AM   Renal   Sodium 136 - 145 mmol/L 143   141   140     K 3.4 - 5.3 mmol/L 4.1   4.1   4.7     Cl 98 - 107 mmol/L 104   104   104     Cl (external) 98 - 107 mmol/L 104   104   104     CO2 22 - 29 mmol/L 22   24   21     Urea Nitrogen 8.0 - 23.0 mg/dL 21.3   24.5   22.2     Creatinine 0.67 - 1.17 mg/dL 0.91   0.94   1.01     Glucose 70 - 99 mg/dL 101   103   132     Calcium 8.8 - 10.2 mg/dL 10.5   10.1   10.7           Latest Ref Rng & Units 2/3/2023     9:42 AM 12/28/2021     9:43 AM 6/26/2020     9:54 AM   Bone Health   Phosphorus 2.5 - 4.5 mg/dL  2.8      Parathyroid Hormone Intact 15 - 65 pg/mL 88    101     Vit D Def 20 - 75 ug/L   18           Latest Ref Rng & Units 5/1/2023     9:30 AM 4/7/2023     9:53 AM 3/3/2023     9:51 AM   Heme   WBC 4.0 - 11.0 10e3/uL 6.5   6.0   6.4     Hgb 13.3 - 17.7 g/dL 17.0   16.3   17.1     Plt 150 - 450 10e3/uL 195   200   211           Latest Ref Rng & Units 12/28/2021     9:43 AM 5/31/2019     9:39 AM 12/28/2018     9:34 AM   Liver   Albumin 3.4 - 5.0 g/dL 4.1   3.9   3.6           Latest Ref Rng & Units 11/25/2016    12:10 AM 11/24/2016     8:55 AM 4/15/2015     2:49 PM   Pancreas   A1C 4.3 - 6.0 % 7.0   7.0   7.2           Latest Ref Rng & Units 11/27/2016     4:00 AM   Iron studies   Iron 35 - 180 ug/dL 74     Iron Saturation Index 15 - 46 % 41     Ferritin 26 - 388 ng/mL 1,611           Latest Ref Rng & Units 5/31/2019     9:40 AM 12/4/2018    12:23 PM 7/27/2018     9:51 AM   [facility] Txp Virology   BK  Spec  Plasma   Plasma   Plasma     BK Res BKNEG^BK Virus DNA Not Detected copies/mL BK Virus DNA Not Detected   BK Virus DNA Not Detected   BK Virus DNA Not Detected     BK Log <2.7 Log copies/mL Not Calculated   Not Calculated   Not Calculated          Recent Labs   Lab Test 03/03/23  0951 04/07/23  0953 05/01/23  0930   DOSTAC 3/2/2023 4/6/2023 4/30/2023   TACROL 4.7* 4.6* 4.8*     Recent Labs   Lab Test 02/06/17  0733 02/13/17  0736 02/20/17  0733   DOSMPA 2,052,017 2,000 2,192,017   MPACID 2.38 2.03 4.29*   MPAG 104.7* 102.9* 85.4       Again, thank you for allowing me to participate in the care of your patient.      Sincerely,    Chris Lubin MD

## 2023-05-04 NOTE — NURSING NOTE
Chief Complaint   Patient presents with     RECHECK     [facility]       /74   Pulse 70   Temp 97.7  F (36.5  C) (Oral)   Wt 121.7 kg (268 lb 6.4 oz)   SpO2 96%   BMI 36.39 kg/m      ROBERT Visit Time Tracking  Role: MA/JUSTINE  Type: Time with patient  Time in minutes: Bola Recinos on 5/4/2023 at 1:30 PM

## 2023-05-04 NOTE — PROGRESS NOTES
"TRANSPLANT NEPHROLOGY CHRONIC POST TRANSPLANT VISIT    Assessment & Plan   # DDKT: Stable   - Baseline Creatinine:  ~ 0.8-1   - Proteinuria: Mild (0.5-1.0 grams)   - Date DSA Last Checked: Dec/2021      Latest DSA: No   - BK Viremia: Not checked recently due to time from transplant   - Kidney Tx Biopsy: No    -Labs every 3 months     # Immunosuppression: Tacrolimus immediate release (goal 4-6) and Mycophenolate mofetil (dose 750 mg every 12 hours)   - Continue with intensive monitoring of immunosuppression for efficacy and toxicity.   - Changes: Not at this time    # Infection Prophylaxis:   - PJP: Sulfa/TMP (Bactrim) MWF    # Hypertension: Controlled;  Goal BP: < 130/80   - Changes: Not at this time. Continue carvedilol 25mg bid, nifedipine 30mg bid, losartan 50mg bid, clonidine 0.6mg bid, furosemide 60mg bid    # Diabetes: Controlled (HbA1c <7%) Last HbA1c: 6.9%   - Management as per Endocrinology. Currently on insulin degludec 35U daily. He stopped empagliflozin 25mg daily because of cost and \"it wasn't effective at decreasing BGs\". I explained why this medication is important for cardio and renal protection. I will reach out to Dr. Us to see if there are any other HYHQ0zf that may be cheaper for him     # Post-Transplant Erythrocytosis: Hgb: Trend up;  On ACEI/ARB: Yes   Imaging: No, repeat imaging of native and transplanted kidneys. Last imaging 12/2018 with enlarging cysts     # Mineral Bone Disorder:   - Secondary renal hyperparathyroidism; PTH level: Minimally elevated ( pg/ml)        On treatment: None  - Vitamin D; level: Not checked recently, but was low last check        On supplement: Yes, cholecalciferol 2000 units daily   - Calcium; level: High        On supplement: No  - Phosphorus; level: Not checked recently        On supplement: No    # Electrolytes:   - Potassium; level: Normal        On supplement: No  - Magnesium; level: Not checked recently        On supplement: No  - Bicarbonate; " level: Normal        On supplement: No    # Skin Cancer Risk:    - Discussed sun protection and recommend regular follow up with Dermatology.    # Medical Compliance: No.  Evidence of infrequent transplant follow-up.  - Discussed importance of checking labs regularly as recommended, taking medications as prescribed and attending scheduled medical appointments.      # Transplant History:  Etiology of Kidney Failure: Diabetes mellitus type 2  Tx: DDKT  Transplant: 11/24/2016 (Kidney)  Significant changes in immunosuppression: None  Significant transplant-related complications: None    Transplant Office Phone Number: [phone]    Assessment and plan was discussed with the patient and he voiced his understanding and agreement.    Return visit: Return in about 1 year (around 5/4/2024).    Chris Lubin MD     I spent 49 minutes on the date of the encounter doing chart review, review of test results, interpretation of tests, patient visit and documentation      Chief Complaint   Mr. Roland is a 67 year old here for kidney transplant, immunosuppression management and routine follow up.    History of Present Illness   The patient overall feels well. He denies any recent hospitalizations. He denies nausea, vomiting, diarrhea, fever, chills, shortness of breath, chest pain, unintentional weight loss, nights sweats, dysuria, hematuria.     He notes that his feet swell and it is worse in the evening than night. He does wear compression socks which help.     Home BP: Not checked    Problem List   Patient Active Problem List   Diagnosis     Hypertension secondary to other renal disorders     Secondary renal hyperparathyroidism (H)     Obesity     Diabetic peripheral neuropathy (H)     Kidney replaced by transplant     Immunosuppressed status (H)     Type 1 diabetes mellitus with nephropathy (H)     Aftercare following organ transplant     Vitamin D deficiency     Hypomagnesemia     Post-transplant erythrocytosis     Morbid  obesity (H)       Allergies   No Known Allergies    Medications   Current Outpatient Medications   Medication Sig     aspirin EC 81 MG EC tablet Take 1 tablet (81 mg) by mouth daily     atorvastatin (LIPITOR) 20 MG tablet Take 1 tablet (20 mg) by mouth daily     carvedilol (COREG) 25 MG tablet TAKE ONE TABLET BY MOUTH TWICE DAILY WITH MEALS     cloNIDine (CATAPRES) 0.3 MG tablet Take 0.6 mg by mouth 2 times daily      DIPHENHYDRAMINE HCL PO Take 50 mg by mouth At Bedtime     empagliflozin (JARDIANCE) 25 MG TABS tablet Take 25 mg by mouth daily     FUROSEMIDE PO Take 60 mg by mouth 2 times daily     insulin aspart (NOVOLOG PEN) 100 UNIT/ML injection Inject 1 Units Subcutaneous Take with snacks or supplements for high blood sugar 1 unit per 4 grams carbohydrate     insulin aspart (NOVOLOG PEN) 100 UNIT/ML injection Inject 1 Units Subcutaneous 3 times daily (with meals) DOSE:  1 units per 4 grams of carbohydrate.  Only chart total amount of units given.  Do not give if pre-prandial glucose is less than 60 mg/dL.     insulin aspart (NOVOLOG PEN) 100 UNIT/ML injection Inject 1-22 Units Subcutaneous 3 times daily (before meals) Correction Scale - VERY HIGH INSULIN RESISTANCE DOSING     Do Not give Correction Insulin if Pre-Meal BG less than 250.   For Pre-Meal  - 260 give 1 unit.   For Pre-Meal -269  give 2 units.   For Pre-Meal  - 279 give 3 units.   For Pre-Meal  - 289 give 4 units.   For Pre-Meal  - 299 give 5 units.   For Pre-Meal  - 309 give 6 units.   ...     insulin aspart (NOVOLOG PEN) 100 UNIT/ML injection Inject 1-16 Units Subcutaneous At Bedtime Correction Scale - VERY HIGH INSULIN RESISTANCE DOSING     Do Not give Bedtime Correction Insulin if BG less than 200.   For  - 209 give 1 units.   For  - 219 give 2 units.   For  - 229 give 3 units.   For  - 239 give 4 units.   For  - 249 give 5 units.   For  - 259 give 6 units.   For  - 269  "give 7 units.   For  - 279 give 8 units  ...     insulin syringe 31G X 5/16\" 0.5 ML MISC FOR ADMINISTERING INSULIN AT HOME 5 TIMES PER DAY.     losartan (COZAAR) 50 MG tablet Take 1 tablet (50 mg) by mouth 2 times daily     mycophenolate (GENERIC EQUIVALENT) 250 MG capsule Take 3 capsules (750 mg) by mouth 2 times daily     NIFEdipine ER (ADALAT CC) 30 MG TB24 Take 1 tablet (30 mg) by mouth 2 times daily     omeprazole 20 MG tablet Take 1 tablet (20 mg) by mouth every morning     sulfamethoxazole-trimethoprim (BACTRIM) 400-80 MG tablet TAKE ONE TABLET BY MOUTH THREE TIMES A WEEK     tacrolimus (GENERIC EQUIVALENT) 0.5 MG capsule HOLD. Total dose 3.0 mg twice daily.     tacrolimus (GENERIC EQUIVALENT) 1 MG capsule Take 3 capsules (3 mg) by mouth 2 times daily     TRESIBA FLEXTOUCH 200 UNIT/ML pen Inject 35 Units Subcutaneous daily      No current facility-administered medications for this visit.     Medications Discontinued During This Encounter   Medication Reason     aspirin EC 81 MG EC tablet      empagliflozin (JARDIANCE) 25 MG TABS tablet      insulin aspart (NOVOLOG PEN) 100 UNIT/ML injection      insulin aspart (NOVOLOG PEN) 100 UNIT/ML injection      insulin aspart (NOVOLOG PEN) 100 UNIT/ML injection        Physical Exam   Vital Signs: /74   Pulse 70   Temp 97.7  F (36.5  C) (Oral)   Wt 121.7 kg (268 lb 6.4 oz)   SpO2 96%   BMI 36.39 kg/m      GENERAL APPEARANCE: alert and no distress  HENT: mouth without ulcers or lesions  LYMPHATICS: no cervical or supraclavicular nodes  RESP: lungs clear to auscultation - no rales, rhonchi or wheezes  CV: regular rhythm, normal rate, no rub, no murmur  EDEMA: no LE edema bilaterally  ABDOMEN: soft, nondistended, nontender, bowel sounds normal  MS: extremities normal - no gross deformities noted, no evidence of inflammation in joints, no muscle tenderness  SKIN: no rash  NEURO: normal strength and tone, sensory exam grossly normal, mentation intact and " speech normal  PSYCH: mentation appears normal and affect normal/bright  TX KIDNEY: normal      Data         Latest Ref Rng & Units 5/1/2023     9:30 AM 4/7/2023     9:53 AM 3/3/2023     9:51 AM   Renal   Sodium 136 - 145 mmol/L 143   141   140     K 3.4 - 5.3 mmol/L 4.1   4.1   4.7     Cl 98 - 107 mmol/L 104   104   104     Cl (external) 98 - 107 mmol/L 104   104   104     CO2 22 - 29 mmol/L 22   24   21     Urea Nitrogen 8.0 - 23.0 mg/dL 21.3   24.5   22.2     Creatinine 0.67 - 1.17 mg/dL 0.91   0.94   1.01     Glucose 70 - 99 mg/dL 101   103   132     Calcium 8.8 - 10.2 mg/dL 10.5   10.1   10.7           Latest Ref Rng & Units 2/3/2023     9:42 AM 12/28/2021     9:43 AM 6/26/2020     9:54 AM   Bone Health   Phosphorus 2.5 - 4.5 mg/dL  2.8      Parathyroid Hormone Intact 15 - 65 pg/mL 88    101     Vit D Def 20 - 75 ug/L   18           Latest Ref Rng & Units 5/1/2023     9:30 AM 4/7/2023     9:53 AM 3/3/2023     9:51 AM   Heme   WBC 4.0 - 11.0 10e3/uL 6.5   6.0   6.4     Hgb 13.3 - 17.7 g/dL 17.0   16.3   17.1     Plt 150 - 450 10e3/uL 195   200   211           Latest Ref Rng & Units 12/28/2021     9:43 AM 5/31/2019     9:39 AM 12/28/2018     9:34 AM   Liver   Albumin 3.4 - 5.0 g/dL 4.1   3.9   3.6           Latest Ref Rng & Units 11/25/2016    12:10 AM 11/24/2016     8:55 AM 4/15/2015     2:49 PM   Pancreas   A1C 4.3 - 6.0 % 7.0   7.0   7.2           Latest Ref Rng & Units 11/27/2016     4:00 AM   Iron studies   Iron 35 - 180 ug/dL 74     Iron Saturation Index 15 - 46 % 41     Ferritin 26 - 388 ng/mL 1,611           Latest Ref Rng & Units 5/31/2019     9:40 AM 12/4/2018    12:23 PM 7/27/2018     9:51 AM   [facility] Txp Virology   BK Spec  Plasma   Plasma   Plasma     BK Res BKNEG^BK Virus DNA Not Detected copies/mL BK Virus DNA Not Detected   BK Virus DNA Not Detected   BK Virus DNA Not Detected     BK Log <2.7 Log copies/mL Not Calculated   Not Calculated   Not Calculated          Recent Labs   Lab Test 03/03/23  0951  04/07/23  0953 05/01/23  0930   DOSTAC 3/2/2023 4/6/2023 4/30/2023   TACROL 4.7* 4.6* 4.8*     Recent Labs   Lab Test 02/06/17  0733 02/13/17  0736 02/20/17  0733   DOSMPA 2,052,017 2,000 2,192,017   MPACID 2.38 2.03 4.29*   MPAG 104.7* 102.9* 85.4

## 2023-05-09 ENCOUNTER — ANCILLARY PROCEDURE (OUTPATIENT)
Dept: ULTRASOUND IMAGING | Facility: CLINIC | Age: 68
End: 2023-05-09
Attending: INTERNAL MEDICINE
Payer: MEDICARE

## 2023-05-09 DIAGNOSIS — D75.1 POST-TRANSPLANT ERYTHROCYTOSIS: ICD-10-CM

## 2023-05-09 PROCEDURE — 76770 US EXAM ABDO BACK WALL COMP: CPT | Mod: GC | Performed by: RADIOLOGY

## 2023-05-10 DIAGNOSIS — Z94.0 KIDNEY TRANSPLANTED: Primary | ICD-10-CM

## 2023-05-10 DIAGNOSIS — Z94.0 KIDNEY TRANSPLANT RECIPIENT: ICD-10-CM

## 2023-05-10 RX ORDER — TACROLIMUS 1 MG/1
3 CAPSULE ORAL 2 TIMES DAILY
Qty: 180 CAPSULE | Refills: 11 | Status: SHIPPED | OUTPATIENT
Start: 2023-05-10 | End: 2024-05-13

## 2023-05-11 ENCOUNTER — DOCUMENTATION ONLY (OUTPATIENT)
Dept: TRANSPLANT | Facility: CLINIC | Age: 68
End: 2023-05-11
Payer: MEDICARE

## 2023-05-11 NOTE — PROGRESS NOTES
"Chris Juan MD Harris, Kathleen, RN  I told him that he could get labs q3 months. Please send a message to DR. Us (his endocrinologist) and ask if any other SGLT2i are cheaper than empagliflozin. The patient stopped it by himself about a month ago because of cost and \"it wasn't working\". I want him on it for cardio and renal protection.     I ordered U/S of native and transplanted kidneys to look for cysts/masses for post txp erythrocytosis.     Chris Abdalla       OUTCOME  Ultrasound completed and reviewed by provider.  Message sent to Dr. Us regarding Empagliflozin    "

## 2023-05-11 NOTE — LETTER
DATE & TIME ISSUED: May 11, 2023 2:22 PM  PATIENT NAME: Corey Roland   : 1955         Attention: Office of Dr. Landon Us (endocrinology):    Corey Roland was recently seen by Dr. Lubin for his annual kidney transplant follow up. Corey stated he stopped his Empagliflozin on his own about a month ago because of the high cost and he felt it was not working for him.   Dr. Lubin was wondering if someone from your team would be able to reach out to Corey to discuss an alternate SGLT2i, or cost assistance for Empagliflozin. The transplant team would like him to remain on an SGLT2i for DM2, as well as cardio and renal protection.       Thanks,       hCris Lubin MD  [phone]

## 2023-06-15 ENCOUNTER — MYC MEDICAL ADVICE (OUTPATIENT)
Dept: NEPHROLOGY | Facility: CLINIC | Age: 68
End: 2023-06-15
Payer: MEDICARE

## 2023-06-19 DIAGNOSIS — Z94.0 KIDNEY REPLACED BY TRANSPLANT: Primary | ICD-10-CM

## 2023-06-19 RX ORDER — MYCOPHENOLATE MOFETIL 250 MG/1
750 CAPSULE ORAL 2 TIMES DAILY
Qty: 180 CAPSULE | Refills: 11 | Status: SHIPPED | OUTPATIENT
Start: 2023-06-19 | End: 2024-06-12

## 2023-07-18 DIAGNOSIS — Z94.0 KIDNEY TRANSPLANT RECIPIENT: Primary | ICD-10-CM

## 2023-07-18 RX ORDER — SULFAMETHOXAZOLE AND TRIMETHOPRIM 400; 80 MG/1; MG/1
1 TABLET ORAL
Qty: 13 TABLET | Refills: 11 | Status: SHIPPED | OUTPATIENT
Start: 2023-07-19 | End: 2024-03-14

## 2023-08-01 ENCOUNTER — LAB (OUTPATIENT)
Dept: LAB | Facility: CLINIC | Age: 68
End: 2023-08-01
Payer: MEDICARE

## 2023-08-01 DIAGNOSIS — Z48.298 AFTERCARE FOLLOWING ORGAN TRANSPLANT: ICD-10-CM

## 2023-08-01 DIAGNOSIS — Z94.0 KIDNEY REPLACED BY TRANSPLANT: ICD-10-CM

## 2023-08-01 LAB
ANION GAP SERPL CALCULATED.3IONS-SCNC: 12 MMOL/L (ref 7–15)
BUN SERPL-MCNC: 18.2 MG/DL (ref 8–23)
CALCIUM SERPL-MCNC: 9.8 MG/DL (ref 8.8–10.2)
CHLORIDE SERPL-SCNC: 105 MMOL/L (ref 98–107)
CREAT SERPL-MCNC: 0.86 MG/DL (ref 0.67–1.17)
DEPRECATED HCO3 PLAS-SCNC: 23 MMOL/L (ref 22–29)
ERYTHROCYTE [DISTWIDTH] IN BLOOD BY AUTOMATED COUNT: 13.1 % (ref 10–15)
GFR SERPL CREATININE-BSD FRML MDRD: >90 ML/MIN/1.73M2
GLUCOSE SERPL-MCNC: 150 MG/DL (ref 70–99)
HCT VFR BLD AUTO: 50.9 % (ref 40–53)
HGB BLD-MCNC: 16.2 G/DL (ref 13.3–17.7)
MCH RBC QN AUTO: 28.8 PG (ref 26.5–33)
MCHC RBC AUTO-ENTMCNC: 31.8 G/DL (ref 31.5–36.5)
MCV RBC AUTO: 91 FL (ref 78–100)
PLATELET # BLD AUTO: 221 10E3/UL (ref 150–450)
POTASSIUM SERPL-SCNC: 4.1 MMOL/L (ref 3.4–5.3)
RBC # BLD AUTO: 5.62 10E6/UL (ref 4.4–5.9)
SODIUM SERPL-SCNC: 140 MMOL/L (ref 136–145)
TACROLIMUS BLD-MCNC: 4.7 UG/L (ref 5–15)
TME LAST DOSE: ABNORMAL H
TME LAST DOSE: ABNORMAL H
WBC # BLD AUTO: 6.8 10E3/UL (ref 4–11)

## 2023-08-01 PROCEDURE — 80048 BASIC METABOLIC PNL TOTAL CA: CPT

## 2023-08-01 PROCEDURE — 80197 ASSAY OF TACROLIMUS: CPT

## 2023-08-01 PROCEDURE — 85027 COMPLETE CBC AUTOMATED: CPT

## 2023-08-01 PROCEDURE — 36415 COLL VENOUS BLD VENIPUNCTURE: CPT

## 2023-09-09 ENCOUNTER — HEALTH MAINTENANCE LETTER (OUTPATIENT)
Age: 68
End: 2023-09-09

## 2023-11-21 ENCOUNTER — LAB (OUTPATIENT)
Dept: LAB | Facility: CLINIC | Age: 68
End: 2023-11-21
Payer: MEDICARE

## 2023-11-21 DIAGNOSIS — Z94.0 KIDNEY REPLACED BY TRANSPLANT: ICD-10-CM

## 2023-11-21 DIAGNOSIS — Z48.298 AFTERCARE FOLLOWING ORGAN TRANSPLANT: ICD-10-CM

## 2023-11-21 LAB
ANION GAP SERPL CALCULATED.3IONS-SCNC: 14 MMOL/L (ref 7–15)
BUN SERPL-MCNC: 7.8 MG/DL (ref 8–23)
CALCIUM SERPL-MCNC: 9.5 MG/DL (ref 8.8–10.2)
CHLORIDE SERPL-SCNC: 107 MMOL/L (ref 98–107)
CREAT SERPL-MCNC: 0.95 MG/DL (ref 0.67–1.17)
DEPRECATED HCO3 PLAS-SCNC: 20 MMOL/L (ref 22–29)
EGFRCR SERPLBLD CKD-EPI 2021: 87 ML/MIN/1.73M2
ERYTHROCYTE [DISTWIDTH] IN BLOOD BY AUTOMATED COUNT: 12.7 % (ref 10–15)
GLUCOSE SERPL-MCNC: 126 MG/DL (ref 70–99)
HCT VFR BLD AUTO: 51.4 % (ref 40–53)
HGB BLD-MCNC: 16.6 G/DL (ref 13.3–17.7)
HOLD SPECIMEN: NORMAL
MCH RBC QN AUTO: 29.5 PG (ref 26.5–33)
MCHC RBC AUTO-ENTMCNC: 32.3 G/DL (ref 31.5–36.5)
MCV RBC AUTO: 91 FL (ref 78–100)
PLATELET # BLD AUTO: 226 10E3/UL (ref 150–450)
POTASSIUM SERPL-SCNC: 4 MMOL/L (ref 3.4–5.3)
RBC # BLD AUTO: 5.63 10E6/UL (ref 4.4–5.9)
SODIUM SERPL-SCNC: 141 MMOL/L (ref 135–145)
TACROLIMUS BLD-MCNC: 5.4 UG/L (ref 5–15)
TME LAST DOSE: NORMAL H
TME LAST DOSE: NORMAL H
WBC # BLD AUTO: 6.3 10E3/UL (ref 4–11)

## 2023-11-21 PROCEDURE — 80197 ASSAY OF TACROLIMUS: CPT

## 2023-11-21 PROCEDURE — 36415 COLL VENOUS BLD VENIPUNCTURE: CPT

## 2023-11-21 PROCEDURE — 85027 COMPLETE CBC AUTOMATED: CPT

## 2023-11-21 PROCEDURE — 80048 BASIC METABOLIC PNL TOTAL CA: CPT

## 2024-03-14 ENCOUNTER — TELEPHONE (OUTPATIENT)
Dept: TRANSPLANT | Facility: CLINIC | Age: 69
End: 2024-03-14
Payer: MEDICARE

## 2024-03-14 DIAGNOSIS — Z94.0 KIDNEY TRANSPLANT RECIPIENT: ICD-10-CM

## 2024-03-14 RX ORDER — SULFAMETHOXAZOLE AND TRIMETHOPRIM 400; 80 MG/1; MG/1
1 TABLET ORAL
Qty: 13 TABLET | Refills: 11 | Status: SHIPPED | OUTPATIENT
Start: 2024-03-15

## 2024-03-14 NOTE — TELEPHONE ENCOUNTER
Call placed to patient. No answer. Voice message left informing patient that Bactrim rx was received by [facility] pharmacy.

## 2024-03-14 NOTE — TELEPHONE ENCOUNTER
Patient Call: Medication Refill  Route to LPN  Instruct the patient to first contact their pharmacy. If they have called their pharmacy and require further assistance, route to LPN.    Pharmacy Name: [facility] #[id number] -    Pharmacy Location: [address]    Name of Medication: sulfamethoxazole-trimethoprim (BACTRIM)    Dose: 400-80 MG tablet        When will the patient be out of this medication?: Less than 24 hours (Pasquale WALKER, then page if no answer)

## 2024-04-06 ENCOUNTER — HEALTH MAINTENANCE LETTER (OUTPATIENT)
Age: 69
End: 2024-04-06

## 2024-05-03 ENCOUNTER — LAB (OUTPATIENT)
Dept: LAB | Facility: CLINIC | Age: 69
End: 2024-05-03
Payer: MEDICARE

## 2024-05-03 DIAGNOSIS — Z94.0 KIDNEY TRANSPLANT RECIPIENT: ICD-10-CM

## 2024-05-03 DIAGNOSIS — Z48.298 AFTERCARE FOLLOWING ORGAN TRANSPLANT: ICD-10-CM

## 2024-05-03 LAB
ERYTHROCYTE [DISTWIDTH] IN BLOOD BY AUTOMATED COUNT: 12.7 % (ref 10–15)
HCT VFR BLD AUTO: 51.2 % (ref 40–53)
HGB BLD-MCNC: 16.7 G/DL (ref 13.3–17.7)
MCH RBC QN AUTO: 29.5 PG (ref 26.5–33)
MCHC RBC AUTO-ENTMCNC: 32.6 G/DL (ref 31.5–36.5)
MCV RBC AUTO: 90 FL (ref 78–100)
PLATELET # BLD AUTO: 218 10E3/UL (ref 150–450)
RBC # BLD AUTO: 5.67 10E6/UL (ref 4.4–5.9)
TACROLIMUS BLD-MCNC: 4.6 UG/L (ref 5–15)
TME LAST DOSE: ABNORMAL H
TME LAST DOSE: ABNORMAL H
WBC # BLD AUTO: 8.2 10E3/UL (ref 4–11)

## 2024-05-03 PROCEDURE — 80197 ASSAY OF TACROLIMUS: CPT

## 2024-05-03 PROCEDURE — 85027 COMPLETE CBC AUTOMATED: CPT

## 2024-05-03 PROCEDURE — 84156 ASSAY OF PROTEIN URINE: CPT

## 2024-05-03 PROCEDURE — 36415 COLL VENOUS BLD VENIPUNCTURE: CPT

## 2024-05-03 PROCEDURE — 80048 BASIC METABOLIC PNL TOTAL CA: CPT

## 2024-05-04 LAB
ALBUMIN MFR UR ELPH: 55.3 MG/DL
ANION GAP SERPL CALCULATED.3IONS-SCNC: 14 MMOL/L (ref 7–15)
BUN SERPL-MCNC: 21 MG/DL (ref 8–23)
CALCIUM SERPL-MCNC: 9.8 MG/DL (ref 8.8–10.2)
CHLORIDE SERPL-SCNC: 104 MMOL/L (ref 98–107)
CREAT SERPL-MCNC: 0.96 MG/DL (ref 0.67–1.17)
CREAT UR-MCNC: 60.6 MG/DL
DEPRECATED HCO3 PLAS-SCNC: 23 MMOL/L (ref 22–29)
EGFRCR SERPLBLD CKD-EPI 2021: 86 ML/MIN/1.73M2
GLUCOSE SERPL-MCNC: 125 MG/DL (ref 70–99)
POTASSIUM SERPL-SCNC: 4 MMOL/L (ref 3.4–5.3)
PROT/CREAT 24H UR: 0.91 MG/MG CR (ref 0–0.2)
SODIUM SERPL-SCNC: 141 MMOL/L (ref 135–145)

## 2024-05-07 ENCOUNTER — OFFICE VISIT (OUTPATIENT)
Dept: TRANSPLANT | Facility: CLINIC | Age: 69
End: 2024-05-07
Attending: INTERNAL MEDICINE
Payer: MEDICARE

## 2024-05-07 VITALS
DIASTOLIC BLOOD PRESSURE: 79 MMHG | WEIGHT: 271 LBS | OXYGEN SATURATION: 96 % | HEART RATE: 77 BPM | BODY MASS INDEX: 36.75 KG/M2 | TEMPERATURE: 97.5 F | SYSTOLIC BLOOD PRESSURE: 138 MMHG

## 2024-05-07 DIAGNOSIS — Z94.0 HTN, KIDNEY TRANSPLANT RELATED: ICD-10-CM

## 2024-05-07 DIAGNOSIS — R80.1 PERSISTENT PROTEINURIA: ICD-10-CM

## 2024-05-07 DIAGNOSIS — Z48.298 AFTERCARE FOLLOWING ORGAN TRANSPLANT: ICD-10-CM

## 2024-05-07 DIAGNOSIS — Z94.0 KIDNEY REPLACED BY TRANSPLANT: Primary | ICD-10-CM

## 2024-05-07 DIAGNOSIS — I15.1 HTN, KIDNEY TRANSPLANT RELATED: ICD-10-CM

## 2024-05-07 DIAGNOSIS — D84.9 IMMUNOSUPPRESSED STATUS (H): ICD-10-CM

## 2024-05-07 PROCEDURE — 99214 OFFICE O/P EST MOD 30 MIN: CPT | Performed by: INTERNAL MEDICINE

## 2024-05-07 PROCEDURE — G2211 COMPLEX E/M VISIT ADD ON: HCPCS | Performed by: INTERNAL MEDICINE

## 2024-05-07 PROCEDURE — G0463 HOSPITAL OUTPT CLINIC VISIT: HCPCS | Performed by: INTERNAL MEDICINE

## 2024-05-07 RX ORDER — INSULIN GLARGINE AND LIXISENATIDE 100; 33 U/ML; UG/ML
45 INJECTION, SOLUTION SUBCUTANEOUS
COMMUNITY
Start: 2024-05-07

## 2024-05-07 RX ORDER — SPIRONOLACTONE 25 MG/1
25 TABLET ORAL DAILY
Qty: 90 TABLET | Refills: 3 | Status: SHIPPED | OUTPATIENT
Start: 2024-05-07 | End: 2024-08-05

## 2024-05-07 ASSESSMENT — PAIN SCALES - GENERAL: PAINLEVEL: NO PAIN (0)

## 2024-05-07 ASSESSMENT — PATIENT HEALTH QUESTIONNAIRE - PHQ9: SUM OF ALL RESPONSES TO PHQ QUESTIONS 1-9: 2

## 2024-05-07 NOTE — PATIENT INSTRUCTIONS
Patient Recommendations:  - Start spironolactone 25mg daily. Check blood pressures. Let us know if they are getting too low (lower than 110 systolic)  -Recommend COVID booster    Transplant Patient Information  Your Post Transplant Coordinator is: Ceci White  For non urgent items, we encourage you to contact your coordinator/care team online via [employer]  You and your care team can also contact your transplant coordinator Monday - Friday, 8am - 5pm at [phone] (Option 2 to reach the coordinator or Option 4 to schedule an appointment).  After hours for urgent matters, please call [facility] at [phone].

## 2024-05-07 NOTE — PROGRESS NOTES
TRANSPLANT NEPHROLOGY CHRONIC POST TRANSPLANT VISIT    Assessment & Plan   # DDKT: Stable   - Baseline Creatinine:  ~ 0.8-1   - Proteinuria: Mild (0.5-1.0 grams), increasing    - Date DSA Last Checked: Dec/2021      Latest DSA: No   - BK Viremia: Not checked recently due to time from transplant   - Kidney Tx Biopsy: No    -Labs every 3 months     # Immunosuppression: Tacrolimus immediate release (goal 4-6) and Mycophenolate mofetil (dose 750 mg every 12 hours)   - Continue with intensive monitoring of immunosuppression for efficacy and toxicity.   - Changes: Not at this time    # Proteinuria:   -Increasing to 0.9g/g in 5/2024, up from 0.38g/g in 2/2023   -Has not been able to get SGLT2i approved by insurance. Pt is on GLP1a (Soliqua). On max dose ARB and BP well controlled.   -Recommend that as patient will be coming off Soliqua for endocrinology to prescribe GLP1a individually along with long acting insulin   -Prescribe spironolactone 25mg daily for proteinuric diabetic CKD due to inability to pay for finerenone.     # Infection Prophylaxis:   - PJP: Sulfa/TMP (Bactrim) MWF    # Hypertension: Borderline control;  Goal BP: < 130/80   - Changes: Yes - prescribe spironolactone 25mg daily . Continue carvedilol 25mg bid, nifedipine 30mg bid, losartan 50mg bid, clonidine 0.6mg bid, furosemide 60mg bid.    -Check BP at home as this may drop BP and may need to dial back nifedipine or clonidine    # Diabetes: Controlled (HbA1c <7%) Last HbA1c: 6.8%   - Management as per Endocrinology.   -Unable to get SGLT2i due to insurance. Currently on combination glargine and GLP1a. Per patient is he going to be switched to glargine. I recommend also being put on GLP1a as well   -Unable to prescribe finerenone for proteinuric diabetic CKD due to cost. Start spironolactone     # Post-Transplant Erythrocytosis: Hgb: Stable;  On ACEI/ARB: Yes   Imaging: No, repeat imaging of native and transplanted kidneys. Last imaging 5/2023 U/S w/  stable simple cysts    # Mineral Bone Disorder:   - Secondary renal hyperparathyroidism; PTH level: Minimally elevated ( pg/ml)        On treatment: None  - Vitamin D; level: Not checked recently, but was low last check        On supplement: Yes, cholecalciferol 2000 units daily   - Calcium; level: Normal        On supplement: No  - Phosphorus; level: Not checked recently        On supplement: No    # Electrolytes:   - Potassium; level: Normal        On supplement: No  - Magnesium; level: Not checked recently        On supplement: No  - Bicarbonate; level: Normal        On supplement: No    # Skin Cancer Risk:    - Discussed sun protection and recommend regular follow up with Dermatology.    # Medical Compliance: Yes      # Health Maintenance and Vaccination Review: Recommend:  COVID booster      # Transplant History:  Etiology of Kidney Failure: Diabetes mellitus type 2  Tx: DDKT  Transplant: 11/24/2016 (Kidney)  Significant changes in immunosuppression: None  Significant transplant-related complications: None    Transplant Office Phone Number: [phone]    Assessment and plan was discussed with the patient and he voiced his understanding and agreement.    Return visit: Return in about 1 year (around 5/7/2025).    Chris Lubin MD     The longitudinal plan of care for kidney transplant was addressed during this visit. Due to the added complexity in care, I will continue to support Corey Roland in the subsequent management of this condition(s) and with the ongoing continuity of care of this condition(s).     Chief Complaint   Mr. Roland is a 68 year old here for kidney transplant, immunosuppression management and routine follow up.    History of Present Illness   The patient overall feels well. He denies any recent hospitalizations. He denies nausea, vomiting, diarrhea, fever, chills, shortness of breath, chest pain, LE edema, unintentional weight loss, nights sweats, dysuria, hematuria.       Home  "BP: Not checked    Problem List   Patient Active Problem List   Diagnosis    HTN, kidney transplant related    Secondary renal hyperparathyroidism (H24)    Diabetic peripheral neuropathy (H)    Kidney replaced by transplant    Immunosuppression (H24)    Type 1 diabetes mellitus with nephropathy (H)    Aftercare following organ transplant    Vitamin D deficiency    Post-transplant erythrocytosis    Morbid obesity (H)       Allergies   No Known Allergies    Medications   Current Outpatient Medications   Medication Sig Dispense Refill    insulin glargine-lixisenatide (SOLIQUA) pen Inject 45 Units Subcutaneous every morning (before breakfast)      spironolactone (ALDACTONE) 25 MG tablet Take 1 tablet (25 mg) by mouth daily for 90 days 90 tablet 3    atorvastatin (LIPITOR) 20 MG tablet Take 1 tablet (20 mg) by mouth daily      carvedilol (COREG) 25 MG tablet TAKE ONE TABLET BY MOUTH TWICE DAILY WITH MEALS 60 tablet 11    cloNIDine (CATAPRES) 0.3 MG tablet Take 0.6 mg by mouth 2 times daily       DIPHENHYDRAMINE HCL PO Take 50 mg by mouth At Bedtime      FUROSEMIDE PO Take 60 mg by mouth 2 times daily      insulin syringe 31G X 5/16\" 0.5 ML MISC FOR ADMINISTERING INSULIN AT HOME 5 TIMES PER DAY.      losartan (COZAAR) 50 MG tablet Take 1 tablet (50 mg) by mouth 2 times daily 180 tablet 3    mycophenolate (GENERIC EQUIVALENT) 250 MG capsule Take 3 capsules (750 mg) by mouth 2 times daily 180 capsule 11    NIFEdipine ER (ADALAT CC) 30 MG TB24 Take 1 tablet (30 mg) by mouth 2 times daily 60 tablet 3    omeprazole 20 MG tablet Take 1 tablet (20 mg) by mouth every morning 90 tablet 3    sulfamethoxazole-trimethoprim (BACTRIM) 400-80 MG tablet Take 1 tablet by mouth Every Mon, Wed, Fri Morning 13 tablet 11    tacrolimus (GENERIC EQUIVALENT) 1 MG capsule Take 3 capsules (3 mg) by mouth 2 times daily 180 capsule 11     No current facility-administered medications for this visit.     Medications Discontinued During This Encounter "   Medication Reason    insulin aspart (NOVOLOG PEN) 100 UNIT/ML injection     insulin aspart (NOVOLOG PEN) 100 UNIT/ML pen     insulin aspart (NOVOLOG PEN) 100 UNIT/ML pen     tacrolimus (GENERIC EQUIVALENT) 0.5 MG capsule     TRESIBA FLEXTOUCH 200 UNIT/ML pen          Physical Exam   Vital Signs: /79 (BP Location: Right arm, Patient Position: Sitting, Cuff Size: Adult Large)   Pulse 77   Temp 97.5  F (36.4  C) (Oral)   Wt 122.9 kg (271 lb)   SpO2 96%   BMI 36.75 kg/m      GENERAL APPEARANCE: alert and no distress  HENT: mouth without ulcers or lesions  LYMPHATICS: no cervical or supraclavicular nodes  RESP: lungs clear to auscultation - no rales, rhonchi or wheezes  CV: regular rhythm, normal rate, no rub, no murmur  EDEMA: no LE edema bilaterally  ABDOMEN: soft, nondistended, nontender, bowel sounds normal  MS: extremities normal - no gross deformities noted, no evidence of inflammation in joints, no muscle tenderness  SKIN: no rash  NEURO: normal strength and tone, sensory exam grossly normal, mentation intact and speech normal  PSYCH: mentation appears normal and affect normal/bright  TX KIDNEY: normal      Data         Latest Ref Rng & Units 5/3/2024    10:02 AM 11/21/2023     9:58 AM 8/1/2023     9:57 AM   Renal   Sodium 135 - 145 mmol/L 141  141  140    K 3.4 - 5.3 mmol/L 4.0  4.0  4.1    Cl 98 - 107 mmol/L 104  107  105    Cl (external) 98 - 107 mmol/L 104  107  105    CO2 22 - 29 mmol/L 23  20  23    Urea Nitrogen 8.0 - 23.0 mg/dL 21.0  7.8  18.2    Creatinine 0.67 - 1.17 mg/dL 0.96  0.95  0.86    Glucose 70 - 99 mg/dL 125  126  150    Calcium 8.8 - 10.2 mg/dL 9.8  9.5  9.8          Latest Ref Rng & Units 2/3/2023     9:42 AM 12/28/2021     9:43 AM 6/26/2020     9:54 AM   Bone Health   Phosphorus 2.5 - 4.5 mg/dL  2.8     Parathyroid Hormone Intact 15 - 65 pg/mL 88   101    Vit D Def 20 - 75 ug/L   18          Latest Ref Rng & Units 5/3/2024    10:02 AM 11/21/2023     9:58 AM 8/1/2023     9:57 AM    Heme   WBC 4.0 - 11.0 10e3/uL 8.2  6.3  6.8    Hgb 13.3 - 17.7 g/dL 16.7  16.6  16.2    Plt 150 - 450 10e3/uL 218  226  221          Latest Ref Rng & Units 12/28/2021     9:43 AM 5/31/2019     9:39 AM 12/28/2018     9:34 AM   Liver   Albumin 3.4 - 5.0 g/dL 4.1  3.9  3.6          Latest Ref Rng & Units 11/25/2016    12:10 AM 11/24/2016     8:55 AM 4/15/2015     2:49 PM   Pancreas   A1C 4.3 - 6.0 % 7.0  7.0  7.2          Latest Ref Rng & Units 11/27/2016     4:00 AM   Iron studies   Iron 35 - 180 ug/dL 74    Iron Saturation Index 15 - 46 % 41    Ferritin 26 - 388 ng/mL 1,611          Latest Ref Rng & Units 5/31/2019     9:40 AM 12/4/2018    12:23 PM 7/27/2018     9:51 AM   [facility] Txp Virology   BK Spec  Plasma  Plasma  Plasma    BK Res BKNEG^BK Virus DNA Not Detected copies/mL BK Virus DNA Not Detected  BK Virus DNA Not Detected  BK Virus DNA Not Detected    BK Log <2.7 Log copies/mL Not Calculated  Not Calculated  Not Calculated      Failed to redirect to the Timeline version of the REVFS SmartLink.  Recent Labs   Lab Test 08/01/23  0957 11/21/23  0958 05/03/24  1002   DOSTAC 7/31/2023 11/20/2023 5/2/2024   TACROL 4.7* 5.4 4.6*     Recent Labs   Lab Test 02/06/17  0733 02/13/17  0736 02/20/17  0733   DOSMPA 2,052,017 2,000 2,192,017   MPACID 2.38 2.03 4.29*   MPAG 104.7* 102.9* 85.4

## 2024-05-07 NOTE — NURSING NOTE
Chief Complaint   Patient presents with    RECHECK     K/P POST RETURN TXP NEPH     BP (!) 156/77 (BP Location: Right arm, Patient Position: Sitting, Cuff Size: Adult Large)   Pulse 77   Temp 97.5  F (36.4  C) (Oral)   Wt 122.9 kg (271 lb)   SpO2 96%   BMI 36.75 kg/m      PT reported that he is taking a new long acting diabetes med which he thinks is driving up his BP    PT reports having had graeme mack    [facility] Visit Time Tracking  Role: MA/JUSTINE  Type: Pre-visit  Time in minutes: Lonny Bradley CMA on 5/7/2024 at 12:57 PM

## 2024-05-07 NOTE — LETTER
5/7/2024         RE: Corey Makannelise  [address]  [facility] [address]        Dear Colleague,    Thank you for referring your patient, Corey Roland, to the [facility]. Please see a copy of my visit note below.    TRANSPLANT NEPHROLOGY CHRONIC POST TRANSPLANT VISIT    Assessment & Plan  # DDKT: Stable   - Baseline Creatinine:  ~ 0.8-1   - Proteinuria: Mild (0.5-1.0 grams), increasing    - Date DSA Last Checked: Dec/2021      Latest DSA: No   - BK Viremia: Not checked recently due to time from transplant   - Kidney Tx Biopsy: No    -Labs every 3 months     # Immunosuppression: Tacrolimus immediate release (goal 4-6) and Mycophenolate mofetil (dose 750 mg every 12 hours)   - Continue with intensive monitoring of immunosuppression for efficacy and toxicity.   - Changes: Not at this time    # Proteinuria:   -Increasing to 0.9g/g in 5/2024, up from 0.38g/g in 2/2023   -Has not been able to get SGLT2i approved by insurance. Pt is on GLP1a (Soliqua). On max dose ARB and BP well controlled.   -Recommend that as patient will be coming off Soliqua for endocrinology to prescribe GLP1a individually along with long acting insulin   -Prescribe spironolactone 25mg daily for proteinuric diabetic CKD due to inability to pay for finerenone.     # Infection Prophylaxis:   - PJP: Sulfa/TMP (Bactrim) MWF    # Hypertension: Borderline control;  Goal BP: < 130/80   - Changes: Yes - prescribe spironolactone 25mg daily . Continue carvedilol 25mg bid, nifedipine 30mg bid, losartan 50mg bid, clonidine 0.6mg bid, furosemide 60mg bid.    -Check BP at home as this may drop BP and may need to dial back nifedipine or clonidine    # Diabetes: Controlled (HbA1c <7%) Last HbA1c: 6.8%   - Management as per Endocrinology.   -Unable to get SGLT2i due to insurance. Currently on combination glargine and GLP1a. Per patient is he going to be switched to glargine. I recommend also being put on GLP1a as  well   -Unable to prescribe finerenone for proteinuric diabetic CKD due to cost. Start spironolactone     # Post-Transplant Erythrocytosis: Hgb: Stable;  On ACEI/ARB: Yes   Imaging: No, repeat imaging of native and transplanted kidneys. Last imaging 5/2023 U/S w/ stable simple cysts    # Mineral Bone Disorder:   - Secondary renal hyperparathyroidism; PTH level: Minimally elevated ( pg/ml)        On treatment: None  - Vitamin D; level: Not checked recently, but was low last check        On supplement: Yes, cholecalciferol 2000 units daily   - Calcium; level: Normal        On supplement: No  - Phosphorus; level: Not checked recently        On supplement: No    # Electrolytes:   - Potassium; level: Normal        On supplement: No  - Magnesium; level: Not checked recently        On supplement: No  - Bicarbonate; level: Normal        On supplement: No    # Skin Cancer Risk:    - Discussed sun protection and recommend regular follow up with Dermatology.    # Medical Compliance: Yes      # Health Maintenance and Vaccination Review: Recommend:  COVID booster      # Transplant History:  Etiology of Kidney Failure: Diabetes mellitus type 2  Tx: DDKT  Transplant: 11/24/2016 (Kidney)  Significant changes in immunosuppression: None  Significant transplant-related complications: None    Transplant Office Phone Number: [phone]    Assessment and plan was discussed with the patient and he voiced his understanding and agreement.    Return visit: Return in about 1 year (around 5/7/2025).    Chris Lubin MD     The longitudinal plan of care for kidney transplant was addressed during this visit. Due to the added complexity in care, I will continue to support Corey Roland in the subsequent management of this condition(s) and with the ongoing continuity of care of this condition(s).     Chief Complaint  Mr. Roland is a 68 year old here for kidney transplant, immunosuppression management and routine follow up.    History  "of Present Illness  The patient overall feels well. He denies any recent hospitalizations. He denies nausea, vomiting, diarrhea, fever, chills, shortness of breath, chest pain, LE edema, unintentional weight loss, nights sweats, dysuria, hematuria.       Home BP: Not checked    Problem List  Patient Active Problem List   Diagnosis     HTN, kidney transplant related     Secondary renal hyperparathyroidism (H24)     Diabetic peripheral neuropathy (H)     Kidney replaced by transplant     Immunosuppression (H24)     Type 1 diabetes mellitus with nephropathy (H)     Aftercare following organ transplant     Vitamin D deficiency     Post-transplant erythrocytosis     Morbid obesity (H)       Allergies  No Known Allergies    Medications  Current Outpatient Medications   Medication Sig Dispense Refill     insulin glargine-lixisenatide (SOLIQUA) pen Inject 45 Units Subcutaneous every morning (before breakfast)       spironolactone (ALDACTONE) 25 MG tablet Take 1 tablet (25 mg) by mouth daily for 90 days 90 tablet 3     atorvastatin (LIPITOR) 20 MG tablet Take 1 tablet (20 mg) by mouth daily       carvedilol (COREG) 25 MG tablet TAKE ONE TABLET BY MOUTH TWICE DAILY WITH MEALS 60 tablet 11     cloNIDine (CATAPRES) 0.3 MG tablet Take 0.6 mg by mouth 2 times daily        DIPHENHYDRAMINE HCL PO Take 50 mg by mouth At Bedtime       FUROSEMIDE PO Take 60 mg by mouth 2 times daily       insulin syringe 31G X 5/16\" 0.5 ML MISC FOR ADMINISTERING INSULIN AT HOME 5 TIMES PER DAY.       losartan (COZAAR) 50 MG tablet Take 1 tablet (50 mg) by mouth 2 times daily 180 tablet 3     mycophenolate (GENERIC EQUIVALENT) 250 MG capsule Take 3 capsules (750 mg) by mouth 2 times daily 180 capsule 11     NIFEdipine ER (ADALAT CC) 30 MG TB24 Take 1 tablet (30 mg) by mouth 2 times daily 60 tablet 3     omeprazole 20 MG tablet Take 1 tablet (20 mg) by mouth every morning 90 tablet 3     sulfamethoxazole-trimethoprim (BACTRIM) 400-80 MG tablet Take 1 " tablet by mouth Every Mon, Wed, Fri Morning 13 tablet 11     tacrolimus (GENERIC EQUIVALENT) 1 MG capsule Take 3 capsules (3 mg) by mouth 2 times daily 180 capsule 11     No current facility-administered medications for this visit.     Medications Discontinued During This Encounter   Medication Reason     insulin aspart (NOVOLOG PEN) 100 UNIT/ML injection      insulin aspart (NOVOLOG PEN) 100 UNIT/ML pen      insulin aspart (NOVOLOG PEN) 100 UNIT/ML pen      tacrolimus (GENERIC EQUIVALENT) 0.5 MG capsule      TRESIBA FLEXTOUCH 200 UNIT/ML pen          Physical Exam  Vital Signs: /79 (BP Location: Right arm, Patient Position: Sitting, Cuff Size: Adult Large)   Pulse 77   Temp 97.5  F (36.4  C) (Oral)   Wt 122.9 kg (271 lb)   SpO2 96%   BMI 36.75 kg/m      GENERAL APPEARANCE: alert and no distress  HENT: mouth without ulcers or lesions  LYMPHATICS: no cervical or supraclavicular nodes  RESP: lungs clear to auscultation - no rales, rhonchi or wheezes  CV: regular rhythm, normal rate, no rub, no murmur  EDEMA: no LE edema bilaterally  ABDOMEN: soft, nondistended, nontender, bowel sounds normal  MS: extremities normal - no gross deformities noted, no evidence of inflammation in joints, no muscle tenderness  SKIN: no rash  NEURO: normal strength and tone, sensory exam grossly normal, mentation intact and speech normal  PSYCH: mentation appears normal and affect normal/bright  TX KIDNEY: normal      Data        Latest Ref Rng & Units 5/3/2024    10:02 AM 11/21/2023     9:58 AM 8/1/2023     9:57 AM   Renal   Sodium 135 - 145 mmol/L 141  141  140    K 3.4 - 5.3 mmol/L 4.0  4.0  4.1    Cl 98 - 107 mmol/L 104  107  105    Cl (external) 98 - 107 mmol/L 104  107  105    CO2 22 - 29 mmol/L 23  20  23    Urea Nitrogen 8.0 - 23.0 mg/dL 21.0  7.8  18.2    Creatinine 0.67 - 1.17 mg/dL 0.96  0.95  0.86    Glucose 70 - 99 mg/dL 125  126  150    Calcium 8.8 - 10.2 mg/dL 9.8  9.5  9.8          Latest Ref Rng & Units 2/3/2023      9:42 AM 12/28/2021     9:43 AM 6/26/2020     9:54 AM   Bone Health   Phosphorus 2.5 - 4.5 mg/dL  2.8     Parathyroid Hormone Intact 15 - 65 pg/mL 88   101    Vit D Def 20 - 75 ug/L   18          Latest Ref Rng & Units 5/3/2024    10:02 AM 11/21/2023     9:58 AM 8/1/2023     9:57 AM   Heme   WBC 4.0 - 11.0 10e3/uL 8.2  6.3  6.8    Hgb 13.3 - 17.7 g/dL 16.7  16.6  16.2    Plt 150 - 450 10e3/uL 218  226  221          Latest Ref Rng & Units 12/28/2021     9:43 AM 5/31/2019     9:39 AM 12/28/2018     9:34 AM   Liver   Albumin 3.4 - 5.0 g/dL 4.1  3.9  3.6          Latest Ref Rng & Units 11/25/2016    12:10 AM 11/24/2016     8:55 AM 4/15/2015     2:49 PM   Pancreas   A1C 4.3 - 6.0 % 7.0  7.0  7.2          Latest Ref Rng & Units 11/27/2016     4:00 AM   Iron studies   Iron 35 - 180 ug/dL 74    Iron Saturation Index 15 - 46 % 41    Ferritin 26 - 388 ng/mL 1,611          Latest Ref Rng & Units 5/31/2019     9:40 AM 12/4/2018    12:23 PM 7/27/2018     9:51 AM   [facility] Txp Virology   BK Spec  Plasma  Plasma  Plasma    BK Res BKNEG^BK Virus DNA Not Detected copies/mL BK Virus DNA Not Detected  BK Virus DNA Not Detected  BK Virus DNA Not Detected    BK Log <2.7 Log copies/mL Not Calculated  Not Calculated  Not Calculated      Failed to redirect to the Timeline version of the REVFS SmartLink.  Recent Labs   Lab Test 08/01/23  0957 11/21/23  0958 05/03/24  1002   DOSTAC 7/31/2023 11/20/2023 5/2/2024   TACROL 4.7* 5.4 4.6*     Recent Labs   Lab Test 02/06/17  0733 02/13/17  0736 02/20/17  0733   DOSMPA 2,052,017 2,000 2,192,017   MPACID 2.38 2.03 4.29*   MPAG 104.7* 102.9* 85.4         Again, thank you for allowing me to participate in the care of your patient.        Sincerely,        Chris Lubin MD

## 2024-05-13 DIAGNOSIS — Z94.0 KIDNEY TRANSPLANT RECIPIENT: ICD-10-CM

## 2024-05-13 DIAGNOSIS — Z94.0 KIDNEY TRANSPLANTED: Primary | ICD-10-CM

## 2024-05-13 RX ORDER — TACROLIMUS 1 MG/1
3 CAPSULE ORAL 2 TIMES DAILY
Qty: 180 CAPSULE | Refills: 11 | Status: SHIPPED | OUTPATIENT
Start: 2024-05-13

## 2024-05-29 ENCOUNTER — MYC MEDICAL ADVICE (OUTPATIENT)
Dept: NEPHROLOGY | Facility: CLINIC | Age: 69
End: 2024-05-29
Payer: MEDICARE

## 2024-06-12 DIAGNOSIS — Z94.0 KIDNEY REPLACED BY TRANSPLANT: Primary | ICD-10-CM

## 2024-06-12 RX ORDER — MYCOPHENOLATE MOFETIL 250 MG/1
750 CAPSULE ORAL 2 TIMES DAILY
Qty: 180 CAPSULE | Refills: 11 | Status: SHIPPED | OUTPATIENT
Start: 2024-06-12

## 2024-08-29 ENCOUNTER — TELEPHONE (OUTPATIENT)
Dept: TRANSPLANT | Facility: CLINIC | Age: 69
End: 2024-08-29
Payer: MEDICARE

## 2024-08-29 NOTE — TELEPHONE ENCOUNTER
Left Voicemail (1st Attempt) and Sent Mychart (1st Attempt) for the patient to call back and schedule the following:    Appointment type: K/P Post Return TXP Neph  Provider: Dr. Larios  Return date: May 2025  Specialty phone number: [phone]  Additional appointment(s) needed: NA  Additonal Notes: NA

## 2024-09-16 ENCOUNTER — MYC MEDICAL ADVICE (OUTPATIENT)
Dept: NEPHROLOGY | Facility: CLINIC | Age: 69
End: 2024-09-16

## 2024-11-02 ENCOUNTER — HEALTH MAINTENANCE LETTER (OUTPATIENT)
Age: 69
End: 2024-11-02

## 2024-11-26 ENCOUNTER — LAB (OUTPATIENT)
Dept: LAB | Facility: CLINIC | Age: 69
End: 2024-11-26
Payer: MEDICARE

## 2024-11-26 DIAGNOSIS — Z48.298 AFTERCARE FOLLOWING ORGAN TRANSPLANT: ICD-10-CM

## 2024-11-26 DIAGNOSIS — Z94.0 KIDNEY TRANSPLANT RECIPIENT: ICD-10-CM

## 2024-11-26 LAB
ANION GAP SERPL CALCULATED.3IONS-SCNC: 11 MMOL/L (ref 7–15)
BUN SERPL-MCNC: 11.2 MG/DL (ref 8–23)
CALCIUM SERPL-MCNC: 9 MG/DL (ref 8.8–10.4)
CHLORIDE SERPL-SCNC: 107 MMOL/L (ref 98–107)
CREAT SERPL-MCNC: 0.95 MG/DL (ref 0.67–1.17)
EGFRCR SERPLBLD CKD-EPI 2021: 87 ML/MIN/1.73M2
ERYTHROCYTE [DISTWIDTH] IN BLOOD BY AUTOMATED COUNT: 13.7 % (ref 10–15)
GLUCOSE SERPL-MCNC: 93 MG/DL (ref 70–99)
HCO3 SERPL-SCNC: 23 MMOL/L (ref 22–29)
HCT VFR BLD AUTO: 42.1 % (ref 40–53)
HGB BLD-MCNC: 13.5 G/DL (ref 13.3–17.7)
MCH RBC QN AUTO: 28.2 PG (ref 26.5–33)
MCHC RBC AUTO-ENTMCNC: 32.1 G/DL (ref 31.5–36.5)
MCV RBC AUTO: 88 FL (ref 78–100)
PLATELET # BLD AUTO: 240 10E3/UL (ref 150–450)
POTASSIUM SERPL-SCNC: 3.5 MMOL/L (ref 3.4–5.3)
RBC # BLD AUTO: 4.79 10E6/UL (ref 4.4–5.9)
SODIUM SERPL-SCNC: 141 MMOL/L (ref 135–145)
TACROLIMUS BLD-MCNC: 7.1 UG/L (ref 5–15)
TME LAST DOSE: NORMAL H
TME LAST DOSE: NORMAL H
WBC # BLD AUTO: 7.7 10E3/UL (ref 4–11)

## 2024-11-26 PROCEDURE — 80197 ASSAY OF TACROLIMUS: CPT

## 2024-11-26 PROCEDURE — 80048 BASIC METABOLIC PNL TOTAL CA: CPT

## 2024-11-26 PROCEDURE — 85027 COMPLETE CBC AUTOMATED: CPT

## 2024-11-26 PROCEDURE — 36415 COLL VENOUS BLD VENIPUNCTURE: CPT

## 2024-11-27 ENCOUNTER — TELEPHONE (OUTPATIENT)
Dept: TRANSPLANT | Facility: CLINIC | Age: 69
End: 2024-11-27
Payer: MEDICARE

## 2024-11-27 DIAGNOSIS — Z79.899 IMMUNOSUPPRESSIVE MANAGEMENT ENCOUNTER FOLLOWING KIDNEY TRANSPLANT: ICD-10-CM

## 2024-11-27 DIAGNOSIS — Z48.298 AFTERCARE FOLLOWING ORGAN TRANSPLANT: ICD-10-CM

## 2024-11-27 DIAGNOSIS — Z94.0 KIDNEY TRANSPLANTED: Primary | ICD-10-CM

## 2024-11-27 DIAGNOSIS — Z94.0 IMMUNOSUPPRESSIVE MANAGEMENT ENCOUNTER FOLLOWING KIDNEY TRANSPLANT: ICD-10-CM

## 2024-11-27 NOTE — TELEPHONE ENCOUNTER
Tacrolimus = 7.1  (11/26/24)  Goal 4-6  Current Tac dose 3 mg BID    Previous levels at goal while on current Tac dose.    PLAN:   Confirm this was a good 12 - hour trough.   Verify current dose.   Confirm no new medications or illness (kathryn. Diarrhea).  If good trough and correct dose above, recommend:  stay on the same dose.     Recheck level in 2 weeks and make sure it is a good trough to avoid additional lab draws.      OUTCOME:  Spoke with Corey.  Reports good 12 hr trough.  Denies any illness, diarrhea.  Discussed recommendations.  Verbalized understanding and agreement to plan.  Order placed.

## 2025-01-06 ENCOUNTER — TELEPHONE (OUTPATIENT)
Dept: TRANSPLANT | Facility: CLINIC | Age: 70
End: 2025-01-06
Payer: MEDICARE

## 2025-01-06 DIAGNOSIS — Z48.298 AFTERCARE FOLLOWING ORGAN TRANSPLANT: ICD-10-CM

## 2025-01-06 DIAGNOSIS — Z94.0 HTN, KIDNEY TRANSPLANT RELATED: Primary | ICD-10-CM

## 2025-01-06 DIAGNOSIS — I15.1 HTN, KIDNEY TRANSPLANT RELATED: Primary | ICD-10-CM

## 2025-01-06 RX ORDER — PREDNISONE 5 MG/1
10 TABLET ORAL DAILY
Qty: 60 TABLET | Refills: 0 | Status: SHIPPED | OUTPATIENT
Start: 2025-01-06

## 2025-01-06 RX ORDER — TACROLIMUS 4 MG/1
4 TABLET, EXTENDED RELEASE ORAL
Qty: 30 TABLET | Refills: 0 | Status: SHIPPED | OUTPATIENT
Start: 2025-01-06

## 2025-01-06 NOTE — TELEPHONE ENCOUNTER
ISSUE  Per Pharmacy staff, patient's Medicare part B has lapsed and he cannot afford his Tacrolimus or Mycophenolate.   He is currently working with the [facility] on getting his social security benefits and Medicaid. He is hopeful he will get assistance soon.  Currently has NO money to pay out of pocket for anything.      PLAN (per Dr. Darrell Olivares)  OK to switch to Envarsus and use voucher for 1 months supply.  (Tac IR dose 3 mg BID, 6 mg daily. Envarsus dose 5 mg daily).  Start Pred 10 until Tac therapeutic, or able to get Cellcept through patient assistance.        OUTCOME  Call placed to pharmacy liaison. Discussed applying for Envarsus voucher and getting shipment out this evening, or tomorrow morning.  Discussed getting Pred 10 shipped out as well (if no out of pocket cost).  Liaison will run test claim and call patient.    Call placed to Corey, discussed switch from Tac IR to Envarsus. Corey verbalized understanding.  Discussed starting Pred. Corey states he will only accept shipment if there is no out of pocket cost.  Discussed importance of immunosuppression. Corey verablized understanding.   Discussed having labs completed 1 week after starting Envarsus. Corey states he will not be able to have labs completed until his medicare is active, or his medicaid is instated.   Will follow up on Wednesday.      no dermatitis, no environmental allergies, no food allergies, no immunosuppressive disorder, and no pruritus.

## 2025-01-08 ENCOUNTER — TELEPHONE (OUTPATIENT)
Dept: TRANSPLANT | Facility: CLINIC | Age: 70
End: 2025-01-08
Payer: MEDICARE

## 2025-01-08 DIAGNOSIS — Z94.0 HTN, KIDNEY TRANSPLANT RELATED: Primary | ICD-10-CM

## 2025-01-08 DIAGNOSIS — I15.1 HTN, KIDNEY TRANSPLANT RELATED: Primary | ICD-10-CM

## 2025-01-08 NOTE — TELEPHONE ENCOUNTER
Transplant Social Work Services Phone Call      Data: MSW received note from care team that Corey has lost Medicare Part B coverage at this time and needs funding assistance for covering medications this month.  Intervention: MSW called Corey to gather information regarding loss of Part B premiums and monitor funding needs.   Assessment: Corey reports that he received a delinquency notice from Medicare for not paying his Part B premiums. He reports that before he could pay, he lost Medicare Part B. Corey notes that he has since called Medicare/[facility] and [facility]. He notes that Medicare reports that he has not lost Medicare Part B and planned to check his case. He notes that Darya (pharmaceutical liaison) was able to check Part B coverage and that it continues to bounce back. He reports that [facility] isn't able to tell him anything different than what he has already tried. Corey notes that in the meantime he has applied for MA and is working with [facility]. He also notes that he has attempted to collect early long-term to help with the out of pocket costs. Corey reports that he was supposed to begin receiving early long-term in Dec 2024 though called his long-term account and they told him it could take up to 4 months. He reports that he continues to actively work with Medicare, [facility], [facility], and his long-term account and is waiting on return calls.   Education provided by : ESRD Medicare and larger county resources  Plan:MSW provided SOT funding for this month with the plan for Corey to continue working with resources to obtain adequate/affordable coverage. Corey verbalized understanding that this funding is a one time resource.    Total funding: $244.98 (Mycophenolate + Predisone 5mg 30/30 days)

## 2025-01-10 ENCOUNTER — TELEPHONE (OUTPATIENT)
Dept: TRANSPLANT | Facility: CLINIC | Age: 70
End: 2025-01-10
Payer: MEDICARE

## 2025-01-10 DIAGNOSIS — I15.1 HTN, KIDNEY TRANSPLANT RELATED: Primary | ICD-10-CM

## 2025-01-10 DIAGNOSIS — Z94.0 HTN, KIDNEY TRANSPLANT RELATED: Primary | ICD-10-CM

## 2025-01-10 NOTE — TELEPHONE ENCOUNTER
FREE DRUG APPLICATION INITIATED    Medication: MYCOPHENOLATE MOFETIL (CELLCEPT BRAND) 250 MG PO CAPS  Free Drug Program Name:  [employer]  Date Submitted: 1/10/2025 12:47 PM  Phone #: [phone]  Fax #: [phone]  Additional Information: n/a

## 2025-01-14 ENCOUNTER — TELEPHONE (OUTPATIENT)
Dept: TRANSPLANT | Facility: CLINIC | Age: 70
End: 2025-01-14
Payer: MEDICARE

## 2025-01-14 DIAGNOSIS — Z94.0 HTN, KIDNEY TRANSPLANT RELATED: Primary | ICD-10-CM

## 2025-01-14 DIAGNOSIS — I15.1 HTN, KIDNEY TRANSPLANT RELATED: Primary | ICD-10-CM

## 2025-01-14 NOTE — TELEPHONE ENCOUNTER
PRIOR AUTHORIZATION DENIED    Medication: MYCOPHENOLATE MOFETIL (CELLCEPT BRAND) 250 MG PO CAPS  Insurance Company: [employer] Part D - Phone [phone] Fax [phone]  Denial Date: 1/14/2025  Denial Reason(s):   Medication is denied under medicare part D. Patient does not have medicare part B at this time.  I was able to enroll patient into free drug which was approved. I was informed previously that a PA will need to be done and sent to [employer] however looks like free drug was approved       Appeal Information:

## 2025-01-14 NOTE — TELEPHONE ENCOUNTER
FREE DRUG APPLICATION APPROVED    Medication: MYCOPHENOLATE MOFETIL (CELLCEPT BRAND) 250 MG PO CAPS  Program Name:  [employer]  Effective Date: 1/14/2025  Expiration Date: 1/13/2026  Pharmacy Filling the Rx:    Patient Notified: yes  Additional Information: n/a

## 2025-01-14 NOTE — TELEPHONE ENCOUNTER
PA Initiation    Medication: MYCOPHENOLATE MOFETIL (CELLCEPT BRAND) 250 MG PO CAPS  Insurance Company: [employer] Part D - Phone [phone] Fax [phone]  Pharmacy Filling the Rx: [facility] - [place], [place] - [address]  Filling Pharmacy Phone:    Filling Pharmacy Fax:    Start Date: 1/14/2025    Should hear back within 72 hours on an outcome

## 2025-01-22 ENCOUNTER — MYC MEDICAL ADVICE (OUTPATIENT)
Dept: TRANSPLANT | Facility: CLINIC | Age: 70
End: 2025-01-22
Payer: MEDICARE

## 2025-01-22 DIAGNOSIS — I15.1 HTN, KIDNEY TRANSPLANT RELATED: Primary | ICD-10-CM

## 2025-01-22 DIAGNOSIS — Z94.0 HTN, KIDNEY TRANSPLANT RELATED: Primary | ICD-10-CM

## 2025-01-22 NOTE — TELEPHONE ENCOUNTER
Call placed to Corey to discuss immunosuppression supply. JOSEPH left requesting a call back to discuss.   My chart message sent.

## 2025-01-23 PROBLEM — M86.9 OSTEOMYELITIS (H): Status: ACTIVE | Noted: 2024-10-09

## 2025-02-03 ENCOUNTER — LAB (OUTPATIENT)
Dept: LAB | Facility: CLINIC | Age: 70
End: 2025-02-03
Payer: MEDICARE

## 2025-02-03 DIAGNOSIS — Z48.298 AFTERCARE FOLLOWING ORGAN TRANSPLANT: ICD-10-CM

## 2025-02-03 DIAGNOSIS — Z79.899 IMMUNOSUPPRESSIVE MANAGEMENT ENCOUNTER FOLLOWING KIDNEY TRANSPLANT: ICD-10-CM

## 2025-02-03 DIAGNOSIS — Z94.0 KIDNEY TRANSPLANTED: ICD-10-CM

## 2025-02-03 DIAGNOSIS — Z94.0 IMMUNOSUPPRESSIVE MANAGEMENT ENCOUNTER FOLLOWING KIDNEY TRANSPLANT: ICD-10-CM

## 2025-02-03 LAB
ALBUMIN MFR UR ELPH: 15.6 MG/DL
ANION GAP SERPL CALCULATED.3IONS-SCNC: 12 MMOL/L (ref 7–15)
BUN SERPL-MCNC: 12 MG/DL (ref 8–23)
CALCIUM SERPL-MCNC: 9.6 MG/DL (ref 8.8–10.4)
CHLORIDE SERPL-SCNC: 103 MMOL/L (ref 98–107)
CREAT SERPL-MCNC: 0.81 MG/DL (ref 0.67–1.17)
CREAT UR-MCNC: 41.9 MG/DL
EGFRCR SERPLBLD CKD-EPI 2021: >90 ML/MIN/1.73M2
ERYTHROCYTE [DISTWIDTH] IN BLOOD BY AUTOMATED COUNT: 14.4 % (ref 10–15)
GLUCOSE SERPL-MCNC: 118 MG/DL (ref 70–99)
HCO3 SERPL-SCNC: 23 MMOL/L (ref 22–29)
HCT VFR BLD AUTO: 49.4 % (ref 40–53)
HGB BLD-MCNC: 15.6 G/DL (ref 13.3–17.7)
MCH RBC QN AUTO: 27.8 PG (ref 26.5–33)
MCHC RBC AUTO-ENTMCNC: 31.6 G/DL (ref 31.5–36.5)
MCV RBC AUTO: 88 FL (ref 78–100)
PLATELET # BLD AUTO: 236 10E3/UL (ref 150–450)
POTASSIUM SERPL-SCNC: 4 MMOL/L (ref 3.4–5.3)
PROT/CREAT 24H UR: 0.37 MG/MG CR (ref 0–0.2)
RBC # BLD AUTO: 5.61 10E6/UL (ref 4.4–5.9)
SODIUM SERPL-SCNC: 138 MMOL/L (ref 135–145)
TACROLIMUS BLD-MCNC: 3 UG/L (ref 5–15)
TME LAST DOSE: ABNORMAL H
TME LAST DOSE: ABNORMAL H
WBC # BLD AUTO: 8 10E3/UL (ref 4–11)

## 2025-02-03 PROCEDURE — 84156 ASSAY OF PROTEIN URINE: CPT

## 2025-02-03 PROCEDURE — 80197 ASSAY OF TACROLIMUS: CPT

## 2025-02-03 PROCEDURE — 36415 COLL VENOUS BLD VENIPUNCTURE: CPT

## 2025-02-03 PROCEDURE — 85027 COMPLETE CBC AUTOMATED: CPT

## 2025-02-03 PROCEDURE — 80048 BASIC METABOLIC PNL TOTAL CA: CPT

## 2025-02-04 ENCOUNTER — TELEPHONE (OUTPATIENT)
Dept: TRANSPLANT | Facility: CLINIC | Age: 70
End: 2025-02-04
Payer: MEDICARE

## 2025-02-04 DIAGNOSIS — Z48.298 AFTERCARE FOLLOWING ORGAN TRANSPLANT: ICD-10-CM

## 2025-02-04 DIAGNOSIS — Z94.0 KIDNEY TRANSPLANTED: Primary | ICD-10-CM

## 2025-02-04 DIAGNOSIS — Z94.0 HTN, KIDNEY TRANSPLANT RELATED: ICD-10-CM

## 2025-02-04 DIAGNOSIS — I15.1 HTN, KIDNEY TRANSPLANT RELATED: ICD-10-CM

## 2025-02-04 RX ORDER — TACROLIMUS 4 MG/1
4 TABLET, EXTENDED RELEASE ORAL
Qty: 30 TABLET | Refills: 0 | Status: SHIPPED | OUTPATIENT
Start: 2025-02-04

## 2025-02-04 NOTE — TELEPHONE ENCOUNTER
ISSUE:   Tacrolimus XR level 3.0 on 02/03/25, goal 4-6, dose 5 mg daily.    Pt recently switched from tarolimus to Envarsus    PLAN:   Call Patient and confirm this was an accurate 24-hour trough.   Verify Tacrolimus XR dose 5 mg daily.   Confirm no new medications or illness.   Confirm no missed doses.     If accurate trough and accurate dose, increase Tacrolimus XR dose to   7mg daily  *Recommended dose rounded from calculated dose 8.33 mg  daily.      Repeat labs in 1 weeks.   For any dose change <50%, recheck labs per guideline or within 1 month.  For any dose change of more than 50%, recheck drug level based on timing to reach steady state:   Immediate release tacrolimus: 2-3 days  Extended-release tacrolimus: 7 days  Cyclosporine: 2 days  Sirolimus: 12 days  Everolimus: 8 days      OUTCOME:   Spoke with Patient, they confirm accurate trough level and current dose 5 mg daily.     Pt notes he has been on the new Envarsus for about 8 days, although missed a dose one day about 4-5 days ago as he filled his pill box wrong. Will still adjust dose up but will start with 6mg daily.    Patient confirmed dose change to 6 mg daily.  Patient agreed to repeat labs in 1 weeks.   Orders sent to preferred pharmacy for dose change and lab for repeat labs.   Patient voiced understanding of plan.

## 2025-02-12 ENCOUNTER — LAB (OUTPATIENT)
Dept: LAB | Facility: CLINIC | Age: 70
End: 2025-02-12
Payer: MEDICARE

## 2025-02-12 DIAGNOSIS — Z94.0 KIDNEY TRANSPLANTED: ICD-10-CM

## 2025-02-12 LAB
ANION GAP SERPL CALCULATED.3IONS-SCNC: 13 MMOL/L (ref 7–15)
BUN SERPL-MCNC: 11.7 MG/DL (ref 8–23)
CALCIUM SERPL-MCNC: 9.8 MG/DL (ref 8.8–10.4)
CHLORIDE SERPL-SCNC: 107 MMOL/L (ref 98–107)
CREAT SERPL-MCNC: 0.68 MG/DL (ref 0.67–1.17)
EGFRCR SERPLBLD CKD-EPI 2021: >90 ML/MIN/1.73M2
ERYTHROCYTE [DISTWIDTH] IN BLOOD BY AUTOMATED COUNT: 14 % (ref 10–15)
GLUCOSE SERPL-MCNC: 97 MG/DL (ref 70–99)
HCO3 SERPL-SCNC: 20 MMOL/L (ref 22–29)
HCT VFR BLD AUTO: 48.1 % (ref 40–53)
HGB BLD-MCNC: 15.6 G/DL (ref 13.3–17.7)
MCH RBC QN AUTO: 28.1 PG (ref 26.5–33)
MCHC RBC AUTO-ENTMCNC: 32.4 G/DL (ref 31.5–36.5)
MCV RBC AUTO: 87 FL (ref 78–100)
PLATELET # BLD AUTO: 214 10E3/UL (ref 150–450)
POTASSIUM SERPL-SCNC: 3.8 MMOL/L (ref 3.4–5.3)
RBC # BLD AUTO: 5.55 10E6/UL (ref 4.4–5.9)
SODIUM SERPL-SCNC: 140 MMOL/L (ref 135–145)
TACROLIMUS BLD-MCNC: 4.6 UG/L (ref 5–15)
TME LAST DOSE: ABNORMAL H
TME LAST DOSE: ABNORMAL H
WBC # BLD AUTO: 7.2 10E3/UL (ref 4–11)

## 2025-02-12 PROCEDURE — 85027 COMPLETE CBC AUTOMATED: CPT

## 2025-02-12 PROCEDURE — 80048 BASIC METABOLIC PNL TOTAL CA: CPT

## 2025-02-12 PROCEDURE — 80197 ASSAY OF TACROLIMUS: CPT

## 2025-02-12 PROCEDURE — 36415 COLL VENOUS BLD VENIPUNCTURE: CPT

## 2025-03-20 ENCOUNTER — TELEPHONE (OUTPATIENT)
Dept: TRANSPLANT | Facility: CLINIC | Age: 70
End: 2025-03-20
Payer: MEDICARE

## 2025-03-20 DIAGNOSIS — I15.1 HTN, KIDNEY TRANSPLANT RELATED: ICD-10-CM

## 2025-03-20 DIAGNOSIS — Z94.0 HTN, KIDNEY TRANSPLANT RELATED: ICD-10-CM

## 2025-03-20 NOTE — TELEPHONE ENCOUNTER
Hi, this is Ron calling from [facility]. We supply invasus XR through a free drug program for one of your patients. I'm calling about your patient Corey Rizvi, S like in [place] Chau as in dog, D A L as in [place], we had received on March 14th a doctor sent over a cancel request for. Uh, Corey's prescription and there was no future follow up on the next prescription. He is authorized. , to have this medication through. , January 15th of next year 2026 if you could call us back at [phone] option two and option two again to reach a pharmacist, , if for any reason patient no longer needs. Uh, Uh, please give us a call so we can discontinue the patient. Thank you so much. Have a great day.     This was left on voice message 03/20/2025 at 2.55PM

## 2025-05-04 ENCOUNTER — HEALTH MAINTENANCE LETTER (OUTPATIENT)
Age: 70
End: 2025-05-04

## 2025-05-07 ENCOUNTER — LAB (OUTPATIENT)
Dept: LAB | Facility: CLINIC | Age: 70
End: 2025-05-07
Payer: MEDICARE

## 2025-05-07 ENCOUNTER — RESULTS FOLLOW-UP (OUTPATIENT)
Dept: TRANSPLANT | Facility: CLINIC | Age: 70
End: 2025-05-07

## 2025-05-07 DIAGNOSIS — Z94.0 KIDNEY TRANSPLANT RECIPIENT: ICD-10-CM

## 2025-05-07 DIAGNOSIS — I15.1 HTN, KIDNEY TRANSPLANT RELATED: ICD-10-CM

## 2025-05-07 DIAGNOSIS — Z48.298 AFTERCARE FOLLOWING ORGAN TRANSPLANT: ICD-10-CM

## 2025-05-07 DIAGNOSIS — R80.1 PERSISTENT PROTEINURIA: ICD-10-CM

## 2025-05-07 DIAGNOSIS — Z94.0 HTN, KIDNEY TRANSPLANT RELATED: ICD-10-CM

## 2025-05-07 DIAGNOSIS — Z94.0 IMMUNOSUPPRESSIVE MANAGEMENT ENCOUNTER FOLLOWING KIDNEY TRANSPLANT: ICD-10-CM

## 2025-05-07 DIAGNOSIS — Z94.0 KIDNEY TRANSPLANTED: ICD-10-CM

## 2025-05-07 DIAGNOSIS — Z79.899 IMMUNOSUPPRESSIVE MANAGEMENT ENCOUNTER FOLLOWING KIDNEY TRANSPLANT: ICD-10-CM

## 2025-05-07 LAB
ALBUMIN MFR UR ELPH: 8.2 MG/DL
ANION GAP SERPL CALCULATED.3IONS-SCNC: 10 MMOL/L (ref 7–15)
BUN SERPL-MCNC: 20.4 MG/DL (ref 8–23)
CALCIUM SERPL-MCNC: 9.4 MG/DL (ref 8.8–10.4)
CHLORIDE SERPL-SCNC: 106 MMOL/L (ref 98–107)
CREAT SERPL-MCNC: 1.32 MG/DL (ref 0.67–1.17)
CREAT UR-MCNC: 75.8 MG/DL
EGFRCR SERPLBLD CKD-EPI 2021: 58 ML/MIN/1.73M2
ERYTHROCYTE [DISTWIDTH] IN BLOOD BY AUTOMATED COUNT: 13.2 % (ref 10–15)
GLUCOSE SERPL-MCNC: 74 MG/DL (ref 70–99)
HCO3 SERPL-SCNC: 23 MMOL/L (ref 22–29)
HCT VFR BLD AUTO: 46.4 % (ref 40–53)
HGB BLD-MCNC: 15.1 G/DL (ref 13.3–17.7)
HOLD SPECIMEN: NORMAL
MCH RBC QN AUTO: 28 PG (ref 26.5–33)
MCHC RBC AUTO-ENTMCNC: 32.5 G/DL (ref 31.5–36.5)
MCV RBC AUTO: 86 FL (ref 78–100)
PLATELET # BLD AUTO: 193 10E3/UL (ref 150–450)
POTASSIUM SERPL-SCNC: 4.2 MMOL/L (ref 3.4–5.3)
PROT/CREAT 24H UR: 0.11 MG/MG CR (ref 0–0.2)
RBC # BLD AUTO: 5.39 10E6/UL (ref 4.4–5.9)
SODIUM SERPL-SCNC: 139 MMOL/L (ref 135–145)
TACROLIMUS BLD-MCNC: 6.2 UG/L (ref 5–15)
TME LAST DOSE: NORMAL H
TME LAST DOSE: NORMAL H
WBC # BLD AUTO: 9.8 10E3/UL (ref 4–11)

## 2025-05-07 PROCEDURE — 80048 BASIC METABOLIC PNL TOTAL CA: CPT

## 2025-05-07 PROCEDURE — 80197 ASSAY OF TACROLIMUS: CPT

## 2025-05-07 PROCEDURE — 36415 COLL VENOUS BLD VENIPUNCTURE: CPT

## 2025-05-07 PROCEDURE — 84156 ASSAY OF PROTEIN URINE: CPT

## 2025-05-07 PROCEDURE — 85027 COMPLETE CBC AUTOMATED: CPT

## 2025-05-08 ENCOUNTER — TELEPHONE (OUTPATIENT)
Dept: TRANSPLANT | Facility: CLINIC | Age: 70
End: 2025-05-08
Payer: MEDICARE

## 2025-05-08 DIAGNOSIS — Z94.0 HTN, KIDNEY TRANSPLANT RELATED: ICD-10-CM

## 2025-05-08 DIAGNOSIS — I15.1 HTN, KIDNEY TRANSPLANT RELATED: ICD-10-CM

## 2025-05-08 NOTE — TELEPHONE ENCOUNTER
ISSUE  Creatinine elevated to 1.32, baseline 0.8-1.0.      PLAN  Assess hydration status.  How much water is he drinking per day?  Any recent illness, diarrhea, s/s of a UTI, or medication changes?  Swelling?  BP?  Taking IS as prescribed-Envarsus 6 mg daily and  mg BID?  Recommend increasing hydration and repeating labs within 1 week.    OUTCOME  Call placed to Corey to discuss labs. Corey states he has been feeling well recently. No acute illness. No fevers. No graft site pain. No UTI sypmtoms.  Chronic swelling in LLE d/t injury and surgery. PCP recently increased Furosemide to 60 mg BID (from 40 mg BID) d/t increased swelling in LLE only.  Corey also reports having a CT w/IV contrast in February.  Taking all IS as prescribed.  Reports drinking almost 4 L of water daily. States he does not have much room to increase hydration.   Has in person SOT appointment tomorrow. Will discuss with provider.  Message sent to Dr. Larios.

## 2025-05-09 ENCOUNTER — OFFICE VISIT (OUTPATIENT)
Dept: TRANSPLANT | Facility: CLINIC | Age: 70
End: 2025-05-09
Attending: INTERNAL MEDICINE
Payer: MEDICARE

## 2025-05-09 VITALS
DIASTOLIC BLOOD PRESSURE: 73 MMHG | HEART RATE: 95 BPM | BODY MASS INDEX: 33.3 KG/M2 | OXYGEN SATURATION: 100 % | SYSTOLIC BLOOD PRESSURE: 157 MMHG | WEIGHT: 245.6 LBS | TEMPERATURE: 98.5 F

## 2025-05-09 DIAGNOSIS — D84.9 IMMUNOSUPPRESSED STATUS: ICD-10-CM

## 2025-05-09 DIAGNOSIS — Z48.298 AFTERCARE FOLLOWING ORGAN TRANSPLANT: ICD-10-CM

## 2025-05-09 DIAGNOSIS — E66.01 MORBID OBESITY (H): ICD-10-CM

## 2025-05-09 DIAGNOSIS — N25.81 SECONDARY RENAL HYPERPARATHYROIDISM: ICD-10-CM

## 2025-05-09 DIAGNOSIS — R80.1 PERSISTENT PROTEINURIA: ICD-10-CM

## 2025-05-09 DIAGNOSIS — E55.9 VITAMIN D DEFICIENCY: ICD-10-CM

## 2025-05-09 DIAGNOSIS — Z94.0 KIDNEY REPLACED BY TRANSPLANT: Primary | ICD-10-CM

## 2025-05-09 DIAGNOSIS — E11.42 DIABETIC PERIPHERAL NEUROPATHY (H): ICD-10-CM

## 2025-05-09 DIAGNOSIS — D75.1 POST-TRANSPLANT ERYTHROCYTOSIS: ICD-10-CM

## 2025-05-09 DIAGNOSIS — N17.9 AKI (ACUTE KIDNEY INJURY): ICD-10-CM

## 2025-05-09 PROCEDURE — 1126F AMNT PAIN NOTED NONE PRSNT: CPT | Performed by: INTERNAL MEDICINE

## 2025-05-09 PROCEDURE — 3077F SYST BP >= 140 MM HG: CPT | Performed by: INTERNAL MEDICINE

## 2025-05-09 PROCEDURE — G2211 COMPLEX E/M VISIT ADD ON: HCPCS | Performed by: INTERNAL MEDICINE

## 2025-05-09 PROCEDURE — 99214 OFFICE O/P EST MOD 30 MIN: CPT | Performed by: INTERNAL MEDICINE

## 2025-05-09 PROCEDURE — G0463 HOSPITAL OUTPT CLINIC VISIT: HCPCS | Performed by: INTERNAL MEDICINE

## 2025-05-09 PROCEDURE — 3078F DIAST BP <80 MM HG: CPT | Performed by: INTERNAL MEDICINE

## 2025-05-09 ASSESSMENT — PAIN SCALES - GENERAL: PAINLEVEL_OUTOF10: NO PAIN (0)

## 2025-05-09 NOTE — NURSING NOTE
Chief Complaint   Patient presents with    RECHECK     1 yr f/u       Vitals:    05/09/25 1136 05/09/25 1138 05/09/25 1146   BP: (!) 166/80 (!) 174/77 (!) 157/73   Pulse: 95     Temp: 98.5  F (36.9  C)     TempSrc: Oral     SpO2: 100%     Weight: 111.4 kg (245 lb 9.6 oz)         BP Readings from Last 3 Encounters:   05/09/25 (!) 157/73   05/07/24 138/79   05/04/23 129/74       BP (!) 157/73   Pulse 95   Temp 98.5  F (36.9  C) (Oral)   Wt 111.4 kg (245 lb 9.6 oz)   SpO2 100%   BMI 33.30 kg/m       Umesh Recinos

## 2025-05-09 NOTE — LETTER
5/9/2025      Corey Roland  [address]  [facility] [address]      Dear Colleague,    Thank you for referring your patient, Corey Roland, to the [facility]. Please see a copy of my visit note below.    TRANSPLANT NEPHROLOGY CLINIC VISIT     Assessment & Plan  # DDKT: CKD Stage 1 - Trend up. Will repeat his labs. Pt endorsed feeling stable.    - Baseline Creatinine: ~ 0.8-1.1, trend up to 1.3    - Proteinuria: Normal (<0.2 grams)   - DSA Hx: No DSA   - Last cPRA: 84%   - BK Viremia: Not checked recently due to time from transplant   - Kidney Tx Biopsy Hx: No biopsy history.    # Immunosuppression: Tacrolimus immediate release (goal 4-6) and Mycophenolate mofetil (dose 750 mg every 12 hours)   - Induction with Recent Transplant:  High Intensity Protocol   - Continue with intensive monitoring of immunosuppression for efficacy and toxicity.   - Historical Changes in Immunosuppression: None   - Changes: Not at this time    # Infection Prevention:   Last CD4 Level: Not checked  - PJP: Sulfa/TMP (Bactrim)      - CMV IgG Ab High Risk Discordance (D+/R-) at time of transplant: No  Present CMV Serostatus: Positive  - EBV IgG Ab High Risk Discordance (D+/R-) at time of transplant: No  Present EBV Serostatus: Positive    # Hypertension: Borderline control;  Goal BP: < 140/90   - Changes: Not at this time    # Diabetes: Controlled (HbA1c <7%)  Last HbA1c: 7%    # Hemoglobin: Stable, normal       Iron studies: Replete    # Mineral Bone Disorder:    - Secondary renal hyperparathyroidism; PTH level: Minimally elevated ( pg/ml)        On treatment: None  - Vitamin D; level: Not checked recently, but was low last check        On supplement: No  - Calcium; level: Normal        On supplement: No    # Electrolytes:  - Potassium; level: Normal        On supplement: No  - Bicarbonate; level: Normal        On supplement: No  - Sodium; level: Normal     # Skin Cancer Risk:    -  Discussed sun protection and recommend regular follow up with Dermatology.    # Transplant History:  Etiology of Kidney Failure: Diabetes mellitus type 2  Tx: DDKT  Transplant: 11/24/2016 (Kidney)  Significant transplant-related complications: None    Transplant Office Phone Number: [phone]    Assessment and plan was discussed with the patient and he voiced his understanding and agreement.    Return visit: Return in about 1 year (around 5/9/2026).    Huyen Larios MD    The longitudinal plan of care for the diagnosis(es)/condition(s) as documented were addressed during this visit. Due to the added complexity in care, I will continue to support Corey in the subsequent management and with ongoing continuity of care.      Chief Complaint  Mr. Roland is a 69 year old here for kidney transplant, immunosuppression management, and elevated creatinine.     History of Present Illness    Mr. Roland reports feeling stable overall.  Since last clinic visit:   Hospitalizations: No   New Medical Issues: No  Chest pain or shortness of breath: No  Lower extremity swelling: No  Weight change: No  Nausea and vomiting: No  Diarrhea: No  Heartburn symptoms: No  Fever, sweats or chills: No  Urinary complaints: No    Problem List  Patient Active Problem List   Diagnosis     HTN, kidney transplant related     Secondary renal hyperparathyroidism     Diabetic peripheral neuropathy (H)     Kidney replaced by transplant     Immunosuppressed status     Type 1 diabetes mellitus with nephropathy (H)     Aftercare following organ transplant     Vitamin D deficiency     Post-transplant erythrocytosis     Morbid obesity (H)     Persistent proteinuria     Osteomyelitis (H)       Allergies  No Known Allergies    Medications  Current Outpatient Medications   Medication Sig Dispense Refill     atorvastatin (LIPITOR) 20 MG tablet Take 1 tablet (20 mg) by mouth daily       carvedilol (COREG) 25 MG tablet TAKE ONE TABLET BY MOUTH TWICE DAILY  "WITH MEALS 60 tablet 11     cloNIDine (CATAPRES) 0.3 MG tablet Take 0.6 mg by mouth 2 times daily        DIPHENHYDRAMINE HCL PO Take 50 mg by mouth At Bedtime       FUROSEMIDE PO Take 60 mg by mouth 2 times daily       insulin glargine-lixisenatide (SOLIQUA) pen Inject 45 Units Subcutaneous every morning (before breakfast)       insulin syringe 31G X 5/16\" 0.5 ML MISC FOR ADMINISTERING INSULIN AT HOME 5 TIMES PER DAY.       losartan (COZAAR) 50 MG tablet Take 1 tablet (50 mg) by mouth 2 times daily 180 tablet 3     mycophenolate (GENERIC EQUIVALENT) 250 MG capsule Take 3 capsules (750 mg) by mouth 2 times daily 180 capsule 11     NIFEdipine ER (ADALAT CC) 30 MG TB24 Take 1 tablet (30 mg) by mouth 2 times daily 60 tablet 3     omeprazole 20 MG tablet Take 1 tablet (20 mg) by mouth every morning 90 tablet 3     predniSONE (DELTASONE) 5 MG tablet Take 2 tablets (10 mg) by mouth daily. Take 10 mg daily until Tacrolimus is in goal range, then decrease to 5 mg daily until you are back on Mycophenolate (Cellcept). 60 tablet 0     spironolactone (ALDACTONE) 25 MG tablet Take 1 tablet (25 mg) by mouth daily for 90 days 90 tablet 3     sulfamethoxazole-trimethoprim (BACTRIM) 400-80 MG tablet Take 1 tablet by mouth Every Mon, Wed, Fri Morning. 13 tablet 11     tacrolimus (ENVARSUS XR) 1 MG 24 hr tablet Take 2 tablets (2 mg) by mouth every morning (before breakfast). Total dose 6 mg daily. 60 tablet 11     tacrolimus (ENVARSUS XR) 4 MG 24 hr tablet Take 1 tablet (4 mg) by mouth every morning (before breakfast). Total dose 6mg daily. 30 tablet 11     No current facility-administered medications for this visit.     There are no discontinued medications.    Physical Exam  Vital Signs: BP (!) 157/73   Pulse 95   Temp 98.5  F (36.9  C) (Oral)   Wt 111.4 kg (245 lb 9.6 oz)   SpO2 100%   BMI 33.30 kg/m      GENERAL APPEARANCE: alert and no distress  EYES: eyes grossly normal to inspection  HENT: normal cephalic/atraumatic  RESP: " lungs clear to auscultation  CV: regular rhythm, normal rate  EDEMA: no LE edema bilaterally  ABDOMEN: soft, nondistended, nontender, bowel sounds normal  MS: extremities normal - no gross deformities noted, no evidence of inflammation in joints, no muscle tenderness  SKIN: no rash  NEURO: mentation intact and speech normal  PSYCH: mentation appears normal and affect normal/bright  TX KIDNEY: normal    Data        Latest Ref Rng & Units 5/7/2025     9:31 AM 2/12/2025    10:07 AM 2/3/2025    10:19 AM   Renal   Sodium 135 - 145 mmol/L 139  140  138    K 3.4 - 5.3 mmol/L 4.2  3.8  4.0    Cl 98 - 107 mmol/L 106  107  103    Cl (external) 98 - 107 mmol/L 106  107  103    CO2 22 - 29 mmol/L 23  20  23    Urea Nitrogen 8.0 - 23.0 mg/dL 20.4  11.7  12.0    Creatinine 0.67 - 1.17 mg/dL 1.32  0.68  0.81    Glucose 70 - 99 mg/dL 74  97  118    Calcium 8.8 - 10.4 mg/dL 9.4  9.8  9.6          Latest Ref Rng & Units 2/3/2023     9:42 AM 12/28/2021     9:43 AM 6/26/2020     9:54 AM   Bone Health   Phosphorus 2.5 - 4.5 mg/dL  2.8     Parathyroid Hormone Intact 15 - 65 pg/mL 88   101    Vit D Def 20 - 75 ug/L   18          Latest Ref Rng & Units 5/7/2025     9:31 AM 2/12/2025    10:07 AM 2/3/2025    10:19 AM   Heme   WBC 4.0 - 11.0 10e3/uL 9.8  7.2  8.0    Hgb 13.3 - 17.7 g/dL 15.1  15.6  15.6    Plt 150 - 450 10e3/uL 193  214  236          Latest Ref Rng & Units 12/28/2021     9:43 AM 5/31/2019     9:39 AM 12/28/2018     9:34 AM   Liver   Albumin 3.4 - 5.0 g/dL 4.1  3.9  3.6          Latest Ref Rng & Units 11/25/2016    12:10 AM 11/24/2016     8:55 AM 4/15/2015     2:49 PM   Pancreas   A1C 4.3 - 6.0 % 7.0  7.0  7.2          Latest Ref Rng & Units 11/27/2016     4:00 AM   Iron studies   Iron 35 - 180 ug/dL 74    Iron Saturation Index 15 - 46 % 41    Ferritin 26 - 388 ng/mL 1,611          Latest Ref Rng & Units 5/31/2019     9:40 AM 12/4/2018    12:23 PM 7/27/2018     9:51 AM   [facility] Txp Virology   BK Spec  Plasma  Plasma  Plasma    BK  Res BKNEG^BK Virus DNA Not Detected copies/mL BK Virus DNA Not Detected  BK Virus DNA Not Detected  BK Virus DNA Not Detected    BK Log <2.7 Log copies/mL Not Calculated  Not Calculated  Not Calculated      Failed to redirect to the Timeline version of the REVFS SmartLink.  Recent Labs   Lab Test 02/03/25  1019 02/12/25  1007 05/07/25  0931   DOSTAC 2/2/2025 2/11/2025 5/6/2025   TACROL 3.0* 4.6* 6.2   Prescription drug management  34 minutes spent by me on the date of the encounter doing chart review, history and exam, documentation and further activities per the note    Again, thank you for allowing me to participate in the care of your patient.        Sincerely,        Huyen Larios MD    Electronically signed

## 2025-05-09 NOTE — PROGRESS NOTES
TRANSPLANT NEPHROLOGY CLINIC VISIT     Assessment & Plan   # {TX STATUS:506996430}: CKD Stage { :382785} - Trend up   - Baseline Creatinine: ~ ***   - Proteinuria: { :172428696}   - DSA Hx: {FV RENAL TX DSA LEVEL:368610860}   - Last cPRA: ***%   - BK Viremia: { :942257}   - Kidney Tx Biopsy Hx: { :752552}.    {# Primary/Recurrent Kidney Disease (Optional)    :246290}    {# Pancreas/Liver/Other Transplant (Optional)    :298120748}    # Immunosuppression: {medications :972588527}   - Induction with Recent Transplant:  { :672530}   - Continue with intensive monitoring of immunosuppression for efficacy and toxicity.   - Historical Changes in Immunosuppression: { :042179}   - Changes: { :442736}    # Infection Prevention:   Last CD4 Level: { :270885}  {Infection Prevention Options :511783}      - CMV IgG Ab High Risk Discordance (D+/R-) at time of transplant: {Yes/No    :129874}  Present CMV Serostatus: { :767799}  - EBV IgG Ab High Risk Discordance (D+/R-) at time of transplant: {Yes/No    :871348}  Present EBV Serostatus: { :055610}    {# Blood Pressure    :359284}    {# Diabetes (Optional)    :703497893}    {# Anemia/Hgb/Erythrocytosis (Optional)    :501602058}    # Mineral Bone Disorder:    {- Bone disorder options:311820280}    {# Electrolytes (Optional)    :137638421}    # Other Significant PMH:   - ***: ***     {# Skin cancer (risk)   :572693290}    # Transplant History:  Etiology of Kidney Failure: { :223715}  Tx: { :037086937}  Transplant: 11/24/2016 (Kidney)  Significant transplant-related complications: { :095472203}    Transplant Office Phone Number: [phone]    Assessment and plan was discussed with the patient and he voiced his understanding and agreement.    Return visit: No follow-ups on file.    Huyen Larios MD    The longitudinal plan of care for the diagnosis(es)/condition(s) as documented were addressed during this visit. Due to the added complexity in care, I will continue to support Corey  "in the subsequent management and with ongoing continuity of care.      Chief Complaint   Mr. Roland is a 69 year old here for { :582806360}.     History of Present Illness    ***    Home BP: { :42041279}    Problem List   Patient Active Problem List   Diagnosis    HTN, kidney transplant related    Secondary renal hyperparathyroidism    Diabetic peripheral neuropathy (H)    Kidney replaced by transplant    Immunosuppressed status    Type 1 diabetes mellitus with nephropathy (H)    Aftercare following organ transplant    Vitamin D deficiency    Post-transplant erythrocytosis    Morbid obesity (H)    Persistent proteinuria    Osteomyelitis (H)       Allergies   No Known Allergies    Medications   Current Outpatient Medications   Medication Sig Dispense Refill    atorvastatin (LIPITOR) 20 MG tablet Take 1 tablet (20 mg) by mouth daily      carvedilol (COREG) 25 MG tablet TAKE ONE TABLET BY MOUTH TWICE DAILY WITH MEALS 60 tablet 11    cloNIDine (CATAPRES) 0.3 MG tablet Take 0.6 mg by mouth 2 times daily       DIPHENHYDRAMINE HCL PO Take 50 mg by mouth At Bedtime      FUROSEMIDE PO Take 60 mg by mouth 2 times daily      insulin glargine-lixisenatide (SOLIQUA) pen Inject 45 Units Subcutaneous every morning (before breakfast)      insulin syringe 31G X 5/16\" 0.5 ML MISC FOR ADMINISTERING INSULIN AT HOME 5 TIMES PER DAY.      losartan (COZAAR) 50 MG tablet Take 1 tablet (50 mg) by mouth 2 times daily 180 tablet 3    mycophenolate (GENERIC EQUIVALENT) 250 MG capsule Take 3 capsules (750 mg) by mouth 2 times daily 180 capsule 11    NIFEdipine ER (ADALAT CC) 30 MG TB24 Take 1 tablet (30 mg) by mouth 2 times daily 60 tablet 3    omeprazole 20 MG tablet Take 1 tablet (20 mg) by mouth every morning 90 tablet 3    predniSONE (DELTASONE) 5 MG tablet Take 2 tablets (10 mg) by mouth daily. Take 10 mg daily until Tacrolimus is in goal range, then decrease to 5 mg daily until you are back on Mycophenolate (Cellcept). 60 tablet 0    " spironolactone (ALDACTONE) 25 MG tablet Take 1 tablet (25 mg) by mouth daily for 90 days 90 tablet 3    sulfamethoxazole-trimethoprim (BACTRIM) 400-80 MG tablet Take 1 tablet by mouth Every Mon, Wed, Fri Morning. 13 tablet 11    tacrolimus (ENVARSUS XR) 1 MG 24 hr tablet Take 2 tablets (2 mg) by mouth every morning (before breakfast). Total dose 6 mg daily. 60 tablet 11    tacrolimus (ENVARSUS XR) 4 MG 24 hr tablet Take 1 tablet (4 mg) by mouth every morning (before breakfast). Total dose 6mg daily. 30 tablet 11     No current facility-administered medications for this visit.     There are no discontinued medications.    Physical Exam   Vital Signs: BP (!) 157/73   Pulse 95   Temp 98.5  F (36.9  C) (Oral)   Wt 111.4 kg (245 lb 9.6 oz)   SpO2 100%   BMI 33.30 kg/m      {EXAM    :413485030}    Data         Latest Ref Rng & Units 5/7/2025     9:31 AM 2/12/2025    10:07 AM 2/3/2025    10:19 AM   Renal   Sodium 135 - 145 mmol/L 139  140  138    K 3.4 - 5.3 mmol/L 4.2  3.8  4.0    Cl 98 - 107 mmol/L 106  107  103    Cl (external) 98 - 107 mmol/L 106  107  103    CO2 22 - 29 mmol/L 23  20  23    Urea Nitrogen 8.0 - 23.0 mg/dL 20.4  11.7  12.0    Creatinine 0.67 - 1.17 mg/dL 1.32  0.68  0.81    Glucose 70 - 99 mg/dL 74  97  118    Calcium 8.8 - 10.4 mg/dL 9.4  9.8  9.6          Latest Ref Rng & Units 2/3/2023     9:42 AM 12/28/2021     9:43 AM 6/26/2020     9:54 AM   Bone Health   Phosphorus 2.5 - 4.5 mg/dL  2.8     Parathyroid Hormone Intact 15 - 65 pg/mL 88   101    Vit D Def 20 - 75 ug/L   18          Latest Ref Rng & Units 5/7/2025     9:31 AM 2/12/2025    10:07 AM 2/3/2025    10:19 AM   Heme   WBC 4.0 - 11.0 10e3/uL 9.8  7.2  8.0    Hgb 13.3 - 17.7 g/dL 15.1  15.6  15.6    Plt 150 - 450 10e3/uL 193  214  236          Latest Ref Rng & Units 12/28/2021     9:43 AM 5/31/2019     9:39 AM 12/28/2018     9:34 AM   Liver   Albumin 3.4 - 5.0 g/dL 4.1  3.9  3.6          Latest Ref Rng & Units 11/25/2016    12:10 AM  11/24/2016     8:55 AM 4/15/2015     2:49 PM   Pancreas   A1C 4.3 - 6.0 % 7.0  7.0  7.2          Latest Ref Rng & Units 11/27/2016     4:00 AM   Iron studies   Iron 35 - 180 ug/dL 74    Iron Saturation Index 15 - 46 % 41    Ferritin 26 - 388 ng/mL 1,611          Latest Ref Rng & Units 5/31/2019     9:40 AM 12/4/2018    12:23 PM 7/27/2018     9:51 AM   [facility] Txp Virology   BK Spec  Plasma  Plasma  Plasma    BK Res BKNEG^BK Virus DNA Not Detected copies/mL BK Virus DNA Not Detected  BK Virus DNA Not Detected  BK Virus DNA Not Detected    BK Log <2.7 Log copies/mL Not Calculated  Not Calculated  Not Calculated      Failed to redirect to the Timeline version of the REVFS SmartLink.  Recent Labs   Lab Test 02/03/25  1019 02/12/25  1007 05/07/25  0931   DOSTAC 2/2/2025 2/11/2025 5/6/2025   TACROL 3.0* 4.6* 6.2   Prescription drug management  *** minutes spent by me on the date of the encounter doing chart review, history and exam, documentation and further activities per the note   the [facility] [place] SmartLink.  Recent Labs   Lab Test 02/03/25  1019 02/12/25  1007 05/07/25  0931   DOSTAC 2/2/2025 2/11/2025 5/6/2025   TACROL 3.0* 4.6* 6.2   Prescription drug management  34 minutes spent by me on the date of the encounter doing chart review, history and exam, documentation and further activities per the note

## 2025-05-19 ENCOUNTER — LAB (OUTPATIENT)
Dept: LAB | Facility: CLINIC | Age: 70
End: 2025-05-19
Payer: MEDICARE

## 2025-05-19 ENCOUNTER — RESULTS FOLLOW-UP (OUTPATIENT)
Dept: TRANSPLANT | Facility: CLINIC | Age: 70
End: 2025-05-19

## 2025-05-19 DIAGNOSIS — I15.1 HTN, KIDNEY TRANSPLANT RELATED: ICD-10-CM

## 2025-05-19 DIAGNOSIS — Z94.0 HTN, KIDNEY TRANSPLANT RELATED: ICD-10-CM

## 2025-05-19 DIAGNOSIS — N17.9 AKI (ACUTE KIDNEY INJURY): ICD-10-CM

## 2025-05-19 DIAGNOSIS — Z94.0 KIDNEY REPLACED BY TRANSPLANT: ICD-10-CM

## 2025-05-19 LAB
ALBUMIN SERPL BCG-MCNC: 4 G/DL (ref 3.5–5.2)
ALBUMIN UR-MCNC: NEGATIVE MG/DL
ANION GAP SERPL CALCULATED.3IONS-SCNC: 11 MMOL/L (ref 7–15)
APPEARANCE UR: CLEAR
BACTERIA #/AREA URNS HPF: ABNORMAL /HPF
BILIRUB UR QL STRIP: NEGATIVE
BUN SERPL-MCNC: 13.8 MG/DL (ref 8–23)
CALCIUM SERPL-MCNC: 9.5 MG/DL (ref 8.8–10.4)
CHLORIDE SERPL-SCNC: 107 MMOL/L (ref 98–107)
COLOR UR AUTO: YELLOW
CREAT SERPL-MCNC: 0.84 MG/DL (ref 0.67–1.17)
EGFRCR SERPLBLD CKD-EPI 2021: >90 ML/MIN/1.73M2
GLUCOSE SERPL-MCNC: 73 MG/DL (ref 70–99)
GLUCOSE UR STRIP-MCNC: NEGATIVE MG/DL
HCO3 SERPL-SCNC: 23 MMOL/L (ref 22–29)
HGB UR QL STRIP: NEGATIVE
KETONES UR STRIP-MCNC: NEGATIVE MG/DL
LEUKOCYTE ESTERASE UR QL STRIP: NEGATIVE
NITRATE UR QL: NEGATIVE
PH UR STRIP: 6 [PH] (ref 5–7)
PHOSPHATE SERPL-MCNC: 2.6 MG/DL (ref 2.5–4.5)
POTASSIUM SERPL-SCNC: 3.6 MMOL/L (ref 3.4–5.3)
RBC #/AREA URNS AUTO: ABNORMAL /HPF
SODIUM SERPL-SCNC: 141 MMOL/L (ref 135–145)
SP GR UR STRIP: 1.01 (ref 1–1.03)
SQUAMOUS #/AREA URNS AUTO: ABNORMAL /LPF
TACROLIMUS BLD-MCNC: 4.4 UG/L (ref 5–15)
TME LAST DOSE: ABNORMAL H
TME LAST DOSE: ABNORMAL H
UROBILINOGEN UR STRIP-ACNC: 0.2 E.U./DL
WBC #/AREA URNS AUTO: ABNORMAL /HPF

## 2025-05-19 PROCEDURE — 36415 COLL VENOUS BLD VENIPUNCTURE: CPT

## 2025-05-19 PROCEDURE — 80197 ASSAY OF TACROLIMUS: CPT

## 2025-05-19 PROCEDURE — 81001 URINALYSIS AUTO W/SCOPE: CPT

## 2025-05-19 PROCEDURE — 80069 RENAL FUNCTION PANEL: CPT

## 2025-07-03 DIAGNOSIS — Z94.0 HTN, KIDNEY TRANSPLANT RELATED: ICD-10-CM

## 2025-07-03 DIAGNOSIS — I15.1 HTN, KIDNEY TRANSPLANT RELATED: ICD-10-CM

## 2025-07-03 DIAGNOSIS — Z94.0 KIDNEY REPLACED BY TRANSPLANT: Primary | ICD-10-CM

## 2025-07-03 RX ORDER — MYCOPHENOLATE MOFETIL 250 MG/1
750 CAPSULE ORAL 2 TIMES DAILY
Qty: 180 CAPSULE | Refills: 11 | Status: SHIPPED | OUTPATIENT
Start: 2025-07-03

## 2025-08-17 ENCOUNTER — HEALTH MAINTENANCE LETTER (OUTPATIENT)
Age: 70
End: 2025-08-17